# Patient Record
Sex: FEMALE | Race: WHITE | NOT HISPANIC OR LATINO | Employment: PART TIME | URBAN - METROPOLITAN AREA
[De-identification: names, ages, dates, MRNs, and addresses within clinical notes are randomized per-mention and may not be internally consistent; named-entity substitution may affect disease eponyms.]

---

## 2017-01-10 ENCOUNTER — TELEPHONE (OUTPATIENT)
Dept: PSYCHIATRY | Facility: CLINIC | Age: 22
End: 2017-01-10

## 2017-01-10 NOTE — TELEPHONE ENCOUNTER
----- Message from Elzbieta Reyes sent at 1/10/2017  8:47 AM CST -----  Regarding: Message/Anne  Contact: [phone]  Savanna (mom) is caller. She'd like to see about getting Genesite testing done for the pt at her next med check. She asked for a call back if you have any questions about this request.

## 2017-01-13 ENCOUNTER — OFFICE VISIT (OUTPATIENT)
Dept: PSYCHIATRY | Facility: CLINIC | Age: 22
End: 2017-01-13
Attending: PSYCHOLOGIST
Payer: COMMERCIAL

## 2017-01-13 DIAGNOSIS — F25.1 SCHIZOAFFECTIVE DISORDER, DEPRESSIVE TYPE (H): Primary | ICD-10-CM
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
## 2017-01-20 ENCOUNTER — OFFICE VISIT (OUTPATIENT)
Dept: PSYCHIATRY | Facility: CLINIC | Age: 22
End: 2017-01-20
Attending: PSYCHOLOGIST
Payer: COMMERCIAL

## 2017-01-20 DIAGNOSIS — F25.1 SCHIZOAFFECTIVE DISORDER, DEPRESSIVE TYPE (H): Primary | ICD-10-CM
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
## 2017-01-27 ENCOUNTER — OFFICE VISIT (OUTPATIENT)
Dept: PSYCHIATRY | Facility: CLINIC | Age: 22
End: 2017-01-27
Attending: PSYCHOLOGIST
Payer: COMMERCIAL

## 2017-01-27 DIAGNOSIS — F25.1 SCHIZOAFFECTIVE DISORDER, DEPRESSIVE TYPE (H): Primary | ICD-10-CM

## 2017-01-27 NOTE — PROGRESS NOTES
[facility]  Therapy Progress Note    Patient Name:  Cesia Nettles   :   1995    Date:   2016     Diagnostic Codes:  Schizoaffective Disorder, depressive type  Type of Session:  Individual psychotherapy  Length of Session:   57 minutes  Practitioner:   Esequiel Worthy, Ph.D.  Supervisor:   Chrissy Herrera Psy.D.    Narrative Description of Session:    The patient was seen by Esequiel Worthy, Ph.D., Postdoctoral Associate. The patient arrived twenty minutes early for her appointment, was casually dressed, and presented with appropriate grooming and hygiene.  Patient s mood was depressed and with congruent affect. No abnormalities in speech or thought content were noted, and thought processes were goal-directed. Patient denied the presence of SI/HI, paranoia, AH, or VH.     Patient opened session by stating that she was feeling  a little down today,  and elaborated by further describing recalling various arguments she has been having with her father.  The clinician encouraged the patient to mentalize while also reassuring herself, which she seemed able to do with encouragement.     Patient also said she is unsure whether she is  clicking  with her new DBT program.  An ensuing discussion revealed that the patient does not feel attended to throughout the groups, and would like more individual attention. Clinician reminded the patient about the importance of using effective assertiveness skills, and helped the patient rehearse effective ways of sharing her feelings with the .     Assessment:    Appearance:  Appropriately groomed, casually dressed  Behavior/relationship to examiner/demeanor:  Cooperative, pleasant  Motor activity/EPS:  Normal  Gait:  Normal  Speech rate:  Normal   Speech volume:  Normal  Speech articulation:  Normal  Speech coherence:  Normal  Speech spontaneity:  Normal  Mood (subjective report):  Depressed  Affect (objective appearance):  Depressed   Thought Process (Associations):   Goal-directed  Thought process (Rate): Normal   Thought content:  Normal  Abnormal Perception:  Denies current AH/VH/ paranoia  Insight:  Good insight  Judgment:  Fair    Plan: Continue discussing stressful interpersonal events, encouraging perspective-taking and use of DBT skills, and increasing self-awareness should continue to be helpful.   I did not see this pt directly. This pt is discussed with me in individual psychotherapy supervision, and I agree with the plan as documented. Chrissy Herrera Psy.D. , L.P.

## 2017-02-02 ENCOUNTER — OFFICE VISIT (OUTPATIENT)
Dept: PSYCHIATRY | Facility: CLINIC | Age: 22
End: 2017-02-02
Attending: PSYCHIATRY & NEUROLOGY
Payer: COMMERCIAL

## 2017-02-02 ENCOUNTER — TRANSFERRED RECORDS (OUTPATIENT)
Dept: HEALTH INFORMATION MANAGEMENT | Facility: CLINIC | Age: 22
End: 2017-02-02

## 2017-02-02 VITALS
DIASTOLIC BLOOD PRESSURE: 76 MMHG | BODY MASS INDEX: 33.88 KG/M2 | SYSTOLIC BLOOD PRESSURE: 115 MMHG | WEIGHT: 216.4 LBS | HEART RATE: 105 BPM

## 2017-02-02 DIAGNOSIS — R45.851 SUICIDAL IDEATION: ICD-10-CM

## 2017-02-02 DIAGNOSIS — F25.1 SCHIZOAFFECTIVE DISORDER, DEPRESSIVE TYPE (H): ICD-10-CM

## 2017-02-02 DIAGNOSIS — F20.3 SCHIZOPHRENIA, UNDIFFERENTIATED (H): Primary | ICD-10-CM

## 2017-02-02 LAB
ALBUMIN SERPL-MCNC: 3.9 G/DL (ref 3.4–5)
ALP SERPL-CCNC: 102 U/L (ref 40–150)
ALT SERPL W P-5'-P-CCNC: 23 U/L (ref 0–50)
ANION GAP SERPL CALCULATED.3IONS-SCNC: 6 MMOL/L (ref 3–14)
AST SERPL W P-5'-P-CCNC: 24 U/L (ref 0–45)
BILIRUB SERPL-MCNC: 0.3 MG/DL (ref 0.2–1.3)
BUN SERPL-MCNC: 9 MG/DL (ref 7–30)
CALCIUM SERPL-MCNC: 9.1 MG/DL (ref 8.5–10.1)
CHLORIDE SERPL-SCNC: 106 MMOL/L (ref 94–109)
CHOLEST SERPL-MCNC: 186 MG/DL
CO2 SERPL-SCNC: 27 MMOL/L (ref 20–32)
CREAT SERPL-MCNC: 0.78 MG/DL (ref 0.52–1.04)
ERYTHROCYTE [DISTWIDTH] IN BLOOD BY AUTOMATED COUNT: 13.9 % (ref 10–15)
GFR SERPL CREATININE-BSD FRML MDRD: NORMAL ML/MIN/1.7M2
GLUCOSE SERPL-MCNC: 87 MG/DL (ref 70–99)
HBA1C MFR BLD: 4.9 % (ref 4.3–6)
HCT VFR BLD AUTO: 38.2 % (ref 35–47)
HDLC SERPL-MCNC: 38 MG/DL
HGB BLD-MCNC: 12.3 G/DL (ref 11.7–15.7)
LDLC SERPL CALC-MCNC: 109 MG/DL
MCH RBC QN AUTO: 27.3 PG (ref 26.5–33)
MCHC RBC AUTO-ENTMCNC: 32.2 G/DL (ref 31.5–36.5)
MCV RBC AUTO: 85 FL (ref 78–100)
NONHDLC SERPL-MCNC: 148 MG/DL
PLATELET # BLD AUTO: 274 10E9/L (ref 150–450)
POTASSIUM SERPL-SCNC: 4.3 MMOL/L (ref 3.4–5.3)
PROT SERPL-MCNC: 7.7 G/DL (ref 6.8–8.8)
RBC # BLD AUTO: 4.51 10E12/L (ref 3.8–5.2)
SODIUM SERPL-SCNC: 139 MMOL/L (ref 133–144)
TRIGL SERPL-MCNC: 195 MG/DL
WBC # BLD AUTO: 6.8 10E9/L (ref 4–11)

## 2017-02-02 PROCEDURE — 99212 OFFICE O/P EST SF 10 MIN: CPT | Mod: ZF

## 2017-02-02 PROCEDURE — 85027 COMPLETE CBC AUTOMATED: CPT | Performed by: PSYCHIATRY & NEUROLOGY

## 2017-02-02 PROCEDURE — 83036 HEMOGLOBIN GLYCOSYLATED A1C: CPT | Performed by: PSYCHIATRY & NEUROLOGY

## 2017-02-02 PROCEDURE — 36415 COLL VENOUS BLD VENIPUNCTURE: CPT | Performed by: PSYCHIATRY & NEUROLOGY

## 2017-02-02 PROCEDURE — 80061 LIPID PANEL: CPT | Performed by: PSYCHIATRY & NEUROLOGY

## 2017-02-02 PROCEDURE — 80053 COMPREHEN METABOLIC PANEL: CPT | Performed by: PSYCHIATRY & NEUROLOGY

## 2017-02-02 RX ORDER — LAMOTRIGINE 100 MG/1
100 TABLET ORAL DAILY
Qty: 30 TABLET | Refills: 3 | Status: SHIPPED | OUTPATIENT
Start: 2017-02-02 | End: 2017-07-07

## 2017-02-02 RX ORDER — BENZTROPINE MESYLATE 1 MG/1
1 TABLET ORAL AT BEDTIME
Qty: 30 TABLET | Refills: 6 | Status: SHIPPED | OUTPATIENT
Start: 2017-02-02 | End: 2017-09-26

## 2017-02-02 RX ORDER — FLUOXETINE 40 MG/1
80 CAPSULE ORAL AT BEDTIME
Qty: 60 CAPSULE | Refills: 5 | Status: SHIPPED | OUTPATIENT
Start: 2017-02-02 | End: 2017-09-07

## 2017-02-02 RX ORDER — PERPHENAZINE 4 MG/1
TABLET ORAL
Qty: 210 TABLET | Refills: 5 | Status: SHIPPED | OUTPATIENT
Start: 2017-02-02 | End: 2017-04-14

## 2017-02-02 NOTE — MR AVS SNAPSHOT
After Visit Summary   2/2/2017    Cesia Nettles    MRN: [id number]           Patient Information     Date Of Birth          1995        Visit Information        Provider Department      2/2/2017 8:00 AM Mary Rodríguez MD [facility]        Today's Diagnoses     Schizophrenia, undifferentiated (H)    -  1     Schizoaffective disorder, depressive type         Suicidal ideation           Care Instructions    Please have mom call this number to check if genesight testing is covered by insurance - [phone]. If so, we will get this done at you next appointment.     Thank you for coming to [facility].    Lab Testing:  **If you had lab testing today and your results are reassuring or normal they will be mailed to you or sent through [employer] within 7 days.   **If the lab tests need quick action we will call you with the results.  The phone number we will call with results is # [phone] (home) . If this is not the best number please call our clinic and change the number.    Medication Refills:  If you need any refills please call your pharmacy and they will contact us. Please allow three business for refill processing.   If you need to  your refill at a new pharmacy, please contact the new pharmacy directly. The new pharmacy will help you get your medications transferred.     Scheduling:  If you have any concerns about today's visit or wish to schedule another appointment please call our office during normal business hours [phone] (8-5:00 M-F)    Contact Us:  Please call [phone] during business hours (8-5:00 M-F).  If after clinic hours, or on the weekend, please call  [phone].      MENTAL HEALTH CRISIS NUMBERS:  [facility]:   [facility] - [phone]   Crisis Residence [facility] [place] - [facility] - [phone]   Walk-In Counseling [facility] [place] - [phone]   COPE 24/7 [employer] Team for Adults - [[phone]];  Child - [[phone]]     Crisis Connection - [phone]     [facility]:   [facility] - [phone]   Walk-in counseling [facility] - [phone]   Walk-in counseling [facility] - [phone]   Crisis Residence [facility] - [phone]   Urgent Care Adult Mental Health:   --Drop-in, 24/7 crisis line, and [place] Mobile Team [[phone]]    CRISIS TEXT LINE: Text 150-030 from anywhere, anytime, any crisis 24/7;    OR SEE www.crisistextline.org     Poison Control Center - [phone]    CHILD: [facility] needs assessment team - [phone]     [facility] Lif[facility] - [phone]; or [employer] Lifeline - [phone]    If you have a medical emergency please call 911or go to the nearest ER.                    _____________________________________________    Again thank you for choosing [facility] and please let us know how we can best partner with you to improve you and your family's health.  You may be receiving a survey in the mail regarding this appointment. We would love to have your feedback, both positive and negative, so please fill out the survey and return it using the provided envolpe. The survey is done by an external company, so your answers are anonymous.           Follow-ups after your visit        Your next 10 appointments already scheduled     Apr 14, 2017  8:00 AM   Schizophrenia Follow Up with Mary Rodríguez MD   Psychiatry Clinic ([facility])    [address] [facility] [address] [phone]              Who to contact     Please call your clinic at [phone] to:    Ask questions about your health    Make or cancel appointments    Discuss your medicines    Learn about your test results    Speak to your doctor   If you have compliments or concerns about an experience at your clinic, or if you wish to file a complaint, please  contact [facility] Patient Relations at [phone] or email us at [email or web address]         Additional Information About Your Visit        [employer] Information     [employer] is an electronic gateway that provides easy, online access to your medical records. With [employer], you can request a clinic appointment, read your test results, renew a prescription or communicate with your care team.     To sign up for [employer] visit the website at [email or web address]   You will be asked to enter the access code listed below, as well as some personal information. Please follow the directions to create your username and password.     Your access code is: [email or web address]  Expires: 5/3/2017  8:44 AM     Your access code will  in 90 days. If you need help or a new code, please contact your [facility] Clinic or call [phone] for assistance.        Care EveryWhere ID     This is your Care EveryWhere ID. This could be used by other organizations to access your [place] medical records  [id number]        Your Vitals Were     Pulse                   105            Blood Pressure from Last 3 Encounters:   17 115/76   10/05/16 129/79   16 111/73    Weight from Last 3 Encounters:   17 98.158 kg (216 lb 6.4 oz)   10/05/16 94.62 kg (208 lb 9.6 oz)   16 95.981 kg (211 lb 9.6 oz)              Today, you had the following     No orders found for display         Where to get your medicines      These medications were sent to [facility] [place] - [address], [place] [address]     Phone:  [phone]    - benztropine 1 MG tablet  - FLUoxetine 40 MG capsule  - lamoTRIgine 100 MG tablet  - metFORMIN 500 MG tablet  - perphenazine 4 MG tablet       Primary Care Provider Office Phone # Fax #    Becki YAHIR Valencia [phone]       [facility] [address] [place] [address]         Thank you!     Thank you for choosing [facility]  for your care. Our goal is always to provide you with excellent care. Hearing back from our patients is one way we can continue to improve our services. Please take a few minutes to complete the written survey that you may receive in the mail after your visit with us. Thank you!             Your Updated Medication List - Protect others around you: Learn how to safely use, store and throw away your medicines at www.disposemymeds.org.          This list is accurate as of: 2/2/17  8:44 AM.  Always use your most recent med list.                   Brand Name Dispense Instructions for use    benztropine 1 MG tablet    COGENTIN    30 tablet    Take 1 tablet (1 mg) by mouth At Bedtime       FLUoxetine 40 MG capsule    PROzac    60 capsule    Take 2 capsules (80 mg) by mouth At Bedtime       lamoTRIgine 100 MG tablet    LaMICtal    30 tablet    Take 1 tablet (100 mg) by mouth daily       metFORMIN 500 MG tablet    GLUCOPHAGE    60 tablet    Take 1 tablet (500 mg) by mouth 2 times daily (with meals)       perphenazine 4 MG tablet     210 tablet    Take 12 mg (3 tabs) PO every morning and 16 mg (4 tabs) PO every night.

## 2017-02-02 NOTE — PATIENT INSTRUCTIONS
Please have mom call this number to check if genesight testing is covered by insurance - [phone]. If so, we will get this done at you next appointment.     Thank you for coming to [facility].    Lab Testing:  **If you had lab testing today and your results are reassuring or normal they will be mailed to you or sent through [employer] within 7 days.   **If the lab tests need quick action we will call you with the results.  The phone number we will call with results is # [phone] (home) . If this is not the best number please call our clinic and change the number.    Medication Refills:  If you need any refills please call your pharmacy and they will contact us. Please allow three business for refill processing.   If you need to  your refill at a new pharmacy, please contact the new pharmacy directly. The new pharmacy will help you get your medications transferred.     Scheduling:  If you have any concerns about today's visit or wish to schedule another appointment please call our office during normal business hours [phone] (8-5:00 M-F)    Contact Us:  Please call [phone] during business hours (8-5:00 M-F).  If after clinic hours, or on the weekend, please call  [phone].      MENTAL HEALTH CRISIS NUMBERS:  [facility]:   [facility] - [phone]   Crisis Residence [facility] Residence - [phone]   Walk-In Counseling [facility] - [phone]   COPE 24/7 [facility] Mobile Team for Adults - [[phone]]; Child - [[phone]]     Crisis Connection - [phone]     [facility]:   [facility] - [phone]   Walk-in counseling [facility] - [phone]   Walk-in counseling [facility] - [phone]   Crisis Residence [facility] - [phone]   Urgent Care Adult Mental Health:   --Drop-in, 24/7 crisis line, and [facility] Mobile Team [[phone]]    CRISIS TEXT LINE: Text  307-399 from anywhere, anytime, any crisis 24/7;    OR SEE www.crisistextline.org     Poison Control Center - [phone]    CHILD: [facility] needs assessment team - [phone]     [facility] - [phone]; or [employer] Lifeline - [phone]    If you have a medical emergency please call 911or go to the nearest ER.                    _____________________________________________    Again thank you for choosing [facility] and please let us know how we can best partner with you to improve you and your family's health.  You may be receiving a survey in the mail regarding this appointment. We would love to have your feedback, both positive and negative, so please fill out the survey and return it using the provided envolpe. The survey is done by an external company, so your answers are anonymous.

## 2017-02-02 NOTE — NURSING NOTE
Chief Complaint   Patient presents with     Recheck Medication   Schizoaffective disorder, depressive type     Reviewed allergies, smoking status, and pharmacy preference  Administered abuse screening questions   Obtained weight, blood pressure and heart rate

## 2017-02-02 NOTE — PROGRESS NOTES
"PSYCHIATRY CLINIC PROGRESS NOTE   30 minute medication management   The initial DIAG EVAL was 8/18/14.  Date of most recent Transfer Evaluation is 07/08/2016.    INTERIM HISTORY                                                 PSYCH CRITICAL ITEM HISTORY:  suicide attempts x2, suicidal ideation, psychosis [sxs include VAH, paranoia, ideas of refernce], mutiple psychotropic trials and psych hosp (3-5).   Cesia Nettles is a 21 year old female who was last seen in clinic on 12/1/2016 at which time no changes were made.  The patient reports good treatment adherence.  History was provided by pt who was a good historian.  Since the last visit: She notes that she started DBT 2 weeks ago, she goes there 3 x/week. Has more structure to her day and not feeling as bored. Continues to see her therapist once a week. Had some \"what if\" suicidal thoughts yesterday when she was about to sleep; passive, intermittent, no intent or plan. Since going up on bedtime Perphenazine; has had less voices; no side effects either. Mood has been stable. Relationships with  residents are ok; apart from \"one girl that gets really scary when she is angry.\" Working on talking to staff more; not isolating so much.   Excited to be going to [place] at the end of March with her mom's side of the family to visit terence's brother. Boyfriend is doing well; coming to see her on Sunday. They have been together 8 months. Excited for some news from her cousin Anel - she is pregnant! Siblings are doing well. Shares an episode recently where she got dysregulated and over-reacted at home - working on utilizing coping skills more.  Labs are due today; she is fasting and will get them. Mom wanted her to discuss Genesight testing today as well; not sure if it is covered.  PSYCH ROS:   ANXIETY:  social anxiety and overwhelmed  DEPRESSION:  hypersomnia, low energy, poor concentration /memory, feeling worthless, suicidal ideation  and overwhelmed;  DENIES- " depressed mood, anhedonia, feeling worthless and feeling hopeless  DYSREGULATION:  mood dysregulation and over reactive;  DENIES- impulsive , physically agitated, aggression and violent behavior  PSYCHOSIS:  none;  DENIES- hallucinations, delusions, disorganized speech, disorganized behavior and negative symptoms including avolition, affective flattening, anhedonia, alogia   Denies-suicidal ideation, SIB urges, malia and aggressive behavior    SUBSTANCE USE:     ALCOHOL-  rare       TOBACCO- none        CAFFEINE- no caffeine                      CANNABIS- none  OTHER ILLICIT DRUGS- none    MEDICAL ROS:  Reports  none  Denies  muscle twitches, excessive diaphoresis, restlessness, tremor and shiver, rash, hair loss , menstrual abnormality, skin/eye discoloration and bleeding or freq bruising and akathisia, dystonia, apparent TD, muscle stiffness and tremor [action or resting].       Financial Support- social security disability     Living Situation-  [employer] since 9/4/2014. Pt is youngest in . She is a HS graduate, mom, Savanna is legal guardian  Children- none    PSYCH and CD Critical Summary Points since July 2016 12/16 - Increased Perphenazine to 16 mg at bedtime due to ongoing psychotic symptoms    PAST PSYCH MED TRIALS   see EMR Problem List: Hx of psychiatric care    MEDICAL / SURGICAL HISTORY                                   MEDICAL TEAM:          PCP- Becki Valencia                   [facility]ist- [facility]  Legal guardian: Savanna Hastings (mother)    Pregnant or breastfeeding:  NO      Contraception- yes, Nexplanon 68 mg    Patient Active Problem List   Diagnosis     Schizoaffective disorder, depressive type (H)     Hx of psychiatric care       ALLERGY                                Review of patient's allergies indicates no known allergies.    MEDICATIONS                               Current Outpatient Prescriptions   Medication Sig Dispense  "Refill     perphenazine 4 MG tablet Take 12 mg (3 tabs) PO every morning and 16 mg (4 tabs) PO every night. 210 tablet 3     benztropine (COGENTIN) 1 MG tablet Take 1 tablet (1 mg) by mouth At Bedtime 30 tablet 6     FLUoxetine (PROZAC) 40 MG capsule Take 2 capsules (80 mg) by mouth At Bedtime 60 capsule 5     lamoTRIgine (LAMICTAL) 100 MG tablet Take 1 tablet (100 mg) by mouth daily 30 tablet 3     metFORMIN (GLUCOPHAGE) 500 MG tablet Take 1 tablet (500 mg) by mouth 2 times daily (with meals) 60 tablet 6       VITALS   /76 mmHg  Pulse 105  Wt 98.158 kg (216 lb 6.4 oz)   MENTAL STATUS EXAM                                                             Alertness: alert  and oriented  Appearance: casually groomed and appearance was notable for stains on sweatshirt and pants, erythematous scab on chin  Behavior/Demeanor: cooperative and pleasant, with good  eye contact  Speech: normal and regular rate and rhythm  Language: intact and no problems  Psychomotor: normal or unremarkable  Mood: \"good\"  Affect: full range; was congruent to mood; was congruent to content  Thought Process/Associations: over-inclusive, mildly tangential but re-directable, but can answer questions directly  Thought Content:  Denies violent ideation and psychotic thought, endorses fleeting passive SI with no intent or plan.  Perception:  Reports auditory hallucinations (command-NO) and intermittent paranoia intermittently  Insight: fair  Judgment: fair  Cognition:  does appear grossly intact; formal cognitive testing was not done    LABS and DATA     PHQ9 TODAY = 16  PHQ-9 SCORE 2/25/2016 4/28/2016 7/8/2016   Total Score - - -   Total Score - - -   Total Score 14 13 12       PSYCHIATRIC DIAGNOSES                                                                                                 Schizoaffective disorder, depressed type  R/o cluster B traits    ASSESSMENT                                     Pertinent background: See Transfer " evaluation note dated above.  Additionally, her schizoaffective disorder is impacted by likely cluster B traits, and her symptoms can take a variable course depending on stressors, often interpersonal. She has been doing remarkably better since moving to a group home and especially with supported employment. (Before this transition, had several hospitalizations with frequent SI.) In general, she is doing much better in recent years than she has in the past.    Discussions critical to overall treatment are documented in notes from 7/7/2015    TODAY: Sudha is doing better today since dose adjustment in bedtime dose of Perphenazine last time. Psychotic symptoms are minimal and less distressing. She has started DBT which is crucial for her in dealing with stressors related to interpersonal difficulties, emotional dysregulation and intermittent passive SI. She is also seeing her psychologist Raudel Worthy weekly, here at the [facility] clinic and has good support from her family and boyfriend.  We will confirm that mom has ensured genesight testing is covered by insurance (number will be in AVS) and plan to get this done at next appointment. She has labs due today and will get them after her appointment.                       Psychotropic drug interactions:   Use of perphenazine and fluoxetine administration could increase risk of QT prolongation with concurrent. QTc (5/2014) 423 ms, and perphenazine dose has since been decreased.  Fluoxetine may raise serum levels of perphenazine as the latter is a 2D6 substrate, but benztropine, per Micromedex, may lower blood levels of perphenazine.  Use of perphenazine and benztropine may raise risk of anticholinergic SE. Risk mitigated by therapeutic response to current medication regimen, including good management of psychotic symptoms, as well as tolerance of medication regimen despite elevated pulse (no other features of anticholinergic toxidrome noted).    Management:  Monitoring for  adverse effects, routine vitals, routine labs, periodic EKGs, using lowest therapeutic dose of [perphenazine] and patient is aware of risks     PLAN                                                                                                       1) PSYCHOTROPIC MEDICATIONS:      - Continue Perphenazine 12 mg am and 16 mg at bedtime      - Continue Lamotrigine 100 mg daily      - Continue Fluoxetine 80 mg at bedtime      - Continue Cogentin 1 mg at bedtime      - Continue Metformin 500 mg BID    2) THERAPY: continue with Raudel    3) LABS NEXT DUE:  Neuroleptic labs due today, pt will get after appt.        RATING SCALES:    none    4) REFERRALS [CD, medical, other]: none    5) :  Continue standard services at [facility], [facility] worker and [facility] worker    6) RTC: 8 weeks.    7) CRISIS NUMBERS: Provided in AVS today  ONLY if a DARRIN PT: [facility] [phone] (clinic), [phone] (after hours)     TREATMENT RISK STATEMENT:  The risks, benefits, alternatives and potential adverse effects have been discussed and are understood by the patient/ patient's guardian. The pt understands the risks of using street drugs or alcohol.  There are no medical contraindications, the pt agrees to treatment with the ability to do so.  The patient understands to call 911 or come to the nearest ED if life threatening or urgent symptoms present.     RESIDENT:   Mary Rodríguez MD    Patient was seen and staffed by Dr. Lanier. Supervisor is Dr. Long.  I saw the patient with the resident, and participated in key portions of the service, including the mental status examination and developing the plan of care. I reviewed key portions of the history with the resident. I agree with the findings and plan as documented in this note.    Angie Lanier

## 2017-02-03 ASSESSMENT — PATIENT HEALTH QUESTIONNAIRE - PHQ9: SUM OF ALL RESPONSES TO PHQ QUESTIONS 1-9: 16

## 2017-02-15 ENCOUNTER — TELEPHONE (OUTPATIENT)
Dept: PSYCHIATRY | Facility: CLINIC | Age: 22
End: 2017-02-15

## 2017-02-15 NOTE — TELEPHONE ENCOUNTER
----- Message from Elzbieta Reyes sent at 2/15/2017 10:50 AM CST -----  Regarding: Message/Anne  Contact: [phone]  Brenda  Practitioner at foster care site, from [facility] is calling. She'll be faxing over an eligibility verification form for a reduced state ID fee. Dr. Rodríguez needs to fill out the form.

## 2017-02-17 NOTE — TELEPHONE ENCOUNTER
Signed & dated 2/16/17.  Faxed to Brenda at [employer] ([phone]).    Copy:  -sent to scanning  -retained in clinic until scanning is confirmed

## 2017-02-20 ENCOUNTER — TELEPHONE (OUTPATIENT)
Dept: PSYCHIATRY | Facility: CLINIC | Age: 22
End: 2017-02-20

## 2017-02-20 ENCOUNTER — OFFICE VISIT (OUTPATIENT)
Dept: PSYCHIATRY | Facility: CLINIC | Age: 22
End: 2017-02-20
Attending: PSYCHIATRY & NEUROLOGY
Payer: COMMERCIAL

## 2017-02-20 VITALS
DIASTOLIC BLOOD PRESSURE: 83 MMHG | SYSTOLIC BLOOD PRESSURE: 134 MMHG | BODY MASS INDEX: 34.27 KG/M2 | WEIGHT: 218.8 LBS | HEART RATE: 114 BPM

## 2017-02-20 DIAGNOSIS — F20.3 SCHIZOPHRENIA, UNDIFFERENTIATED (H): ICD-10-CM

## 2017-02-20 PROCEDURE — 99212 OFFICE O/P EST SF 10 MIN: CPT | Mod: ZF

## 2017-02-20 RX ORDER — HYDROXYZINE HYDROCHLORIDE 25 MG/1
TABLET, FILM COATED ORAL
Qty: 60 TABLET | Refills: 3 | Status: SHIPPED
Start: 2017-02-20 | End: 2017-12-11

## 2017-02-20 NOTE — NURSING NOTE
Chief Complaint   Patient presents with     Recheck Medication       Schizophrenia, undifferentiated     Reviewed allergies, smoking status, and pharmacy preference  Administered abuse screening questions   Obtained weight, blood pressure and heart rate

## 2017-02-20 NOTE — PROGRESS NOTES
PSYCHIATRY CLINIC PROGRESS NOTE   30 minute medication management   The initial DIAG EVAL was 14.  Date of most recent Transfer Evaluation is 2016.    INTERIM HISTORY                                                 PSYCH CRITICAL ITEM HISTORY:  suicide attempts x2, suicidal ideation, psychosis [sxs include VAH, paranoia, ideas of refernce], mutiple psychotropic trials and psych hosp (3-5).   Cesia Nettles is a 21 year old female who was last seen in clinic on 2016 at which time no changes were made. The patient reports good treatment adherence.  History was provided by pt who was a good historian.  Since the last visit: She notes that she has been having increased anxiety, mostly everyday usually at night. Feels like these started after she started DBT 2 weeks ago. Happening more these days, quite disabling. Practicing TIPP skills but useful to an extent.   She would like to get genesight testing today, states that mom will pay for these. Continues to see her therapist once a week. Saw Pranav yesterday, she went to [place] yesterday. Excited to be going to [place] at the end of March with her mom's side of the family to visit terence's brother.     Called mom on the phone to ensure she has explored insurance coverage for genesight testing; mom reports that her intention is to see if there are other anti-psychotics that can be used in lieu of perphenazine as she is worried that her dose may need to be increased - reports that Sudha had shared that she had believed that she had a conversation with her  grand-ma. Per mom, Sudha takes a while to get used to dose adjustments.    PSYCH ROS:   ANXIETY:  social anxiety, nervous/tense and fidgety/restless  DEPRESSION:  hypersomnia, low energy, poor concentration /memory, feeling worthless and overwhelmed;  DENIES- depressed mood, anhedonia, feeling worthless, suicidal ideation  and feeling hopeless  DYSREGULATION:  mood dysregulation and over reactive;   DENIES- impulsive , physically agitated, aggression and violent behavior  PSYCHOSIS:  pt's mom reports that pt had thought she had a conversation with her grand-ma, who is ;  DENIES- hallucinations, delusions, disorganized speech, disorganized behavior and negative symptoms including avolition, affective flattening, anhedonia, alogia   Denies-suicidal ideation, SIB urges, malia and aggressive behavior    SUBSTANCE USE:     ALCOHOL-  rare       TOBACCO- none        CAFFEINE- no caffeine                      CANNABIS- none  OTHER ILLICIT DRUGS- none    MEDICAL ROS:  Reports  none  Denies  muscle twitches, excessive diaphoresis, restlessness, tremor and shiver, rash, hair loss , menstrual abnormality, skin/eye discoloration and bleeding or freq bruising and akathisia, dystonia, apparent TD, muscle stiffness and tremor [action or resting].       Financial Support- social security disability     Living Situation-  [employer] since 2014. Pt is youngest in . She is a HS graduate, mom, Savanna is legal guardian  Children- none    PSYCH and CD Critical Summary Points since  - Increased Perphenazine to 16 mg at bedtime due to ongoing psychotic symptoms   - started hydroxyzine for anxiety    PAST PSYCH MED TRIALS   see EMR Problem List: Hx of psychiatric care    MEDICAL / SURGICAL HISTORY                                   MEDICAL TEAM:          PCP- Becki Valencia                   [facility]ist- [facility]  Legal guardian: Savanna Hastings (mother)    Pregnant or breastfeeding:  NO      Contraception- yes, Nexplanon 68 mg    Patient Active Problem List   Diagnosis     Schizoaffective disorder, depressive type (H)     Hx of psychiatric care       ALLERGY                                Review of patient's allergies indicates no known allergies.    MEDICATIONS                               Current Outpatient Prescriptions   Medication Sig  "Dispense Refill     perphenazine 4 MG tablet Take 12 mg (3 tabs) PO every morning and 16 mg (4 tabs) PO every night. 210 tablet 5     benztropine (COGENTIN) 1 MG tablet Take 1 tablet (1 mg) by mouth At Bedtime 30 tablet 6     FLUoxetine (PROZAC) 40 MG capsule Take 2 capsules (80 mg) by mouth At Bedtime 60 capsule 5     lamoTRIgine (LAMICTAL) 100 MG tablet Take 1 tablet (100 mg) by mouth daily 30 tablet 3     metFORMIN (GLUCOPHAGE) 500 MG tablet Take 1 tablet (500 mg) by mouth 2 times daily (with meals) 60 tablet 6       VITALS   /83  Pulse 114  Wt 99.2 kg (218 lb 12.8 oz)  BMI 34.27 kg/m2   MENTAL STATUS EXAM                                                             Alertness: alert  and oriented  Appearance: adequately groomed and casually groomed, erythematous scab on chin  Behavior/Demeanor: cooperative and pleasant, with good  eye contact  Speech: normal and regular rate and rhythm  Language: intact and no problems  Psychomotor: normal or unremarkable  Mood: \"good\"  Affect: full range; was congruent to mood; was congruent to content  Thought Process/Associations: over-inclusive, mildly tangential but re-directable, but can answer questions directly  Thought Content:  Denies violent ideation and psychotic thought, endorses a hx of chronic fleeting passive SI with no intent or plan.  Perception:  Reports auditory hallucinations (command-NO) and intermittent paranoia intermittently  Insight: fair  Judgment: fair  Cognition:  does appear grossly intact; formal cognitive testing was not done    LABS and DATA     PHQ9 TODAY = 12  PHQ-9 SCORE 4/28/2016 7/8/2016 2/2/2017   Total Score - - -   Total Score 13 12 16       PSYCHIATRIC DIAGNOSES                                                                                                 Schizoaffective disorder, depressed type  R/o cluster B traits    ASSESSMENT                                     Pertinent background: See Transfer evaluation note dated " above.  Additionally, her schizoaffective disorder is impacted by likely cluster B traits, and her symptoms can take a variable course depending on stressors, often interpersonal. She has been doing remarkably better since moving to a group home and especially with supported employment. (Before this transition, had several hospitalizations with frequent SI.) In general, she is doing much better in recent years than she has in the past.    Discussions critical to overall treatment are documented in notes from 7/7/2015    TODAY: Sudha is here to get a gensight testing. Confirmed with mom that insurance will cover and MA will  the outstanding cost. Mom would like psychotropics to be tested, to have more pharmacological options for management of residual psychosis. Per mom, Risperdal worked in the past, but with intolerable side effects. Will follow clinic procedure and get sample after visit.  With regards to anxiety, this could likely be exacerbated by ongoing work on DBT kills, which she is utilizing as much as she can. We will start PRN Hydroxyzine today; for this purpose, in the event that TIPP skills are insufficient. No other concerns.                    Psychotropic drug interactions:   Use of perphenazine and fluoxetine administration could increase risk of QT prolongation with concurrent. QTc (5/2014) 423 ms, and perphenazine dose has since been decreased.  Fluoxetine may raise serum levels of perphenazine as the latter is a 2D6 substrate, but benztropine, per Micromedex, may lower blood levels of perphenazine.  Use of perphenazine and benztropine may raise risk of anticholinergic SE. Risk mitigated by therapeutic response to current medication regimen, including good management of psychotic symptoms, as well as tolerance of medication regimen despite elevated pulse (no other features of anticholinergic toxidrome noted).    Management:  Monitoring for adverse effects, routine vitals, routine labs,  periodic EKGs, using lowest therapeutic dose of [perphenazine] and patient is aware of risks     PLAN                                                                                                       1) PSYCHOTROPIC MEDICATIONS:      - Continue Perphenazine 12 mg am and 16 mg at bedtime      - Continue Lamotrigine 100 mg daily      - Continue Fluoxetine 80 mg at bedtime      - Continue Cogentin 1 mg at bedtime      - Continue Metformin 500 mg BID      - Start Hydroxyzine 25 mg   tab BID PRN    2) THERAPY: continue with Raudel    3) LABS NEXT DUE:  Neuroleptic labs due 2/18       RATING SCALES:    none    4) REFERRALS [CD, medical, other]: none    5) :  Continue standard services at [facility], [facility] worker and [facility] worker    6) RTC: 8 weeks.    7) CRISIS NUMBERS: Provided in AVS today  ONLY if a EDUAR PT: [facility] [phone] (clinic), [phone] (after hours)     TREATMENT RISK STATEMENT:  The risks, benefits, alternatives and potential adverse effects have been discussed and are understood by the patient/ patient's guardian. The pt understands the risks of using street drugs or alcohol.  There are no medical contraindications, the pt agrees to treatment with the ability to do so.  The patient understands to call 911 or come to the nearest ED if life threatening or urgent symptoms present.     RESIDENT:   Mary Rodríguez MD    Patient was seen and staffed by Dr. Long. Supervisor is Dr. Long.    I saw the patient with the resident, and participated in key portions of the service, including the mental status examination and developing the plan of care. I reviewed key portions of the history with the resident. I agree with the findings and plan as documented in this note.    Angelic Long MD

## 2017-02-20 NOTE — TELEPHONE ENCOUNTER
-Pt arrived to clinic accompanied by herself (had appt with Dr. Rodríguez immediately prior to sample collection).   -Genetic testing ordered by Dr. Rodríguez for psychotropic medications.  -Buccal swab obtained from pt.  -[employer] consent form signed by pt.  -Copy of insurance card obtained ([place] printed from media tab, HP printed from insurance tab found in chart header).  -All information and samples sealed in envelope provided by [employer].  -Envelope given to clinic intake dept for mailing.  -Will contact pt once results have been rec'd.    Per Dr. Lazar RT in 6-8 weeks.

## 2017-02-20 NOTE — MR AVS SNAPSHOT
After Visit Summary   2/20/2017    Cesia Nettles    MRN: [id number]           Patient Information     Date Of Birth          1995        Visit Information        Provider Department      2/20/2017 2:30 PM Mary Rodríguez MD [facility]ATRY      Today's Diagnoses     Schizophrenia, undifferentiated (H)           Follow-ups after your visit        Follow-up notes from your care team     Return in about 2 months (around 4/20/2017).      Your next 10 appointments already scheduled     Mar 03, 2017  2:00 PM CST   Adult Psychotherapy with Esequiel Worthy, PhD   Psychiatry Clinic ([facility])    [address]  [address] [place] [address]   [phone]            Mar 24, 2017  2:00 PM CDT   Adult Psychotherapy with Esequiel Worthy, PhD   Psychiatry Clinic ([facility])    [address]  [address] [place] [address]   [phone]            Mar 31, 2017  2:00 PM CDT   Adult Psychotherapy with Esequiel Worthy, PhD   Psychiatry Clinic ([facility])    [address]  [address] [place] [address]   [phone]            Apr 07, 2017  2:00 PM CDT   Adult Psychotherapy with Esequiel Worthy, PhD   Psychiatry Clinic ([facility])    [address]  [address] [place] [address]   [phone]            Apr 10, 2017  2:30 PM CDT   Schizophrenia Follow Up with Mary Rodríguez MD   Psychiatry Clinic ([facility])    [address]  [address] [place] [address]   [phone]            Apr 14, 2017  8:00 AM CDT   Schizophrenia Follow Up with Mary Rodríguez MD   Psychiatry Clinic ([facility])    [address]  [address] [place] [address]   [phone]            Apr 14, 2017   2:00 PM CDT   Adult Psychotherapy with Esequiel Worthy PhD   Psychiatry Clinic ([facility])    [address]  [address] [facility] [address]   [phone]            2017  2:00 PM CDT   Adult Psychotherapy with Esequiel Worthy PhD   Psychiatry Clinic ([facility])    [facility]  [address] [facility] [address]  [address] [facility] [address] [phone]            2017  2:00 PM CDT   Adult Psychotherapy with Esequiel Worthy PhD   Psychiatry Clinic ([facility])    [address]  [address] [facility] [id number] [phone]              Who to contact     Please call your clinic at [phone] to:    Ask questions about your health    Make or cancel appointments    Discuss your medicines    Learn about your test results    Speak to your doctor   If you have compliments or concerns about an experience at your clinic, or if you wish to file a complaint, please contact [facility] Patient Relations at [phone] or email us at [email or web address]         Additional Information About Your Visit        [employer] Information     freshbagt is an electronic gateway that provides easy, online access to your medical records. With [employer], you can request a clinic appointment, read your test results, renew a prescription or communicate with your care team.     To sign up for freshbagt visit the website at [email or web address]   You will be asked to enter the access code listed below, as well as some personal information. Please follow the directions to create your username and password.     Your access code is: [email or web address]  Expires: 5/3/2017  8:44 AM     Your access code will  in 90 days. If you need help or a new code, please contact your [facility] or call [phone] for assistance.        Care EveryWhere ID     This  is your Care EveryWhere ID. This could be used by other organizations to access your [facility] medical records  [id number]        Your Vitals Were     Pulse BMI (Body Mass Index)                114 34.27 kg/m2           Blood Pressure from Last 3 Encounters:   02/20/17 134/83   02/02/17 115/76   10/05/16 129/79    Weight from Last 3 Encounters:   02/20/17 99.2 kg (218 lb 12.8 oz)   02/02/17 98.2 kg (216 lb 6.4 oz)   10/05/16 94.6 kg (208 lb 9.6 oz)              Today, you had the following     No orders found for display         Today's Medication Changes          These changes are accurate as of: 2/20/17 11:59 PM.  If you have any questions, ask your nurse or doctor.               Start taking these medicines.        Dose/Directions    hydrOXYzine 25 MG tablet   Commonly known as:  ATARAX   Used for:  Schizophrenia, undifferentiated (H)   Started by:  Mary Rodríguez MD        start Hydroxyzine 25 mg ? tab BID PRN   Quantity:  60 tablet   Refills:  3            Where to get your medicines      These medications were [place] [facility] [place] [address], [place] [address]     Phone:  [phone]     hydrOXYzine 25 MG tablet                Primary Care Provider Office Phone # Fax #    Becki Valencia [phone]       [facility] [address] [place] [address]        Thank you!     Thank you for choosing PSYCHIATRY CLINIC  for your care. Our goal is always to provide you with excellent care. Hearing back from our patients is one way we can continue to improve our services. Please take a few minutes to complete the written survey that you may receive in the mail after your visit with us. Thank you!             Your Updated Medication List - Protect others around you: Learn how to safely use, store and throw away your medicines at www.disposemymeds.org.          This list is accurate as of: 2/20/17 11:59 PM.  Always use your  most recent med list.                   Brand Name Dispense Instructions for use    benztropine 1 MG tablet    COGENTIN    30 tablet    Take 1 tablet (1 mg) by mouth At Bedtime       FLUoxetine 40 MG capsule    PROzac    60 capsule    Take 2 capsules (80 mg) by mouth At Bedtime       hydrOXYzine 25 MG tablet    ATARAX    60 tablet    start Hydroxyzine 25 mg ? tab BID PRN       lamoTRIgine 100 MG tablet    LaMICtal    30 tablet    Take 1 tablet (100 mg) by mouth daily       metFORMIN 500 MG tablet    GLUCOPHAGE    60 tablet    Take 1 tablet (500 mg) by mouth 2 times daily (with meals)       perphenazine 4 MG tablet     210 tablet    Take 12 mg (3 tabs) PO every morning and 16 mg (4 tabs) PO every night.

## 2017-02-21 ASSESSMENT — PATIENT HEALTH QUESTIONNAIRE - PHQ9: SUM OF ALL RESPONSES TO PHQ QUESTIONS 1-9: 12

## 2017-02-24 ENCOUNTER — OFFICE VISIT (OUTPATIENT)
Dept: PSYCHIATRY | Facility: CLINIC | Age: 22
End: 2017-02-24
Attending: PSYCHOLOGIST
Payer: COMMERCIAL

## 2017-02-24 DIAGNOSIS — F25.1 SCHIZOAFFECTIVE DISORDER, DEPRESSIVE TYPE (H): Primary | ICD-10-CM

## 2017-02-24 NOTE — MR AVS SNAPSHOT
After Visit Summary   2/24/2017    Cesia Nettles    MRN: [id number]           Patient Information     Date Of Birth          1995        Visit Information        Provider Department      2/24/2017 2:00 PM Esequiel Worthy, PhD Psychiatry Clinic        Today's Diagnoses     Schizoaffective disorder, depressive type    -  1       Follow-ups after your visit        Your next 10 appointments already scheduled     Mar 03, 2017  2:00 PM CST   Adult Psychotherapy with Esequiel Worthy PhD   Psychiatry Clinic ([facility])    [address] [place] [address]  [address] [place] [address]   [phone]            Mar 24, 2017  2:00 PM CDT   Adult Psychotherapy with Esequiel Worthy, PhD   Psychiatry Clinic ([facility])    [address] [place] [address]  [address] [place] [address]   [phone]            Mar 31, 2017  2:00 PM CDT   Adult Psychotherapy with Esequiel Worthy, PhD   Psychiatry Clinic ([facility])    [address] [address]  [address] [place] [address]   [phone]            Apr 07, 2017  2:00 PM CDT   Adult Psychotherapy with Esequiel Worthy PhD   Psychiatry Clinic ([facility])    [address] [place] [address]  [address] [place] [address]   [phone]            Apr 10, 2017  2:30 PM CDT   Schizophrenia Follow Up with Mary Rodríguez MD   Psychiatry Clinic ([facility])    [address] [address]  [address] [place] [address]   [phone]            Apr 14, 2017  8:00 AM CDT   Schizophrenia Follow Up with Mary Rodríguez MD   Psychiatry Clinic ([facility])    [address] [address]  [address] [place] [address]   [phone]            Apr 14, 2017  2:00 PM CDT   Adult Psychotherapy with Esequiel Worthy PhD   Psychiatry Clinic ([facility])     [address]  [address] [facility] [address]   [phone]            2017  2:00 PM CDT   Adult Psychotherapy with Esequiel Worthy, PhD   Psychiatry Clinic ([facility])    [address]  [address] [facility] [address]   [phone]            2017  2:00 PM CDT   Adult Psychotherapy with Esequiel Worthy PhD   Psychiatry Clinic ([facility])    [address]  [address] [facility] [address]   [phone]              Who to contact     Please call your clinic at [phone] to:    Ask questions about your health    Make or cancel appointments    Discuss your medicines    Learn about your test results    Speak to your doctor   If you have compliments or concerns about an experience at your clinic, or if you wish to file a complaint, please contact [facility] Patient Relations at [phone] or email us at [email or web address]         Additional Information About Your Visit        [employer] Information     [employer] is an electronic gateway that provides easy, online access to your medical records. With [employer], you can request a clinic appointment, read your test results, renew a prescription or communicate with your care team.     To sign up for [employer] visit the website at [email or web address]   You will be asked to enter the access code listed below, as well as some personal information. Please follow the directions to create your username and password.     Your access code is: [email or web address]  Expires: 5/3/2017  8:44 AM     Your access code will  in 90 days. If you need help or a new code, please contact your [facility] or call [phone] for assistance.        Care EveryWhere ID     This is your Care EveryWhere ID. This could be used by other organizations to access your [place]  medical records  [id number]         Blood Pressure from Last 3 Encounters:   02/20/17 134/83   02/02/17 115/76   10/05/16 129/79    Weight from Last 3 Encounters:   02/20/17 99.2 kg (218 lb 12.8 oz)   02/02/17 98.2 kg (216 lb 6.4 oz)   10/05/16 94.6 kg (208 lb 9.6 oz)              Today, you had the following     No orders found for display       Primary Care Provider Office Phone # Fax #    Becki Valencia [phone]       [facility] [address] [address]        Thank you!     Thank you for choosing PSYCHIATRY CLINIC  for your care. Our goal is always to provide you with excellent care. Hearing back from our patients is one way we can continue to improve our services. Please take a few minutes to complete the written survey that you may receive in the mail after your visit with us. Thank you!             Your Updated Medication List - Protect others around you: Learn how to safely use, store and throw away your medicines at www.disposemymeds.org.          This list is accurate as of: 2/24/17 11:59 PM.  Always use your most recent med list.                   Brand Name Dispense Instructions for use    benztropine 1 MG tablet    COGENTIN    30 tablet    Take 1 tablet (1 mg) by mouth At Bedtime       FLUoxetine 40 MG capsule    PROzac    60 capsule    Take 2 capsules (80 mg) by mouth At Bedtime       hydrOXYzine 25 MG tablet    ATARAX    60 tablet    start Hydroxyzine 25 mg ? tab BID PRN       lamoTRIgine 100 MG tablet    LaMICtal    30 tablet    Take 1 tablet (100 mg) by mouth daily       metFORMIN 500 MG tablet    GLUCOPHAGE    60 tablet    Take 1 tablet (500 mg) by mouth 2 times daily (with meals)       perphenazine 4 MG tablet     210 tablet    Take 12 mg (3 tabs) PO every morning and 16 mg (4 tabs) PO every night.

## 2017-02-24 NOTE — PROGRESS NOTES
[facility]  Therapy Progress Note    Patient Name:  Cesia Nettles   :   1995    Date:   2016     Diagnostic Codes:  Schizoaffective Disorder, depressive type  Type of Session:  Individual psychotherapy  Length of Session:   57 minutes  Practitioner:   Esequiel Worthy, Ph.D.  Supervisor:   Chrissy Herrera Psy.D.    Narrative Description of Session:    The patient was seen by Esequiel Worthy, Ph.D., Postdoctoral Associate. The patient arrived thirty minutes early for her appointment and was dressed casually with appropriate grooming/hygiene. Her mood was joyful with anxious affect.  Her thought pattern was at times tangential as she struggled to identify therapeutic topics to discuss. Patient denied any significant paranoia.      Patient shared that things have been going  really well  for her at home after one of her roommate s recently moved out (for the time being). That is, the patient shared that she feels as though she can be  calmer  and less threatened when this roommate is not home. The patient demonstrated an increased ability to mentalize as means to understand the roommate s internal struggles and conflicts.     Time was also spent helping the patient understand actions she has taken to improve her experience at her current DBT group. Initially, the patient said things have gotten better  on their own,  but she was able to take ownership of things she has done to maximize the utility of her group and have a more enjoyable experience.     Assessment:    Appearance:  Appropriately groomed, casually dressed  Behavior/relationship to examiner/demeanor:  Cooperative, pleasant  Motor activity/EPS:  Normal  Gait:  Normal  Speech rate:  Normal   Speech volume:  Normal  Speech articulation:  Normal  Speech coherence:  Normal  Speech spontaneity:  Normal  Mood (subjective report):  Joyful  Affect (objective appearance):  Anxious   Thought Process (Associations):  Goal-directed  Thought process (Rate):  Normal   Thought content:  Normal  Abnormal Perception:  Denies current AH/VH/paranoia  Insight:  Fair   Judgment:  Fair    Plan: Continue working with the patient on improving mentalization, flexible thinking, emotion regulation skills, and effective interpersonal skills.    I did not see this pt directly. This pt is discussed with me in individual psychotherapy supervision, and I agree with the plan as documented. Chrissy Herrera Psy.D. , L.P.

## 2017-03-01 NOTE — TELEPHONE ENCOUNTER
The following specific information is now requested from [employer] online ordering process:    Please list which of the following tests you would like ordered:   Psychotropic    The diagnoses in the patient's problem list correct?   Schizophrenia, undifferentiated (H)  F20.3       Third, Please answer the following questions:  1. Has the patient failed or are they currently failing at least one neuropsychiatric medication?  Yes  2. List the medication(s) that the patient has failed or is currently failing:   Hydroxyzine  Perphenazine  Fluoxetine  lamotrgine  3. Is the patient on multiple medications for his/her condition which increases the risk for adverse drug events?  Yes  4. Is the patient suspected of abuse and/or diversion with their current medication regimen?  No    Routed to Intake to enter in [employer].

## 2017-03-02 DIAGNOSIS — F25.1 SCHIZOAFFECTIVE DISORDER, DEPRESSIVE TYPE (H): Primary | ICD-10-CM

## 2017-03-03 ENCOUNTER — TELEPHONE (OUTPATIENT)
Dept: PSYCHIATRY | Facility: CLINIC | Age: 22
End: 2017-03-03

## 2017-03-03 ENCOUNTER — TRANSFERRED RECORDS (OUTPATIENT)
Dept: HEALTH INFORMATION MANAGEMENT | Facility: CLINIC | Age: 22
End: 2017-03-03

## 2017-03-03 NOTE — TELEPHONE ENCOUNTER
Writer rec'd photocopied results of [employer] testing.      Routed to Dr. Rodríguez to:  1.  Verify if she has rec'd color copy of results  2.  Ask if she would like pt to review with her or have pt meet for [facility] visit to review results.     Copy:  -sent to scanning  -retained in clinic until scanning is confirmed

## 2017-03-03 NOTE — PROGRESS NOTES
Patient Name:  Cesia Nettles   :   1995    Date:   2017      Diagnostic Codes:  Schizoaffective Disorder, depressive type  Type of Session:  Individual psychotherapy  Length of Session:   57 minutes  Practitioner:   Esequiel Worthy, Ph.D.  Supervisor:   Chrissy Herrera Psy.D.     Narrative Description of Session:    The patient was seen by Esequiel Worthy, Ph.D., Postdoctoral Associate. The patient arrived thirty minutes early for her appointment and was dressed casually with appropriate grooming/hygiene. Her mood was joyful with anxious affect. Her thought pattern was at times tangential as she struggled to identify therapeutic topics to discuss. Patient denied any significant paranoia.      Patient shared that things have been going  really well  for her at home after one of her roommate s recently moved out (for the time being). That is, the patient shared that she feels as though she can be  calmer  and less threatened when this roommate is not home. The patient demonstrated an increased ability to mentalize as means to understand the roommate s internal struggles and conflicts.      Time was also spent helping the patient understand actions she has taken to improve her experience at her current DBT group. Initially, the patient said things have gotten better  on their own,  but she was able to take ownership of things she has done to maximize the utility of her group and have a more enjoyable experience.      Assessment:    Appearance:  Appropriately groomed, casually dressed  Behavior/relationship to examiner/demeanor:  Cooperative, pleasant  Motor activity/EPS:  Normal  Gait:  Normal  Speech rate:  Normal   Speech volume:  Normal  Speech articulation:  Normal  Speech coherence:  Normal  Speech spontaneity:  Normal  Mood (subjective report):  Joyful  Affect (objective appearance):  Anxious   Thought Process (Associations):  Goal-directed  Thought process (Rate): Normal   Thought content:   Normal  Abnormal Perception:  Denies current AH/VH/paranoia  Insight:  Fair   Judgment:  Fair     Plan: Continue working with the patient on improving mentalization, flexible thinking, emotion regulation skills, and effective interpersonal skills.     I did not see this pt directly. This pt is discussed with me in individual psychotherapy supervision, and I agree with the plan as documented. Chrissy Herrera Psy.D. , L.P.

## 2017-03-06 NOTE — TELEPHONE ENCOUNTER
Dr. Rodríguez has already placed MTM referral to:  Discuss results of [employer] testing for psychotropics

## 2017-03-06 NOTE — TELEPHONE ENCOUNTER
LVM for pt:  Informed her results are in and the plan is for her and her mom/guardian to meet with a pharmacist to discuss.  Informed her writer will be calling here mom as well.     Called pt's mom/guardiant Savanna:  Informed her of the plan and she is looking forward to discussing the results.        -Routed to Pharmacists Gabbi and Delicia to verify they've rec'd the referral.   -Routed to DONOVAN Enciso.

## 2017-03-06 NOTE — TELEPHONE ENCOUNTER
Mary Rodríguez MD Flaa, Sandrita STUART RN        Caller: Unspecified (3 days ago,  4:20 PM)                     -I have not received a copy yet.  -I put in a MTM consult yesterday to have pt review with the pharm D's. Mom ideally should be present.

## 2017-03-06 NOTE — TELEPHONE ENCOUNTER
Copy of results placed in Dr. Rodríguez's folder.  Writer will notify pt's mom and follow-up on MTM referral.

## 2017-03-09 ENCOUNTER — OFFICE VISIT (OUTPATIENT)
Dept: PHARMACY | Facility: CLINIC | Age: 22
End: 2017-03-09
Payer: COMMERCIAL

## 2017-03-09 VITALS — DIASTOLIC BLOOD PRESSURE: 81 MMHG | SYSTOLIC BLOOD PRESSURE: 124 MMHG | HEART RATE: 109 BPM

## 2017-03-09 DIAGNOSIS — L08.9 LOCAL INFECTION OF SKIN AND SUBCUTANEOUS TISSUE: ICD-10-CM

## 2017-03-09 DIAGNOSIS — K02.9 DENTAL CARIES: ICD-10-CM

## 2017-03-09 DIAGNOSIS — F41.9 ANXIETY: ICD-10-CM

## 2017-03-09 DIAGNOSIS — F25.1 SCHIZOAFFECTIVE DISORDER, DEPRESSIVE TYPE (H): Primary | ICD-10-CM

## 2017-03-09 DIAGNOSIS — F32.89 OTHER DEPRESSION: ICD-10-CM

## 2017-03-09 DIAGNOSIS — G44.219 EPISODIC TENSION-TYPE HEADACHE, NOT INTRACTABLE: ICD-10-CM

## 2017-03-09 DIAGNOSIS — G25.81 RESTLESS LEG SYNDROME: ICD-10-CM

## 2017-03-09 PROCEDURE — 99607 MTMS BY PHARM ADDL 15 MIN: CPT | Performed by: PHARMACIST

## 2017-03-09 PROCEDURE — 99605 MTMS BY PHARM NP 15 MIN: CPT | Performed by: PHARMACIST

## 2017-03-09 RX ORDER — KETOCONAZOLE 20 MG/G
1 CREAM TOPICAL DAILY PRN
COMMUNITY
End: 2019-11-24

## 2017-03-09 RX ORDER — BACITRACIN ZINC 500 [USP'U]/G
1 OINTMENT TOPICAL DAILY PRN
COMMUNITY
End: 2019-11-24

## 2017-03-09 NOTE — Clinical Note
Dr. Rodríguez,  Thank you for referring this patient for MTM!  We reviewed her medications and Genesight results today- please see my note for details.  Pt and her mother seem interested in a discussion about switching medications, but I was clear that they needed to discuss any changes with you.    She does have some gene-drug interactions, but it mostly seems that Cesia has not seen much benefit from increasing doses of perphenazine over the last few months- we discussed options that did not have gene-drug interactions, one of which was paliperidone.  Additionally, she may benefit from increasing metformin to help with appetite suppression, as it does look like her weight has been steadily increasing.  Her next appointment with you is scheduled for 4/10/17, but they thought they would like to get a sooner appointment if possible to discuss any changes.  I let them know that you would call with next steps.  Please see my note for details and let me know how I can help.  Thanks! Delicia

## 2017-03-09 NOTE — PATIENT INSTRUCTIONS
Recommendations from today's MTM visit:                                                    Today we reviewed what your medicines are for, how to know if they are working, that your medicines are safe and how to make your medicine regimen as easy as possible.     1. I will talk to Dr. Rodríguez about your Genesight results and possibly increasing metformin    Please call with any questions!    Next MTM visit: as needed    To schedule another MTM appointment, please call the clinic directly or you may call the MTM scheduling line at [phone] or toll-free at [phone].     My Clinical Pharmacist's contact information:                                                      It was a pleasure seeing you today!  Please feel free to contact me with any questions or concerns you have.     Delicia Hubbard, Elena  [facility]  Phone: [phone]    You may receive a survey about the MTM services you received.  I would appreciate your feedback to help me serve you better in the future. Please fill it out and return it when you can. Your comments will be anonymous.

## 2017-03-09 NOTE — PROGRESS NOTES
"SUBJECTIVE/OBJECTIVE:                                                    Cesia Nettles is a 21 year old female coming in for an initial visit for Medication Therapy Management.  She was referred to me from Dr. Rodríguez. She is seen today with her mother.    Chief Complaint: Genesight review.    Allergies/ADRs: Reviewed in Epic  Tobacco: No tobacco use   Alcohol: not currently using  PMH: Reviewed in Epic    Medication Adherence: no issues reported    Schizoaffective/Depression/Anxiety:  Pt takes lamotrigine 100mg daily, fluoxetine 80mg QHS (varying doses since age 13), perphenazine 12mg QAM and 16mg QPM (been on x 4 years), which was increased a couple months ago from 12mg BID.  Pt feels as though there have been more breakthrough auditory hallucinations over the past year, mostly over the last few months, and does not feel as though symptoms have improved since the perphenazine increase. Pt does experience drowsiness throughout the day, but sleeps well through the night.  Pt was recently started on hydroxyzine 12.5mg BID prn anxiety, which she has only needed a couple times since being started on 2/20/17, but reported that it has been helpful.  Both patient and her mother reported that depression symptoms were not controlled with fluoxetine 80mg until lamotrigine was added.  They are interested in switching medications, as they do not feel that continued increases of perphenazine have been very helpful.    Pt has taken metformin 500mg BID x a \"couple years\" to help prevent increases in blood glucose.  Pt and mother do not believe it has prevented any weight gain.  Mom reported that she did have some diarrhea upon initiation, but it resolved.  No current side effects.    Past meds: Abilify 30mg- no benefit, risperidone 3mg- very effective, but dulling, not effective at 1mg or 2mg; Seroquel- delusions returned quickly  Pt reported no other past antidepressants    Restless legs: Pt has been taking benztropine 1mg QHS " "for \"many years.\"  She reported that she currently has no issues with restless legs or other EPS-type symptoms.  No side effects.    Dental Caries Prevention: Patient uses Prevident 5000 1.1% for brushing teeth BID.  No side effects.  Skin Infection: Pt reported having small infections on skin every once in awhile that turn into scabs.  She uses ketoconazole 2% cream and bacitracin ointment prn, which she reportedly finds helpful.  No side effects.    Headache: Pt has acetaminophen 325mg available prn (325-650mg K1fvddp), which she reported works well with one dose.  No side effects.    Current labs include:  BP Readings from Last 3 Encounters:   17 124/81   17 134/83   17 115/76     Lab Results   Component Value Date    A1C 4.9 2017   .  Lab Results   Component Value Date    CHOL 186 2017     Lab Results   Component Value Date    TRIG 195 2017     Lab Results   Component Value Date    HDL 38 2017     Lab Results   Component Value Date     2017     TSH   Date Value Ref Range Status   12/15/2015 0.98 0.20 - 4.50 mcU/mL Final     ASSESSMENT:                                                       Current medications were reviewed today.     Medication Adherence: no issues identified  Schizoaffective/Depression/Anxiety:    Reviewed Genesight results with patient.  Discussed that results will not tell us which drugs will work but can give us some insight into dosing and side effects based on individual metabolism of each medication. Given current therapy, notable Genesight results include:  1. CYP2D6 poor metabolizer- no activity  2. CYP2C9 poor metabolizer- reduced activity  3. CY ultrarapid metabolizer    Genesight results indicate that patient may have higher than anticipated levels of perphenazine and fluoxetine, though clinically, patient has needed increasing doses for symptom control.  She does, however, have side effects from perphenazine, including " sedation and metabolic effects and may benefit from switching medications if she continues to not have improvement with perphenazine dose increases.  If a medication change is warranted, she may benefit from paliperidone, an active metabolite of risperidone that does not have any gene-drug interactions.  (Risperidone had been very helpful in the past but caused mental dulling.)  Additionally, pt may benefit from reducing fluoxetine and maximizing lamotrigine, as lamotrigine seems to have been more beneficial.  Lastly, unrelated to Genesight results, pt may benefit from increasing metformin to 1000mg BID for appetite suppression- last A1c WNL (4.9).  Metabolic monitoring labs (lipids, weight, A1c, BP) are up to date.    Restless legs: Stable.  Dental Caries Prevention: Stable.  Skin Infection: Continue current medications as needed.  Headache: Stable.    PLAN:                                                      1. Consider increasing metformin to 1000mg BID for appetite suppression.  2. Consider medication changes based on Genesight results (see assessment- perphenazine and fluoxetine).    I spent 60 minutes with this patient today.  A copy of the visit note was provided to the patient's primary care psychiatrist.    Will follow up as needed.    The patient was given a summary of these recommendations as an after visit summary.     Delicia Hubbard, PharmD  [facility]  Phone: [phone]

## 2017-03-09 NOTE — MR AVS SNAPSHOT
After Visit Summary   3/9/2017    Cesia Nettles    MRN: [id number]           Patient Information     Date Of Birth          1995        Visit Information        Provider Department      3/9/2017 1:00 PM PSYCH [facility]        Care Instructions    Recommendations from today's MTM visit:                                                    Today we reviewed what your medicines are for, how to know if they are working, that your medicines are safe and how to make your medicine regimen as easy as possible.     1. I will talk to Dr. Rodríguez about your Genesight results and possibly increasing metformin    Please call with any questions!    Next MTM visit: as needed    To schedule another MTM appointment, please call the clinic directly or you may call the MTM scheduling line at [phone] or toll-free at [phone].     My Clinical Pharmacist's contact information:                                                      It was a pleasure seeing you today!  Please feel free to contact me with any questions or concerns you have.     Delicia Hubbard, Elena  [facility]  Phone: [phone]    You may receive a survey about the MTM services you received.  I would appreciate your feedback to help me serve you better in the future. Please fill it out and return it when you can. Your comments will be anonymous.            Follow-ups after your visit        Your next 10 appointments already scheduled     Mar 24, 2017  2:00 PM CDT   Adult Psychotherapy with Esequiel Worthy, PhD   Psychiatry Clinic ([place] [id number] [facility] [id number] [phone]            Mar 31, 2017  2:00 PM CDT   Adult Psychotherapy with Esequiel Worthy, PhD   Psychiatry Clinic ([address]  [address] [facility] [id number] [phone]             Apr 07, 2017  2:00 PM CDT   Adult Psychotherapy with Esequiel Worthy, PhD   Psychiatry Clinic ([facility])    [address]  [facility] [address]   [phone]            Apr 10, 2017  2:30 PM CDT   Schizophrenia Follow Up with Mary Rodríguez MD   Psychiatry Clinic ([facility])    [address]  [address] [place] [address]   [phone]            Apr 14, 2017  8:00 AM CDT   Schizophrenia Follow Up with Mary Rodríguez MD   Psychiatry Clinic ([facility])    [address]  [address] [place] [address]   [phone]            Apr 14, 2017  2:00 PM CDT   Adult Psychotherapy with Esequiel Worthy PhD   Psychiatry Clinic ([facility])    [address]  [address] [place] [address] [phone]            Apr 21, 2017  2:00 PM CDT   Adult Psychotherapy with Esequiel Worthy, PhD   Psychiatry Clinic ([facility])    [address]  [address] [place] [address] [phone]            Apr 28, 2017  2:00 PM CDT   Adult Psychotherapy with Esequiel Worthy, PhD   Psychiatry Clinic ([facility])    [address]  [address] [place] [address] [phone]              Who to contact     If you have questions or need follow up information about today's clinic visit or your schedule please contact [facility] directly at [phone].  Normal or non-critical lab and imaging results will be communicated to you by MyChart, letter or phone within 4 business days after the clinic has received the results. If you do not hear from us within 7 days, please contact the clinic through MyChart or phone. If you have a critical or abnormal lab result, we will notify you by phone as soon  "as possible.  Submit refill requests through [employer] or call your pharmacy and they will forward the refill request to us. Please allow 3 business days for your refill to be completed.          Additional Information About Your Visit        [employer] Makeup Information     [employer] lets you send messages to your doctor, view your test results, renew your prescriptions, schedule appointments and more. To sign up, go to [email or web address] . Click on \"Log in\" on the left side of the screen, which will take you to the Welcome page. Then click on \"Sign up Now\" on the right side of the page.     You will be asked to enter the access code listed below, as well as some personal information. Please follow the directions to create your username and password.     Your access code is: [email or web address]  Expires: 5/3/2017  8:44 AM     Your access code will  in 90 days. If you need help or a new code, please call your [facility] clinic or [phone].        Care EveryWhere ID     This is your Care EveryWhere ID. This could be used by other organizations to access your [facility] medical records  [id number]         Blood Pressure from Last 3 Encounters:   17 134/83   17 115/76   10/05/16 129/79    Weight from Last 3 Encounters:   17 218 lb 12.8 oz (99.2 kg)   17 216 lb 6.4 oz (98.2 kg)   10/05/16 208 lb 9.6 oz (94.6 kg)              Today, you had the following     No orders found for display       Primary Care Provider Office Phone # Fax #    Becki YAHIR [facility] [phone]       [facility] [address] [place] [address]        Thank you!     Thank you for choosing [facility]  for your care. Our goal is always to provide you with excellent care. Hearing back from our patients is one way we can continue to improve our services. Please take a few minutes to complete the written survey that you may receive in the mail after your visit with us. Thank you!      "        Your Updated Medication List - Protect others around you: Learn how to safely use, store and throw away your medicines at www.disposemymeds.org.          This list is accurate as of: 3/9/17  1:58 PM.  Always use your most recent med list.                   Brand Name Dispense Instructions for use    Acetaminophen 325 MG Caps      Take 325-650 mg by mouth every 4 hours as needed       bacitracin ointment      Apply 1 g topically daily as needed for wound care       benztropine 1 MG tablet    COGENTIN    30 tablet    Take 1 tablet (1 mg) by mouth At Bedtime       FLUoxetine 40 MG capsule    PROzac    60 capsule    Take 2 capsules (80 mg) by mouth At Bedtime       hydrOXYzine 25 MG tablet    ATARAX    60 tablet    start Hydroxyzine 25 mg ? tab BID PRN       ketoconazole 2 % cream    NIZORAL     Apply 1 applicator topically daily as needed for itching or irritation       lamoTRIgine 100 MG tablet    LaMICtal    30 tablet    Take 1 tablet (100 mg) by mouth daily       metFORMIN 500 MG tablet    GLUCOPHAGE    60 tablet    Take 1 tablet (500 mg) by mouth 2 times daily (with meals)       perphenazine 4 MG tablet     210 tablet    Take 12 mg (3 tabs) PO every morning and 16 mg (4 tabs) PO every night.       PREVIDENT 5000 BOOSTER 1.1 % Pste   Generic drug:  Sodium Fluoride      Apply 1 Dose to affected area 2 times daily

## 2017-03-15 ENCOUNTER — TELEPHONE (OUTPATIENT)
Dept: PSYCHIATRY | Facility: CLINIC | Age: 22
End: 2017-03-15

## 2017-03-15 NOTE — TELEPHONE ENCOUNTER
Returned call to pt's mom.  She and pt met with pharmacist to discuss genesight testing (see 3/9/17 encounter).  Pt is eager to move forward with changes.  Mom is wondering if pt needs an appointment to discuss these changes or if they can be discussed over the phone.  PEN 4/10/17.      Routed to Dr. Olea to advise.       Will follow-up with pt's mom.

## 2017-03-15 NOTE — TELEPHONE ENCOUNTER
Mary Rodríguez MD Flaa, Sandrita STUART RN        Caller: Unspecified (Today,  1:39 PM)                       -I can either call mom to discuss potential changes on the phone on Monday   -or have them come in Friday 3/24 - I have an opening at 3 pm - they can make an appt now if they would like that.   -I would prefer the 3/24 face to face - and that is also 2 weeks before their next scheduled appt.

## 2017-03-15 NOTE — TELEPHONE ENCOUNTER
Called mom.  Pt's starts a new job next week so her availability is unknown.  Pt's mom works Friday afternoons so she can't attend.  A phone call on Monday is preferred.  Best times to call mom ar before 10AM or after 3PM but Dr. Rodríguez can call at any time and she may be available.

## 2017-03-15 NOTE — TELEPHONE ENCOUNTER
----- Message from Elzbieta Reyes sent at 3/14/2017  2:31 PM CDT -----  Regarding: Meds/Nwroselyniti  Contact: [phone]  Savanna (mom) is calling. She'd like to f/u after meeting with the pharmacist last week on the med change.

## 2017-03-22 NOTE — TELEPHONE ENCOUNTER
Message  Received: Today       Shahida Cleveland Jodi L, RN       Phone Number: [phone]                       Mom, Savanna, is caller. Would like to talk to someone about the med change that is going to be happening. Says that they will be out of town next week, so would like to talk to someone THIS week.

## 2017-03-23 ENCOUNTER — TELEPHONE (OUTPATIENT)
Dept: PSYCHIATRY | Facility: CLINIC | Age: 22
End: 2017-03-23

## 2017-03-23 NOTE — TELEPHONE ENCOUNTER
Called momSavanna (Sudha's legal guardian)  to discuss genesight results. Sudha started a new job this week and they have been working on reality testing for her recent paranoia.    We discussed optimizing Lamictal and decreasing Fluoxetine and cross-tapering Perphenazine with Paliperidone. Sudha and mom would love to start the anti-psychotic cross-taper first, preferably when they are back from their trip to [place]. I will touch base with mom prior to the appt to ensure the plan still stands. No other concerns.    Mary Rodríguez M.D. M.P.H   PGY-3 Psychiatry Resident

## 2017-04-06 ENCOUNTER — TELEPHONE (OUTPATIENT)
Dept: PSYCHIATRY | Facility: CLINIC | Age: 22
End: 2017-04-06

## 2017-04-14 ENCOUNTER — OFFICE VISIT (OUTPATIENT)
Dept: PSYCHIATRY | Facility: CLINIC | Age: 22
End: 2017-04-14
Attending: PSYCHIATRY & NEUROLOGY
Payer: COMMERCIAL

## 2017-04-14 VITALS
WEIGHT: 214.2 LBS | SYSTOLIC BLOOD PRESSURE: 136 MMHG | DIASTOLIC BLOOD PRESSURE: 76 MMHG | BODY MASS INDEX: 33.55 KG/M2 | HEART RATE: 75 BPM

## 2017-04-14 DIAGNOSIS — F20.3 SCHIZOPHRENIA, UNDIFFERENTIATED (H): ICD-10-CM

## 2017-04-14 PROCEDURE — 99212 OFFICE O/P EST SF 10 MIN: CPT | Mod: ZF

## 2017-04-14 RX ORDER — PALIPERIDONE 3 MG/1
TABLET, EXTENDED RELEASE ORAL
Qty: 53 TABLET | Refills: 3 | Status: SHIPPED | OUTPATIENT
Start: 2017-04-14 | End: 2017-06-15

## 2017-04-14 RX ORDER — PERPHENAZINE 4 MG/1
TABLET ORAL
Qty: 210 TABLET | Refills: 5 | Status: SHIPPED | OUTPATIENT
Start: 2017-04-14 | End: 2017-04-17

## 2017-04-14 NOTE — PROGRESS NOTES
"PSYCHIATRY CLINIC PROGRESS NOTE   30 minute medication management   The initial DIAG EVAL was 8/18/14.  Date of most recent Transfer Evaluation is 07/08/2016.    INTERIM HISTORY                                                 PSYCH CRITICAL ITEM HISTORY:  suicide attempts x2, suicidal ideation, psychosis [sxs include VAH, paranoia, ideas of refernce], mutiple psychotropic trials and psych hosp (3-5).   Cesia Nettles is a 22 year old female who was last seen in clinic on 2/2/2016 at which time no changes were made. The patient reports good treatment adherence.  History was provided by pt who was a good historian.  Since the last visit: She notes that she has been more stressed out due to a new job which has been overwhelming - she will be quitting this week as it is nit a good  fit. Continues to hear voices which are derogatary; yesterday was feeling bad about herself, was feeling like nobody cared about her. Went to the concert with Pranav, birthday went well. Continues to see Alexys her therapist for support.  Called mom to discuss planned taper and ensure that was still the goal of today's visit.    PSYCH ROS:   ANXIETY:  social anxiety, nervous/tense, overwhelmed and with new job  DEPRESSION:  depressed mood, hypersomnia, low energy, poor concentration /memory, feeling worthless, suicidal ideation [intermittent, passive, \"what if's\"] and overwhelmed;  DENIES- anhedonia, feeling worthless and feeling hopeless  DYSREGULATION:  mood dysregulation and over reactive;  DENIES- impulsive , physically agitated, aggression and violent behavior  PSYCHOSIS:  percpetual disturbance [auditory hallucinations (yes;  command-NO;  following command-NO];  DENIES- delusions, paranoia and ideas of reference     SUBSTANCE USE:     ALCOHOL-  rare       TOBACCO- none        CAFFEINE- no caffeine                      CANNABIS- none  OTHER ILLICIT DRUGS- none    MEDICAL ROS:  Reports  none  Denies  muscle twitches, excessive diaphoresis, " restlessness, tremor and shiver, rash, hair loss , menstrual abnormality, skin/eye discoloration and bleeding or freq bruising and akathisia, dystonia, apparent TD, muscle stiffness and tremor [action or resting].       Financial Support- social security disability     Living Situation-  - [employer] [facility] since 9/4/2014. Pt is youngest in . She is a HS graduate, mom, Savanna is legal guardian  Children- none    PSYCH and CD Critical Summary Points since July 2016 12/16 - Increased Perphenazine to 16 mg at bedtime due to ongoing psychotic symptoms  2/17 - started hydroxyzine for anxiety    PAST PSYCH MED TRIALS   see EMR Problem List: Hx of psychiatric care    MEDICAL / SURGICAL HISTORY                                   MEDICAL TEAM:          PCP- Becki Valencia                   Therapist- Cheikh Sierra [facility] [place] [facility]  Legal guardian: Savanna Hastings (mother)    Pregnant or breastfeeding:  NO      Contraception- yes, Nexplanon 68 mg    Patient Active Problem List   Diagnosis     Schizoaffective disorder, depressive type (H)     Hx of psychiatric care       ALLERGY                                Review of patient's allergies indicates no known allergies.    MEDICATIONS                               Current Outpatient Prescriptions   Medication Sig Dispense Refill     Sodium Fluoride (PREVIDENT 5000 BOOSTER) 1.1 % PSTE Apply 1 Dose to affected area 2 times daily       ketoconazole (NIZORAL) 2 % cream Apply 1 applicator topically daily as needed for itching or irritation       bacitracin ointment Apply 1 g topically daily as needed for wound care       Acetaminophen 325 MG CAPS Take 325-650 mg by mouth every 4 hours as needed       hydrOXYzine (ATARAX) 25 MG tablet start Hydroxyzine 25 mg   tab BID PRN 60 tablet 3     perphenazine 4 MG tablet Take 12 mg (3 tabs) PO every morning and 16 mg (4 tabs) PO every night. 210 tablet 5     benztropine (COGENTIN) 1 MG tablet Take 1 tablet (1 mg)  "by mouth At Bedtime 30 tablet 6     FLUoxetine (PROZAC) 40 MG capsule Take 2 capsules (80 mg) by mouth At Bedtime 60 capsule 5     lamoTRIgine (LAMICTAL) 100 MG tablet Take 1 tablet (100 mg) by mouth daily 30 tablet 3     metFORMIN (GLUCOPHAGE) 500 MG tablet Take 1 tablet (500 mg) by mouth 2 times daily (with meals) 60 tablet 6       VITALS   /76  Pulse 75  Wt 97.2 kg (214 lb 3.2 oz)  BMI 33.55 kg/m2   MENTAL STATUS EXAM                                                             Alertness: alert  and oriented  Appearance: adequately groomed and casually groomed, erythematous scab on chin  Behavior/Demeanor: cooperative and pleasant, with good  eye contact  Speech: normal and regular rate and rhythm  Language: intact and no problems  Psychomotor: normal or unremarkable  Mood: \"ok\"  Affect: full range; was congruent to mood; was congruent to content  Thought Process/Associations: over-inclusive, mildly tangential but re-directable, can answer questions directly  Thought Content:  Denies violent ideation and psychotic thought, endorses a hx of chronic fleeting passive SI with no intent or plan.  Perception:  Reports auditory hallucinations (yes;  command-NO;  following command-NO  Insight: fair  Judgment: fair  Cognition:  does appear grossly intact; formal cognitive testing was not done    LABS and DATA     PHQ9 TODAY = 13  PHQ-9 SCORE 7/8/2016 2/2/2017 2/20/2017   Total Score - - -   Total Score 12 16 12       PSYCHIATRIC DIAGNOSES                                                                                                 Schizoaffective disorder, depressed type  R/o cluster B traits    ASSESSMENT                                     Pertinent background: See Transfer evaluation note dated above.  Additionally, her schizoaffective disorder is impacted by likely cluster B traits, and her symptoms can take a variable course depending on stressors, often interpersonal. She has been doing remarkably better " since moving to a group home and especially with supported employment. (Before this transition, had several hospitalizations with frequent SI.) In general, she is doing much better in recent years than she has in the past.    Discussions critical to overall treatment are documented in notes from 7/7/2015    TODAY: Sudha is here to start cross-taper of anti-psychotics due to limited benefits with Perphenazine and side efffects. Per gensight testing, Paliperidone would likely provide benefits similar to Risperidone which she had responded to in the past without the risk of gene-drug interaction. Confirmed with mom via phone that she is agreeable to this plan. We will decrease Perphenazine by 4 mg /week and start Paliperidone at 3 mg and titrate to 6 mg in 1 week. Perphenazine taper should take about 7 weeks (total dose of 28 mg). She is aware to monitor clinical response and call with any concerns during this cross taper. Future medication considerations would include optimizing lamotrigine and reducing Fluoxetine; due to more benefits with the former. No other concerns.                    Psychotropic drug interactions:   Use of perphenazine and fluoxetine administration could increase risk of QT prolongation with concurrent. QTc (5/2014) 423 ms, and perphenazine dose has since been decreased.  Fluoxetine may raise serum levels of perphenazine as the latter is a 2D6 substrate, but benztropine, per Micromedex, may lower blood levels of perphenazine.  Use of perphenazine and benztropine may raise risk of anticholinergic SE. Risk mitigated by therapeutic response to current medication regimen, including good management of psychotic symptoms, as well as tolerance of medication regimen despite elevated pulse (no other features of anticholinergic toxidrome noted).    Management:  Monitoring for adverse effects, routine vitals, routine labs, periodic EKGs, tapering off of [perphenazine] and patient is aware of risks     PLAN                                                                                                        1) PSYCHOTROPIC MEDICATIONS:      - We started Paliperidone 3 mg today, please go up to 6 mg (2 tabs) in a week and hold it till our next appt.      - We will decrease bedtime dose of Perphenazine by 4 mg/week starting today; this taper should take 7 weeks to complete.         - Continue Lamotrigine 100 mg daily      - Continue Fluoxetine 80 mg at bedtime      - Continue Cogentin 1 mg at bedtime      - Continue Metformin 500 mg BID      - Continue Hydroxyzine 25 mg   tab BID PRN    2) THERAPY: continue with Raudel    3) LABS NEXT DUE:  Neuroleptic labs due 2/18       RATING SCALES:    none    4) REFERRALS [CD, medical, other]: none    5) :  Continue standard services at [facility], [facility] worker and [facility] worker    6) RTC: 8 weeks.    7) CRISIS NUMBERS: Provided in AVS today  ONLY if a [facility] PT: [facility] [place] [facility] [phone] (clinic), [phone] (after hours)     TREATMENT RISK STATEMENT:  The risks, benefits, alternatives and potential adverse effects have been discussed and are understood by the patient/ patient's guardian. The pt understands the risks of using street drugs or alcohol.  There are no medical contraindications, the pt agrees to treatment with the ability to do so.  The patient understands to call 911 or come to the nearest ED if life threatening or urgent symptoms present.     RESIDENT:   Mary Rodríguez MD    Patient was seen and staffed by Dr. Long. Supervisor is Dr. Long.    I saw the patient with the resident, and participated in key portions of the service, including the mental status examination and developing the plan of care. I reviewed key portions of the history with the resident. I agree with the findings and plan as documented in this note.    Angelic Long MD

## 2017-04-14 NOTE — PATIENT INSTRUCTIONS
We started Paliperidone 3 mg today, please go up to 6 mg (2 tabs) in a week and hold it till our next appt. This medication has been sent to [facility]    We will decrease bedtime dose of Perphenazine by 4 mg/week starting today; this taper should take 7 weeks to complete. It doesn't matter which dose you decide to go down by.     We will re-assess in 8 weeks to decide if we need to go up on Paliperidone further.     Call with any questions    Thank you for coming to the PSYCHIATRY CLINIC.    Lab Testing:  **If you had lab testing today and your results are reassuring or normal they will be mailed to you or sent through [employer] within 7 days.   **If the lab tests need quick action we will call you with the results.  The phone number we will call with results is # [phone] (home) . If this is not the best number please call our clinic and change the number.    Medication Refills:  If you need any refills please call your pharmacy and they will contact us. Please allow three business for refill processing.   If you need to  your refill at a new pharmacy, please contact the new pharmacy directly. The new pharmacy will help you get your medications transferred.     Scheduling:  If you have any concerns about today's visit or wish to schedule another appointment please call our office during normal business hours [phone] (8-5:00 M-F)    Contact Us:  Please call [phone] during business hours (8-5:00 M-F).  If after clinic hours, or on the weekend, please call  [phone].      MENTAL HEALTH CRISIS NUMBERS:  [facility]:   [facility] - [phone]   Crisis Residence [facility] - [phone]   Walk-In Counseling [facility] - [phone]   COPE [facility] [place] Mobile Team for Adults - [[phone]]; Child - [[phone]]     Crisis Connection - [phone]     [facility]:   [facility] - [phone]   Walk-in counseling [facility]  [place] - [phone]   Walk-in counseling [facility] - [phone]   Crisis Residence [facility] - [phone]   Urgent Care Adult Mental Health:   --Drop-in, 24/7 crisis line, and [facility] Mobile Team [[phone]]    CRISIS TEXT LINE: Text [phone] from anywhere, anytime, any crisis 24/7;    OR SEE www.crisistextline.org     Poison Control Center - [phone]    CHILD: [facility] needs assessment team - [phone]     [facility] Lif[facility] - 1-434.550.6726; or [facility] - 1-568.122.4989    If you have a medical emergency please call 911or go to the nearest ER.                    _____________________________________________    Again thank you for choosing PSYCHIATRY CLINIC and please let us know how we can best partner with you to improve you and your family's health.  You may be receiving a survey in the mail regarding this appointment. We would love to have your feedback, both positive and negative, so please fill out the survey and return it using the provided envolpe. The survey is done by an external company, so your answers are anonymous.

## 2017-04-14 NOTE — MR AVS SNAPSHOT
After Visit Summary   4/14/2017    Cseia Nettles    MRN: [id number]           Patient Information     Date Of Birth          1995        Visit Information        Provider Department      4/14/2017 8:00 AM Mary Rodríguez MD Psychiatry Clinic        Today's Diagnoses     Schizophrenia, undifferentiated (H)          Care Instructions    We started Paliperidone 3 mg today, please go up to 6 mg (2 tabs) in a week and hold it till our next appt. This medication has been sent to [facility]    We will decrease bedtime dose of Perphenazine by 4 mg/week starting today; this taper should take 7 weeks to complete. It doesn't matter which dose you decide to go down by.     We will re-assess in 8 weeks to decide if we need to go up on Paliperidone further.     Call with any questions    Thank you for coming to the PSYCHIATRY CLINIC.    Lab Testing:  **If you had lab testing today and your results are reassuring or normal they will be mailed to you or sent through [employer] within 7 days.   **If the lab tests need quick action we will call you with the results.  The phone number we will call with results is # [phone] (home) . If this is not the best number please call our clinic and change the number.    Medication Refills:  If you need any refills please call your pharmacy and they will contact us. Please allow three business for refill processing.   If you need to  your refill at a new pharmacy, please contact the new pharmacy directly. The new pharmacy will help you get your medications transferred.     Scheduling:  If you have any concerns about today's visit or wish to schedule another appointment please call our office during normal business hours [phone] (8-5:00 M-F)    Contact Us:  Please call [phone] during business hours (8-5:00 M-F).  If after clinic hours, or on the weekend, please call  [phone].      MENTAL HEALTH CRISIS NUMBERS:  [facility]:   [facility]  [facility] - [phone]   Crisis Residence [facility] - [phone]   Walk-In Counseling Center [facility] - [phone]   COPE 24/7 [place] Mobile Team for Adults - [[phone]]; Child - [[phone]]     Crisis Connection - [phone]     [facility] - [phone]   Walk-in counseling [facility] - [phone]   Walk-in counseling [facility] - [phone]   Crisis Residence [facility] - [phone]   Urgent Care Adult Mental Health:   --Drop-in, 24/7 crisis line, and [facility] Mobile Team [[phone]]    CRISIS TEXT LINE: Text [phone] from anywhere, anytime, any crisis 24/7;    OR SEE www.crisistextline.org     Poison Control Center - [phone]    CHILD: [facility] needs assessment team - [phone]     [facility] - [phone]; or [facility] Lifeline - 1-159.855.5046    If you have a medical emergency please call 911or go to the nearest ER.                    _____________________________________________    Again thank you for choosing PSYCHIATRY CLINIC and please let us know how we can best partner with you to improve you and your family's health.  You may be receiving a survey in the mail regarding this appointment. We would love to have your feedback, both positive and negative, so please fill out the survey and return it using the provided envolpe. The survey is done by an external company, so your answers are anonymous.           Follow-ups after your visit        Your next 10 appointments already scheduled     Apr 20, 2017  2:00 PM CDT   Adult Psychotherapy with Esequiel Worthy, PhD   Psychiatry Clinic ([facility])    [address]  [address] [facility] [id number] [phone]            Apr 27, 2017  2:00 PM CDT   Adult Psychotherapy with Esequiel Worthy, PhD   Psychiatry Clinic ([facility])    [address]  [address] [place]  [address] [facility] [id number] [phone]            Jun 01, 2017  8:15 AM CDT   Schizophrenia Follow Up with Mary Rodríguez MD   Psychiatry Clinic ([facility])    [facility]  [address] [facility] [address]  [address] [facility] [id number] [phone]              Who to contact     Please call your clinic at [phone] to:    Ask questions about your health    Make or cancel appointments    Discuss your medicines    Learn about your test results    Speak to your doctor   If you have compliments or concerns about an experience at your clinic, or if you wish to file a complaint, please contact [facility] Patient Relations at [phone] or email us at [email or web address]         Additional Information About Your Visit        Care EveryWhere ID     This is your Care EveryWhere ID. This could be used by other organizations to access your [facility] medical records  [id number]        Your Vitals Were     Pulse BMI (Body Mass Index)                75 33.55 kg/m2           Blood Pressure from Last 3 Encounters:   04/14/17 136/76   03/09/17 124/81   02/20/17 134/83    Weight from Last 3 Encounters:   04/14/17 97.2 kg (214 lb 3.2 oz)   02/20/17 99.2 kg (218 lb 12.8 oz)   02/02/17 98.2 kg (216 lb 6.4 oz)              Today, you had the following     No orders found for display       Primary Care Provider Office Phone # Fax #    Becki YAHIR Valencia [phone]       [facility] [address] [place] [address]        Thank you!     Thank you for choosing PSYCHIATRY CLINIC  for your care. Our goal is always to provide you with excellent care. Hearing back from our patients is one way we can continue to improve our services. Please take a few minutes to complete the written survey that you may receive in the mail after your visit with us. Thank you!             Your Updated Medication List - Protect others  around you: Learn how to safely use, store and throw away your medicines at www.disposemymeds.org.          This list is accurate as of: 4/14/17  8:47 AM.  Always use your most recent med list.                   Brand Name Dispense Instructions for use    Acetaminophen 325 MG Caps      Take 325-650 mg by mouth every 4 hours as needed       bacitracin ointment      Apply 1 g topically daily as needed for wound care       benztropine 1 MG tablet    COGENTIN    30 tablet    Take 1 tablet (1 mg) by mouth At Bedtime       FLUoxetine 40 MG capsule    PROzac    60 capsule    Take 2 capsules (80 mg) by mouth At Bedtime       hydrOXYzine 25 MG tablet    ATARAX    60 tablet    start Hydroxyzine 25 mg ? tab BID PRN       ketoconazole 2 % cream    NIZORAL     Apply 1 applicator topically daily as needed for itching or irritation       lamoTRIgine 100 MG tablet    LaMICtal    30 tablet    Take 1 tablet (100 mg) by mouth daily       metFORMIN 500 MG tablet    GLUCOPHAGE    60 tablet    Take 1 tablet (500 mg) by mouth 2 times daily (with meals)       perphenazine 4 MG tablet     210 tablet    Take 12 mg (3 tabs) PO every morning and 16 mg (4 tabs) PO every night.       PREVIDENT 5000 BOOSTER 1.1 % Pste   Generic drug:  Sodium Fluoride      Apply 1 Dose to affected area 2 times daily

## 2017-04-17 ENCOUNTER — TELEPHONE (OUTPATIENT)
Dept: PSYCHIATRY | Facility: CLINIC | Age: 22
End: 2017-04-17

## 2017-04-17 DIAGNOSIS — F20.3 SCHIZOPHRENIA, UNDIFFERENTIATED (H): ICD-10-CM

## 2017-04-17 RX ORDER — PERPHENAZINE 4 MG/1
TABLET ORAL
Qty: 180 TABLET | Refills: 5 | Status: SHIPPED | OUTPATIENT
Start: 2017-04-17 | End: 2017-06-01

## 2017-04-17 NOTE — TELEPHONE ENCOUNTER
Per 4/14/17 Rx:  perphenazine 4 MG tablet 210 tablet 5 4/14/2017  No   Sig: Start taking 12 mg (3 tabs) PO every morning and 12mg (4 tabs) PO every night. We are decreasing by 4 mg every week starting today.              In review of notes it appears that writing 4 tabs was an error.  However, since the 4/14/17 note isn't yet signed writer will verify this with Dr. Rodríguez and follow-up with pharmacy.

## 2017-04-17 NOTE — TELEPHONE ENCOUNTER
Mary Rodríguez MD Flaa, Sandrita STUART RN        Caller: Unspecified (Today, 12:23 PM)                     It is 12 mg BID, so 3 tabs.      Re-sent Rx accordingly.

## 2017-04-17 NOTE — TELEPHONE ENCOUNTER
----- Message from Elzbieta Reyes sent at 4/17/2017  8:23 AM CDT -----  Regarding: Meds/Anne Bucio from [place]  Ph: [phone]    The Perfenadine 4mg directions say to take 12mg/4 tabs at night, but 4 tabs would be 16 mg. She'd like to clarify.

## 2017-04-18 ENCOUNTER — TELEPHONE (OUTPATIENT)
Dept: PSYCHIATRY | Facility: CLINIC | Age: 22
End: 2017-04-18

## 2017-04-18 ASSESSMENT — PATIENT HEALTH QUESTIONNAIRE - PHQ9: SUM OF ALL RESPONSES TO PHQ QUESTIONS 1-9: 17

## 2017-04-18 NOTE — TELEPHONE ENCOUNTER
Prior Authorization Retail Medication Request  Medication/Dose: Prior Authorization for Paliperidone ER (Invega 3 MG)  Diagnosis and ICD code:   New/Renewal/Insurance Change PA:   Previously Tried and Failed Therapies:     Insurance ID (if provided):  [employer] ID#  [id number]  Insurance Phone (if provided): [phone]/Fax: [phone]    Any additional info from fax request:     If you received a fax notification from an outside Pharmacy:  Pharmacy Name: [facility]  Pharmacy #: [phone]  Pharmacy Fax: [phone]

## 2017-04-19 ENCOUNTER — TELEPHONE (OUTPATIENT)
Dept: PSYCHIATRY | Facility: CLINIC | Age: 22
End: 2017-04-19

## 2017-04-19 NOTE — TELEPHONE ENCOUNTER
Medication Question  Received: Today       Gabbi Marie Jodi L RN       Phone Number: [phone]                     Brenda, mental health practitioner at [facility], is calling with questions regarding the patient's medications.  The patient was prescribed Invega and was supposed to be titrating off of perphenazine, but the Invega requires a prior authorization.  Brenda is wondering if they should wait to start the titration until the PA is approved?  She would like a call back at [phone].

## 2017-04-19 NOTE — LETTER
2017      Name:  Cesia Nettles  :  1995      To Whom It May Concern:    Cesia was recently prescribed Invega.  We have received notice that Invega needs a Prior Authorization.  We will complete this PA and submit it to insurance in hopes it will be approved for coverage.      In the meantime, please continue current dose of perphanzine.  The perphanize taper will start at the same time she starts Invega.     We will keep you updated on the status of the outcome of the Invega Prior Authorization.     Thank You,       Mary Rodríguez MD

## 2017-04-19 NOTE — TELEPHONE ENCOUNTER
Mary Rodríguez MD Flaa, Sandrita STUART RN        Caller: Unspecified (Today, 10:44 AM)                     Yes, lets wait till it gets approved. I am hoping she will still be able to start the taper this week.

## 2017-04-19 NOTE — TELEPHONE ENCOUNTER
I spoke with Brenda and let her know that Dr. Rodríguez would like to wait to start the taper until the Invega is approved. I let her know I would give her a call when we receive the decision letter from insurance. She stated understanding and then asked if there was any way to get a new script for perphenazine starting the same day as the Invega. I let her know I would forward on to see if that is possible. Message forwarded to Sandrita Moise RN. Alejandra Lowery LPN

## 2017-04-21 ENCOUNTER — TELEPHONE (OUTPATIENT)
Dept: PSYCHIATRY | Facility: CLINIC | Age: 22
End: 2017-04-21

## 2017-04-25 NOTE — TELEPHONE ENCOUNTER
Writer called patient and informed her of PA Approval for Paliperidone ER (Invega) 3 MG, called [facility] and left a message regarding PA Approval Rupinder Samayoa LPN

## 2017-04-25 NOTE — TELEPHONE ENCOUNTER
APPROVED  Effective dates 4/14/17 - 4/19/18    Routed to LINDA Bucio to notify pt & pharmacy.           Copy of completed PA and approval letter:  -sent to scanning  -retained in clinic until scanning is confirmed

## 2017-04-26 ENCOUNTER — TELEPHONE (OUTPATIENT)
Dept: PSYCHIATRY | Facility: CLINIC | Age: 22
End: 2017-04-26

## 2017-04-26 NOTE — TELEPHONE ENCOUNTER
Diagnosis for this med is: Schizophrenia, undifferentiated       Routed to Rupinder MCKEON to return call with this information.

## 2017-04-26 NOTE — TELEPHONE ENCOUNTER
----- Message from Shahida Cleveland sent at 4/26/2017  1:32 PM CDT -----  Anne/Sandrita Ahn from [place], is caller. She is wondering what the pt's diagnosis is for the invega    [phone]

## 2017-04-26 NOTE — TELEPHONE ENCOUNTER
Writer called [facility] and spoke with Anabelle and relayed the message. No further questions, she will update the patient's file. Rupinder Samayoa LPN

## 2017-04-27 ENCOUNTER — TELEPHONE (OUTPATIENT)
Dept: PSYCHIATRY | Facility: CLINIC | Age: 22
End: 2017-04-27

## 2017-04-27 NOTE — TELEPHONE ENCOUNTER
----- Message from Shahida Cleveland sent at 4/27/2017  9:47 AM CDT -----  Anne/Sandrita Sims from [employer], is caller. She is calling about the perphenazine. She says that the med is being titrated and they need more detailed directions.     P: [phone]   F: [phone]

## 2017-04-27 NOTE — TELEPHONE ENCOUNTER
"Patient called writer and informed writer that she \"needs a break from therapy.\" Clinician validated patient's concern that work and therapy feel too \"overwhelming,\" and reassured patient that she could seek therapy services from this clinic in the future.   "

## 2017-04-27 NOTE — Clinical Note
Hi Dr. Rodríguez,     Please edit letter to provide specific week-by-week tapering instructions for perphenazine.  [facility] requires specific instructions.     Thank You,   Sandrita

## 2017-04-27 NOTE — LETTER
2017          Name:  Cesia Nettles  :  1995      To Group Home Staff:    Perphanzine tapering instructions:  Week 1:  12 mg PO every morning and 12 mg PO every night  Week 2:   8 mg PO every morning and 12 mg PO every night  Week 3:   8 mg PO every morning and 8 mg PO every night  Week 4:   4 mg PO every morning and 8 mg PO every night  Week 5:   4 mg PO every morning and 4 mg PO every night  Week 6:   None in the morning, 4 mg PO only every night  Then stop.      Sincerely,      Mary Rodríguez MD, MPH

## 2017-05-18 ENCOUNTER — TELEPHONE (OUTPATIENT)
Dept: PSYCHIATRY | Facility: CLINIC | Age: 22
End: 2017-05-18

## 2017-05-18 DIAGNOSIS — F20.3 SCHIZOPHRENIA, UNDIFFERENTIATED (H): ICD-10-CM

## 2017-05-18 NOTE — TELEPHONE ENCOUNTER
Rec'd fax from  stating:  -Pharmacogenetics policy specifically states that multi-gene sequencing panels for all pharmacogenetic indications are considered experimental/investigational.  Experimental/investigational services are generally not covered.    -Services that do not meet coverage criteria are considered not medically necessary.      Options to dispute this:  -Pt/mom can appeal it by calling Medical Appeal line at [phone].  -Health Care Provider can talk with [employer] medical reviewer at [phone].          Copy of this fax:  -sent to scanning  -retained in clinic until scanning is confirmed

## 2017-06-01 ENCOUNTER — OFFICE VISIT (OUTPATIENT)
Dept: PSYCHIATRY | Facility: CLINIC | Age: 22
End: 2017-06-01
Attending: PSYCHIATRY & NEUROLOGY
Payer: COMMERCIAL

## 2017-06-01 VITALS
SYSTOLIC BLOOD PRESSURE: 132 MMHG | WEIGHT: 214.6 LBS | HEART RATE: 112 BPM | DIASTOLIC BLOOD PRESSURE: 85 MMHG | BODY MASS INDEX: 33.61 KG/M2

## 2017-06-01 DIAGNOSIS — F20.3 SCHIZOPHRENIA, UNDIFFERENTIATED (H): ICD-10-CM

## 2017-06-01 PROCEDURE — 99212 OFFICE O/P EST SF 10 MIN: CPT | Mod: ZF

## 2017-06-01 NOTE — PROGRESS NOTES
PSYCHIATRY CLINIC PROGRESS NOTE   30 minute medication management   The initial DIAG EVAL was 8/18/14.  Date of most recent Transfer Evaluation is 07/08/2016.    INTERIM HISTORY                                                 PSYCH CRITICAL ITEM HISTORY:  suicide attempts x2, suicidal ideation, psychosis [sxs include VAH, paranoia, ideas of refernce], mutiple psychotropic trials and psych hosp (3-5).   Cesia Nettles is a 22 year old female who was last seen in clinic on 4/14/2016 at which time Paliperidone- Perphenazine cross taper was started. The patient reports good treatment adherence.  History was provided by pt who was a good historian.  Since the last visit: She notes that she has been doing well with the taper - now on 4 mg Perphenazine at bedtime; no adverse effects, actually feels better in terms of her mood. Notes voices are less as well. Stopped going to therapy sessions, wants to take a break for a while and find a better fit. Living at  is fine, they have a new roommate which is cool because there is less conflict in the house.  Has some family related plans for the summer, excited that sonali is in town. Shares concern of weight gain and requests titration of Metformin.  PSYCH ROS:   ANXIETY:  social anxiety  DEPRESSION:  hypersomnia, weight gain /loss, low energy, poor concentration /memory and overwhelmed;  DENIES- depressed mood, anhedonia, feeling worthless, suicidal ideation  and feeling hopeless  DYSREGULATION:  mood dysregulation;  DENIES- irritable, over reactive, impulsive , physically agitated, aggression and violent behavior  PSYCHOSIS:  percpetual disturbance [hallucinations    and these are much less AH, non-command type, more derogatory];  DENIES- delusions, paranoia and ideas of reference     SUBSTANCE USE:     ALCOHOL-  rare       TOBACCO- none        CAFFEINE- no caffeine                      CANNABIS- none  OTHER ILLICIT DRUGS- none    MEDICAL ROS:  Reports  none  Denies  muscle  twitches, excessive diaphoresis, restlessness, tremor and shiver, rash, hair loss , menstrual abnormality, skin/eye discoloration and bleeding or freq bruising and akathisia, dystonia, apparent TD, muscle stiffness and tremor [action or resting].       Financial Support- social security disability     Living Situation-  - [facility] since 9/4/2014. Pt is youngest in . She is a HS graduate, mom, Savanna is legal guardian  Children- none    PSYCH and CD Critical Summary Points since July 2016 12/16 - Increased Perphenazine to 16 mg at bedtime due to ongoing psychotic symptoms  2/17 - started hydroxyzine for anxiety  4/17 - started Perphenazine and Paliperidone cross taper    PAST PSYCH MED TRIALS   see EMR Problem List: Hx of psychiatric care    MEDICAL / SURGICAL HISTORY                                   MEDICAL TEAM:          PCP- Becki Valencia                   Therapist- Cheikh [facility] [place] [facility]  Legal guardian: Savanna Hastings (mother)    Pregnant or breastfeeding:  NO      Contraception- yes, Nexplanon 68 mg    Patient Active Problem List   Diagnosis     Schizoaffective disorder, depressive type (H)     Hx of psychiatric care       ALLERGY                                Review of patient's allergies indicates no known allergies.    MEDICATIONS                               Current Outpatient Prescriptions   Medication Sig Dispense Refill     perphenazine 4 MG tablet Start taking 12 mg (3 tabs) PO every morning and 12mg (3 tabs) PO every night. We are decreasing by 4 mg every week starting today 180 tablet 5     paliperidone (INVEGA) 3 MG 24 hr tablet Start taking 3 mg ( 1 tab) today and increase to 6 mg ( 2 tabs) after a week 53 tablet 3     Sodium Fluoride (PREVIDENT 5000 BOOSTER) 1.1 % PSTE Apply 1 Dose to affected area 2 times daily       ketoconazole (NIZORAL) 2 % cream Apply 1 applicator topically daily as needed for itching or irritation       bacitracin  "ointment Apply 1 g topically daily as needed for wound care       Acetaminophen 325 MG CAPS Take 325-650 mg by mouth every 4 hours as needed       hydrOXYzine (ATARAX) 25 MG tablet start Hydroxyzine 25 mg   tab BID PRN 60 tablet 3     benztropine (COGENTIN) 1 MG tablet Take 1 tablet (1 mg) by mouth At Bedtime 30 tablet 6     FLUoxetine (PROZAC) 40 MG capsule Take 2 capsules (80 mg) by mouth At Bedtime 60 capsule 5     lamoTRIgine (LAMICTAL) 100 MG tablet Take 1 tablet (100 mg) by mouth daily 30 tablet 3     metFORMIN (GLUCOPHAGE) 500 MG tablet Take 1 tablet (500 mg) by mouth 2 times daily (with meals) 60 tablet 6       VITALS   /85  Pulse 112  Wt 97.3 kg (214 lb 9.6 oz)  BMI 33.61 kg/m2   MENTAL STATUS EXAM                                                             Alertness: alert  and oriented  Appearance: adequately groomed and casually groomed, erythematous scab on chin  Behavior/Demeanor: cooperative and pleasant, with good  eye contact  Speech: normal and regular rate and rhythm  Language: intact and no problems  Psychomotor: normal or unremarkable  Mood: \"ok, much better, really\"  Affect: full range; was congruent to mood; was congruent to content  Thought Process/Associations: over-inclusive, mildly tangential but re-directable, can answer questions directly  Thought Content:  Denies suicidal ideation  and violent ideation,   Perception:  Reports auditory hallucinations (yes;  command-NO;  following command-NO  Insight: fair  Judgment: fair  Cognition:  does appear grossly intact; formal cognitive testing was not done    LABS and DATA     PHQ9 TODAY = 14  PHQ-9 SCORE 2/2/2017 2/20/2017 4/14/2017   Total Score - - -   Total Score 16 12 17       PSYCHIATRIC DIAGNOSES                                                                                                 Schizoaffective disorder, depressed type  R/o cluster B traits    ASSESSMENT                                     Pertinent background: See " Transfer evaluation note dated above.  Additionally, her schizoaffective disorder is impacted by likely cluster B traits, and her symptoms can take a variable course depending on stressors, often interpersonal. She has been doing remarkably better since moving to a group home and especially with supported employment. (Before this transition, had several hospitalizations with frequent SI.) In general, she is doing much better in recent years than she has in the past.    Discussions critical to overall treatment are documented in notes from 7/7/2015    TODAY: Sudha did well with cross taper of Paliperidone and Perphenazine, now on last week of Perphanzine. She tolerated well with improvement in MH symptoms - mood, psychosis, dysregulation. We will leave Paliperidone at 6 mg for now and re-assess need for further titration in the future. Also, per [employer] testing, future medication considerations would include optimizing lamotrigine and reducing Fluoxetine; due to more benefits with the former.  Due to endorsed fatigue 2/2 weight gain, discussed the use of behavioral interventions such as physical activities and healthy nutrition. Per her request, will increase Metformin today to aid in this goal; she has been on higher doses in the past and an increase was recommended by MTM consult a few months ago. She is aware to call for any concerns/ side effects.  Of note, patient is taking a break from therapy, but is open to re-starting at a later date. Will coordinate care with her therapist especially as patient would like to transition to another provider. No other concerns.                Psychotropic drug interactions:   Use of perphenazine and fluoxetine administration could increase risk of QT prolongation with concurrent. QTc (5/2014) 423 ms, and perphenazine dose is being tapered.  Fluoxetine may raise serum levels of perphenazine as the latter is a 2D6 substrate, but benztropine, per Micromedex, may lower blood  levels of perphenazine.  Use of perphenazine and benztropine may raise risk of anticholinergic SE. Risk mitigated by therapeutic response to current medication regimen, including good management of psychotic symptoms, as well as tolerance of medication regimen despite elevated pulse (no other features of anticholinergic toxidrome noted).    Management:  Monitoring for adverse effects, routine vitals, routine labs, periodic EKGs, tapering off of [perphenazine] and patient is aware of risks     PLAN                                                                                                       1) PSYCHOTROPIC MEDICATIONS:      - Continue Paliperidone 6 mg       - Continue Perphenazine at 4 mg; taper ends this week      - Continue Lamotrigine 100 mg daily      - Continue Fluoxetine 80 mg at bedtime      - Continue Cogentin 1 mg at bedtime      - Increase Metformin to 1000 mg BID      - Continue Hydroxyzine 25 mg   tab BID PRN    2) THERAPY: encourage    3) LABS NEXT DUE:  Neuroleptic labs due 2/18       RATING SCALES:    none    4) REFERRALS [CD, medical, other]: none    5) :  Continue standard services at [facility], [facility] worker and [facility] worker    6) RTC: 8 weeks with new resident, pt is aware to call with concerns in the meantime.    7) CRISIS NUMBERS: Provided in AVS today  ONLY if a [facility] PT: [facility] [phone] (clinic), [phone] (after hours)     TREATMENT RISK STATEMENT:  The risks, benefits, alternatives and potential adverse effects have been discussed and are understood by the patient/ patient's guardian. The pt understands the risks of using street drugs or alcohol.  There are no medical contraindications, the pt agrees to treatment with the ability to do so.  The patient understands to call 911 or come to the nearest ED if life threatening or urgent symptoms present.     RESIDENT:   Mary Rodríguez MD    Patient was not staffed due to logistical issues.  Supervisor is Dr. Rosales who will review and sign the note.    I did not see this patient directly. I have reviewed the documentation and I agree with the resident's plan of care.     Oraila Rosales MD

## 2017-06-01 NOTE — PATIENT INSTRUCTIONS
We increased the Metformin to 100 mg BID    Complete the ongoing taper and further adjustments to your medications will be done next time.    Will talk to Alexys about finding you a new therapist.     Great job!    Thank you for coming to the PSYCHIATRY CLINIC.    Lab Testing:  **If you had lab testing today and your results are reassuring or normal they will be mailed to you or sent through [employer] within 7 days.   **If the lab tests need quick action we will call you with the results.  The phone number we will call with results is # [phone] (home) . If this is not the best number please call our clinic and change the number.    Medication Refills:  If you need any refills please call your pharmacy and they will contact us. Please allow three business for refill processing.   If you need to  your refill at a new pharmacy, please contact the new pharmacy directly. The new pharmacy will help you get your medications transferred.     Scheduling:  If you have any concerns about today's visit or wish to schedule another appointment please call our office during normal business hours [phone] (8-5:00 M-F)    Contact Us:  Please call [phone] during business hours (8-5:00 M-F).  If after clinic hours, or on the weekend, please call  [phone].      MENTAL HEALTH CRISIS NUMBERS:  [facility]:   [facility] - [phone]   Crisis Residence [facility]idence - [phone]   Walk-In Counseling [facility] - [phone]   COPE 24/7 [place] Mobile Team for Adults - [[phone]]; Child - [[phone]]     Crisis Connection - [phone]     [facility]:   [facility] - [phone]   Walk-in counseling [facility] - [phone]   Walk-in counseling [facility] - [phone]   Crisis Residence [facility] Residence - [phone]   Urgent Care Adult Mental Health:   --Drop-in, 24/7 crisis line, and [place]  Mobile Team [[phone]]    CRISIS TEXT LINE: Text [phone] from anywhere, anytime, any crisis 24/7;    OR SEE www.crisistextline.org     Poison Control Center - [phone]    CHILD: [facility] needs assessment team - [phone]     [facility] Lifeline - 1-656.665.1940; or [facility] Lifeline - 1-851.386.2785    If you have a medical emergency please call 911or go to the nearest ER.                    _____________________________________________    Again thank you for choosing PSYCHIATRY CLINIC and please let us know how we can best partner with you to improve you and your family's health.  You may be receiving a survey in the mail regarding this appointment. We would love to have your feedback, both positive and negative, so please fill out the survey and return it using the provided envolpe. The survey is done by an external company, so your answers are anonymous.

## 2017-06-01 NOTE — MR AVS SNAPSHOT
After Visit Summary   6/1/2017    Cesia Nettles    MRN: [id number]           Patient Information     Date Of Birth          1995        Visit Information        Provider Department      6/1/2017 8:15 AM Mary Rodríguez MD Psychiatry Clinic        Today's Diagnoses     Schizophrenia, undifferentiated (H)          Care Instructions    We increased the Metformin to 100 mg BID    Complete the ongoing taper and further adjustments to your medications will be done next time.    Will talk to Alexys about finding you a new therapist.     Great job!    Thank you for coming to the PSYCHIATRY CLINIC.    Lab Testing:  **If you had lab testing today and your results are reassuring or normal they will be mailed to you or sent through [employer] within 7 days.   **If the lab tests need quick action we will call you with the results.  The phone number we will call with results is # [phone] (home) . If this is not the best number please call our clinic and change the number.    Medication Refills:  If you need any refills please call your pharmacy and they will contact us. Please allow three business for refill processing.   If you need to  your refill at a new pharmacy, please contact the new pharmacy directly. The new pharmacy will help you get your medications transferred.     Scheduling:  If you have any concerns about today's visit or wish to schedule another appointment please call our office during normal business hours [phone] (8-5:00 M-F)    Contact Us:  Please call [phone] during business hours (8-5:00 M-F).  If after clinic hours, or on the weekend, please call  [phone].      MENTAL HEALTH CRISIS NUMBERS:  [facility]:   [facility] - [phone]   Crisis Residence [facility] - [phone]   Walk-In Counseling Center [facility] - [phone]   COPE 24/7 [place] Mobile Team for Adults - [[phone]]; Child - [[phone]]      Crisis Connection - [phone]     [facility] - [phone]   Walk-in counseling [facility] - [facility] - [phone]   Walk-in counseling [facility] - [phone]   Crisis Residence [facility] - [phone]   Urgent Care Adult Mental Health:   --Drop-in, 24/7 crisis line, and [place] Mobile Team [[phone]]    CRISIS TEXT LINE: Text [phone] from anywhere, anytime, any crisis 24/7;    OR SEE www.crisistextline.org     Poison Control Center - [phone]    CHILD: [facility] needs assessment team - [phone]     [facility] Lifeline - [phone]; or [employer] Lifeline - 1-529.610.4137    If you have a medical emergency please call 911or go to the nearest ER.                    _____________________________________________    Again thank you for choosing PSYCHIATRY CLINIC and please let us know how we can best partner with you to improve you and your family's health.  You may be receiving a survey in the mail regarding this appointment. We would love to have your feedback, both positive and negative, so please fill out the survey and return it using the provided envolpe. The survey is done by an external company, so your answers are anonymous.             Follow-ups after your visit        Your next 10 appointments already scheduled     Jul 26, 2017  8:45 AM CDT   Schizophrenia Follow Up with Twyla Petit MD   Psychiatry Clinic ([facility])    [address] [facility] [address] [phone]              Who to contact     Please call your clinic at [phone] to:    Ask questions about your health    Make or cancel appointments    Discuss your medicines    Learn about your test results    Speak to your doctor   If you have compliments or concerns about an experience at your clinic, or if you wish to file a complaint, please contact [facility]  Physicians Patient Relations at [phone] or email us at [email or web address]         Additional Information About Your Visit        Care EveryWhere ID     This is your Care EveryWhere ID. This could be used by other organizations to access your [place] medical records  [id number]        Your Vitals Were     Pulse BMI (Body Mass Index)                112 33.61 kg/m2           Blood Pressure from Last 3 Encounters:   06/01/17 132/85   04/14/17 136/76   03/09/17 124/81    Weight from Last 3 Encounters:   06/01/17 97.3 kg (214 lb 9.6 oz)   04/14/17 97.2 kg (214 lb 3.2 oz)   02/20/17 99.2 kg (218 lb 12.8 oz)              Today, you had the following     No orders found for display       Primary Care Provider Office Phone # Fax #    Becki Valencia [phone]       [facility] [address] [place] [address]        Thank you!     Thank you for choosing PSYCHIATRY CLINIC  for your care. Our goal is always to provide you with excellent care. Hearing back from our patients is one way we can continue to improve our services. Please take a few minutes to complete the written survey that you may receive in the mail after your visit with us. Thank you!             Your Updated Medication List - Protect others around you: Learn how to safely use, store and throw away your medicines at www.disposemymeds.org.          This list is accurate as of: 6/1/17  9:08 AM.  Always use your most recent med list.                   Brand Name Dispense Instructions for use    Acetaminophen 325 MG Caps      Take 325-650 mg by mouth every 4 hours as needed       bacitracin ointment      Apply 1 g topically daily as needed for wound care       benztropine 1 MG tablet    COGENTIN    30 tablet    Take 1 tablet (1 mg) by mouth At Bedtime       FLUoxetine 40 MG capsule    PROzac    60 capsule    Take 2 capsules (80 mg) by mouth At Bedtime       hydrOXYzine 25 MG tablet    ATARAX    60 tablet    start  Hydroxyzine 25 mg ? tab BID PRN       ketoconazole 2 % cream    NIZORAL     Apply 1 applicator topically daily as needed for itching or irritation       lamoTRIgine 100 MG tablet    LaMICtal    30 tablet    Take 1 tablet (100 mg) by mouth daily       metFORMIN 500 MG tablet    GLUCOPHAGE    60 tablet    Take 1 tablet (500 mg) by mouth 2 times daily (with meals)       paliperidone 3 MG 24 hr tablet    INVEGA    53 tablet    Start taking 3 mg ( 1 tab) today and increase to 6 mg ( 2 tabs) after a week       PREVIDENT 5000 BOOSTER 1.1 % Pste   Generic drug:  Sodium Fluoride      Apply 1 Dose to affected area 2 times daily

## 2017-06-02 ASSESSMENT — PATIENT HEALTH QUESTIONNAIRE - PHQ9: SUM OF ALL RESPONSES TO PHQ QUESTIONS 1-9: 14

## 2017-06-10 ENCOUNTER — TELEPHONE (OUTPATIENT)
Dept: PSYCHIATRY | Facility: CLINIC | Age: 22
End: 2017-06-10

## 2017-06-10 NOTE — TELEPHONE ENCOUNTER
"Pt contacted on call psychiatry resident as she reports that she has been having some increase in mental health symptoms, specifically paranoia over the last few days. Yesterday she believed her step mother was calling her names and that people do not like her or care about her. She reports she is able to maintain safety, had some suicidal thoughts Thursday with no plan but \"worked through them\" and has not had any since. She is hoping to have an adjustment to her medication as she just completed a cross taper of perphenazine to paliperidone and believes she may benefit from more paliperidone. Of note, she also had not yet started the increased metformin dose. She reported good adherence with all of her other prescribed medications. She is concerned with waiting until July for a medication adjustment and is agreeable to plan of writer sending message to Dr. Rodríguez with her request of an increase in paliperidone to be addressed early next week. She again denied having any current safety concerns and agreed to come to the ED or call emergency services should they reoccur and she is unable to keep self safe.       Samantha Payan, DO  PGY-2 Psychiatry Resident  "

## 2017-06-15 ENCOUNTER — TELEPHONE (OUTPATIENT)
Dept: PSYCHIATRY | Facility: CLINIC | Age: 22
End: 2017-06-15

## 2017-06-15 RX ORDER — PALIPERIDONE 3 MG/1
9 TABLET, EXTENDED RELEASE ORAL AT BEDTIME
Qty: 21 TABLET | Refills: 0 | Status: SHIPPED | OUTPATIENT
Start: 2017-06-15 | End: 2017-06-16

## 2017-06-16 ENCOUNTER — TELEPHONE (OUTPATIENT)
Dept: PSYCHIATRY | Facility: CLINIC | Age: 22
End: 2017-06-16

## 2017-06-16 DIAGNOSIS — F20.3 SCHIZOPHRENIA, UNDIFFERENTIATED (H): ICD-10-CM

## 2017-06-16 RX ORDER — PALIPERIDONE 3 MG/1
9 TABLET, EXTENDED RELEASE ORAL AT BEDTIME
Qty: 21 TABLET | Refills: 0 | Status: SHIPPED | OUTPATIENT
Start: 2017-06-16 | End: 2017-06-19

## 2017-06-16 NOTE — TELEPHONE ENCOUNTER
"This writer is covering for Dr. Rodríguez. Please see flow of communication below.     Per message sent to Dr. Rodríguez by on-call resident, Dr. Stewart on 6/15/17    \"We (the on-call residents) have received two phone calls about this patient getting worse over the last several days. More hallucinations, paranoia, delusions. So, I decided to bump her Invega to 9mg from 6mg. I wrote a 7day script for them. Mom and Sudha would like to get an earlier/urgent appt if available (currently scheduled to see Twyla in July). I wasn't sure if I should Epic message scheduling myself, or allow you guys to handle it.     Anyway, that's the situation. I'm sure the mother would appreciate a call back if not tomorrow then for sure on Monday.\"    Per telephone note by Dr. Petit later in the evening of 6/15/17:     \"Received a request for call back to pt's mom Denise [phone].  (per intake, there is a BRIONNA for mom.)  Mom spoke earlier in the evening with a different resident on call who increased pt's neuroleptic dose and gave a 7 day supply.  However, per mom, pt's group home is requesting an entirely new prescription to be faxed with instructions, so that pt's psychosis can be better managed.  Per mom, th pt has been having increasing hallucinations since Sunday (when pt called in) and mom is concerned pt will need to be hospitalized soon if she does not getter a higher dose neuroleptic.  Mom is aware that Dr. Rodríguez is away until Monday and is transitioning out of clinic, so is requesting prescription refill from covering doctor or supervisor.       I will route this communication to Dr. Rodríguez and to Max Schofield and  Yannick.     The group home phone number is [phone] and the fax number is [phone]. Group Home name is [email or web address].\"      Per message from clinic attending, DR. Schofield, on 6/16/17:     \"As you are providing cross-coverage for Dr. Rodríguez, Madina has been experiencing increased psychotic " "symptoms (including AH, paranoia) in the context of cross-taper off of Perphenazine (28mg/day as of 4/14/17 when started cross-taper, tapered to discontinuation by around 6/4/17) and on to Paliperidone (3mg/day started 4/14/17, and increased to 6mg/day one week later).  The cross-taper was evidently made, due to lack of efficacy and side effects.  She met with Dr. Rodríguez 6/1/17, and was noted to be doing well with the medication change.  Madina called 6/10/17 and spoke with on-call resident - BOB Payan MD - see that note re: increased paranoia, along with, \"She reports she is able to maintain safety, had some suicidal thoughts Thursday with no plan but \"worked through them\" and has not had any since. She is hoping to have an adjustment to her medication ...\"  Paliperidone increased to 9mg/day 6/15/17 pm by on-call resident ZACH Stewart MD and 7 day rx sent to pharmacy.  I would recommend contacting Madina, and checking on symptoms and safety.  Could offer to add Perphenazine 4mg po qhs PRN (psychosis) as an additional message.  Would be ideal for her to follow-up with Dr. Rodríguez next wee.  I would also recommend that she contact on-call provider or seek ER evaluation if she has worsened symptoms or safety concerns over the weekend.\"      Call placed to pt's mother, Denise, at 9am on 6/16/17.     Discussed concerns about worsening symptoms. Mother requests that new prescription be faxed to  so that they can make the medication change today.   - Discussed possibility of adding additional 4mg PRN dose of perphenazine, but mother declines at this time. Would like to see how increase in Invega goes.   - Reviewed Dr. Rodríguez's schedule for next week - has urgent slot on Monday AM 6/19at 7:45 which will work for pt.   - No additional concerns at this time. Appreciated call.     Call placed to pt at 9:25am.    Pt reports continued paranoia and AH. Agreeable to increase in medication. Reports chronic, intermittent " passive suicidal thoughts, but denies plan or intent. Feels safe. Agreeable to appt on Monday at 7:45am. No additional concerns at this time.       Order for increase in Invega to 9mg daily faxed to  at fax number noted above.   - Will route conversation to Chandu Eli, Marisabel and Pat, as well as Sandrita Moise RN      Ines Servin MD   Psychiatry Resident, PGY-3

## 2017-06-16 NOTE — TELEPHONE ENCOUNTER
Dr. Servin faxed it earlier today.  Writer faxed again.  Called  staff who confirm they did receive it.  Also faxed the order to [employer].

## 2017-06-16 NOTE — TELEPHONE ENCOUNTER
Received a request for call back to pt's mom Denise [phone].  (per intake, there is a BRIONNA for mom.)  Mom spoke earlier in the evening with a different resident on call who increased pt's neuroleptic dose and gave a 7 day supply.  However, per mom, pt's group home is requesting an entirely new prescription to be faxed with instructions, so that pt's psychosis can be better managed.  Per mom, th pt has been having increasing hallucinations since Sunday (when pt called in) and mom is concerned pt will need to be hospitalized soon if she does not getter a higher dose neuroleptic.  Mom is aware that Dr. Rodríguez is away until Monday and is transitioning out of clinic, so is requesting prescription refill from covering doctor or supervisor.      I will route this communication to Dr. Rodríguez and to Max Schofeild and  Yannick.    The group home phone number is [phone] and the fax number is [phone]. Group Home name is [email or web address]     Twyla Petit MD   Psychiatry Resident PGY2

## 2017-06-16 NOTE — TELEPHONE ENCOUNTER
"vd phone call from patient's mother this evening. Patient has cross-titrated from perphenazine to paliperidone. Mother says she \"hasn't seen Dianne like this is a long, long time.\" Reports increased paranoia, delusions, and hallucinations. Mother and patient alike are very concerned and are asking if they can increased the paliperidone. No SI or HI.    Given level of concern, we agreed to increase dose to 9mg paliperidone at bed time. Sent script for 7 day supply to their pharmacy. Savanna will plan to call back on Monday to figure out further dosing. She hopes that Sudha can be seen in clinic on an urgent basis (current appt scheduled for July).      "

## 2017-06-16 NOTE — TELEPHONE ENCOUNTER
Printed the 6/16/17 order.  Placed in Dr. Servin's folder to sign.  Will then fax to group home.

## 2017-06-16 NOTE — TELEPHONE ENCOUNTER
Message  Received: Today       Shahida Cleveland Jodi L, RN                     Brii from [employer] is caller. She is calling with a question about the paliperidone. She thinks that there was suppose to be a dose change and she is checking on this. She had thought that it should be increased for 7 days.     [phone]

## 2017-06-19 ENCOUNTER — TELEPHONE (OUTPATIENT)
Dept: PSYCHIATRY | Facility: CLINIC | Age: 22
End: 2017-06-19

## 2017-06-19 DIAGNOSIS — F20.3 SCHIZOPHRENIA, UNDIFFERENTIATED (H): ICD-10-CM

## 2017-06-19 RX ORDER — PALIPERIDONE 9 MG/1
9 TABLET, EXTENDED RELEASE ORAL AT BEDTIME
Qty: 30 TABLET | Refills: 2 | Status: SHIPPED | OUTPATIENT
Start: 2017-06-19 | End: 2017-08-17 | Stop reason: DRUGHIGH

## 2017-06-19 NOTE — TELEPHONE ENCOUNTER
"Called momDenise to inquire about pt who was a no-show to her appt this morning. She did not answer and generic VM so did not leave a VM.     Called her [facility] to find out what happened and staff (Brenda) mentioned that she had cancelled as she had stayed up late and was \"not feeling up to it\".    Talked to Sudha who states that she wasn't feeling too good this morning- \" had a stomach ache and the appt was too early\". States that since starting the 9 mg Invega, she does feel somewhat better, but it is a bit too early to tell. She notes that she has made another appt in a month ( only slots left now are urgent morning slots which don't work for her) and will f/ up then.     I inform her that I will be providing a refill of the 9 mg of Invega - covering resident had provided only a week's worth and that we will be calling later this week to check on her progress. Inform staff of the above and request that we would appreciate close monitoring and to call with any questions/ concerns.    Update - mom called and I informed her of above conversation. She appreciated this and will keep us informed on Sudha's progress.    Plan - Continue Invega 9 mg tabs at bedtime, will provide 30 day supply with 2 refills and e-fax to Daphne Rodríguez M.D. M.P.H   PGY-3 Psychiatry Resident  "

## 2017-06-28 ENCOUNTER — TELEPHONE (OUTPATIENT)
Dept: PSYCHIATRY | Facility: CLINIC | Age: 22
End: 2017-06-28

## 2017-06-28 NOTE — TELEPHONE ENCOUNTER
Per clinical note dated 6/01/17, patient requested a titration of metformin due to concerns regarding weight gain.  Metformin was increased to 1000 mg BID.  Called Brenda to inform her, confirmed that the dose filled by the pharmacy is correct.  Discussed the most common side effects that they might see, such as diarrhea, flatulence, nausea (GI distress in general).  Faxed a copy of the order for their records.    Will route to Dr. Rodríguez and Dr. Petit for FYI.

## 2017-06-28 NOTE — TELEPHONE ENCOUNTER
----- Message from Rupinder Cohn RN sent at 6/28/2017 12:04 PM CDT -----  Regarding: FW: Medication Question  Contact: [phone]      ----- Message -----     From: Gabbi Marie     Sent: 6/28/2017  11:53 AM       To: Sandrita Moise RN  Subject: Medication Question                              Hi Brenda Remy from [employer] Is calling with a medication question.  The patient's prescription for metformin was refilled on 6/10, but the dose had doubled.  Previously she had been taking 1-500 mg tablet twice daily, and the new orders say to take 2 tablets twice daily.  Brenda says they never received an order to increase the medication.  She would appreciate a call back to clarify what dose the patient should be taking.  She can be reached at [phone].    Thanks,  Gabbi

## 2017-07-07 DIAGNOSIS — R45.851 SUICIDAL IDEATION: ICD-10-CM

## 2017-07-07 RX ORDER — LAMOTRIGINE 100 MG/1
100 TABLET ORAL DAILY
Qty: 30 TABLET | Refills: 0 | Status: SHIPPED | OUTPATIENT
Start: 2017-07-07 | End: 2017-08-08

## 2017-07-07 NOTE — TELEPHONE ENCOUNTER
Medication requested: Lamictal 100 mg tabs  Last refilled: 6-14-17  Qty: 30      Last seen: 6-1-17  RTC: 8 wks  Cancel: 0  No-show: 0  Next appt: 7-26-17    Refill decision: Refilled for 30 days per protocol.    Kathleen M Doege RN

## 2017-07-20 ENCOUNTER — TELEPHONE (OUTPATIENT)
Dept: PSYCHIATRY | Facility: CLINIC | Age: 22
End: 2017-07-20

## 2017-07-20 NOTE — TELEPHONE ENCOUNTER
----- Message from Gabbi Marie sent at 7/20/2017 12:23 PM CDT -----  Regarding: Patient Call - Medication Request  Contact: [phone]  Cesia Ruelas is calling to discuss the possibility of increasing her dose of paliperidone before her appointment with Dr. Petit on 7/26.  She said that Dr. Rodríguez had told her to call the clinic and discuss this with the nurse if she wanted to increase the med before she was seen.  Cesia can be reached at [phone].  It is ok to leave a detailed message.    Patient uses [facility].    Thanks,  Gabbi

## 2017-07-20 NOTE — TELEPHONE ENCOUNTER
"Appt history:  LS  6/1/17  RTC  8 weeks  CAN  6/19/17 per pt   NSH  none  PEN  7/26/17     At 6/1/17 appt:  No med changes    Returned call to pt:  Denies hearing voices.  No crisis.  Denies thoughts of self harm, suicide, harming others.  Has been feeling paranoid this week.  Denies triggers or conflicts in the house and with life in general.  As we further discuss how she's feeling she shares she's paranoid and nervous about her boyfriend stating, \"I'm nervous that he likes another girl because he's the best boyfriend I've ever had.\"  The trust issue began a couple of months ago when pt saw a flirtatious message in her boyfriend's phone from his ex.      Pt would like to try an increase in Invega to see if that helps.  Per 6/1/17 note, she has no therapist.  Pt states she's meeting her new therapist tomorrow and is nervous.  The reason she switched therapy clinics is due to the frequent changing of therapy interns at this clinic.  As we continue to talk, pt states she can wait until her appt next week for a med change citing that she can talk to her  staff and her mom.  She was just hoping to get started on the increase prior to her 7/26/17 appt because \"I know I need my meds increased.\"     Routed to Dr. Petit as OLIVAI.        "

## 2017-07-21 NOTE — TELEPHONE ENCOUNTER
Twyla Petit MD   7/20/17 (9:57 PM)                   Thank you for the message.  I would prefer to wait until the appointment unless pt feels like she is decompensating too much.  It's so hard to do this without having met the patient, that I really do prefer not to adjust meds without meeting the person, especially if she has an appointment coming up in a few days.  Please let me know if she reports new/worsening symptoms and I should reconsider.

## 2017-07-26 ENCOUNTER — OFFICE VISIT (OUTPATIENT)
Dept: PSYCHIATRY | Facility: CLINIC | Age: 22
End: 2017-07-26
Attending: PSYCHIATRY & NEUROLOGY
Payer: COMMERCIAL

## 2017-07-26 DIAGNOSIS — Z92.89 HX OF PSYCHIATRIC CARE: ICD-10-CM

## 2017-07-26 NOTE — PATIENT INSTRUCTIONS
SELF CARE and SAFETY PLAN    This care plan was completed on 07/26/174 at which time the following was reported: suicidal ideation without plan; without intent [details in Interim History] and paranoid ideation.  It was determined that ongoing outpatient care was appropriate in addition to establishing the following care plan:     1. Triggers which can worsen symptoms (possibly cause thoughts of self-harm):     -Thoughts: regarding Pranav reconnecting with Ex-GF/fear of rejection    -Images    -Mood    -Situation    -Behavior       2. Coping strategies:    -ice pack on face    -paced breathing (4 in & 6 out) 10 X    -progressive muscle relaxation    -intense exercise    -other -Journaling      3. People I can call for help: (friends, family and/or professionals)    Name: Twyla Petit MD              Phone: [facility] [phone] (clinic)                                                                                            [phone] (after hours)    Name:  Staff at    Phone: They're there 24/7 [phone]    Name:  Therapist Rylee Samuels   Phone: [phone]      4. Making the environment safe:  # no access to guns  # no stockpiling of meds        CRISIS NUMBERS:  CRISIS TEXT LINE: Text 084-047 from anywhere, anytime, any crisis 24/7;  OR SEE www.crisistextline.org

## 2017-07-26 NOTE — MR AVS SNAPSHOT
After Visit Summary   7/26/2017    Cesia Nettles    MRN: [id number]           Patient Information     Date Of Birth          1995        Visit Information        Provider Department      7/26/2017 8:45 AM Twyla Petit MD Psychiatry Clinic        Care Instructions    SELF CARE and SAFETY PLAN    This care plan was completed on 07/26/174 at which time the following was reported: suicidal ideation without plan; without intent [details in Interim History] and paranoid ideation.  It was determined that ongoing outpatient care was appropriate in addition to establishing the following care plan:     1. Triggers which can worsen symptoms (possibly cause thoughts of self-harm):     -Thoughts: regarding Pranav reconnecting with Ex-GF/fear of rejection    -Images    -Mood    -Situation    -Behavior       2. Coping strategies:    -ice pack on face    -paced breathing (4 in & 6 out) 10 X    -progressive muscle relaxation    -intense exercise    -other -Journaling      3. People I can call for help: (friends, family and/or professionals)    Name: Twyla Petit MD              Phone: [facility] [phone] (clinic)                                                                                            [phone] (after hours)    Name:  Staff at    Phone: they're there 24/7 [phone]    Name:  Therapist Rylee Samuels   Phone: [phone]      4. Making the environment safe:  # no access to guns  # no stockpiling of meds        CRISIS NUMBERS:  CRISIS TEXT LINE: Text 227-226 from anywhere, anytime, any crisis 24/7;  OR SEE www.crisistextline.org                        Follow-ups after your visit        Follow-up notes from your care team     Return in about 4 weeks (around 8/23/2017).      Your next 10 appointments already scheduled     Sep 06, 2017  8:15 AM CDT   Schizophrenia Follow Up with Twyla Petit MD   Psychiatry Clinic ([facility])    [address]  [facility] [id number] [phone]              Who to contact     Please call your clinic at [phone] to:    Ask questions about your health    Make or cancel appointments    Discuss your medicines    Learn about your test results    Speak to your doctor   If you have compliments or concerns about an experience at your clinic, or if you wish to file a complaint, please contact [facility] Patient Relations at [phone] or email us at [email or web address]         Additional Information About Your Visit        Lecorpio Information     Lecorpio is an electronic gateway that provides easy, online access to your medical records. With Lecorpio, you can request a clinic appointment, read your test results, renew a prescription or communicate with your care team.     To sign up for Lecorpio visit the website at [email or web address]   You will be asked to enter the access code listed below, as well as some personal information. Please follow the directions to create your username and password.     Your access code is: [email or web address]  Expires: 10/24/2017 10:02 AM     Your access code will  in 90 days. If you need help or a new code, please contact your [facility] or call [phone] for assistance.        Care EveryWhere ID     This is your Care EveryWhere ID. This could be used by other organizations to access your [facility] medical records  [id number]         Blood Pressure from Last 3 Encounters:   17 132/85   17 136/76   17 124/81    Weight from Last 3 Encounters:   17 97.3 kg (214 lb 9.6 oz)   17 97.2 kg (214 lb 3.2 oz)   17 99.2 kg (218 lb 12.8 oz)              Today, you had the following     No orders found for display       Primary Care Provider Office Phone # Fax #    Becki Valencia [phone]       [facility] [address] [place] [address]         Equal Access to Services     [facility] : Hadii laura butler hussaingarrett Kennedy, wadollyda luqadaha, qaybta kapriscillasubha jean baptiste. So [facility] [phone].    ATENCIÓN: Si habla elvie, tiene a christensen disposición servicios gratuitos de asistencia lingüística. Rukhsana al [phone].    We comply with applicable federal civil rights laws and [place] laws. We do not discriminate on the basis of race, color, national origin, age, disability sex, sexual orientation or gender identity.            Thank you!     Thank you for choosing PSYCHIATRY CLINIC  for your care. Our goal is always to provide you with excellent care. Hearing back from our patients is one way we can continue to improve our services. Please take a few minutes to complete the written survey that you may receive in the mail after your visit with us. Thank you!             Your Updated Medication List - Protect others around you: Learn how to safely use, store and throw away your medicines at www.disposemymeds.org.          This list is accurate as of: 7/26/17 10:02 AM.  Always use your most recent med list.                   Brand Name Dispense Instructions for use Diagnosis    Acetaminophen 325 MG Caps      Take 325-650 mg by mouth every 4 hours as needed        bacitracin ointment      Apply 1 g topically daily as needed for wound care        benztropine 1 MG tablet    COGENTIN    30 tablet    Take 1 tablet (1 mg) by mouth At Bedtime    Schizoaffective disorder, depressive type (H)       FLUoxetine 40 MG capsule    PROzac    60 capsule    Take 2 capsules (80 mg) by mouth At Bedtime    Schizophrenia, undifferentiated (H)       hydrOXYzine 25 MG tablet    ATARAX    60 tablet    start Hydroxyzine 25 mg ? tab BID PRN    Schizophrenia, undifferentiated (H)       ketoconazole 2 % cream    NIZORAL     Apply 1 applicator topically daily as needed for itching or irritation        lamoTRIgine 100 MG tablet    LaMICtal    30 tablet     Take 1 tablet (100 mg) by mouth daily    Suicidal ideation       metFORMIN 500 MG tablet    GLUCOPHAGE    60 tablet    Take 2 tablets (1,000 mg) by mouth 2 times daily (with meals)    Schizophrenia, undifferentiated (H)       paliperidone 9 MG 24 hr tablet    INVEGA    30 tablet    Take 1 tablet (9 mg) by mouth At Bedtime    Schizophrenia, undifferentiated (H)       PREVIDENT 5000 BOOSTER 1.1 % Pste   Generic drug:  Sodium Fluoride      Apply 1 Dose to affected area 2 times daily

## 2017-07-26 NOTE — PROGRESS NOTES
PSYCHIATRY CLINIC TRANSFER NOTE   The initial diagnostic evaluation was on 07/08/2016.  Date of the most recent transfer of care liliane is 07/26/2017.    Pertinent Background:  This patient first experienced mental health issues in childhood and has received treatment for psychosis and suicidality.  See transfer evaluation for detailed history.  Notably, Rylee is a 21 yo female with history of AH dating back to age 7 yo.  Psychosis has also included command AH in the past.  Additionally she has had 2 SI attempts (2012 Zyrtec OD and 2014 hanging with scarf).  Previous prompts were fear of rejection.  Therese is very close to both parents and her half siblings and she is in a romantic relationship with BF Pranav for a little over a year. She has good insight about her paranoia and some realistic concerns about BF reconnecting with his ex. Per chart, some Cluster B traits and frequent hospitalizations prior to starting  residence and supportive employment, and doing well the past severeal years.       Psych critical item history includes suicide attempt [multiple], suicidal ideation, psychosis [sxs include paranoia, AH, ideas of reference], mutiple psychotropic trials and psych hosp (>5).    INTERIM HISTORY                                                 Cesia Nettles is a 22 year old female who was last seen for medication management on 06/01/2017 at which time Metformin was increased to 1,000 mg BID and cross taper of Perphenazine to Paliperidone was continued. Per telephone encounter Paliperidone was increased after Perphenazine completely tapered.  The patient reports good treatment adherence.  History was provided by the patient who was a good historian.  Since the last visit:  - Therese reports that she has ongoing paranoia about Pranav (romantic BF) reconnecting with an ex-GF on social media.  There has been a history of this in the past during their 1 year relationship and almost lead to pt breaking up with BF.  Therese  will be working on this concern with her therapist.  - Other than the paranoia, which may be reality based concern, Therese denies any ideas of reference, delusions, VH.  She does occasionally have AH of thinking that someone has called her name.  This is not bothersome for the patient and is much less frequent or intense than before.  - In terms of mood, Therese feels that she is doing very well and has no specific concerns.  - She shares with me many pictures of her half siblings and young children in her extended family. She is looking forward to a trip that is coming up to visit with family in [place] for a grandfather's birthday.    RECENT SYMPTOMS:   DEPRESSION:  reports-suicidal ideation without plan; without intent [details in Interim History] and low energy;  DENIES- feeling worthless, poor concentration /memory, feeling hopeless and excessive crying  YUMIKO/HYPOMANIA:  reports-none;  DENIES- increased energy and decreased sleep need  PSYCHOSIS:  reports-auditory hallucinations without commands [details in Interim History] and paranoia about relationship;  DENIES- delusions and visual hallucinations [details in Interim History]  DYSREGULATION:  reports-suicidal ideation without plan; without intent [details in Interim History];  DENIES- violent ideation, SIB, impulsive and aggressive  ANXIETY:  excessive worry, feeling fearful and concerns regarding romantic relationship  EATING DISORDER: none    RECENT SUBSTANCE USE:     ALCOHOL- none          TOBACCO- none               CAFFEINE- no caffeine  OPIOIDS- none       NARCAN KIT- N/A       CANNABIS- none          OTHER ILLICIT DRUGS- none     CURRENT SOCIAL HISTORY:  FINANCIAL SUPPORT- social security disability       CHILDREN- none       LIVING SITUATION- lives in       SOCIAL/ SPIRITUAL SUPPORT- family, BF       FEELS SAFE AT HOME- Yes     MEDICAL ROS:  Reports wt gain and polydipsia      Denies short term memory and/or word finding difficulty and unusual  movements    SUBSTANCE USE HISTORY                                                                             Past Use- none  Treatment [#, most recent] - none  Medical Consequences [withdrawal, sz etc] - none  HIV/Hepatitis- none  Legal Consequences- none    PSYCHIATRIC HISTORY     SIB [method, most recent]- none  Suicidal Ideation Hx [passive, active]- h/o active SI w plan and intent  Suicide Attempt [#, recent, method]:   #- intentional excessive ingestion of Zyrtec 2012 (prompt was feeling rejected by parents), and impulsive attempt to hang self with scarf 2014(unable to identify prompt)   Most Recent- 2014    Violence/Aggression Hx- has in the past engaged in hair pulling with siblings  Psychosis Hx- yes. Since age 7 yo,  usu negative slef talk, some command Ah for SI, VH, paranoia and ideas of reference  Psych Hosp [ #, most recent, committed]- ~ 7 X, last one in 2014  ECT [#, most recent]- none    Eating Disorder: none    Outpatient Programs [ DBT, Day Treatment, Eating Disorder Tx etc] : NA     SOCIAL and FAMILY HISTORY                                          patient reported   Financial Support- social security disability  Living Situation/Family/Relationships- lives in ;  Children- None  Trauma History (self-report)- maybe some bullying  Legal- none  Cultural/ Social/ Spiritual Support- parents,  staff,   Early History/Education-  Pt was born in [place] and parents  when pt was about 3 mo old.  Parents later  when pt was 6 yo.  Then both parents moved to [place] when pt was 5 yo, and Therese spent more time with mom, but continued to be very close with dad as well.  Both parents remarried and Therese now has 2 half sibs on each side of the family.  Notably, both mom and dad each have a 8 yo and a 5 yo, so Therese has 4 half sibs (2 are 8 yo and 2 are 5 yo).  Pt has a HS degree.    Family Mental Health History-  Mat great GF - Schizophrenia, Mat- GM - MDD, Mom - BPD and MDD.  Pat GF - MDD, Dad  MDD.    PAST PSYCH MED TRIALS   see EMR Problem List: Hx of psychiatric care    MEDICAL / SURGICAL HISTORY                                   CARE TEAM:   PCP- Becki Valencia          Therapist- Rylee Samuels [facility] -  [phone] (Verbal BRIONNA on file)    Pregnant or breastfeeding:  No      Contraception- Implenon    Neurologic Hx [head injury etc]:  H/O childhood Szs (Petit mal) from age [age] yo.    Patient Active Problem List   Diagnosis     Schizoaffective disorder, depressive type (H)     Hx of psychiatric care       ALLERGY                                Review of patient's allergies indicates no known allergies.  MEDICATIONS                               Current Outpatient Prescriptions   Medication Sig Dispense Refill     lamoTRIgine (LAMICTAL) 100 MG tablet Take 1 tablet (100 mg) by mouth daily 30 tablet 0     paliperidone (INVEGA) 9 MG 24 hr tablet Take 1 tablet (9 mg) by mouth At Bedtime 30 tablet 2     metFORMIN (GLUCOPHAGE) 500 MG tablet Take 2 tablets (1,000 mg) by mouth 2 times daily (with meals) 60 tablet 6     Sodium Fluoride (PREVIDENT 5000 BOOSTER) 1.1 % PSTE Apply 1 Dose to affected area 2 times daily       ketoconazole (NIZORAL) 2 % cream Apply 1 applicator topically daily as needed for itching or irritation       bacitracin ointment Apply 1 g topically daily as needed for wound care       Acetaminophen 325 MG CAPS Take 325-650 mg by mouth every 4 hours as needed       hydrOXYzine (ATARAX) 25 MG tablet start Hydroxyzine 25 mg   tab BID PRN 60 tablet 3     benztropine (COGENTIN) 1 MG tablet Take 1 tablet (1 mg) by mouth At Bedtime 30 tablet 6     FLUoxetine (PROZAC) 40 MG capsule Take 2 capsules (80 mg) by mouth At Bedtime 60 capsule 5     VITALS   There were no vitals taken for this visit.   MENTAL STATUS EXAM                                                           Alertness: alert  and oriented  Appearance: casually groomed  Behavior/Demeanor: cooperative, pleasant and interruptive, with  good  eye contact   Speech: regular rate and rhythm  Language: no problems  Psychomotor: normal or unremarkable  Mood: description consistent with euthymia, pt reports paranoia about romantic relationship  Affect: full range and appropriate; was congruent to mood; was congruent to content  Thought Process/Associations: unremarkable  Thought Content:  Reports suicidal ideation without plan; without intent [details in Interim History] and paranoid ideation;  Denies violent ideation and delusions  Perception:  Reports auditory hallucinations without commands [details in Interim History];  Denies visual hallucinations  Insight: good  Judgment: good  Cognition: does  appear grossly intact; formal cognitive testing was not done    LABS and DATA   RATING SCALES:  AIMS: determine next due    PHQ9 TODAY = 13  PHQ-9 SCORE 2/20/2017 4/14/2017 6/1/2017   Total Score - - -   Total Score - - -   Total Score 12 17 14     ANTIPSYCHOTIC LABS   [glu, A1C, lipids (focus LDL), liver enzymes, WBC, ANEU, Hgb, plts]    q12 mo  Recent Labs   Lab Test  02/02/17   0916  09/16/14   0947   GLC  87  69*   A1C  4.9  5.2     Recent Labs   Lab Test  02/02/17   0916 12/15/15   CHOL  186  188   TRIG  195*  210*   LDL  109*  109   HDL  38*  37*     Recent Labs   Lab Test  02/02/17   0916  12/21/13   1016   AST  24  33   ALT  23  18   ALKPHOS  102  107  107     Recent Labs   Lab Test  02/02/17   0916  09/16/14   0947  12/21/13   1016   WBC  6.8  6.9  6.1  6.1   ANEU   --   3.9  3.9  3.9   HGB  12.3  12.8  12.5  12.5   PLT  274  317  310  310     Last EKG 05/2014 with QTc 423 ms    DIAGNOSIS     Schizoaffective Disorder, Depressed type    ASSESSMENT                                     TODAY Therese reports feeling overall well in terms of mood. She likewise denies signs and symptoms of psychosis other than mild-moderate paranoia about her romantic relationship.  She will be working through this with her therapist.  Given previous h/o SI and SA in  context of feeling rejected, we discuss need for a safety plan with potential theoretical stressor of the romantic relationship ending or even going thorough rough patch.  Pt is agreeable to contacting us, her therapist as well as  staff in the event that there would be a prompt for decompensation.      SUICIDE RISK ASSESSMENT [details described above]:  Today Cesia Nettles reports presvious SA X2, last one in 2014 and sporadic passive SI without plan or intent, last one a few months ago.  In addition, she has notable risk factors for self-harm including similar prompt as seen w/previous self-harm, relationship conflict and previous suicide attempt.  However, risk is mitigated by no plan or intent, no access to lethal means, describes a safety plan, h/o seeking help when needed, symptom improvement, future oriented, feeling hopeful, none to minimal alcohol use , commitment to family, good social support   and stable housing.  Based on all available evidence she does not appear to be at imminent risk for self-harm therefore does not meet criteria for a 72-hr hold/  involuntary hospitalization.  However, based on degree of symptoms close psych FU was recommended which the pt did agree to.  Additional steps to minimize risk include: SAFETY PLAN completed 07/26/2017 and called Therapist Rylee Samuels during visit    [place] PRESCRIPTION MONITORING PROGRAM [] was not checked today:  not using controlled substances.    PSYCHOTROPIC DRUG INTERACTIONS:   Fluoxetine and Hydrozyzine  concurrent use may increase risk of QTc prolongation.   Fluoxetine and Paliperidone concurrent use may increase QTc prolongation  Paliperidone and Hydroxyzine concurrent use may increase Qt prolongation  MANAGEMENT:  Monitoring for adverse effects, routine vitals, routine labs and periodic EKGs     PLAN                                                                                                       1) PSYCHOTROPIC MEDICATIONS:  - Continue  Paliperidone 9 mg   - Continue Lamotrigine 100 mg Daily  - Continue Fluoxetine 80 mg QHS  - Continue Cogentin 1 mg QHS  - Continue Metformin 1000 mg BID  - Continue Hydroxyzine 25 mg 1/2 tab BID PRN (hasn't use in several months)    2) THERAPY:  Continue - I called today to open lines of communication. (Verbal BRIONNA on file.)  TREATMENT PLAN:   Date revised  -  NA   Date next due- next apt    3) NEXT DUE:    Labs- Neuroleptic labs due 02/18  EKG- will consider at next apt  Rating Scales- AIMS at next apt    4) REFERRALS:    - Continue : GH, CM, CADI, ARHMS     5) RTC: 4-6 wks     SAFETY PLAN reviewed: Yes    6) CRISIS NUMBERS:   Provided routinely in AVS.  Especially emphasized:  [facility] [phone] (clinic)    [phone] (after hours)  ONLY if a [facility] PT: [facility] [phone] (clinic), [phone] (after hours)     TREATMENT RISK STATEMENT:  The risks, benefits, alternatives and potential adverse effects have been discussed and are understood by the pt. The pt understands the risks of using street drugs or alcohol. There are no medical contraindications, the pt agrees to treatment with the ability to do so. The pt knows to call the clinic for any problems or to access emergency care if needed.  Medical and substance use concerns are documented above.  Psychotropic drug interaction check was done, including changes made today.    WHODAS 2.0  07/26/17  total score = see scanned image tab; [a 12-item WHODAS 2.0 assessment has been completed by the pt and/or recorded in EPIC].    RESIDENT:   Twyla Petit MD    Patient staffed in clinic with Dr. Lanier who will sign the note.  Supervisor is Dr. Rosales.  I saw the patient with the resident, and participated in key portions of the service, including the mental status examination and developing the plan of care. I reviewed key portions of the history with the resident. I agree with the findings and plan as documented in  this note.    Angie Lanier

## 2017-07-27 ENCOUNTER — TELEPHONE (OUTPATIENT)
Dept: PSYCHIATRY | Facility: CLINIC | Age: 22
End: 2017-07-27

## 2017-07-27 NOTE — TELEPHONE ENCOUNTER
On 7/26/2017 the patient signed three (3)  PHI's authorizing phone messages to be left for her regarding scheduling, medical information.  The forms also authorize Person to Person communication with   1. Rylee Samuels/therapist  2. Savanna Sinon/mother  And  3. Tino Nettles/father   for scheduling, medical and billing information.  I sent these document to scanning on 7/27/2017 and kept a copy in Psychiatry until scanning is complete/confirmed. Elizabeth Velasquez/CMA

## 2017-07-27 NOTE — TELEPHONE ENCOUNTER
On 7/26/2017 the patient signed three (3)  PHI's authorizing phone messages to be left for her regarding scheduling, medical and billing information.  The forms also authorize Person to Person communication with   1. Rylee Samuels/therapist  2. Savanna Venkata/mother  And  3. Tino Nettles/father   for scheduling, medical and billing information.  I sent these document to scanning on 7/27/2017 and kept a copy in Psychiatry until scanning is complete/confirmed. Elizabeth Velasquez/CMA

## 2017-08-08 DIAGNOSIS — R45.851 SUICIDAL IDEATION: ICD-10-CM

## 2017-08-08 RX ORDER — LAMOTRIGINE 100 MG/1
100 TABLET ORAL DAILY
Qty: 30 TABLET | Refills: 0 | Status: SHIPPED | OUTPATIENT
Start: 2017-08-08 | End: 2017-09-07

## 2017-08-08 NOTE — TELEPHONE ENCOUNTER
Medication requested: Lamictal 100 mg tabs  Last refilled: 7-11-17  Qty: 30      Last seen: 7-26-17  RTC: 4-6 wks  Cancel: 0  No-show: 0  Next appt: 9-6-17    Refill decision: Refilled for 30 days per protocol.      Kathleen M Doege RN

## 2017-08-16 ENCOUNTER — TELEPHONE (OUTPATIENT)
Dept: PSYCHIATRY | Facility: CLINIC | Age: 22
End: 2017-08-16

## 2017-08-16 DIAGNOSIS — F20.3 SCHIZOPHRENIA, UNDIFFERENTIATED (H): ICD-10-CM

## 2017-08-16 NOTE — TELEPHONE ENCOUNTER
----- Message from Shahida Cleveland sent at 8/16/2017 10:53 AM CDT -----  Contact: [phone]  Marisabel/Sandrita    Patient is caller. She is calling to request a med increase for haldol. She says that she uses the [facility] pharmacy.

## 2017-08-16 NOTE — TELEPHONE ENCOUNTER
"Appt history:  LS  7/26/17  RTC  4-6 weeks  CAN  none  NSH  none  PEN  9/6/17    At 7/26/17 appt:  No med changes were made.    Returned call to pt:  Returned call to pt.  We clarify the med she wants to increase is Invega.  Reason is \"paranoia and hallucinations\" about her dad and her boyfriend.  Increased since last appt (specifically a couple of weeks ago).  Comes and goes rather than being constant through the day.  Doesn't occur every day but has been more frequent lately.  Voices telling her that her boyfriend doesn't want to spend time with her and is cheating on her. Paranoid that her boyfriend doesn't love her.  Also, worries that her dad only wants to spend time with her stepmom.  Pt shared her feelings with her dad last week so they decided to have breakfast together.  However, during this recent breakfast pt feels like her dad only told her all of the things she does wrong.  Writer asked if she feels safe at and she states she does and adds talks with group home staff about her feelings.  Mostly talks to her mom about her feelings and reports her mom gets stressed about it.  Sees a therapist q Monday.  Didn't have appt this Monday because therapist was not available.  Does have therapy appt this coming Monday.      Pt is aware writer will share this update with Dr. Petit. There are no sooner appts available to offer pt than her already scheduled appt.      Routed to Dr. Petit.      "

## 2017-08-17 ENCOUNTER — DOCUMENTATION ONLY (OUTPATIENT)
Dept: PSYCHIATRY | Facility: CLINIC | Age: 22
End: 2017-08-17

## 2017-08-17 RX ORDER — PALIPERIDONE 3 MG/1
TABLET, EXTENDED RELEASE ORAL
Qty: 30 TABLET | Refills: 0 | Status: SHIPPED | OUTPATIENT
Start: 2017-08-17 | End: 2017-09-26

## 2017-08-17 RX ORDER — PALIPERIDONE 9 MG/1
TABLET, EXTENDED RELEASE ORAL
Qty: 30 TABLET | Refills: 0 | Status: SHIPPED | OUTPATIENT
Start: 2017-08-17 | End: 2017-09-26

## 2017-08-17 NOTE — TELEPHONE ENCOUNTER
Twyla Petit MD   You; Oralia Rosales MD 8/17/17 (6:28 AM)                   Let's go up to 12 mg Qday. 12 mg daily would be highest dose.  We can go up to that until her apt and if she continues to have paranoia we may have to switch neuroleptics. Please ask her  to alert  staff if she gets any EPS symptoms.

## 2017-08-17 NOTE — PROGRESS NOTES
Received message that pt is having increased paranoia about her BF (possibly cheating on her) and about her father.  Pt requested to increase neuroleptic medication until next visit. Reviewed chart, and ok'd increase of Invega to 12 mg QDay (this is the max dose).  GH will be instructed to monitor for EPSE.  Next apt 09/06/2017.    Twyla Petit MD   Psychiatry Resident PGY3

## 2017-08-17 NOTE — TELEPHONE ENCOUNTER
Notified pt via phone call.  She is appreciative.  New Rx sent to [facility].  Order printed for Dr. Petit to sign

## 2017-09-07 DIAGNOSIS — R45.851 SUICIDAL IDEATION: ICD-10-CM

## 2017-09-07 DIAGNOSIS — F20.3 SCHIZOPHRENIA, UNDIFFERENTIATED (H): ICD-10-CM

## 2017-09-07 RX ORDER — FLUOXETINE 40 MG/1
80 CAPSULE ORAL AT BEDTIME
Qty: 60 CAPSULE | Refills: 0 | Status: SHIPPED | OUTPATIENT
Start: 2017-09-07 | End: 2017-10-27

## 2017-09-07 RX ORDER — LAMOTRIGINE 100 MG/1
100 TABLET ORAL DAILY
Qty: 30 TABLET | Refills: 0 | Status: SHIPPED | OUTPATIENT
Start: 2017-09-07 | End: 2017-10-27

## 2017-09-07 NOTE — TELEPHONE ENCOUNTER
Medication requested: Fluoxetine 40  Mg caps  Last refilled: 8-9-17  Qty: 60      Kathleen M Doege RN

## 2017-09-07 NOTE — TELEPHONE ENCOUNTER
Medication requested: Lamictal 100 mg tabs  Last refilled: 8-8-17  Qty: 30      Last seen: 7-26-17  RTC: 4-6- wks  Cancel: 2  No-show: 0  Next appt: 10-2-17    Refill decision: Refill pended and routed to the provider for review/determination due to cancellation x2.      Kathleen M Doege RN

## 2017-09-26 DIAGNOSIS — F25.1 SCHIZOAFFECTIVE DISORDER, DEPRESSIVE TYPE (H): ICD-10-CM

## 2017-09-26 DIAGNOSIS — F20.3 SCHIZOPHRENIA, UNDIFFERENTIATED (H): ICD-10-CM

## 2017-09-26 NOTE — TELEPHONE ENCOUNTER
Medication requested: benztropine 1 MG   Last refilled: 2/2/17  Qty: 30 : 6    Medication requested:  paliperidone 3 MG & 9 MG  Last refilled: 8/17/17  Qty: 30:0     Last seen:  7/26/17  RTC:  4-6 WKS  Cancel: 1  No-show: 0  Next appt: 10/2/17    **3 MEDS Refill decision: Refill pended and routed to the provider for review/determination due to :1 CANCEL / DELAYED APPT.

## 2017-09-27 RX ORDER — PALIPERIDONE 9 MG/1
9 TABLET, EXTENDED RELEASE ORAL AT BEDTIME
Qty: 30 TABLET | Refills: 0 | Status: SHIPPED | OUTPATIENT
Start: 2017-09-27 | End: 2017-11-03

## 2017-09-27 RX ORDER — PALIPERIDONE 3 MG/1
3 TABLET, EXTENDED RELEASE ORAL AT BEDTIME
Qty: 30 TABLET | Refills: 0 | Status: SHIPPED | OUTPATIENT
Start: 2017-09-27 | End: 2017-11-03

## 2017-09-27 RX ORDER — BENZTROPINE MESYLATE 1 MG/1
1 TABLET ORAL AT BEDTIME
Qty: 30 TABLET | Refills: 0 | Status: SHIPPED | OUTPATIENT
Start: 2017-09-27 | End: 2017-11-03

## 2017-10-10 ENCOUNTER — TELEPHONE (OUTPATIENT)
Dept: PSYCHIATRY | Facility: CLINIC | Age: 22
End: 2017-10-10

## 2017-10-11 NOTE — TELEPHONE ENCOUNTER
"Patient called Behavioral Health Intake stating she wished to speak with a provider.  She was noted to be tearful.  When this writer called the patient she stated that she had been \"broken up with\" by her boyfriend 2-3 weeks before and that she had been having increasing thoughts of self-harm since that time.  Stated that tonight those thoughts had become severe/upsetting and that while she did not have a plan for self-injury or suicide, she was not feeling safe.  This writer asked the patient if she thought that she needed to be assessed in the ED in order ensure her safety tonight and she stated that she felt this to be the case.    Patient was at home, at the group home she lives in, which is staffed at all times.  She passed the phone to a staff person, named Flaca, who stated that she would ensure coverage to their  and would bring the patient for assessment later in the evening, ensuring this writer that the patient would be monitored at all times.  Agreed that this writer would call back later in the night to assess status of patient.    In approximately 2 hrs this writer called the patient back - she was noted to no longer be sounding tearful, in fact her voice sounded brightened.  She volunteered that she was feeling much better after she had had a long conversation with her staff about her feelings.  Volunteered the following statement: \"I'm not feeling unsafe anymore.\"  We agreed that the patient would call tomorrow/in the near term to schedule follow-up assessment/check up with [facility], and that this note would be routed to her OPP Dr. Petit.  Finally, patient was strongly encouraged to call back, and to reach out to her staff again, if her thoughts of self-harm returned/intensified and if she once again began to feel unsafe.    Iván Torres, DO  Psychiatry, PGY2  "

## 2017-10-16 ENCOUNTER — HOSPITAL ENCOUNTER (EMERGENCY)
Facility: CLINIC | Age: 22
Discharge: HOME | End: 2017-10-16
Attending: PSYCHIATRY & NEUROLOGY | Admitting: PSYCHIATRY & NEUROLOGY
Payer: COMMERCIAL

## 2017-10-16 VITALS
BODY MASS INDEX: 30.7 KG/M2 | TEMPERATURE: 95.6 F | SYSTOLIC BLOOD PRESSURE: 117 MMHG | RESPIRATION RATE: 16 BRPM | HEART RATE: 105 BPM | WEIGHT: 196 LBS | OXYGEN SATURATION: 98 % | DIASTOLIC BLOOD PRESSURE: 71 MMHG

## 2017-10-16 DIAGNOSIS — F25.1 SCHIZOAFFECTIVE DISORDER, DEPRESSIVE TYPE (H): ICD-10-CM

## 2017-10-16 DIAGNOSIS — F70 MILD INTELLECTUAL DISABILITY: ICD-10-CM

## 2017-10-16 LAB
AMPHETAMINES UR QL SCN: NEGATIVE
BARBITURATES UR QL: NEGATIVE
BENZODIAZ UR QL: NEGATIVE
CANNABINOIDS UR QL SCN: NEGATIVE
COCAINE UR QL: NEGATIVE
ETHANOL UR QL SCN: NEGATIVE
HCG UR QL: NEGATIVE
OPIATES UR QL SCN: NEGATIVE

## 2017-10-16 PROCEDURE — 90791 PSYCH DIAGNOSTIC EVALUATION: CPT

## 2017-10-16 PROCEDURE — 81025 URINE PREGNANCY TEST: CPT | Performed by: FAMILY MEDICINE

## 2017-10-16 PROCEDURE — 99285 EMERGENCY DEPT VISIT HI MDM: CPT | Mod: 25 | Performed by: PSYCHIATRY & NEUROLOGY

## 2017-10-16 PROCEDURE — 99284 EMERGENCY DEPT VISIT MOD MDM: CPT | Mod: Z6 | Performed by: PSYCHIATRY & NEUROLOGY

## 2017-10-16 PROCEDURE — 80307 DRUG TEST PRSMV CHEM ANLYZR: CPT | Performed by: FAMILY MEDICINE

## 2017-10-16 PROCEDURE — 80320 DRUG SCREEN QUANTALCOHOLS: CPT | Performed by: FAMILY MEDICINE

## 2017-10-16 ASSESSMENT — ENCOUNTER SYMPTOMS
NERVOUS/ANXIOUS: 0
SHORTNESS OF BREATH: 0
DIZZINESS: 0
DYSPHORIC MOOD: 1
CHEST TIGHTNESS: 0
FEVER: 0
ABDOMINAL PAIN: 0
HALLUCINATIONS: 0
BACK PAIN: 0

## 2017-10-16 NOTE — ED AVS SNAPSHOT
[facility], Emergency Department    [address]    [place] [address]    Phone:  [phone]    Fax:  [phone]                                       Cesia Nettles   MRN: [id number]    Department:  [facility], Emergency Department   Date of Visit:  10/16/2017           Patient Information     Date Of Birth          1995        Your diagnoses for this visit were:     Schizoaffective disorder, depressive type (H)     Mild intellectual disability        You were seen by Kojo Culp MD.        Discharge Instructions       Follow up with your therapist, see if she can give you a make up appointment this week for missing today.  You may want to see if you can do an extra session or two in the next few weeks as well.    Don't forget to use staff to help through some of these rougher times        24 Hour Appointment Hotline       To make an appointment at any [facility], call [phone] ([phone]). If you don't have a family doctor or clinic, we will help you find one. [facility] clinics are conveniently located to serve the needs of you and your family.             Review of your medicines      Our records show that you are taking the medicines listed below. If these are incorrect, please call your family doctor or clinic.        Dose / Directions Last dose taken    Acetaminophen 325 MG Caps   Dose:  325-650 mg   Indication:  Pain        Take 325-650 mg by mouth every 4 hours as needed   Refills:  0        bacitracin ointment   Dose:  1 g        Apply 1 g topically daily as needed for wound care   Refills:  0        benztropine 1 MG tablet   Commonly known as:  COGENTIN   Dose:  1 mg   Quantity:  30 tablet        Take 1 tablet (1 mg) by mouth At Bedtime   Refills:  0        FLUoxetine 40 MG capsule   Commonly known as:  PROzac   Dose:  80 mg   Quantity:  60 capsule        Take 2 capsules (80 mg) by mouth At Bedtime   Refills:  0        hydrOXYzine 25 MG tablet   Commonly known  as:  ATARAX   Quantity:  60 tablet        start Hydroxyzine 25 mg ? tab BID PRN   Refills:  3        ketoconazole 2 % cream   Commonly known as:  NIZORAL   Dose:  1 applicator        Apply 1 applicator topically daily as needed for itching or irritation   Refills:  0        lamoTRIgine 100 MG tablet   Commonly known as:  LaMICtal   Dose:  100 mg   Quantity:  30 tablet        Take 1 tablet (100 mg) by mouth daily   Refills:  0        metFORMIN 500 MG tablet   Commonly known as:  GLUCOPHAGE   Dose:  1000 mg   Quantity:  60 tablet        Take 2 tablets (1,000 mg) by mouth 2 times daily (with meals)   Refills:  6        * paliperidone 9 MG 24 hr tablet   Commonly known as:  INVEGA   Dose:  9 mg   Quantity:  30 tablet        Take 1 tablet (9 mg) by mouth At Bedtime TAKE WITH 3MG TAB  FOR 12MG DAILY DOSE   Refills:  0        * paliperidone 3 MG 24 hr tablet   Commonly known as:  INVEGA   Dose:  3 mg   Quantity:  30 tablet        Take 1 tablet (3 mg) by mouth At Bedtime TAKE WITH 9MG TAB  FOR 12MG DAILY DOSE   Refills:  0        PREVIDENT 5000 BOOSTER 1.1 % Pste   Dose:  1 Dose   Generic drug:  Sodium Fluoride        Apply 1 Dose to affected area 2 times daily   Refills:  0        * Notice:  This list has 2 medication(s) that are the same as other medications prescribed for you. Read the directions carefully, and ask your doctor or other care provider to review them with you.            Procedures and tests performed during your visit     Drug abuse screen 6 urine (tox)    HCG qualitative urine      Orders Needing Specimen Collection     None      Pending Results     No orders found from 10/14/2017 to 10/17/2017.            Pending Culture Results     No orders found from 10/14/2017 to 10/17/2017.            Pending Results Instructions     If you had any lab results that were not finalized at the time of your Discharge, you can call the ED Lab Result RN at [phone]. You will be contacted by this team for any positive  "Lab results or changes in treatment. The nurses are available 7 days a week from 10A to 6:30P.  You can leave a message 24 hours per day and they will return your call.        Thank you for choosing [facility]       Thank you for choosing [facility] for your care. Our goal is always to provide you with excellent care. Hearing back from our patients is one way we can continue to improve our services. Please take a few minutes to complete the written survey that you may receive in the mail after you visit with us. Thank you!        [address] Information     [employer] lets you send messages to your doctor, view your test results, renew your prescriptions, schedule appointments and more. To sign up, go to [email or web address] . Click on \"Log in\" on the left side of the screen, which will take you to the Welcome page. Then click on \"Sign up Now\" on the right side of the page.     You will be asked to enter the access code listed below, as well as some personal information. Please follow the directions to create your username and password.     Your access code is: [email or web address]  Expires: 10/24/2017 10:02 AM     Your access code will  in 90 days. If you need help or a new code, please call your [facility] clinic or [phone].        Care EveryWhere ID     This is your Care EveryWhere ID. This could be used by other organizations to access your [facility] medical records  [id number]        Equal Access to Services     TAVIA JOE : Hadii laura Benavides, waaxda alphonse, qaybta kaalmasubha jean baptiste . So [facility] [phone].    ATENCIÓN: Si habla español, tiene a christensen disposición servicios gratuitos de asistencia lingüística. Carmename al [phone].    We comply with applicable federal civil rights laws and [place] laws. We do not discriminate on the basis of race, color, national origin, age, disability, sex, sexual orientation, or gender identity.            After Visit " Summary       This is your record. Keep this with you and show to your community pharmacist(s) and doctor(s) at your next visit.

## 2017-10-16 NOTE — ED NOTES
Bed: HW01  Expected date: 10/16/17  Expected time: 10:50 AM  Means of arrival:   Comments:  EM 23yo F si on hold

## 2017-10-16 NOTE — DISCHARGE INSTRUCTIONS
Follow up with your therapist, see if she can give you a make up appointment this week for missing today.  You may want to see if you can do an extra session or two in the next few weeks as well.    Don't forget to use staff to help through some of these rougher times

## 2017-10-16 NOTE — ED PROVIDER NOTES
History     Chief Complaint   Patient presents with     Suicidal     found ace wrap around at neck; after breakup with boyfriend     The history is provided by the patient, medical records and a parent.     Cesia Nettles is a 22 year old female who comes in due to her having suicidal thoughts.  She had grabbed a scarf and states she tied it around her neck for a few minutes and then took it off. It was not witnessed.  She has no marks on her neck.  She denies being suicidal now.  She states she is stressed secondary to her bf breaking up with her 3 weeks ago.  She has been having off and on spells of feeling down and having unsafe thoughts since the break up.  She has used staff and called the resident on call last week to good success.  Today she did not get to go to her regular therapy appointment due to the therapist cancelling due to being sick.  The staff today at her group home she does not know as well so was unsure about going to talk to them.  She has a history of schizoaffective disorder and mild intellectual disability.  She states she can talk to staff or call the crisis line if she feels unsafe again.    Please see the 's assessment in [employer] from today for further details.    I have reviewed the Medications, Allergies, Past Medical and Surgical History, and Social History in the Epic system.    Review of Systems   Constitutional: Negative for fever.   Eyes: Negative for visual disturbance.   Respiratory: Negative for chest tightness and shortness of breath.    Cardiovascular: Negative for chest pain.   Gastrointestinal: Negative for abdominal pain.   Musculoskeletal: Negative for back pain.   Neurological: Negative for dizziness.   Psychiatric/Behavioral: Positive for dysphoric mood and suicidal ideas (had thoughts earlier today, none now). Negative for hallucinations and self-injury. The patient is not nervous/anxious.    All other systems reviewed and are negative.      Physical Exam   BP:  117/71  Pulse: 105  Temp: 95.6  F (35.3  C)  Resp: 16  Weight: 88.9 kg (196 lb)  SpO2: 98 %       Physical Exam   Constitutional: She is oriented to person, place, and time. She appears well-developed and well-nourished.   HENT:   Head: Normocephalic and atraumatic.   Mouth/Throat: Oropharynx is clear and moist.   Eyes: Pupils are equal, round, and reactive to light.   Neck: Normal range of motion. Neck supple.   Cardiovascular: Normal rate, regular rhythm and normal heart sounds.    Pulmonary/Chest: Effort normal and breath sounds normal.   Abdominal: Soft. Bowel sounds are normal.   Musculoskeletal: Normal range of motion.   Neurological: She is alert and oriented to person, place, and time.   Skin: Skin is warm and dry.   Psychiatric: She has a normal mood and affect. Her speech is normal and behavior is normal. Judgment and thought content normal. She is not actively hallucinating. Thought content is not paranoid and not delusional. Cognition and memory are impaired. She expresses no homicidal and no suicidal ideation. She expresses no suicidal plans and no homicidal plans.   Cesia is a 21 y/o female who looks her age.  She is well groomed with good eye contact.   Nursing note and vitals reviewed.      ED Course     ED Course     Procedures               Labs Ordered and Resulted from Time of ED Arrival Up to the Time of Departure from the ED   DRUG ABUSE SCREEN 6 CHEM DEP URINE ([facility])   HCG QUALITATIVE URINE            Assessments & Plan (with Medical Decision Making)   Cesia will be discharged home.  She is not an imminent risk to herself or others.  She is struggling with the break up of a bf and has periods where she feels unsafe and times she feels okay.  She has shown that she can call for help or talk with staff.  Also, her therapy appointment was cancelled today.  She will follow up with her therapist and established providers.    I have reviewed the nursing notes.    I have reviewed the findings,  diagnosis, plan and need for follow up with the patient.    New Prescriptions    No medications on file       Final diagnoses:   Schizoaffective disorder, depressive type (H)   Mild intellectual disability       10/16/2017   [facility], [place], EMERGENCY DEPARTMENT     Kojo Culp MD  10/16/17 [id number]

## 2017-10-16 NOTE — ED AVS SNAPSHOT
[facility], [place], Emergency Department    [address]    [facility] [address]    Phone:  [phone]    Fax:  [phone]                                       Cesia Nettles   MRN: [id number]    Department:  [facility], Emergency Department   Date of Visit:  10/16/2017           After Visit Summary Signature Page     I have received my discharge instructions, and my questions have been answered. I have discussed any challenges I see with this plan with the nurse or doctor.    ..........................................................................................................................................  Patient/Patient Representative Signature      ..........................................................................................................................................  Patient Representative Print Name and Relationship to Patient    ..................................................               ................................................  Date                                            Time    ..........................................................................................................................................  Reviewed by Signature/Title    ...................................................              ..............................................  Date                                                            Time

## 2017-10-18 ENCOUNTER — TELEPHONE (OUTPATIENT)
Dept: PSYCHIATRY | Facility: CLINIC | Age: 22
End: 2017-10-18

## 2017-10-18 NOTE — TELEPHONE ENCOUNTER
----- Message from Elzbieta Reyes sent at 10/18/2017  1:56 PM CDT -----  Regarding: Message/Megits  Contact: [phone]  Brii,  staff, is caller. States she wanted to let us know that the pt has been decompensating. They brought the pt to the hospital on Monday because she said she wanted to hang herself- pt was not admitted.  She's been having SI for several days now; this comes after a break-up with her SO about two weeks ago.

## 2017-10-18 NOTE — TELEPHONE ENCOUNTER
"Returned call to Brii at the .  Since the ED visit pt is \"doing fine, she goes to work\" but at home she isolates more.  Had individual therapy this AM.  Usually has weekly therapy but this week she's going twice.  Next therapy session is on Friday.  Most of her SI is situational (recent break up with boyfriend).  Informed Brii that increased therapy sessions are beneficial with the recent break-up.  Brii states that all  staff are aware of pt's current increased SI.  They will bring pt to ED if they are not able to ensure her safety.  We scheduled an appt on Tuesday 10/24/17 with Dr. Petit and cancelled the appt on 11/7/17.      Routed to fran Rosales.    "

## 2017-10-27 DIAGNOSIS — F20.3 SCHIZOPHRENIA, UNDIFFERENTIATED (H): ICD-10-CM

## 2017-10-27 DIAGNOSIS — R45.851 SUICIDAL IDEATION: ICD-10-CM

## 2017-10-27 NOTE — TELEPHONE ENCOUNTER
Medication requested: Lamictal 100 mg tabs and Fluoxetine 40 mg caps  Last refilled: 9-8-17  Qty: 3  0      Last seen: 7-26-17  RTC: 4-6 wks  Cancel: 2  No-show: 1  Next appt: none    Refill decision: Refill pended and routed to the provider for review/determination due to cancellation 2, NOS x1 and no scheduled appointment.  Monday 10-24-17 appointment had been an early appointment specifically made for pt- but it appears the patient did not come to appointment.    Kathleen M Doege RN

## 2017-10-30 RX ORDER — LAMOTRIGINE 100 MG/1
100 TABLET ORAL DAILY
Qty: 30 TABLET | Refills: 0 | Status: SHIPPED | OUTPATIENT
Start: 2017-10-30 | End: 2017-12-05

## 2017-10-30 RX ORDER — FLUOXETINE 40 MG/1
80 CAPSULE ORAL AT BEDTIME
Qty: 60 CAPSULE | Refills: 0 | Status: SHIPPED | OUTPATIENT
Start: 2017-10-30 | End: 2017-12-05

## 2017-11-03 DIAGNOSIS — F25.1 SCHIZOAFFECTIVE DISORDER, DEPRESSIVE TYPE (H): ICD-10-CM

## 2017-11-03 DIAGNOSIS — F20.3 SCHIZOPHRENIA, UNDIFFERENTIATED (H): ICD-10-CM

## 2017-11-03 RX ORDER — PALIPERIDONE 3 MG/1
3 TABLET, EXTENDED RELEASE ORAL AT BEDTIME
Qty: 14 TABLET | Refills: 0 | Status: SHIPPED | OUTPATIENT
Start: 2017-11-03 | End: 2017-12-11

## 2017-11-03 RX ORDER — BENZTROPINE MESYLATE 1 MG/1
1 TABLET ORAL AT BEDTIME
Qty: 14 TABLET | Refills: 0 | Status: SHIPPED | OUTPATIENT
Start: 2017-11-03 | End: 2017-12-11

## 2017-11-03 RX ORDER — PALIPERIDONE 9 MG/1
9 TABLET, EXTENDED RELEASE ORAL AT BEDTIME
Qty: 14 TABLET | Refills: 0 | Status: SHIPPED | OUTPATIENT
Start: 2017-11-03 | End: 2017-12-04

## 2017-11-03 NOTE — TELEPHONE ENCOUNTER
Writer covering for Dr. Petit who is out of the office today. 2 week supply of the medications requested were provided as pt has not been seen in clinic since July and has had medication changes since that time without follow up as instructed. Prescriptions sent with instructions for patient to follow up in the next 2 weeks for further refills and next scheduled appointment is over a month away.

## 2017-11-03 NOTE — TELEPHONE ENCOUNTER
Medication requested: Cogentin and Invega  Last refilled: 10-3-17  Qty: 30 days      Last seen: 7-26-17  RTC: 4-6 wks  Cancel: 2  No-show: 1  Next appt: 12-11-17    Refill decision: Refill pended and routed to the provider for review/determination due to cancellation x2 and NOS x1.      Kathleen M Doege RN

## 2017-12-04 DIAGNOSIS — F20.3 SCHIZOPHRENIA, UNDIFFERENTIATED (H): ICD-10-CM

## 2017-12-04 NOTE — TELEPHONE ENCOUNTER
Medication requested: Paliperidone ER 9 mg tabs  Last refilled: 11-14-17  Qty: 14      Last seen: 7-26-17  RTC: 4-6 wks  Cancel: 2  No-show: 1  Next appt: 12-11-17    Refill decision: Refill pended and routed to the provider for review/determination due to the fact that on 11-3-17 a 14 day supply order was sent to the pharmacy (filled on 11-14-17) with the understanding that she be seen with in 2 weeks. She did not make an appointment. She should now be out of medication as of 11-28-17 and next appointment is not until 12-11-17. She has had 2 cancellations and 1 NOS since she was last seen.    Kathleen M Doege RN

## 2017-12-05 DIAGNOSIS — R45.851 SUICIDAL IDEATION: ICD-10-CM

## 2017-12-05 DIAGNOSIS — F20.3 SCHIZOPHRENIA, UNDIFFERENTIATED (H): ICD-10-CM

## 2017-12-05 RX ORDER — PALIPERIDONE 9 MG/1
9 TABLET, EXTENDED RELEASE ORAL AT BEDTIME
Qty: 7 TABLET | Refills: 0 | Status: SHIPPED | OUTPATIENT
Start: 2017-12-05 | End: 2017-12-11

## 2017-12-05 RX ORDER — LAMOTRIGINE 100 MG/1
100 TABLET ORAL DAILY
Qty: 30 TABLET | Refills: 0 | Status: SHIPPED | OUTPATIENT
Start: 2017-12-05 | End: 2017-12-11

## 2017-12-05 NOTE — TELEPHONE ENCOUNTER
Medication requested: FLUoxetine (PROZAC) 40 MG   Last refilled: 10/30/17  Qty: 60 0      Last seen: 7/26/17  RTC: 4-6 WKS  Cancel: 2  No-show: 1  Next appt: 12/11/17    Refill decision: Refill pended and routed to the provider for review/determination due to  DELAY IN F/U APPT.   2 CX  1NOSHOW    PENDING APPT. 12/11/17

## 2017-12-05 NOTE — TELEPHONE ENCOUNTER
Medication requested: lamoTRIgine (LAMICTAL) 100 MG   Last refilled: 10/30/17  Qty: 30:0      Last seen: :  RTC: 7/26/17  Cancel: 2  No-show: 1  Next appt: 12/11/17    Refill decision:Refill pended and routed to the provider for review/determination due to  DELAY IN F/U APPT.  2 CX  1 NOSHOW

## 2017-12-06 RX ORDER — FLUOXETINE 40 MG/1
80 CAPSULE ORAL AT BEDTIME
Qty: 60 CAPSULE | Refills: 0 | Status: SHIPPED | OUTPATIENT
Start: 2017-12-06 | End: 2017-12-11

## 2017-12-11 ENCOUNTER — OFFICE VISIT (OUTPATIENT)
Dept: PSYCHIATRY | Facility: CLINIC | Age: 22
End: 2017-12-11
Attending: PSYCHIATRY & NEUROLOGY
Payer: COMMERCIAL

## 2017-12-11 VITALS
BODY MASS INDEX: 29.7 KG/M2 | WEIGHT: 189.6 LBS | HEART RATE: 116 BPM | SYSTOLIC BLOOD PRESSURE: 114 MMHG | DIASTOLIC BLOOD PRESSURE: 77 MMHG

## 2017-12-11 DIAGNOSIS — F25.1 SCHIZOAFFECTIVE DISORDER, DEPRESSIVE TYPE (H): ICD-10-CM

## 2017-12-11 DIAGNOSIS — R45.851 SUICIDAL IDEATION: ICD-10-CM

## 2017-12-11 DIAGNOSIS — F20.3 SCHIZOPHRENIA, UNDIFFERENTIATED (H): ICD-10-CM

## 2017-12-11 PROCEDURE — 99212 OFFICE O/P EST SF 10 MIN: CPT | Mod: ZF

## 2017-12-11 RX ORDER — PALIPERIDONE 6 MG/1
12 TABLET, EXTENDED RELEASE ORAL AT BEDTIME
Qty: 60 TABLET | Refills: 1 | Status: SHIPPED | OUTPATIENT
Start: 2017-12-11 | End: 2018-01-23

## 2017-12-11 RX ORDER — BENZTROPINE MESYLATE 1 MG/1
1 TABLET ORAL AT BEDTIME
Qty: 30 TABLET | Refills: 1 | Status: SHIPPED | OUTPATIENT
Start: 2017-12-11 | End: 2018-01-23

## 2017-12-11 RX ORDER — FLUOXETINE 40 MG/1
80 CAPSULE ORAL AT BEDTIME
Qty: 60 CAPSULE | Refills: 1 | Status: SHIPPED | OUTPATIENT
Start: 2017-12-11 | End: 2018-01-23

## 2017-12-11 RX ORDER — HYDROXYZINE HYDROCHLORIDE 25 MG/1
TABLET, FILM COATED ORAL
Qty: 60 TABLET | Refills: 3 | Status: SHIPPED | OUTPATIENT
Start: 2017-12-11 | End: 2017-12-27

## 2017-12-11 RX ORDER — LAMOTRIGINE 100 MG/1
100 TABLET ORAL DAILY
Qty: 30 TABLET | Refills: 1 | Status: SHIPPED | OUTPATIENT
Start: 2017-12-11 | End: 2018-01-23

## 2017-12-11 ASSESSMENT — PATIENT HEALTH QUESTIONNAIRE - PHQ9: SUM OF ALL RESPONSES TO PHQ QUESTIONS 1-9: 16

## 2017-12-11 NOTE — NURSING NOTE
Chief Complaint   Patient presents with     Recheck Medication     Schizophrenia,     Reviewed allergies, smoking status, and pharmacy preference  Administered abuse screening questions   Obtained weight, blood pressure and heart rate

## 2017-12-11 NOTE — PROGRESS NOTES
PSYCHIATRY CLINIC PROGRESS NOTE   The initial diagnostic evaluation was on 07/08/2016.  Date of the most recent transfer of care liliane is 07/26/2017.    Pertinent Background:  This patient first experienced mental health issues in childhood and has received treatment for psychosis and suicidality.  See transfer evaluation for detailed history.  Notably, Rylee is a 23 yo female with history of AH dating back to age 9 yo.  Psychosis has also included command AH in the past.  Additionally she has had 2 SI attempts (2012 Zyrtec OD and 2014 hanging with scarf).  Previous prompts were fear of rejection.  Therese is very close to both parents and her half siblings and she is in a romantic relationship with BF Pranav for a little over a year. She has good insight about her paranoia and some realistic concerns about BF reconnecting with his ex. Per chart, some Cluster B traits and frequent hospitalizations prior to starting  residence and supportive employment, and doing well the past severeal years.       Psych critical item history includes suicide attempt [multiple], suicidal ideation, psychosis [sxs include paranoia, AH, ideas of reference], mutiple psychotropic trials and psych hosp (>5).    INTERIM HISTORY                                                 Cesia Nettles is a 22 year old female who was last seen for medication management on 07/26/2017 at which time no medication changes were made. Per telephone encounter Paliperidone was increased after an acute stressor worsened symptoms.  The patient reports good treatment adherence.  History was provided by the patient who was a good historian.  Since the last visit:  - Sudha's BF broke up with her at the end of October.  This lead to decompensation in mood and an increase in suicidal thinking, which led to pt tying a scarf around her neck in context of suicidal ideation.  Pt was evaluated in the ED and was discharged to home ().   - Since that time, Sudha has continued  to work with her therapist on processing the break up as well as her emotional stability.  She reports this is going well.  - Sudha has also signed up for a DBT group. Per pt, she has done this several times before and found very helpful, so  Will be starting at [facility] next week.  - Pt continues to have some interaction with her ex-boyfriend, who recently started dating a new woman.  Pt reports that she felt very upset when he sent her a photo of the new GF with him and had a long crying spell. Sudha has told Pranav (ex-BF) that she does not want to see pictures of him with the new GF and will take a break from texting with him.  We talked about setting boundaries and pt is looking forward to employing more of her DBT skills in the near future.  - Despite this stressor, Sudha reports that after the acute break up and suicidality in October, her mood has been very stable with baseline passive suicidal thinking.  She does not have any suicidal intent and no plan.  She does not spend time thinking about possible means of ending her life.  She is hopeful and future oriented.  She has juana strong commitment to her immediate and extended family.   - Sudha continues to enjoy living in the , although she does wish that the house had Internet, so that her housemates would stop asking her to use her individual wifi (installed by mom).  Encouraged pt to bring up issue with her co-residents to the  staff.    RECENT SYMPTOMS:   DEPRESSION:  reports-suicidal ideation without plan; without intent [details in Interim History], low energy and poor concentration /memory;  DENIES- feeling worthless, indecisiveness, feeling hopeless, feeling trapped and excessive crying  PSYCHOSIS:  reports-auditory hallucinations without commands [details in Interim History] and visual distortions/shadows;  DENIES- delusions  DYSREGULATION:  reports-suicidal ideation without plan; without intent [details in Interim History] and  mood dysregulation;  DENIES- violent ideation, SIB, impulsive and aggressive  EATING DISORDER: none    RECENT SUBSTANCE USE:     ALCOHOL- none          TOBACCO- none               CAFFEINE- no caffeine  OPIOIDS- none       NARCAN KIT- N/A       CANNABIS- none          OTHER ILLICIT DRUGS- none     CURRENT SOCIAL HISTORY:  FINANCIAL SUPPORT- social security disability       CHILDREN- none       LIVING SITUATION- lives in       SOCIAL/ SPIRITUAL SUPPORT- family, BF       FEELS SAFE AT HOME- Yes     MEDICAL ROS:  Reports wt gain and polydipsia      Denies short term memory and/or word finding difficulty and unusual movements        PSYCH and CD Critical Summary Points since July 2017           10/2017 - Increased Invega from 9 mg to 12 mg      PAST PSYCH MED TRIALS   see EMR Problem List: Hx of psychiatric care    MEDICAL / SURGICAL HISTORY                                   CARE TEAM:   PCP- Becki Valencia          Therapist- Rylee Samuels - [facility] -  [phone] (Verbal BRIONNA on file); Will also start DBT    Pregnant or breastfeeding:  No      Contraception- Implenon    Neurologic Hx [head injury etc]:  H/O childhood Szs (Petit mal) from age [age] yo.    Patient Active Problem List   Diagnosis     Schizoaffective disorder, depressive type (H)     Hx of psychiatric care       ALLERGY                                Review of patient's allergies indicates no known allergies.  MEDICATIONS                               Current Outpatient Prescriptions   Medication Sig Dispense Refill     FLUoxetine (PROZAC) 40 MG capsule Take 2 capsules (80 mg) by mouth At Bedtime 60 capsule 0     paliperidone (INVEGA) 9 MG 24 hr tablet Take 1 tablet (9 mg) by mouth At Bedtime TAKE WITH 3MG TAB  FOR 12MG DAILY DOSE 7 tablet 0     lamoTRIgine (LAMICTAL) 100 MG tablet Take 1 tablet (100 mg) by mouth daily 30 tablet 0     benztropine (COGENTIN) 1 MG tablet Take 1 tablet (1 mg) by mouth At Bedtime 14 tablet 0     paliperidone (INVEGA) 3 MG  24 hr tablet Take 1 tablet (3 mg) by mouth At Bedtime TAKE WITH 9MG TAB  FOR 12MG DAILY DOSE 14 tablet 0     metFORMIN (GLUCOPHAGE) 500 MG tablet Take 2 tablets (1,000 mg) by mouth 2 times daily (with meals) 60 tablet 6     Sodium Fluoride (PREVIDENT 5000 BOOSTER) 1.1 % PSTE Apply 1 Dose to affected area 2 times daily       ketoconazole (NIZORAL) 2 % cream Apply 1 applicator topically daily as needed for itching or irritation       bacitracin ointment Apply 1 g topically daily as needed for wound care       Acetaminophen 325 MG CAPS Take 325-650 mg by mouth every 4 hours as needed       hydrOXYzine (ATARAX) 25 MG tablet start Hydroxyzine 25 mg   tab BID PRN 60 tablet 3     VITALS   /77  Pulse 116  Wt 86 kg (189 lb 9.6 oz)  BMI 29.7 kg/m2   MENTAL STATUS EXAM                                                           Alertness: alert  and oriented  Appearance: casually groomed  Behavior/Demeanor: cooperative, pleasant and interruptive, with good  eye contact   Speech: regular rate and rhythm  Language: no problems  Psychomotor: normal or unremarkable  Mood: description consistent with euthymia  Affect: full range and appropriate; was congruent to mood; was congruent to content  Thought Process/Associations: unremarkable  Thought Content:  Reports suicidal ideation without plan; without intent [details in Interim History];  Denies violent ideation and delusions  Perception:  Reports auditory hallucinations without commands [details in Interim History];  Denies visual hallucinations  Insight: good  Judgment: good  Cognition: does  appear grossly intact; formal cognitive testing was not done    LABS and DATA   RATING SCALES:  AIMS: determine next due    PHQ9 TODAY = 16  PHQ-9 SCORE 2/20/2017 4/14/2017 6/1/2017   Total Score - - -   Total Score - - -   Total Score 12 17 14     ANTIPSYCHOTIC LABS   [glu, A1C, lipids (focus LDL), liver enzymes, WBC, ANEU, Hgb, plts]    q12 mo  Recent Labs   Lab Test  02/02/17    0916  09/16/14   0947   GLC  87  69*   A1C  4.9  5.2     Recent Labs   Lab Test  02/02/17   0916 12/15/15   CHOL  186  188   TRIG  195*  210*   LDL  109*  109   HDL  38*  37*     Recent Labs   Lab Test  02/02/17   0916  12/21/13   1016   AST  24  33   ALT  23  18   ALKPHOS  102  107  107     Recent Labs   Lab Test  02/02/17   0916  09/16/14   0947  12/21/13   1016   WBC  6.8  6.9  6.1  6.1   ANEU   --   3.9  3.9  3.9   HGB  12.3  12.8  12.5  12.5   PLT  274  317  310  310     Last EKG 05/2014 with QTc 423 ms    DIAGNOSIS     Schizoaffective Disorder, Depressed type, in partial remission re: mood, active (though baseline) psychotic features.    ASSESSMENT                                     TODAY Therese reports feeling back to baseline after the break up with her BF in October.  Mood is stable and depression likely entering remission. Fleeting thoughts about being better off dead are without plan or intent and pt  Psychosis is also stable with some baseline occasional AH/VH that are not bothersome to the pt.       SUICIDE RISK ASSESSMENT [details described above]: Cesia Nettles reported previous SA X3, most recently 10/2017 tying a scarf around her neck while having SI (prior to that was in 2014). Current passive SI without plan or intent.  In addition, she has notable risk factors for self-harm including similar prompt as seen w/previous self-harm, relationship conflict and previous suicide attempts.  However, risk is mitigated by no suicidal plan or intent, describes a safety plan, h/o seeking help when needed, symptom improvement, future oriented, feeling hopeful, none to minimal alcohol use, commitment to family, good social support and stable housing.  Based on all available evidence she does not appear to be at imminent risk for self-harm therefore does not meet criteria for a 72-hr hold/involuntary hospitalization.  However, based on degree of symptoms DBT and close psych FU was recommended which the pt  did agree to.  Additional steps to minimize risk include: SAFETY PLAN completed 07/26/2017 and ID previous SI triggers    [place] PRESCRIPTION MONITORING PROGRAM [] was not checked today:  not using controlled substances.    PSYCHOTROPIC DRUG INTERACTIONS:   Fluoxetine and Hydrozyzine  concurrent use may increase risk of QTc prolongation.   Fluoxetine and Paliperidone concurrent use may increase QTc prolongation  Paliperidone and Hydroxyzine concurrent use may increase Qt prolongation  MANAGEMENT:  Monitoring for adverse effects, routine vitals, routine labs and periodic EKGs     PLAN                                                                                                       1) PSYCHOTROPIC MEDICATIONS:  - Continue Paliperidone 12 mg   - Continue Lamotrigine 100 mg Daily  - Continue Fluoxetine 80 mg QHS  - Continue Cogentin 1 mg QHS  - Continue Metformin 1000 mg BID  - Continue Hydroxyzine 25 mg 1/2 tab BID PRN (hasn't use in several months)    2) THERAPY:  Continue Individual and will start DBT next week.  TREATMENT PLAN:   Date revised  -  NA   Date next due- next apt    3) NEXT DUE:    Labs- Neuroleptic labs due 02/18  EKG- will consider at next apt  Rating Scales- AIMS at next apt    4) REFERRALS:    - Continue : GH, CM, CADI, ARHMS     5) RTC: 4     SAFETY PLAN reviewed: Yes    6) CRISIS NUMBERS:   Provided routinely in AVS.  Especially emphasized:  [facility] [phone] (clinic)    [phone] (after hours)  ONLY if a [facility] PT: [facility] [phone] (clinic), [phone] (after hours)     TREATMENT RISK STATEMENT:  The risks, benefits, alternatives and potential adverse effects have been discussed and are understood by the pt. The pt understands the risks of using street drugs or alcohol. There are no medical contraindications, the pt agrees to treatment with the ability to do so. The pt knows to call the clinic for any problems or to access emergency  care if needed.  Medical and substance use concerns are documented above.  Psychotropic drug interaction check was done, including changes made today.    WHODAS 2.0 12/11/17  total score = 32; [a 12-item WHODAS 2.0 assessment has been completed by the pt and/or recorded in EPIC].      RESIDENT:   Twyla Petit MD    Patient staffed in clinic with Dr. Schofield who will sign the note.  Supervisor is Dr. Rosales.    Supervisor Attestation:  I saw the patient with the resident, and participated in key portions of the service, including the mental status examination and developing the plan of care. I reviewed key portions of the history with the resident. I agree with the findings and plan as documented in this note.  Elvis Schofield MD

## 2017-12-11 NOTE — MR AVS SNAPSHOT
After Visit Summary   12/11/2017    Cesia Nettles    MRN: [id number]           Patient Information     Date Of Birth          1995        Visit Information        Provider Department      12/11/2017 9:45 AM Twyla Petit MD Psychiatry Clinic        Today's Diagnoses     Schizoaffective disorder, depressive type        Schizophrenia, undifferentiated (H)        Suicidal ideation          Care Instructions    No Medication Changes.          Follow-ups after your visit        Follow-up notes from your care team     Return in about 4 weeks (around 1/8/2018).      Your next 10 appointments already scheduled     Jan 17, 2018  9:45 AM CST   Adult Med Follow UP with Twyla Petit MD   Psychiatry Clinic ([facility])    [address] [facility] [address] [phone]              Who to contact     Please call your clinic at [phone] to:    Ask questions about your health    Make or cancel appointments    Discuss your medicines    Learn about your test results    Speak to your doctor   If you have compliments or concerns about an experience at your clinic, or if you wish to file a complaint, please contact [facility] Patient Relations at [phone] or email us at [email or web address]         Additional Information About Your Visit        MyChart Information     Mediaoceant is an electronic gateway that provides easy, online access to your medical records. With Adim8, you can request a clinic appointment, read your test results, renew a prescription or communicate with your care team.     To sign up for Mediaoceant visit the website at [email or web address]   You will be asked to enter the access code listed below, as well as some personal information. Please follow the directions to create your username and password.     Your access code is: [email or web address]  Expires: 3/18/2018  9:42 AM     Your access code  will  in 90 days. If you need help or a new code, please contact your [facility] or call [phone] for assistance.        Care EveryWhere ID     This is your Care EveryWhere ID. This could be used by other organizations to access your [facility] medical records  [id number]        Your Vitals Were     Pulse BMI (Body Mass Index)                116 29.7 kg/m2           Blood Pressure from Last 3 Encounters:   17 114/77   10/16/17 117/71   17 132/85    Weight from Last 3 Encounters:   17 86 kg (189 lb 9.6 oz)   10/16/17 88.9 kg (196 lb)   17 97.3 kg (214 lb 9.6 oz)              Today, you had the following     No orders found for display         Today's Medication Changes          These changes are accurate as of: 17 11:59 PM.  If you have any questions, ask your nurse or doctor.               These medicines have changed or have updated prescriptions.        Dose/Directions    paliperidone 6 MG 24 hr tablet   Commonly known as:  INVEGA   This may have changed:    - medication strength  - how much to take  - Another medication with the same name was removed. Continue taking this medication, and follow the directions you see here.   Used for:  Schizophrenia, undifferentiated (H)   Changed by:  Twyla Petit MD        Dose:  12 mg   Take 2 tablets (12 mg) by mouth At Bedtime TAKE WITH 3MG TAB  FOR 12MG DAILY DOSE   Quantity:  60 tablet   Refills:  1            Where to get your medicines      These medications [place] [facility] [place] [address], [place] [address]     Phone:  [phone]     benztropine 1 MG tablet    FLUoxetine 40 MG capsule    lamoTRIgine 100 MG tablet    metFORMIN 500 MG tablet    paliperidone 6 MG 24 hr tablet         Some of these will need a paper prescription and others can be bought over the counter.  Ask your nurse if you have questions.     Bring a paper  prescription for each of these medications     hydrOXYzine 25 MG tablet                Primary Care Provider Office Phone # Fax #    Becki Valencia [phone]       [facility] [address] [address]        Equal Access to Services     HARRY HEATH : Hadii aad ku hadmiano Soomaali, waaxda luqadaha, qaybta kaalmada adeegyada, waxay idiin hayaan adeeg khyamila lagabelamont . So [facility] [phone].    ATENCIÓN: Si habla español, tiene a christensen disposición servicios gratuitos de asistencia lingüística. Llame al [phone].    We comply with applicable federal civil rights laws and [place] laws. We do not discriminate on the basis of race, color, national origin, age, disability, sex, sexual orientation, or gender identity.            Thank you!     Thank you for choosing PSYCHIATRY CLINIC  for your care. Our goal is always to provide you with excellent care. Hearing back from our patients is one way we can continue to improve our services. Please take a few minutes to complete the written survey that you may receive in the mail after your visit with us. Thank you!             Your Updated Medication List - Protect others around you: Learn how to safely use, store and throw away your medicines at www.disposemymeds.org.          This list is accurate as of: 12/11/17 11:59 PM.  Always use your most recent med list.                   Brand Name Dispense Instructions for use Diagnosis    Acetaminophen 325 MG Caps      Take 325-650 mg by mouth every 4 hours as needed        bacitracin ointment      Apply 1 g topically daily as needed for wound care        benztropine 1 MG tablet    COGENTIN    30 tablet    Take 1 tablet (1 mg) by mouth At Bedtime    Schizoaffective disorder, depressive type (H)       FLUoxetine 40 MG capsule    PROzac    60 capsule    Take 2 capsules (80 mg) by mouth At Bedtime    Schizophrenia, undifferentiated (H)       hydrOXYzine 25 MG tablet    ATARAX    60 tablet    start  Hydroxyzine 25 mg ? tab BID PRN    Schizophrenia, undifferentiated (H)       ketoconazole 2 % cream    NIZORAL     Apply 1 applicator topically daily as needed for itching or irritation        lamoTRIgine 100 MG tablet    LaMICtal    30 tablet    Take 1 tablet (100 mg) by mouth daily    Suicidal ideation       metFORMIN 500 MG tablet    GLUCOPHAGE    60 tablet    Take 2 tablets (1,000 mg) by mouth 2 times daily (with meals)    Schizophrenia, undifferentiated (H)       paliperidone 6 MG 24 hr tablet    INVEGA    60 tablet    Take 2 tablets (12 mg) by mouth At Bedtime TAKE WITH 3MG TAB  FOR 12MG DAILY DOSE    Schizophrenia, undifferentiated (H)       PREVIDENT 5000 BOOSTER 1.1 % Pste   Generic drug:  Sodium Fluoride      Apply 1 Dose to affected area 2 times daily

## 2017-12-27 DIAGNOSIS — F20.3 SCHIZOPHRENIA, UNDIFFERENTIATED (H): ICD-10-CM

## 2017-12-27 RX ORDER — HYDROXYZINE HYDROCHLORIDE 25 MG/1
TABLET, FILM COATED ORAL
Qty: 60 TABLET | Refills: 0 | Status: SHIPPED | OUTPATIENT
Start: 2017-12-27 | End: 2018-01-23

## 2017-12-27 NOTE — TELEPHONE ENCOUNTER
Rec'd RF request from [facility] for hydroxyzine.  Per med the following RF was completed with local print rather than e-prescribe:   Disp Refills Start End FRACISCO   hydrOXYzine (ATARAX) 25 MG tablet 60 tablet 3 12/11/2017  No   Sig: start Hydroxyzine 25 mg   tab BID PRN   Class: Local Print   Order: [id number]         Writer re-sent as stated above under e-prescribe (30 d/s, 0 RFs).

## 2018-01-23 ENCOUNTER — OFFICE VISIT (OUTPATIENT)
Dept: PSYCHIATRY | Facility: CLINIC | Age: 23
End: 2018-01-23
Attending: PSYCHIATRY & NEUROLOGY
Payer: COMMERCIAL

## 2018-01-23 VITALS
BODY MASS INDEX: 29.13 KG/M2 | WEIGHT: 186 LBS | HEART RATE: 106 BPM | DIASTOLIC BLOOD PRESSURE: 78 MMHG | SYSTOLIC BLOOD PRESSURE: 118 MMHG

## 2018-01-23 DIAGNOSIS — F25.1 SCHIZOAFFECTIVE DISORDER, DEPRESSIVE TYPE (H): Primary | ICD-10-CM

## 2018-01-23 DIAGNOSIS — R45.851 SUICIDAL IDEATION: ICD-10-CM

## 2018-01-23 DIAGNOSIS — F20.3 SCHIZOPHRENIA, UNDIFFERENTIATED (H): ICD-10-CM

## 2018-01-23 DIAGNOSIS — F25.1 SCHIZOAFFECTIVE DISORDER, DEPRESSIVE TYPE (H): ICD-10-CM

## 2018-01-23 PROCEDURE — G0463 HOSPITAL OUTPT CLINIC VISIT: HCPCS | Mod: ZF

## 2018-01-23 RX ORDER — PALIPERIDONE 6 MG/1
12 TABLET, EXTENDED RELEASE ORAL AT BEDTIME
Qty: 60 TABLET | Refills: 1 | Status: SHIPPED | OUTPATIENT
Start: 2018-01-23 | End: 2018-03-23

## 2018-01-23 RX ORDER — HYDROXYZINE HYDROCHLORIDE 25 MG/1
TABLET, FILM COATED ORAL
Qty: 60 TABLET | Refills: 0 | Status: SHIPPED | OUTPATIENT
Start: 2018-01-23 | End: 2018-03-23

## 2018-01-23 RX ORDER — BENZTROPINE MESYLATE 1 MG/1
1 TABLET ORAL AT BEDTIME
Qty: 30 TABLET | Refills: 1 | Status: SHIPPED | OUTPATIENT
Start: 2018-01-23 | End: 2018-03-23

## 2018-01-23 RX ORDER — LAMOTRIGINE 100 MG/1
100 TABLET ORAL DAILY
Qty: 30 TABLET | Refills: 1 | Status: SHIPPED | OUTPATIENT
Start: 2018-01-23 | End: 2018-04-20

## 2018-01-23 RX ORDER — FLUOXETINE 40 MG/1
80 CAPSULE ORAL AT BEDTIME
Qty: 60 CAPSULE | Refills: 1 | Status: SHIPPED | OUTPATIENT
Start: 2018-01-23 | End: 2018-03-23

## 2018-01-23 ASSESSMENT — ABNORMAL INVOLUNTARY MOVEMENT SCALE (AIMS)
TONGUE: 0
AIMS_DISAPPEAR_SLEEPING: YES
AIMS_PATIENT_AWARENESS: NO AWARENESS

## 2018-01-23 ASSESSMENT — PAIN SCALES - GENERAL: PAINLEVEL: NO PAIN (0)

## 2018-01-23 NOTE — MR AVS SNAPSHOT
After Visit Summary   1/23/2018    Cesia Nettles    MRN: [id number]           Patient Information     Date Of Birth          1995        Visit Information        Provider Department      1/23/2018 8:15 AM Twyla Petit MD Psychiatry Clinic        Today's Diagnoses     Schizoaffective disorder, depressive type (H)    -  1    Schizoaffective disorder, depressive type        Schizophrenia, undifferentiated (H)        Suicidal ideation          Care Instructions      TREATMENT  PLAN          SYMPTOMS; PROBLEMS   MEASURABLE GOALS;    FUNCTIONAL IMPROVEMENT INTERVENTIONS;   GAINS MADE DISCHARGE CRITERIA   Dysregulation: mood dysregulation   reduce feeling overwhelmed/ improve decision making skills psychotherapy  social skills training  strength focus  stress management   - Continue DBT skills group marked symptom improvement   Depression: depressed mood   find enjoyment at least once a day stress management   - get out of the house 4-5 times/week for social interaction marked symptom improvement     Thank you for coming to the PSYCHIATRY CLINIC.    Lab Testing:  If you had lab testing today and your results are reassuring or normal they will be mailed to you or sent through [employer] within 7 days.   If the lab tests need quick action we will call you with the results.  The phone number we will call with results is # [phone] (home) . If this is not the best number please call our clinic and change the number.    Medication Refills:  If you need any refills please call your pharmacy and they will contact us. Our fax number for refills is [phone]. Please allow three business for refill processing.   If you need to  your refill at a new pharmacy, please contact the new pharmacy directly. The new pharmacy will help you get your medications transferred.     Scheduling:  If you have any concerns about today's visit or wish to schedule another appointment please call our office during normal  business hours [phone] (8-5:00 M-F)    Contact Us:  Please call [phone] during business hours (8-5:00 M-F).  If after clinic hours, or on the weekend, please call  [phone].    Financial Assistance [phone]  [facility] Billing [phone]  [place] Billing [phone]  Medical Records [phone]      MENTAL HEALTH CRISIS NUMBERS:  [facility]:   [facility] - [phone]   Crisis Residence [facility] - [phone]   Walk-In Counseling Center [facility] - [phone]   COPE 24/7 [employer] Mobile Team for Adults - [[phone]]; Child - [[phone]]     Crisis Connection - [phone]     [facility]:   [facility] - [phone]   Walk-in counseling [facility] - [phone]   Walk-in counseling [facility] - [phone]   Crisis Residence [facility] Residence - [phone]   Urgent Care Adult Mental Health:   --Drop-in, 24/7 crisis line, and [facility] Mobile Team [[phone]]    CRISIS TEXT LINE: Text [phone] from anywhere, anytime, any crisis 24/7;    OR SEE www.crisistextline.org     Poison Control Center - [phone]    CHILD: [facility] needs assessment team - [phone]     [facility] - [phone]; or [facility] - [phone]    If you have a medical emergency please call 911or go to the nearest ER.                    _____________________________________________    Again thank you for choosing PSYCHIATRY CLINIC and please let us know how we can best partner with you to improve you and your family's health.  You may be receiving a survey in the mail regarding this appointment. We would love to have your feedback, both positive and negative, so please fill out the survey and return it using the provided envolpe. The survey is done by an external company, so your answers are anonymous.             Follow-ups after your visit        Your next 10 appointments  already scheduled     2018  8:45 AM CST   Adult Med Follow UP with Twyla Petit MD   Psychiatry Clinic ([facility])    [place] [id number]  [address] [id number] [phone]              Who to contact     Please call your clinic at [phone] to:    Ask questions about your health    Make or cancel appointments    Discuss your medicines    Learn about your test results    Speak to your doctor   If you have compliments or concerns about an experience at your clinic, or if you wish to file a complaint, please contact [facility] Patient Relations at [phone] or email us at [email or web address]         Additional Information About Your Visit        [email or web address] Information     [employer] is an electronic gateway that provides easy, online access to your medical records. With [employer], you can request a clinic appointment, read your test results, renew a prescription or communicate with your care team.     To sign up for [employer] visit the website at [email or web address]   You will be asked to enter the access code listed below, as well as some personal information. Please follow the directions to create your username and password.     Your access code is: [email or web address]  Expires: 3/18/2018  9:42 AM     Your access code will  in 90 days. If you need help or a new code, please contact your [facility] or call [phone] for assistance.        Care EveryWhere ID     This is your Care EveryWhere ID. This could be used by other organizations to access your [place] medical records  [id number]        Your Vitals Were     Pulse BMI (Body Mass Index)                106 29.13 kg/m2           Blood Pressure from Last 3 Encounters:   18 118/78   17 114/77   10/16/17 117/71    Weight from Last 3 Encounters:   18 84.4 kg (186 lb)   17 86 kg (189 lb 9.6 oz)   10/16/17  88.9 kg (196 lb)              Today, you had the following     No orders found for display         Where to get your medicines      These medications were [place] to [facility] [place] [address], [place] [address]     Phone:  [phone]     benztropine 1 MG tablet    FLUoxetine 40 MG capsule    hydrOXYzine 25 MG tablet    lamoTRIgine 100 MG tablet    paliperidone 6 MG 24 hr tablet          Primary Care Provider Office Phone # Fax #    Becki Valencia [phone]       [facility] [address] [place] [address]        Equal Access to Services     [facility]: Hadii aad luke hadasho Sonidia, waaxda luqadaha, qaybta kaalmada adeegyada, subha mccormack . [facility] [phone].    ATENCIÓN: Si habla español, tiene a christensen disposición servicios gratuitos de asistencia lingüística. [facility] [phone].    We comply with applicable federal civil rights laws and [place] laws. We do not discriminate on the basis of race, color, national origin, age, disability, sex, sexual orientation, or gender identity.            Thank you!     Thank you for choosing PSYCHIATRY CLINIC  for your care. Our goal is always to provide you with excellent care. Hearing back from our patients is one way we can continue to improve our services. Please take a few minutes to complete the written survey that you may receive in the mail after your visit with us. Thank you!             Your Updated Medication List - Protect others around you: Learn how to safely use, store and throw away your medicines at www.disposemymeds.org.          This list is accurate as of: 1/23/18  9:07 AM.  Always use your most recent med list.                   Brand Name Dispense Instructions for use Diagnosis    Acetaminophen 325 MG Caps      Take 325-650 mg by mouth every 4 hours as needed        bacitracin ointment      Apply 1 g topically daily as needed for wound  care        benztropine 1 MG tablet    COGENTIN    30 tablet    Take 1 tablet (1 mg) by mouth At Bedtime    Schizoaffective disorder, depressive type (H)       FLUoxetine 40 MG capsule    PROzac    60 capsule    Take 2 capsules (80 mg) by mouth At Bedtime    Schizophrenia, undifferentiated (H)       hydrOXYzine 25 MG tablet    ATARAX    60 tablet    Take 1/2 tab po  BID PRN.    Schizophrenia, undifferentiated (H)       ketoconazole 2 % cream    NIZORAL     Apply 1 applicator topically daily as needed for itching or irritation        lamoTRIgine 100 MG tablet    LaMICtal    30 tablet    Take 1 tablet (100 mg) by mouth daily    Suicidal ideation       metFORMIN 500 MG tablet    GLUCOPHAGE    60 tablet    Take 2 tablets (1,000 mg) by mouth 2 times daily (with meals)    Schizophrenia, undifferentiated (H)       paliperidone 6 MG 24 hr tablet    INVEGA    60 tablet    Take 2 tablets (12 mg) by mouth At Bedtime TAKE WITH 3MG TAB  FOR 12MG DAILY DOSE    Schizophrenia, undifferentiated (H)       PREVIDENT 5000 BOOSTER 1.1 % Pste   Generic drug:  Sodium Fluoride      Apply 1 Dose to affected area 2 times daily

## 2018-01-23 NOTE — Clinical Note
Dr. Lanier, I put your name in for the treatment plan, because I thought you would staff pt, but had to let her go due to time constraints.  Sorry. L

## 2018-01-23 NOTE — PROGRESS NOTES
"  PSYCHIATRY CLINIC PROGRESS NOTE   The initial diagnostic evaluation was on 07/08/2016.  Date of the most recent transfer of care liliane is 07/26/2017.    Pertinent Background:  This patient first experienced mental health issues in childhood and has received treatment for psychosis and suicidality.  See transfer evaluation for detailed history.  Notably, Rylee is a 21 yo female with history of AH dating back to age 7 yo.  Psychosis has also included command AH in the past.  Additionally she has had 2 SI attempts (2012 Zyrtec OD and 2014 hanging with scarf).  Previous prompts were fear of rejection.  Therese is very close to both parents and her half siblings and she is in a romantic relationship with BF Pranav for a little over a year. She has good insight about her paranoia and some realistic concerns about BF reconnecting with his ex. Per chart, some Cluster B traits and frequent hospitalizations prior to starting  residence and supportive employment, and doing well the past severeal years.       Psych critical item history includes suicide attempt [multiple], suicidal ideation, psychosis [sxs include paranoia, AH, ideas of reference], mutiple psychotropic trials and psych hosp (>5).    INTERIM HISTORY                                                 Cesia Nettles is a 22 year old female who was last seen for medication management on 12/11/2017 at which time no medication changes were made.   The patient reports good treatment adherence.  History was provided by the patient who was a good historian.  Since the last visit:  - Sudha reports that her mood has improved and that overall she is doing \"alot better.\"  She attributes this improvement largely to starting DBT skills group and continuing with her individual therapist.   - She does think about her ex-BF occasionally and they do have minimal interaction on social media; however, she no longer gets dysregulated by their interactions and she has been free from any SI " since our previous visit.    - She advocates for continuing medications as they are.    RECENT SYMPTOMS:   DEPRESSION:  reports-depressed mood;  DENIES- suicidal ideation, feeling hopeless, excessive crying and overwhelmed  PSYCHOSIS:  reports-none;  DENIES- delusions, auditory hallucinations, visual hallucinations and disorganized behavior  DYSREGULATION:  reports-none;  DENIES- suicidal ideation, violent ideation, SIB, mood dysregulation, impulsive, aggressive and irritable  EATING DISORDER: none    RECENT SUBSTANCE USE:     ALCOHOL- none          TOBACCO- none               CAFFEINE- no caffeine  OPIOIDS- none       NARCAN KIT- N/A       CANNABIS- none          OTHER ILLICIT DRUGS- none     CURRENT SOCIAL HISTORY:  FINANCIAL SUPPORT- social security disability       CHILDREN- none       LIVING SITUATION- lives in       SOCIAL/ SPIRITUAL SUPPORT- family, BF       FEELS SAFE AT HOME- Yes     MEDICAL ROS:  Reports wt gain and polydipsia      Denies short term memory and/or word finding difficulty and unusual movements        PSYCH and CD Critical Summary Points since July 2017           10/2017 - Increased Invega from 9 mg to 12 mg      PAST PSYCH MED TRIALS   see EMR Problem List: Hx of psychiatric care    MEDICAL / SURGICAL HISTORY                                   CARE TEAM:   PCP- Becki Valencia          Therapist- Rylee Samuels [facility] -  [phone] (Verbal BRIONNA on file); Will also start DBT    Pregnant or breastfeeding:  No      Contraception- Implenon    Neurologic Hx [head injury etc]:  H/O childhood Szs (Petit mal) from age [age] yo.    Patient Active Problem List   Diagnosis     Schizoaffective disorder, depressive type (H)     Hx of psychiatric care       ALLERGY                                Review of patient's allergies indicates no known allergies.  MEDICATIONS                               Current Outpatient Prescriptions   Medication Sig Dispense Refill     hydrOXYzine (ATARAX) 25 MG tablet  Take 1/2 tab po  BID PRN. 60 tablet 0     FLUoxetine (PROZAC) 40 MG capsule Take 2 capsules (80 mg) by mouth At Bedtime 60 capsule 1     lamoTRIgine (LAMICTAL) 100 MG tablet Take 1 tablet (100 mg) by mouth daily 30 tablet 1     metFORMIN (GLUCOPHAGE) 500 MG tablet Take 2 tablets (1,000 mg) by mouth 2 times daily (with meals) 60 tablet 6     Sodium Fluoride (PREVIDENT 5000 BOOSTER) 1.1 % PSTE Apply 1 Dose to affected area 2 times daily       ketoconazole (NIZORAL) 2 % cream Apply 1 applicator topically daily as needed for itching or irritation       bacitracin ointment Apply 1 g topically daily as needed for wound care       Acetaminophen 325 MG CAPS Take 325-650 mg by mouth every 4 hours as needed       benztropine (COGENTIN) 1 MG tablet Take 1 tablet (1 mg) by mouth At Bedtime 30 tablet 1     paliperidone (INVEGA) 6 MG 24 hr tablet Take 2 tablets (12 mg) by mouth At Bedtime TAKE WITH 3MG TAB  FOR 12MG DAILY DOSE 60 tablet 1     VITALS   /78  Pulse 106  Wt 84.4 kg (186 lb)  BMI 29.13 kg/m2   MENTAL STATUS EXAM                                                           Alertness: alert  and oriented  Appearance: casually groomed  Behavior/Demeanor: cooperative, pleasant and interruptive, with good  eye contact   Speech: regular rate and rhythm  Language: no problems  Psychomotor: normal or unremarkable  Mood: description consistent with euthymia  Affect: full range and appropriate; was congruent to mood; was congruent to content  Thought Process/Associations: unremarkable  Thought Content:  Reports none;  Denies suicidal and violent ideation and delusions  Perception:  Reports none;  Denies auditory hallucinations and visual hallucinations  Insight: good  Judgment: good  Cognition: does  appear grossly intact; formal cognitive testing was not done    LABS and DATA   RATING SCALES:  AIMS: done 01/23/2018 with total score of 0    PHQ9 TODAY = 13  PHQ-9 SCORE 4/14/2017 6/1/2017 12/11/2017   Total Score - - -    Total Score - - -   Total Score 17 14 16     ANTIPSYCHOTIC LABS   [glu, A1C, lipids (focus LDL), liver enzymes, WBC, ANEU, Hgb, plts]    q12 mo  Recent Labs   Lab Test  02/02/17   0916  09/16/14   0947   GLC  87  69*   A1C  4.9  5.2     Recent Labs   Lab Test  02/02/17   0916 12/15/15   CHOL  186  188   TRIG  195*  210*   LDL  109*  109   HDL  38*  37*     Recent Labs   Lab Test  02/02/17   0916  12/21/13   1016   AST  24  33   ALT  23  18   ALKPHOS  102  107  107     Recent Labs   Lab Test  02/02/17   0916  09/16/14   0947  12/21/13   1016   WBC  6.8  6.9  6.1  6.1   ANEU   --   3.9  3.9  3.9   HGB  12.3  12.8  12.5  12.5   PLT  274  317  310  310     Last EKG 05/2014 with QTc 423 ms    DIAGNOSIS     Schizoaffective Disorder, Depressed type, in partial remission re: mood, active (though baseline) psychotic features.    ASSESSMENT                                     TODAY Therese  Continues to experience partial resolution of her depression and complete resolution of her SI, since starting DBT skills group.  Today, she denies any signs and symptoms of psychosis.  She continues to enjoy her [facility] living environment and spending time with her family.  No medication changes made today.        SUICIDE RISK ASSESSMENT [details described above]: Cesia Nettles reported previous SA X3, most recently 10/2017 tying a scarf around her neck while having SI (prior to that was in 2014). No current SI.  In addition, she has notable risk factors for self-harm including similar prompt as seen w/previous self-harm, relationship conflict and previous suicide attempts.  However, risk is mitigated by no suicidal plan or intent, describes a safety plan, h/o seeking help when needed, symptom improvement, future oriented, feeling hopeful, none to minimal alcohol use, commitment to family, good social support and stable housing.  Based on all available evidence she does not appear to be at imminent risk for self-harm therefore does not  meet criteria for a 72-hr hold/involuntary hospitalization.  However, based on degree of symptoms DBT and close psych FU was recommended which the pt did agree to.  Additional steps to minimize risk include: SAFETY PLAN completed 07/26/2017 and ID previous SI [facility] PRESCRIPTION MONITORING PROGRAM [] was not checked today:  not using controlled substances.    PSYCHOTROPIC DRUG INTERACTIONS:   Fluoxetine and Hydrozyzine  concurrent use may increase risk of QTc prolongation.   Fluoxetine and Paliperidone concurrent use may increase QTc prolongation  Paliperidone and Hydroxyzine concurrent use may increase Qt prolongation  MANAGEMENT:  Monitoring for adverse effects, routine vitals, routine labs and periodic EKGs     PLAN                                                                                                       1) PSYCHOTROPIC MEDICATIONS:  - Continue Paliperidone 12 mg   - Continue Lamotrigine 100 mg Daily  - Continue Fluoxetine 80 mg QHS  - Continue Cogentin 1 mg QHS  - Continue Metformin 1000 mg BID  - Continue Hydroxyzine 25 mg 1/2 tab BID PRN (hasn't use in several months)    2) THERAPY:  Continue Individual and will start DBT next week.    TREATMENT PLAN:   Date revised  -  01/23/2018  Date next due- 04/23/2018    3) NEXT DUE:    Labs- Neuroleptic labs due 02/18  EKG- will consider at next apt  Rating Scales- AIMS: done 01/23/2018 with total score of 0    4) REFERRALS:    - Continue : GH, CM, CADI, ARHMS     5) RTC: 4 weeks     SAFETY PLAN reviewed: Yes    6) CRISIS NUMBERS:   Provided routinely in AVS.  Especially emphasized:  [facility] [phone] (clinic)    [phone] (after hours)  ONLY if a [facility] PT: [facility] [phone] (clinic), [phone] (after hours)     TREATMENT RISK STATEMENT:  The risks, benefits, alternatives and potential adverse effects have been discussed and are understood by the pt. The pt understands the risks of  using street drugs or alcohol. There are no medical contraindications, the pt agrees to treatment with the ability to do so. The pt knows to call the clinic for any problems or to access emergency care if needed.  Medical and substance use concerns are documented above.  Psychotropic drug interaction check was done, including changes made today.    PSYCHIATRY CLINIC INDIVIDUAL PSYCHOTHERAPY NOTE                                     [16]   Start time - 08:50        End time - 09:06  Date reviewed - 01/23/18       Date next due - 04/18/2018    Subjective: This supportive psychotherapy session addressed issues related to current stressors consisting of relationship conflicts/interactions.  Patient's reaction: Action in the context of mental status appropriate for ambulatory setting.  Progress: good  Plan: RTC 8 weeks  Psychotherapy services during this visit included  myself and the patient.   TREATMENT  PLAN          SYMPTOMS; PROBLEMS   MEASURABLE GOALS;    FUNCTIONAL IMPROVEMENT INTERVENTIONS;   GAINS MADE DISCHARGE CRITERIA   Dysregulation: mood dysregulation   reduce feeling overwhelmed/ improve decision making skills psychotherapy  social skills training  strength focus  stress management   - Continue DBT skills group marked symptom improvement   Depression: depressed mood   find enjoyment at least once a day stress management   - get out of the house 4-5 times/week for social interaction marked symptom improvement   RESIDENT:   Twyla Petit MD    Patient not staffed in clinic.  Supervisor is Dr. Rosales.    I did not see this patient directly. I have reviewed the documentation and I agree with the resident's plan of care.     Oralia Rosales MD

## 2018-01-23 NOTE — PATIENT INSTRUCTIONS
TREATMENT  PLAN          SYMPTOMS; PROBLEMS   MEASURABLE GOALS;    FUNCTIONAL IMPROVEMENT INTERVENTIONS;   GAINS MADE DISCHARGE CRITERIA   Dysregulation: mood dysregulation   reduce feeling overwhelmed/ improve decision making skills psychotherapy  social skills training  strength focus  stress management   - Continue DBT skills group marked symptom improvement   Depression: depressed mood   find enjoyment at least once a day stress management   - get out of the house 4-5 times/week for social interaction marked symptom improvement     Thank you for coming to the PSYCHIATRY CLINIC.    Lab Testing:  If you had lab testing today and your results are reassuring or normal they will be mailed to you or sent through [employer] within 7 days.   If the lab tests need quick action we will call you with the results.  The phone number we will call with results is # [phone] (home) . If this is not the best number please call our clinic and change the number.    Medication Refills:  If you need any refills please call your pharmacy and they will contact us. Our fax number for refills is [phone]. Please allow three business for refill processing.   If you need to  your refill at a new pharmacy, please contact the new pharmacy directly. The new pharmacy will help you get your medications transferred.     Scheduling:  If you have any concerns about today's visit or wish to schedule another appointment please call our office during normal business hours [phone] (8-5:00 M-F)    Contact Us:  Please call [phone] during business hours (8-5:00 M-F).  If after clinic hours, or on the weekend, please call  [phone].    Financial Assistance [phone]  [employer] Billing [phone]  [place] Billing [phone]  Medical Records [phone]      MENTAL HEALTH CRISIS NUMBERS:  [facility]:   [facility] - [phone]   Crisis Residence [facility] - [phone]    Walk-In Counseling Center [facility] - [phone]   COPE 24/7 [employer] Mobile Team for Adults - [[phone]]; Child - [[phone]]     Crisis Connection - [phone]     [facility]:   [facility] - [phone]   Walk-in counseling [facility] - [phone]   Walk-in counseling [facility] - [phone]   Crisis Residence [facility] - [phone]   Urgent Care Adult Mental Health:   --Drop-in, 24/7 crisis line, and [place] Mobile Team [[phone]]    CRISIS TEXT LINE: Text [phone] from anywhere, anytime, any crisis 24/7;    OR SEE www.[email or web address]     Poison Control Center - [phone]    CHILD: [facility] needs assessment team - [phone]     [facility] Lifeline - [phone]; or [facility] Lifeline - [phone]    If you have a medical emergency please call 911or go to the nearest ER.                    _____________________________________________    Again thank you for choosing PSYCHIATRY CLINIC and please let us know how we can best partner with you to improve you and your family's health.  You may be receiving a survey in the mail regarding this appointment. We would love to have your feedback, both positive and negative, so please fill out the survey and return it using the provided envolpe. The survey is done by an external company, so your answers are anonymous.

## 2018-01-23 NOTE — NURSING NOTE
Chief Complaint   Patient presents with     Recheck Medication     Schizoaffective disorder, depressive      Reviewed Allergies, Medications, Pharmacy, Smoking Status, and Pain Level  Administered Abuse Screening Questions   Obtained Weight, Blood Pressure, Heart Rate

## 2018-02-22 ENCOUNTER — TELEPHONE (OUTPATIENT)
Dept: PSYCHIATRY | Facility: CLINIC | Age: 23
End: 2018-02-22

## 2018-02-22 ENCOUNTER — OFFICE VISIT (OUTPATIENT)
Dept: PSYCHIATRY | Facility: CLINIC | Age: 23
End: 2018-02-22
Attending: PSYCHIATRY & NEUROLOGY
Payer: COMMERCIAL

## 2018-02-22 VITALS
HEART RATE: 114 BPM | WEIGHT: 187.2 LBS | SYSTOLIC BLOOD PRESSURE: 110 MMHG | BODY MASS INDEX: 29.32 KG/M2 | DIASTOLIC BLOOD PRESSURE: 66 MMHG

## 2018-02-22 DIAGNOSIS — F25.0 SCHIZOAFFECTIVE DISORDER, BIPOLAR TYPE (H): Primary | ICD-10-CM

## 2018-02-22 PROCEDURE — 40000809 ZZH STATISTIC NO DOCUMENTATION TO SUPPORT CHARGE

## 2018-02-22 PROCEDURE — G0463 HOSPITAL OUTPT CLINIC VISIT: HCPCS | Mod: ZF

## 2018-02-22 ASSESSMENT — PAIN SCALES - GENERAL: PAINLEVEL: NO PAIN (0)

## 2018-02-22 NOTE — NURSING NOTE
Chief Complaint   Patient presents with     Recheck Medication     Schizoaffective disorder, depressive type (H)      Reviewed Allergies, Medications, Pharmacy, Smoking Status, and Pain Level   Obtained Weight, Blood Pressure, Heart Rate

## 2018-02-22 NOTE — MR AVS SNAPSHOT
After Visit Summary   2018    Cesia Nettles    MRN: [id number]           Patient Information     Date Of Birth          1995        Visit Information        Provider Department      2018 9:15 AM Twyla Petit MD Psychiatry Clinic        Today's Diagnoses     Schizoaffective disorder, bipolar type (H)    -  1       Follow-ups after your visit        Your next 10 appointments already scheduled     Mar 27, 2018  7:45 AM CDT   Adult Med Follow UP with Twyla Petit MD   Psychiatry Clinic ([facility])    [place] [id number]  [address] [id number] [phone]              Who to contact     Please call your clinic at [phone] to:    Ask questions about your health    Make or cancel appointments    Discuss your medicines    Learn about your test results    Speak to your doctor            Additional Information About Your Visit        MyChart Information     [employer] is an electronic gateway that provides easy, online access to your medical records. With [employer], you can request a clinic appointment, read your test results, renew a prescription or communicate with your care team.     To sign up for [employer] visit the website at [email or web address]   You will be asked to enter the access code listed below, as well as some personal information. Please follow the directions to create your username and password.     Your access code is: [email or web address]  Expires: 2018  5:36 PM     Your access code will  in 90 days. If you need help or a new code, please contact your [facility] or call [phone] for assistance.        Care EveryWhere ID     This is your Care EveryWhere ID. This could be used by other organizations to access your [place] medical records  [id number]        Your Vitals Were     Pulse BMI (Body Mass Index)                114 29.32 kg/m2           Blood Pressure from Last 3  Encounters:   02/22/18 110/66   01/23/18 118/78   12/11/17 114/77    Weight from Last 3 Encounters:   02/22/18 84.9 kg (187 lb 3.2 oz)   01/23/18 84.4 kg (186 lb)   12/11/17 86 kg (189 lb 9.6 oz)              Today, you had the following     No orders found for display       Primary Care Provider Office Phone # Fax #    Becki Valencia [phone]       [facility] [address] [place] [address]        Equal Access to Services     [facility]: Hadii aad ku hadasho Soomaali, waaxda luqadaha, qaybta kaalmada adeegyada, subha mccormack . So [facility] [phone].    ATENCIÓN: Si habla español, tiene a christensen disposición servicios gratuitos de asistencia lingüística. [facility] [phone].    We comply with applicable federal civil rights laws and [place] laws. We do not discriminate on the basis of race, color, national origin, age, disability, sex, sexual orientation, or gender identity.            Thank you!     Thank you for choosing PSYCHIATRY CLINIC  for your care. Our goal is always to provide you with excellent care. Hearing back from our patients is one way we can continue to improve our services. Please take a few minutes to complete the written survey that you may receive in the mail after your visit with us. Thank you!             Your Updated Medication List - Protect others around you: Learn how to safely use, store and throw away your medicines at www.disposemymeds.org.          This list is accurate as of 2/22/18 11:59 PM.  Always use your most recent med list.                   Brand Name Dispense Instructions for use Diagnosis    Acetaminophen 325 MG Caps      Take 325-650 mg by mouth every 4 hours as needed        bacitracin ointment      Apply 1 g topically daily as needed for wound care        ketoconazole 2 % cream    NIZORAL     Apply 1 applicator topically daily as needed for itching or irritation        lamoTRIgine 100 MG tablet    LaMICtal    30 tablet     Take 1 tablet (100 mg) by mouth daily    Suicidal ideation       metFORMIN 500 MG tablet    GLUCOPHAGE    60 tablet    Take 2 tablets (1,000 mg) by mouth 2 times daily (with meals)    Schizophrenia, undifferentiated (H)       PREVIDENT 5000 BOOSTER 1.1 % Pste   Generic drug:  Sodium Fluoride      Apply 1 Dose to affected area 2 times daily

## 2018-02-22 NOTE — PROGRESS NOTES
Pt left the lobby before she was seen due to time constraints and pt issues that came up in provider's previous appt.  Pt called and said she would reschedule.  I called pt and left a voicemail with my apology for the delay and offered appointment times early next week. Twyla Petit MD   Psychiatry Resident PGY3    I did not see this patient directly. I have reviewed the documentation and I agree with the resident's plan of care.     Oralia Rosales MD

## 2018-02-22 NOTE — TELEPHONE ENCOUNTER
Called Sudha and left voce mail to apologize for running behind this morning and not being able to see her before her ride came to pick her up.    Twyla Petit MD   Psychiatry Resident PGY3

## 2018-02-22 NOTE — Clinical Note
"Hi Dr. Rosales, Could you please enter the \"no fee\" code for level of service?  I got prompted from billing to forward this encounter to you.  Pt was not seen, because she left waiting area d/t long wait time. Thank you, Twyla"

## 2018-03-23 ENCOUNTER — TELEPHONE (OUTPATIENT)
Dept: PSYCHIATRY | Facility: CLINIC | Age: 23
End: 2018-03-23

## 2018-03-23 DIAGNOSIS — F20.3 SCHIZOPHRENIA, UNDIFFERENTIATED (H): ICD-10-CM

## 2018-03-23 DIAGNOSIS — F25.1 SCHIZOAFFECTIVE DISORDER, DEPRESSIVE TYPE (H): ICD-10-CM

## 2018-03-23 RX ORDER — PALIPERIDONE 6 MG/1
12 TABLET, EXTENDED RELEASE ORAL AT BEDTIME
Qty: 60 TABLET | Refills: 0 | Status: SHIPPED | OUTPATIENT
Start: 2018-03-23 | End: 2018-03-23

## 2018-03-23 RX ORDER — PALIPERIDONE 6 MG/1
12 TABLET, EXTENDED RELEASE ORAL AT BEDTIME
Qty: 60 TABLET | Refills: 0
Start: 2018-03-23 | End: 2018-04-20

## 2018-03-23 RX ORDER — HYDROXYZINE HYDROCHLORIDE 25 MG/1
TABLET, FILM COATED ORAL
Qty: 30 TABLET | Refills: 0 | Status: SHIPPED | OUTPATIENT
Start: 2018-03-23 | End: 2018-05-09

## 2018-03-23 RX ORDER — BENZTROPINE MESYLATE 1 MG/1
1 TABLET ORAL AT BEDTIME
Qty: 30 TABLET | Refills: 0 | Status: SHIPPED | OUTPATIENT
Start: 2018-03-23 | End: 2018-04-20

## 2018-03-23 RX ORDER — FLUOXETINE 40 MG/1
80 CAPSULE ORAL AT BEDTIME
Qty: 60 CAPSULE | Refills: 0 | Status: SHIPPED | OUTPATIENT
Start: 2018-03-23 | End: 2018-04-20

## 2018-03-23 NOTE — TELEPHONE ENCOUNTER
Medication requested: benztropine  Last refilled: 2/26/18  Qty: 30  Medication requested: fluoxetine  Last refilled: 2/26/18  Qty: 60  Medication requested: hydroxyzine  Last refilled: 2/26/18  Qty: 30  Medication requested: paliperidone 9 mg  Last refilled: 2/26/18  Qty: 30  Medication requested: paliperidone 3 mg  Last refilled: 2/26/18  Qty: 30      Last seen: 1/23/18  RTC: 4 weeks  Cancel: 2  No-show: 0  Next appt: 3/27/18    Refill decision:   Refill pended and routed to the provider for review/determination due to appt cancels  Please see order for Paliperidone. Total of 12 mg ordered but EMR has 6 mg take take 2 tabs with Paliperone 3 mg (15 mg?)

## 2018-04-20 DIAGNOSIS — F20.3 SCHIZOPHRENIA, UNDIFFERENTIATED (H): ICD-10-CM

## 2018-04-20 DIAGNOSIS — R45.851 SUICIDAL IDEATION: ICD-10-CM

## 2018-04-20 DIAGNOSIS — F25.1 SCHIZOAFFECTIVE DISORDER, DEPRESSIVE TYPE (H): ICD-10-CM

## 2018-04-23 RX ORDER — PALIPERIDONE 6 MG/1
12 TABLET, EXTENDED RELEASE ORAL AT BEDTIME
Qty: 28 TABLET | Refills: 0 | Status: SHIPPED | OUTPATIENT
Start: 2018-04-23 | End: 2018-05-09

## 2018-04-23 RX ORDER — FLUOXETINE 40 MG/1
80 CAPSULE ORAL AT BEDTIME
Qty: 28 CAPSULE | Refills: 0 | Status: SHIPPED | OUTPATIENT
Start: 2018-04-23 | End: 2018-05-09

## 2018-04-23 RX ORDER — LAMOTRIGINE 100 MG/1
100 TABLET ORAL DAILY
Qty: 14 TABLET | Refills: 0 | Status: SHIPPED | OUTPATIENT
Start: 2018-04-23 | End: 2018-05-09

## 2018-04-23 RX ORDER — BENZTROPINE MESYLATE 1 MG/1
1 TABLET ORAL AT BEDTIME
Qty: 14 TABLET | Refills: 0 | Status: SHIPPED | OUTPATIENT
Start: 2018-04-23 | End: 2018-05-09

## 2018-04-24 ENCOUNTER — TELEPHONE (OUTPATIENT)
Dept: PSYCHIATRY | Facility: CLINIC | Age: 23
End: 2018-04-24

## 2018-04-24 NOTE — TELEPHONE ENCOUNTER
Prior Authorization Retail Medication Request    Medication/Dose: paliperidone ER 6 mg - take 2 tablets at bedtime    ICD code (if different than what is on RX):  F25.0  Schizoaffective disorder, bipolar type    Previously Tried and Failed:  perphenazine  Rationale:  Patient has been on this medication since 2017, and has been stable on it, together with her other medications.  She has experienced lessening of auditory and visual hallucinations, to a level that is manageable and does not interfere with her functioning.  Her mood has also been relatively stable, and she has been doing well.  She feels stable enough emotionally and can cope with stressors that previously would have led to suicidal ideation and gestures.  On her current medications, she is  Not experiencing dysregulation, allowing her to apply the skills that she has learned in therapy.  Please consider continuing to cover this medication for her to prevent decompensation and possible need for hospitalization.    Insurance Name:  [employer]  Insurance ID:  [id number]      Pharmacy Information (if different than what is on RX)  Name:  [facility] on [facility] in [place]  Phone:  [phone]  Fax:  [phone]      [employer]  Key:  [id number]  Patient last name:  Tho  : 1995    Will route to PA team for assistance.

## 2018-04-26 NOTE — TELEPHONE ENCOUNTER
APPROVAL  Rec'd approval of paliperidone ER.  Effective 3/26/18 - 4/26/2023.  PA # [id number].  Faxed approval letter to pharmacy.      Notified [place] pharmacy via fax  that it's been approved.    Called patient and left VM.     Copies sent to scanning:  - appeal approval letter

## 2018-05-09 ENCOUNTER — OFFICE VISIT (OUTPATIENT)
Dept: PSYCHIATRY | Facility: CLINIC | Age: 23
End: 2018-05-09
Attending: PSYCHIATRY & NEUROLOGY
Payer: COMMERCIAL

## 2018-05-09 VITALS
SYSTOLIC BLOOD PRESSURE: 102 MMHG | BODY MASS INDEX: 27.97 KG/M2 | WEIGHT: 178.6 LBS | DIASTOLIC BLOOD PRESSURE: 72 MMHG | HEART RATE: 90 BPM

## 2018-05-09 DIAGNOSIS — F25.1 SCHIZOAFFECTIVE DISORDER, DEPRESSIVE TYPE (H): ICD-10-CM

## 2018-05-09 DIAGNOSIS — F20.3 SCHIZOPHRENIA, UNDIFFERENTIATED (H): ICD-10-CM

## 2018-05-09 DIAGNOSIS — F25.0 SCHIZOAFFECTIVE DISORDER, BIPOLAR TYPE (H): Primary | ICD-10-CM

## 2018-05-09 DIAGNOSIS — R45.851 SUICIDAL IDEATION: ICD-10-CM

## 2018-05-09 PROCEDURE — G0463 HOSPITAL OUTPT CLINIC VISIT: HCPCS | Mod: ZF

## 2018-05-09 RX ORDER — HYDROXYZINE HYDROCHLORIDE 25 MG/1
TABLET, FILM COATED ORAL
Qty: 30 TABLET | Refills: 0 | Status: SHIPPED | OUTPATIENT
Start: 2018-05-09 | End: 2018-06-25

## 2018-05-09 RX ORDER — FLUOXETINE 40 MG/1
80 CAPSULE ORAL AT BEDTIME
Qty: 28 CAPSULE | Refills: 3 | Status: SHIPPED | OUTPATIENT
Start: 2018-05-09 | End: 2018-06-25

## 2018-05-09 RX ORDER — BENZTROPINE MESYLATE 1 MG/1
1 TABLET ORAL AT BEDTIME
Qty: 30 TABLET | Refills: 3 | Status: SHIPPED | OUTPATIENT
Start: 2018-05-09 | End: 2018-06-25

## 2018-05-09 RX ORDER — PALIPERIDONE 6 MG/1
12 TABLET, EXTENDED RELEASE ORAL AT BEDTIME
Qty: 28 TABLET | Refills: 3 | Status: SHIPPED | OUTPATIENT
Start: 2018-05-09 | End: 2018-06-25

## 2018-05-09 RX ORDER — LAMOTRIGINE 100 MG/1
100 TABLET ORAL DAILY
Qty: 30 TABLET | Refills: 3 | Status: SHIPPED | OUTPATIENT
Start: 2018-05-09 | End: 2018-06-25

## 2018-05-09 ASSESSMENT — PAIN SCALES - GENERAL: PAINLEVEL: NO PAIN (0)

## 2018-05-09 NOTE — MR AVS SNAPSHOT
After Visit Summary   5/9/2018    Cesia Nettles    MRN: [id number]           Patient Information     Date Of Birth          1995        Visit Information        Provider Department      5/9/2018 9:15 AM Twyla Petit MD Psychiatry Clinic        Today's Diagnoses     Schizoaffective disorder, bipolar type (H)    -  1    Schizophrenia, undifferentiated (H)        Suicidal ideation        Schizoaffective disorder, depressive type          Care Instructions      TREATMENT  PLAN          SYMPTOMS; PROBLEMS   MEASURABLE GOALS;    FUNCTIONAL IMPROVEMENT INTERVENTIONS;   GAINS MADE DISCHARGE CRITERIA   Dysregulation: mood dysregulation and relationship conflict with dad   reduce feeling overwhelmed/ improve decision making skills psychotherapy  social skills training  strength focus  stress management   - Will consider Fam Therapy marked symptom improvement   Depression: depressed mood   find enjoyment at least once a day  Learn new coping skills to  Deal with stressors stress management   - get out of the house 4-5 times/week for social interaction marked symptom improvement             Follow-ups after your visit        Follow-up notes from your care team     Return in about 4 weeks (around 6/6/2018).      Your next 10 appointments already scheduled     Jun 25, 2018 10:15 AM CDT   Adult Med Follow UP with Twyla Petit MD   Psychiatry Clinic ([facility])    [place] [id number] [address] [address] [phone]              Who to contact     Please call your clinic at [phone] to:    Ask questions about your health    Make or cancel appointments    Discuss your medicines    Learn about your test results    Speak to your doctor            Additional Information About Your Visit        MyChart Information     [employer] is an electronic gateway that provides easy, online access to your medical records. With [employer], you can request a clinic  appointment, read your test results, renew a prescription or communicate with your care team.     To sign up for [employer] visit the website at [email or web address]   You will be asked to enter the access code listed below, as well as some personal information. Please follow the directions to create your username and password.     Your access code is: [email or web address]  Expires: 2018  5:36 PM     Your access code will  in 90 days. If you need help or a new code, please contact your [facility] or call [phone] for assistance.        Care EveryWhere ID     This is your Care EveryWhere ID. This could be used by other organizations to access your [place] medical records  [id number]        Your Vitals Were     Pulse BMI (Body Mass Index)                90 27.97 kg/m2           Blood Pressure from Last 3 Encounters:   18 102/72   18 110/66   18 118/78    Weight from Last 3 Encounters:   18 81 kg (178 lb 9.6 oz)   18 84.9 kg (187 lb 3.2 oz)   18 84.4 kg (186 lb)                 Where to get your medicines      These medications were [place] to [facility] [place] - [address], [place] [address]     Phone:  [phone]     benztropine 1 MG tablet    FLUoxetine 40 MG capsule    hydrOXYzine 25 MG tablet    lamoTRIgine 100 MG tablet    metFORMIN 500 MG tablet    paliperidone 6 MG 24 hr tablet          Primary Care Provider Office Phone # Fax #    Becki YAHIR Valencia [phone]       [facility] [address] [place] [address]        Equal Access to Services     [facility] HEATH AH: Hadkaren Benavides, wacayetano cunha, qamatt kaalmapadmini rios, subha cervantes. So [facility] [phone].    ATENCIÓN: Si habla español, tiene a christensen disposición servicios gratuitos de asistencia lingüística. Llame al [phone].    We comply with applicable federal  civil rights laws and [place] laws. We do not discriminate on the basis of race, color, national origin, age, disability, sex, sexual orientation, or gender identity.            Thank you!     Thank you for choosing PSYCHIATRY CLINIC  for your care. Our goal is always to provide you with excellent care. Hearing back from our patients is one way we can continue to improve our services. Please take a few minutes to complete the written survey that you may receive in the mail after your visit with us. Thank you!             Your Updated Medication List - Protect others around you: Learn how to safely use, store and throw away your medicines at www.disposemymeds.org.          This list is accurate as of 5/9/18 11:59 PM.  Always use your most recent med list.                   Brand Name Dispense Instructions for use Diagnosis    Acetaminophen 325 MG Caps      Take 325-650 mg by mouth every 4 hours as needed        bacitracin ointment      Apply 1 g topically daily as needed for wound care        benztropine 1 MG tablet    COGENTIN    30 tablet    Take 1 tablet (1 mg) by mouth At Bedtime *Call clinic to schedule MD APPT.    Schizoaffective disorder, depressive type (H)       FLUoxetine 40 MG capsule    PROzac    28 capsule    Take 2 capsules (80 mg) by mouth At Bedtime *Call clinic to schedule MD APPT.    Schizophrenia, undifferentiated (H)       hydrOXYzine 25 MG tablet    ATARAX    30 tablet    Take 1/2 tab po  BID PRN.    Schizophrenia, undifferentiated (H)       ketoconazole 2 % cream    NIZORAL     Apply 1 applicator topically daily as needed for itching or irritation        lamoTRIgine 100 MG tablet    LaMICtal    30 tablet    Take 1 tablet (100 mg) by mouth daily *Call clinic to schedule MD APPT.    Suicidal ideation       metFORMIN 500 MG tablet    GLUCOPHAGE    60 tablet    Take 2 tablets (1,000 mg) by mouth 2 times daily (with meals)    Schizophrenia, undifferentiated (H)       paliperidone 6 MG 24 hr tablet     INVEGA    28 tablet    Take 2 tablets (12 mg) by mouth At Bedtime *Call clinic to schedule MD APPT.    Schizophrenia, undifferentiated (H)       PREVIDENT 5000 BOOSTER 1.1 % Pste   Generic drug:  Sodium Fluoride      Apply 1 Dose to affected area 2 times daily

## 2018-05-09 NOTE — PATIENT INSTRUCTIONS
TREATMENT  PLAN          SYMPTOMS; PROBLEMS   MEASURABLE GOALS;    FUNCTIONAL IMPROVEMENT INTERVENTIONS;   GAINS MADE DISCHARGE CRITERIA   Dysregulation: mood dysregulation and relationship conflict with dad   reduce feeling overwhelmed/ improve decision making skills psychotherapy  social skills training  strength focus  stress management   - Will consider Fam Therapy marked symptom improvement   Depression: depressed mood   find enjoyment at least once a day  Learn new coping skills to  Deal with stressors stress management   - get out of the house 4-5 times/week for social interaction marked symptom improvement

## 2018-05-09 NOTE — PROGRESS NOTES
PSYCHIATRY CLINIC PROGRESS NOTE    The initial diagnostic evaluation was on 07/08/2016.  Date of the most recent transfer of care liliane is 07/26/2017.    Pertinent Background:  This patient first experienced mental health issues in childhood and has received treatment for psychosis and suicidality.  See transfer evaluation for detailed history.  Notably, Rylee is a 23 yo female with history of AH dating back to age 7 yo.  Psychosis has also included command AH in the past.  Additionally she has had 2 SI attempts (2012 Zyrtec OD and 2014 hanging with scarf).  Previous prompts were fear of rejection.  Therese is very close to both parents and her half siblings and she is in a romantic relationship with BF Pranav for a little over a year. She has good insight about her paranoia and some realistic concerns about BF reconnecting with his ex. Per chart, some Cluster B traits and frequent hospitalizations prior to starting  residence and supportive employment, and doing well the past severeal years.       Psych critical item history includes suicide attempt [multiple], suicidal ideation, psychosis [sxs include paranoia, AH, ideas of reference], mutiple psychotropic trials and psych hosp (>5).    INTERIM HISTORY                                                 Cesia Nettles is a 23 year old female who was last seen for medication management on 01/23/20178 at which time no medication changes were made.   The patient reports good treatment adherence.  History was provided by the patient who was a good historian.  Since the last visit:  - Sudha presented for a follow up appt on 02/22/2018; however, due to provider running behind schedule, pt left the lobby before she was seen.  - Today Sudha reports hat she has been doing very well.  She is excited about an upcoming visit to see her grandfather in early June.  She tells me a lot about her extended family and about a new baby that one of her cousins is having.  - Since our last  "visit Sudha has completed a brief DBT program and finds that the skills she learned there are very useful for working though anxiety and stressors.  She has used her Hydroxyzine medication only once (12.5 mg) in the past several months.  - Sudha does have some relationship conflict with her father and pt reports that dad \"focuses only on the negative\" and gives the example of him telling her that she needs to clean up her room.  - Pt lives in a  and likes her environment there.  - She is active in a [employer] and recently developed some romantic feelings for a peer.  We discuss w pt need to be cautious about proceeding with the romantic relationship, as in the past she had significant emotional dysregulation surrounding a break up.  Sudha is in agreement about taking things slowly and will discuss with her individual therapist whether and how she should tell her peer about her feelings toward him.  - She advocates for continuing medications as they are.    RECENT SYMPTOMS:   DEPRESSION:  reports-depressed mood;  DENIES- suicidal ideation, feeling hopeless, excessive crying and overwhelmed  PSYCHOSIS:  reports-none;  DENIES- delusions, auditory hallucinations, visual hallucinations and disorganized behavior  DYSREGULATION:  reports-none;  DENIES- suicidal ideation, violent ideation, SIB, mood dysregulation, impulsive, aggressive and irritable  EATING DISORDER: none    RECENT SUBSTANCE USE:     ALCOHOL- none          TOBACCO- none               CAFFEINE- no caffeine  OPIOIDS- none       NARCAN KIT- N/A       CANNABIS- none          OTHER ILLICIT DRUGS- none     CURRENT SOCIAL HISTORY:  FINANCIAL SUPPORT- social security disability       CHILDREN- none       LIVING SITUATION- lives in       SOCIAL/ SPIRITUAL SUPPORT- family, BF       FEELS SAFE AT HOME- Yes     MEDICAL ROS:  Reports wt gain and polydipsia      Denies short term memory and/or word finding difficulty and unusual movements        PSYCH and CD Critical " Summary Points since July 2017           10/2017 - Increased Invega from 9 mg to 12 mg      PAST PSYCH MED TRIALS   see EMR Problem List: Hx of psychiatric care    MEDICAL / SURGICAL HISTORY                                   CARE TEAM:   PCP- Becki Valencia          Therapist- Rylee Samuels [facility] -  [phone] (Verbal BRIONNA on file); Will also start DBT    Pregnant or breastfeeding:  No      Contraception- Implenon    Neurologic Hx [head injury etc]:  H/O childhood Szs (Petit mal) from age [age] yo.    Patient Active Problem List   Diagnosis     Schizoaffective disorder, depressive type (H)     Hx of psychiatric care       ALLERGY                                Review of patient's allergies indicates no known allergies.  MEDICATIONS                               Current Outpatient Prescriptions   Medication Sig Dispense Refill     Acetaminophen 325 MG CAPS Take 325-650 mg by mouth every 4 hours as needed       bacitracin ointment Apply 1 g topically daily as needed for wound care       benztropine (COGENTIN) 1 MG tablet Take 1 tablet (1 mg) by mouth At Bedtime *Call clinic to schedule MD APPT. 14 tablet 0     FLUoxetine (PROZAC) 40 MG capsule Take 2 capsules (80 mg) by mouth At Bedtime *Call clinic to schedule MD APPT. 28 capsule 0     hydrOXYzine (ATARAX) 25 MG tablet Take 1/2 tab po  BID PRN. 30 tablet 0     ketoconazole (NIZORAL) 2 % cream Apply 1 applicator topically daily as needed for itching or irritation       lamoTRIgine (LAMICTAL) 100 MG tablet Take 1 tablet (100 mg) by mouth daily *Call clinic to schedule MD APPT. 14 tablet 0     metFORMIN (GLUCOPHAGE) 500 MG tablet Take 2 tablets (1,000 mg) by mouth 2 times daily (with meals) 60 tablet 6     paliperidone (INVEGA) 6 MG 24 hr tablet Take 2 tablets (12 mg) by mouth At Bedtime *Call clinic to schedule MD APPT. 28 tablet 0     Sodium Fluoride (PREVIDENT 5000 BOOSTER) 1.1 % PSTE Apply 1 Dose to affected area 2 times daily       VITALS   /72  Pulse  90  Wt 81 kg (178 lb 9.6 oz)  BMI 27.97 kg/m2   MENTAL STATUS EXAM                                                           Alertness: alert  and oriented  Appearance: casually groomed  Behavior/Demeanor: cooperative, pleasant and interruptive, with good  eye contact   Speech: regular rate and rhythm  Language: no problems  Psychomotor: normal or unremarkable  Mood: description consistent with euthymia  Affect: full range and appropriate; was congruent to mood; was congruent to content  Thought Process/Associations: unremarkable  Thought Content:  Reports none;  Denies suicidal and violent ideation and delusions  Perception:  Reports none;  Denies auditory hallucinations and visual hallucinations  Insight: good  Judgment: good  Cognition: does  appear grossly intact; formal cognitive testing was not done    LABS and DATA   RATING SCALES:  AIMS: done 01/23/2018 with total score of 0    PHQ9 TODAY = 10  PHQ-9 SCORE 4/14/2017 6/1/2017 12/11/2017   Total Score - - -   Total Score - - -   Total Score 17 14 16     ANTIPSYCHOTIC LABS   [glu, A1C, lipids (focus LDL), liver enzymes, WBC, ANEU, Hgb, plts]    q12 mo  Recent Labs   Lab Test  02/02/17   0916  09/16/14   0947   GLC  87  69*   A1C  4.9  5.2     Recent Labs   Lab Test  02/02/17   0916 12/15/15   CHOL  186  188   TRIG  195*  210*   LDL  109*  109   HDL  38*  37*     Recent Labs   Lab Test  02/02/17   0916  12/21/13   1016   AST  24  33   ALT  23  18   ALKPHOS  102  107  107     Recent Labs   Lab Test  02/02/17   0916  09/16/14   0947  12/21/13   1016   WBC  6.8  6.9  6.1  6.1   ANEU   --   3.9  3.9  3.9   HGB  12.3  12.8  12.5  12.5   PLT  274  317  310  310     Last EKG 05/2014 with QTc 423 ms    DIAGNOSIS     Schizoaffective Disorder, Depressed type, in partial remission re: mood, active (though baseline) psychotic features.    ASSESSMENT                                     TODAY Therese continues to experience partial resolution of her depression and  complete resolution of her SI, since using DBT skills.  Today, she denies any signs and symptoms of psychosis.  She continues to enjoy her [facility] living environment and spending time with her family and is looking forward to a visit with her grandfather in early June.  No medication changes made today.        SUICIDE RISK ASSESSMENT [details described above]: Cesia Nettles reported previous SA X3, most recently 10/2017 tying a scarf around her neck while having SI (prior to that was in 2014). No current SI.  In addition, she has notable risk factors for self-harm including similar prompt as seen w/previous self-harm, relationship conflict and previous suicide attempts.  However, risk is mitigated by no suicidal plan or intent, describes a safety plan, h/o seeking help when needed, symptom improvement, future oriented, feeling hopeful, none to minimal alcohol use, commitment to family, good social support and stable housing.  Based on all available evidence she does not appear to be at imminent risk for self-harm therefore does not meet criteria for a 72-hr hold/involuntary hospitalization.  However, based on degree of symptoms DBT and close psych FU was recommended which the pt did agree to.  Additional steps to minimize risk include: SAFETY PLAN completed 07/26/2017 and ID previous SI triggers    [place] PRESCRIPTION MONITORING PROGRAM [] was not checked today:  not using controlled substances.    PSYCHOTROPIC DRUG INTERACTIONS:   Fluoxetine and Hydrozyzine  concurrent use may increase risk of QTc prolongation.   Fluoxetine and Paliperidone concurrent use may increase QTc prolongation  Paliperidone and Hydroxyzine concurrent use may increase Qt prolongation  MANAGEMENT:  Monitoring for adverse effects, routine vitals, routine labs and periodic EKGs     PLAN                                                                                                       1) PSYCHOTROPIC MEDICATIONS:  - Continue Paliperidone 12 mg   -  Continue Lamotrigine 100 mg Daily  - Continue Fluoxetine 80 mg QHS  - Continue Cogentin 1 mg QHS  - Continue Metformin 1000 mg BID  - Continue Hydroxyzine 25 mg 1/2 tab BID PRN (hasn't use in several months)    2) THERAPY:  Continue Individual and consider Family Therapy. Has completed DBT between Feb and May 2018.    TREATMENT PLAN:   Date revised  -  05/09/2018  Date next due- 08/09/2018    3) NEXT DUE:    Labs- Neuroleptic labs due 02/18 - ordered Fasting labs prior to next appt  EKG- will consider at next apt  Rating Scales- AIMS: done 01/23/2018 with total score of 0    4) REFERRALS:    - Continue : GH, CM, CADI, ARHMS     5) RTC: 4 weeks     SAFETY PLAN reviewed: No: no safety concerns today    6) CRISIS NUMBERS:   Provided routinely in AVS.  Especially emphasized:  [facility] [phone] (clinic)    [phone] (after hours)  ONLY if a [facility] PT: [facility] [phone] (clinic), [phone] (after hours)     TREATMENT RISK STATEMENT:  The risks, benefits, alternatives and potential adverse effects have been discussed and are understood by the pt. The pt understands the risks of using street drugs or alcohol. There are no medical contraindications, the pt agrees to treatment with the ability to do so. The pt knows to call the clinic for any problems or to access emergency care if needed.  Medical and substance use concerns are documented above.  Psychotropic drug interaction check was done, including changes made today.    PSYCHIATRY CLINIC INDIVIDUAL PSYCHOTHERAPY NOTE                                     [16]   Start time - 09:30       End time -09:46  Date reviewed - 05/09/2018      Date next due - 08/09/2018    Subjective: This supportive psychotherapy session addressed issues related to current stressors consisting of relationship conflicts/interactions.  Patient's reaction: Action in the context of mental status appropriate for ambulatory setting.  Progress:  good  Plan: RTC 4 weeks  Psychotherapy services during this visit included  myself and the patient.       TREATMENT  PLAN          SYMPTOMS; PROBLEMS   MEASURABLE GOALS;    FUNCTIONAL IMPROVEMENT INTERVENTIONS;   GAINS MADE DISCHARGE CRITERIA   Dysregulation: mood dysregulation and relationship conflict with dad   reduce feeling overwhelmed/ improve decision making skills psychotherapy  social skills training  strength focus  stress management   - Will consider Fam Therapy marked symptom improvement   Depression: depressed mood   find enjoyment at least once a day  Learn new coping skills to  Deal with stressors stress management   - get out of the house 4-5 times/week for social interaction marked symptom improvement     RESIDENT:   Twyla Petit MD    Patient not staffed in clinic.  Supervisor is Dr. Rosales.    I did not see this patient directly. I have reviewed the documentation and I agree with the resident's plan of care.     Oralia Rosales MD

## 2018-05-10 ASSESSMENT — PATIENT HEALTH QUESTIONNAIRE - PHQ9: SUM OF ALL RESPONSES TO PHQ QUESTIONS 1-9: 10

## 2018-06-25 ENCOUNTER — OFFICE VISIT (OUTPATIENT)
Dept: PSYCHIATRY | Facility: CLINIC | Age: 23
End: 2018-06-25
Attending: PSYCHIATRY & NEUROLOGY
Payer: COMMERCIAL

## 2018-06-25 VITALS
HEART RATE: 81 BPM | DIASTOLIC BLOOD PRESSURE: 65 MMHG | WEIGHT: 173.8 LBS | BODY MASS INDEX: 27.22 KG/M2 | SYSTOLIC BLOOD PRESSURE: 94 MMHG

## 2018-06-25 DIAGNOSIS — F25.1 SCHIZOAFFECTIVE DISORDER, DEPRESSIVE TYPE (H): Primary | ICD-10-CM

## 2018-06-25 DIAGNOSIS — R45.851 SUICIDAL IDEATION: ICD-10-CM

## 2018-06-25 DIAGNOSIS — F20.3 SCHIZOPHRENIA, UNDIFFERENTIATED (H): ICD-10-CM

## 2018-06-25 DIAGNOSIS — F25.1 SCHIZOAFFECTIVE DISORDER, DEPRESSIVE TYPE (H): ICD-10-CM

## 2018-06-25 PROCEDURE — 80061 LIPID PANEL: CPT | Performed by: PSYCHIATRY & NEUROLOGY

## 2018-06-25 PROCEDURE — G0463 HOSPITAL OUTPT CLINIC VISIT: HCPCS | Mod: ZF

## 2018-06-25 RX ORDER — FLUOXETINE 40 MG/1
80 CAPSULE ORAL AT BEDTIME
Qty: 28 CAPSULE | Refills: 3 | Status: SHIPPED | OUTPATIENT
Start: 2018-06-25 | End: 2018-09-25

## 2018-06-25 RX ORDER — BENZTROPINE MESYLATE 1 MG/1
1 TABLET ORAL AT BEDTIME
Qty: 30 TABLET | Refills: 3 | Status: SHIPPED | OUTPATIENT
Start: 2018-06-25 | End: 2018-09-25

## 2018-06-25 RX ORDER — LAMOTRIGINE 100 MG/1
100 TABLET ORAL DAILY
Qty: 30 TABLET | Refills: 3 | Status: SHIPPED | OUTPATIENT
Start: 2018-06-25 | End: 2018-09-25

## 2018-06-25 RX ORDER — PALIPERIDONE 6 MG/1
12 TABLET, EXTENDED RELEASE ORAL AT BEDTIME
Qty: 28 TABLET | Refills: 3 | Status: SHIPPED | OUTPATIENT
Start: 2018-06-25 | End: 2018-09-25

## 2018-06-25 RX ORDER — HYDROXYZINE HYDROCHLORIDE 25 MG/1
TABLET, FILM COATED ORAL
Qty: 30 TABLET | Refills: 0 | Status: SHIPPED | OUTPATIENT
Start: 2018-06-25 | End: 2018-12-10

## 2018-06-25 ASSESSMENT — PAIN SCALES - GENERAL: PAINLEVEL: NO PAIN (0)

## 2018-06-25 NOTE — NURSING NOTE
Chief Complaint   Patient presents with     RECHECK     Schizoaffective disorder, bipolar type

## 2018-06-25 NOTE — MR AVS SNAPSHOT
After Visit Summary   2018    Cesia Nettles    MRN: [id number]           Patient Information     Date Of Birth          1995        Visit Information        Provider Department      2018 10:15 AM Twyla Petit MD Psychiatry Clinic        Today's Diagnoses     Schizoaffective disorder, depressive type (H)    -  1    Schizophrenia, undifferentiated (H)        Suicidal ideation        Schizoaffective disorder, depressive type           Follow-ups after your visit        Follow-up notes from your care team     Return in about 8 weeks (around 2018).      Who to contact     Please call your clinic at [phone] to:    Ask questions about your health    Make or cancel appointments    Discuss your medicines    Learn about your test results    Speak to your doctor            Additional Information About Your Visit        MyChart Information     [employer] is an electronic gateway that provides easy, online access to your medical records. With [employer], you can request a clinic appointment, read your test results, renew a prescription or communicate with your care team.     To sign up for [employer] visit the website at [email or web address]   You will be asked to enter the access code listed below, as well as some personal information. Please follow the directions to create your username and password.     Your access code is: [email or web address]  Expires: 2018 11:40 AM     Your access code will  in 90 days. If you need help or a new code, please contact your [facility] or call [phone] for assistance.        Care EveryWhere ID     This is your Care EveryWhere ID. This could be used by other organizations to access your [facility] medical records  [id number]        Your Vitals Were     Pulse BMI (Body Mass Index)                81 27.22 kg/m2           Blood Pressure from Last 3 Encounters:   18 94/65   18 102/72   18 110/66     Weight from Last 3 Encounters:   06/25/18 78.8 kg (173 lb 12.8 oz)   05/09/18 81 kg (178 lb 9.6 oz)   02/22/18 84.9 kg (187 lb 3.2 oz)              We Performed the Following     CBC with Platelets Differential ([facility])     Glucose     Hemoglobin A1c     Lipid panel reflex to direct LDL Fasting          Where to get your medicines      These medications were [place] to [facility] [place] [address], [place] [address]     Phone:  [phone]     benztropine 1 MG tablet    FLUoxetine 40 MG capsule    hydrOXYzine 25 MG tablet    lamoTRIgine 100 MG tablet    metFORMIN 500 MG tablet    paliperidone 6 MG 24 hr tablet          Primary Care Provider Office Phone # Fax #    Becki Valencia [phone]       [facility] [address] [place] [address]        Equal Access to Services     [facility] Center[facility]IS : Hadii aad ku hadasho Soomaali, waaxda luqadaha, qaybta kaalmada adeegyada, waxay alexandreain haykarln sacha mccormack . So [facility] [phone].    ATENCIÓN: Si habla español, tiene a christensen disposición servicios gratuitos de asistencia lingüística. Rukhsana al [phone].    We comply with applicable federal civil rights laws and [place] laws. We do not discriminate on the basis of race, color, national origin, age, disability, sex, sexual orientation, or gender identity.            Thank you!     Thank you for choosing PSYCHIATRY CLINIC  for your care. Our goal is always to provide you with excellent care. Hearing back from our patients is one way we can continue to improve our services. Please take a few minutes to complete the written survey that you may receive in the mail after your visit with us. Thank you!             Your Updated Medication List - Protect others around you: Learn how to safely use, store and throw away your medicines at www.disposemymeds.org.          This list is accurate as of 6/25/18 11:40 AM.  Always use your most recent med list.                    Brand Name Dispense Instructions for use Diagnosis    Acetaminophen 325 MG Caps      Take 325-650 mg by mouth every 4 hours as needed        bacitracin ointment      Apply 1 g topically daily as needed for wound care        benztropine 1 MG tablet    COGENTIN    30 tablet    Take 1 tablet (1 mg) by mouth At Bedtime *Call clinic to schedule MD APPT.    Schizoaffective disorder, depressive type (H)       FLUoxetine 40 MG capsule    PROzac    28 capsule    Take 2 capsules (80 mg) by mouth At Bedtime *Call clinic to schedule MD APPT.    Schizophrenia, undifferentiated (H)       hydrOXYzine 25 MG tablet    ATARAX    30 tablet    Take 1/2 tab po  BID PRN.    Schizophrenia, undifferentiated (H)       ketoconazole 2 % cream    NIZORAL     Apply 1 applicator topically daily as needed for itching or irritation        lamoTRIgine 100 MG tablet    LaMICtal    30 tablet    Take 1 tablet (100 mg) by mouth daily *Call clinic to schedule MD APPT.    Suicidal ideation       metFORMIN 500 MG tablet    GLUCOPHAGE    60 tablet    Take 2 tablets (1,000 mg) by mouth 2 times daily (with meals)    Schizophrenia, undifferentiated (H)       paliperidone 6 MG 24 hr tablet    INVEGA    28 tablet    Take 2 tablets (12 mg) by mouth At Bedtime *Call clinic to schedule MD APPT.    Schizophrenia, undifferentiated (H)       PREVIDENT 5000 BOOSTER 1.1 % Pste   Generic drug:  Sodium Fluoride      Apply 1 Dose to affected area 2 times daily

## 2018-06-25 NOTE — PROGRESS NOTES
PSYCHIATRY CLINIC PROGRESS NOTE    The initial diagnostic evaluation was on 2016.  Date of the most recent transfer of care liliane is 2017.    Pertinent Background:  This patient first experienced mental health issues in childhood and has received treatment for psychosis and suicidality.  See transfer evaluation for detailed history.  Notably, Rylee is a 21 yo female with history of AH dating back to age 7 yo.  Psychosis has also included command AH in the past.  Additionally she has had 2 SI attempts ( Zyrtec OD and  hanging with scarf).  Previous prompts were fear of rejection.  Therese is very close to both parents and her half siblings and she is in a romantic relationship with BF Pranav for a little over a year. She has good insight about her paranoia and some realistic concerns about BF reconnecting with his ex. Per chart, some Cluster B traits and frequent hospitalizations prior to starting  residence and supportive employment, and doing well the past severeal years.       Psych critical item history includes suicide attempt [multiple], suicidal ideation, psychosis [sxs include paranoia, AH, ideas of reference], mutiple psychotropic trials and psych hosp (>5).    INTERIM HISTORY                                                 Cesia Nettles is a 23 year old female who was last seen for medication management on 2018 at which time no medication changes were made.   The patient reports good treatment adherence.  History was provided by the patient who was a good historian.  Since the last visit:  - Today Sudha reports hat she has been doing very well.    - She had a nice visit with mat MERISSA in [place].  There is family reunion coming up later on this summer in [place] again and pt is looking ofrward to it.  - She reports mood is good and stable. No SI. No SIB.  Using DBT skills to help regulate emotions.  - Enjoys her  environment.  - Shows me many pictures of her extended family  niece,  younger sibs, etc.  - Sudha also is very excited about the season finale of [place] show and tells me about the kirk .    - She advocates for continuing medications as they are.    RECENT SYMPTOMS:   DEPRESSION:  reports-none;  DENIES- suicidal ideation, feeling hopeless, excessive crying and overwhelmed  PSYCHOSIS:  reports-none;  DENIES- delusions, auditory hallucinations, visual hallucinations and disorganized behavior  DYSREGULATION:  reports-none;  DENIES- suicidal ideation, violent ideation, SIB, mood dysregulation, impulsive, aggressive and irritable  EATING DISORDER: none    RECENT SUBSTANCE USE:     ALCOHOL- none          TOBACCO- none               CAFFEINE- no caffeine  OPIOIDS- none       NARCAN KIT- N/A       CANNABIS- none          OTHER ILLICIT DRUGS- none     CURRENT SOCIAL HISTORY:  FINANCIAL SUPPORT- social security disability       CHILDREN- none       LIVING SITUATION- lives in [facility]     SOCIAL/ SPIRITUAL SUPPORT- family, BF       FEELS SAFE AT HOME- Yes     MEDICAL ROS:  Reports wt gain and polydipsia      Denies short term memory and/or word finding difficulty and unusual movements        PSYCH and CD Critical Summary Points since July 2017           10/2017 - Increased Invega from 9 mg to 12 mg      PAST PSYCH MED TRIALS   see EMR Problem List: Hx of psychiatric care    MEDICAL / SURGICAL HISTORY                                   CARE TEAM:   PCP- Becki Valencia          Therapist- Rylee Samuels - [facility] -  [phone] (Verbal BRIONNA on file); Will also start DBT    Pregnant or breastfeeding:  No      Contraception- Implenon    Neurologic Hx [head injury etc]:  H/O childhood Szs (Petit mal) from age [age] yo.    Patient Active Problem List   Diagnosis     Schizoaffective disorder, depressive type (H)     Hx of psychiatric care       ALLERGY                                Review of patient's allergies indicates no known  allergies.  MEDICATIONS                               Current Outpatient Prescriptions   Medication Sig Dispense Refill     Acetaminophen 325 MG CAPS Take 325-650 mg by mouth every 4 hours as needed       bacitracin ointment Apply 1 g topically daily as needed for wound care       benztropine (COGENTIN) 1 MG tablet Take 1 tablet (1 mg) by mouth At Bedtime *Call clinic to schedule MD APPT. 30 tablet 3     FLUoxetine (PROZAC) 40 MG capsule Take 2 capsules (80 mg) by mouth At Bedtime *Call clinic to schedule MD APPT. 28 capsule 3     hydrOXYzine (ATARAX) 25 MG tablet Take 1/2 tab po  BID PRN. 30 tablet 0     ketoconazole (NIZORAL) 2 % cream Apply 1 applicator topically daily as needed for itching or irritation       lamoTRIgine (LAMICTAL) 100 MG tablet Take 1 tablet (100 mg) by mouth daily *Call clinic to schedule MD APPT. 30 tablet 3     metFORMIN (GLUCOPHAGE) 500 MG tablet Take 2 tablets (1,000 mg) by mouth 2 times daily (with meals) 60 tablet 6     paliperidone (INVEGA) 6 MG 24 hr tablet Take 2 tablets (12 mg) by mouth At Bedtime *Call clinic to schedule MD APPT. 28 tablet 3     Sodium Fluoride (PREVIDENT 5000 BOOSTER) 1.1 % PSTE Apply 1 Dose to affected area 2 times daily       VITALS   BP 94/65  Pulse 81  Wt 78.8 kg (173 lb 12.8 oz)  BMI 27.22 kg/m2   MENTAL STATUS EXAM                                                           Alertness: alert  and oriented  Appearance: casually groomed  Behavior/Demeanor: cooperative, pleasant and interruptive, with good  eye contact   Speech: regular rate and rhythm  Language: no problems  Psychomotor: normal or unremarkable  Mood: description consistent with euthymia  Affect: full range and appropriate; was congruent to mood; was congruent to content  Thought Process/Associations: unremarkable  Thought Content:  Reports none;  Denies suicidal and violent ideation and delusions  Perception:  Reports none;  Denies auditory hallucinations and visual hallucinations  Insight:  good  Judgment: good  Cognition: does  appear grossly intact; formal cognitive testing was not done    LABS and DATA   RATING SCALES:  AIMS: done 01/23/2018 with total score of 0    PHQ9 TODAY = pt reports she did not receive PHQ9 to complete  PHQ-9 SCORE 6/1/2017 12/11/2017 5/9/2018   Total Score - - -   Total Score - - -   Total Score 14 16 10     ANTIPSYCHOTIC LABS   [glu, A1C, lipids (focus LDL), liver enzymes, WBC, ANEU, Hgb, plts]    q12 mo  Recent Labs   Lab Test  02/02/17   0916  09/16/14   0947   GLC  87  69*   A1C  4.9  5.2     Recent Labs   Lab Test  02/02/17   0916 12/15/15   CHOL  186  188   TRIG  195*  210*   LDL  109*  109   HDL  38*  37*     Recent Labs   Lab Test  02/02/17   0916  12/21/13   1016   AST  24  33   ALT  23  18   ALKPHOS  102  107  107     Recent Labs   Lab Test  02/02/17   0916  09/16/14   0947  12/21/13   1016   WBC  6.8  6.9  6.1  6.1   ANEU   --   3.9  3.9  3.9   HGB  12.3  12.8  12.5  12.5   PLT  274  317  310  310     Last EKG 05/2014 with QTc 423 ms    DIAGNOSIS     Schizoaffective Disorder, Depressed type, in partial remission re: mood, active (though baseline) psychotic features.    ASSESSMENT                                     TODAY Therese continues to experience resolution of her depression and complete resolution of her SI, since using DBT skills.  Today, she denies any signs and symptoms of psychosis.  She continues to enjoy her [facility] living environment and spending time with her family. Had a pleasant visit with GP in [place] and is planning another visit to [place] for a family reunion.  No medication changes made today. Pt overdue for yearly neuroleptic labs and needs Fasting labs.     SUICIDE RISK ASSESSMENT [details described above]: Cesia Nettles reported previous SA X3, most recently 10/2017 tying a scarf around her neck while having SI (prior to that was in 2014). No current SI.  In addition, she has notable risk factors for self-harm including similar prompt as seen  w/previous self-harm, relationship conflict and previous suicide attempts.  However, risk is mitigated by no suicidal plan or intent, describes a safety plan, h/o seeking help when needed, symptom improvement, future oriented, feeling hopeful, none to minimal alcohol use, commitment to family, good social support and stable housing.  Based on all available evidence she does not appear to be at imminent risk for self-harm therefore does not meet criteria for a 72-hr hold/involuntary hospitalization.  However, based on degree of symptoms DBT and close psych FU was recommended which the pt did agree to.  Additional steps to minimize risk include: SAFETY PLAN completed 07/26/2017 and ID previous SI triggers    [place] PRESCRIPTION MONITORING PROGRAM [] was not checked today:  not using controlled substances.    PSYCHOTROPIC DRUG INTERACTIONS:   Fluoxetine and Hydrozyzine  concurrent use may increase risk of QTc prolongation.   Fluoxetine and Paliperidone concurrent use may increase QTc prolongation  Paliperidone and Hydroxyzine concurrent use may increase Qt prolongation  MANAGEMENT:  Monitoring for adverse effects, routine vitals, routine labs and periodic EKGs     PLAN                                                                                                       1) PSYCHOTROPIC MEDICATIONS:  - Continue Paliperidone 12 mg   - Continue Lamotrigine 100 mg Daily  - Continue Fluoxetine 80 mg QHS  - Continue Cogentin 1 mg QHS  - Continue Metformin 1000 mg BID  - Continue Hydroxyzine 25 mg 1/2 tab BID PRN (hasn't use in several months)    2) THERAPY:  Continue Individual and consider Family Therapy. Has completed DBT between Feb and May 2018.    TREATMENT PLAN:   Date revised  -  05/09/2018  Date next due- 08/09/2018    3) NEXT DUE:    Labs- Neuroleptic labs due 02/18 - ordered Fasting labs prior to next appt  EKG- will consider at next apt  Rating Scales- AIMS: done 01/23/2018 with total score of 0    4) REFERRALS:     - Continue : GH, CM, CADI, ARHMS     5) RTC: 8 weeks with Dr Rupal Maynard     SAFETY PLAN reviewed: No: no safety concerns today    6) CRISIS NUMBERS:   Provided routinely in AVS.  Especially emphasized:  [facility] [place] [phone] (clinic)    [phone] (after hours)  ONLY if a [facility] PT: [facility] [place] [phone] (clinic), [phone] (after hours)     TREATMENT RISK STATEMENT:  The risks, benefits, alternatives and potential adverse effects have been discussed and are understood by the pt. The pt understands the risks of using street drugs or alcohol. There are no medical contraindications, the pt agrees to treatment with the ability to do so. The pt knows to call the clinic for any problems or to access emergency care if needed.  Medical and substance use concerns are documented above.  Psychotropic drug interaction check was done, including changes made today.    PSYCHIATRY CLINIC INDIVIDUAL PSYCHOTHERAPY NOTE                                     [16]   Start time - 10:30       End time -10:46  Date reviewed - 05/09/2018      Date next due - 08/09/2018    Subjective: This supportive psychotherapy session addressed issues related to current stressors consisting of relationship conflicts/interactions.  Patient's reaction: Action in the context of mental status appropriate for ambulatory setting.  Progress: good  Plan: RTC 8 weeks with Dr Rupal Maynard  Psychotherapy services during this visit included  myself and the patient.       TREATMENT  PLAN          SYMPTOMS; PROBLEMS   MEASURABLE GOALS;    FUNCTIONAL IMPROVEMENT INTERVENTIONS;   GAINS MADE DISCHARGE CRITERIA   Dysregulation: mood dysregulation and relationship conflict with dad   reduce feeling overwhelmed/ improve decision making skills psychotherapy  social skills training  strength focus  stress management   - Will consider Fam Therapy marked symptom improvement   Depression: depressed mood   find  enjoyment at least once a day  Learn new coping skills to  Deal with stressors stress management   - get out of the house 4-5 times/week for social interaction marked symptom improvement     RESIDENT:   Twyla Petit MD    Patient staffed in clinic with Dr. Rosales.  Supervisor is Dr. Rosales.  I saw the patient with the resident, and participated in key portions of the service, including the mental status examination and developing the plan of care. I reviewed key portions of the history with the resident. I agree with the findings and plan as documented in this note.    Oralia Rosales MD

## 2018-09-06 ENCOUNTER — TELEPHONE (OUTPATIENT)
Dept: PSYCHIATRY | Facility: CLINIC | Age: 23
End: 2018-09-06

## 2018-09-06 NOTE — TELEPHONE ENCOUNTER
On 5/1/2017 the patient signed an BRIONNA authorizing [facility] to obtain/release medical records from [facility] for the purpose of all services per BRIONNA. This authorization will remain valid until patient revokes it in writing or upon termination of services per BRIONNA. On 9/6/2018 I sent this BRIONNA and A letter from [facility] to Medical Records via the tube system and  kept a copy until scanning is complete/confirmed. Tabitha Radford MA

## 2018-09-25 DIAGNOSIS — R45.851 SUICIDAL IDEATION: ICD-10-CM

## 2018-09-25 DIAGNOSIS — F25.1 SCHIZOAFFECTIVE DISORDER, DEPRESSIVE TYPE (H): ICD-10-CM

## 2018-09-25 DIAGNOSIS — F20.3 SCHIZOPHRENIA, UNDIFFERENTIATED (H): ICD-10-CM

## 2018-09-25 RX ORDER — BENZTROPINE MESYLATE 1 MG/1
1 TABLET ORAL AT BEDTIME
Qty: 30 TABLET | Refills: 0 | Status: SHIPPED | OUTPATIENT
Start: 2018-09-25 | End: 2018-10-25

## 2018-09-25 RX ORDER — PALIPERIDONE 6 MG/1
12 TABLET, EXTENDED RELEASE ORAL AT BEDTIME
Qty: 60 TABLET | Refills: 0 | Status: SHIPPED | OUTPATIENT
Start: 2018-09-25 | End: 2018-10-25

## 2018-09-25 RX ORDER — FLUOXETINE 40 MG/1
80 CAPSULE ORAL AT BEDTIME
Qty: 60 CAPSULE | Refills: 0 | Status: SHIPPED | OUTPATIENT
Start: 2018-09-25 | End: 2018-10-25

## 2018-09-25 RX ORDER — LAMOTRIGINE 100 MG/1
100 TABLET ORAL DAILY
Qty: 30 TABLET | Refills: 0 | Status: SHIPPED | OUTPATIENT
Start: 2018-09-25 | End: 2018-10-25

## 2018-09-25 NOTE — TELEPHONE ENCOUNTER
Medication requested: COGENTIN 1 MG tablet    Last refilled: 9/5/18  Qty: 30    Medication requested: lamoTRIgine (LAMICTAL) 100 MG tablet     Last refilled: 9/5/18  Qty: 30    Medication requested: FLUoxetine (PROZAC) 40 MG capsule      Last refilled: 9/5/18  Qty: 60    Medication requested: paliperidone (INVEGA) 9MG and 3 MG tab  Last refilled: 9/5/18  Qty: 30    Last seen: 6/25/18  RTC: 8 weeks  Cancel: 0  No-show: 0  Next appt: not scheduled    Refill decision:   30 day shabbir refill sent to the pharmacy - including instructions for patient to call the clinic and schedule an appointment.

## 2018-10-25 DIAGNOSIS — F25.1 SCHIZOAFFECTIVE DISORDER, DEPRESSIVE TYPE (H): ICD-10-CM

## 2018-10-25 DIAGNOSIS — R45.851 SUICIDAL IDEATION: ICD-10-CM

## 2018-10-25 DIAGNOSIS — F20.3 SCHIZOPHRENIA, UNDIFFERENTIATED (H): ICD-10-CM

## 2018-10-25 RX ORDER — PALIPERIDONE 6 MG/1
12 TABLET, EXTENDED RELEASE ORAL AT BEDTIME
Qty: 28 TABLET | Refills: 0 | Status: SHIPPED | OUTPATIENT
Start: 2018-10-25 | End: 2018-11-15

## 2018-10-25 RX ORDER — BENZTROPINE MESYLATE 1 MG/1
1 TABLET ORAL AT BEDTIME
Qty: 14 TABLET | Refills: 0 | Status: SHIPPED | OUTPATIENT
Start: 2018-10-25 | End: 2018-11-01

## 2018-10-25 RX ORDER — FLUOXETINE 40 MG/1
80 CAPSULE ORAL AT BEDTIME
Qty: 28 CAPSULE | Refills: 0 | Status: SHIPPED | OUTPATIENT
Start: 2018-10-25 | End: 2018-11-01

## 2018-10-25 RX ORDER — LAMOTRIGINE 100 MG/1
100 TABLET ORAL DAILY
Qty: 14 TABLET | Refills: 0 | Status: SHIPPED | OUTPATIENT
Start: 2018-10-25 | End: 2018-11-01

## 2018-10-25 NOTE — TELEPHONE ENCOUNTER
Medication requested: COGENTIN 1 MG tablet    Last refilled: 9/25/18  Qty: 30     Medication requested: lamoTRIgine (LAMICTAL) 100 MG tablet     Last refilled: 9/25/18  Qty: 30     Medication requested: FLUoxetine (PROZAC) 40 MG capsule      Last refilled: 9/25/18  Qty: 60     Medication requested: paliperidone (INVEGA) 9MG and 3 MG tab  Last refilled: 9/25/18  Qty: 30     Last seen: 6/25/18  RTC: 8 weeks  Cancel: 0  No-show: 0  Next appt: not scheduled     Refill decision:   30 day shabbir refill sent to the pharmacy - including instructions for patient to call the clinic and schedule an appointment.

## 2018-10-31 ENCOUNTER — TELEPHONE (OUTPATIENT)
Dept: PSYCHIATRY | Facility: CLINIC | Age: 23
End: 2018-10-31

## 2018-10-31 DIAGNOSIS — R45.851 SUICIDAL IDEATION: ICD-10-CM

## 2018-10-31 DIAGNOSIS — F20.3 SCHIZOPHRENIA, UNDIFFERENTIATED (H): ICD-10-CM

## 2018-10-31 DIAGNOSIS — F25.1 SCHIZOAFFECTIVE DISORDER, DEPRESSIVE TYPE (H): ICD-10-CM

## 2018-10-31 NOTE — TELEPHONE ENCOUNTER
Last seen: 6/25/18 (Marisabel)  RTC: 8 wks with Caesar  Cancel: none  No-show: none  Next appt: 12/10/18     Incoming refill from patient called to inform that they've scheduled a follow-up appt on 12/10/18 and would like a refill through that appt.      Medication requested: benztropine (COGENTIN) 1 MG tablet  Directions: Take 1 tablet (1 mg) by mouth At Bedtime *Call clinic to schedule MD APPT. - Oral  Qty: 14 tablet  Last refilled: 10/25/2018     Medication requested: FLUoxetine (PROZAC) 40 MG capsule  Directions: Take 2 capsules (80 mg) by mouth At Bedtime *Call clinic to schedule MD APPT. - Oral  Qty: 28 capsule  Last refilled: 10/25/2018    Medication requested: lamoTRIgine (LAMICTAL) 100 MG tablet  Directions: Take 1 tablet (100 mg) by mouth daily *Call clinic to schedule MD APPT. - Oral  Qty: 14 tablet  Last refilled: 10/25/2018    Refill request routed to provider for approval.

## 2018-11-01 RX ORDER — BENZTROPINE MESYLATE 1 MG/1
1 TABLET ORAL AT BEDTIME
Qty: 35 TABLET | Refills: 0 | Status: SHIPPED | OUTPATIENT
Start: 2018-11-01 | End: 2018-11-14

## 2018-11-01 RX ORDER — FLUOXETINE 40 MG/1
80 CAPSULE ORAL AT BEDTIME
Qty: 70 CAPSULE | Refills: 0 | Status: SHIPPED | OUTPATIENT
Start: 2018-11-01 | End: 2018-11-14

## 2018-11-01 RX ORDER — LAMOTRIGINE 100 MG/1
100 TABLET ORAL DAILY
Qty: 35 TABLET | Refills: 0 | Status: SHIPPED | OUTPATIENT
Start: 2018-11-01 | End: 2018-11-14

## 2018-11-01 NOTE — TELEPHONE ENCOUNTER
Rupal Maynard MD   You 4 hours ago (5:59 AM)                 Vicente Ahn,     Yes you may refill them. Thank you!     Rupal (Routing comment)                        You   Rupal Maynard MD 18 hours ago (4:03 PM)                   Hi Dr. Maynard-     Please see encounter notes, do you approve a refill of these meds to get pt to follow-up appt scheduled on 12/10/18?     Thanks,   Anabelle (Routing comment)         -Writer refilled 35 day supply of medications for pt to last until follow-up appt scheduled on 12/10/18. Writer sent refills of Lamictal, Prozac and Cogentin to pt's preferred pharmacy per provider approval.

## 2018-11-14 DIAGNOSIS — F20.3 SCHIZOPHRENIA, UNDIFFERENTIATED (H): ICD-10-CM

## 2018-11-14 DIAGNOSIS — R45.851 SUICIDAL IDEATION: ICD-10-CM

## 2018-11-14 DIAGNOSIS — F25.1 SCHIZOAFFECTIVE DISORDER, DEPRESSIVE TYPE (H): ICD-10-CM

## 2018-11-14 RX ORDER — PALIPERIDONE 6 MG/1
12 TABLET, EXTENDED RELEASE ORAL AT BEDTIME
Qty: 60 TABLET | Refills: 0 | Status: CANCELLED | OUTPATIENT
Start: 2018-11-14

## 2018-11-14 NOTE — TELEPHONE ENCOUNTER
Medication requested: paliperidone (INVEGA) 6 MG 24 hr   Last refilled: 10/26/18  Qty: 30    Medication requested: lamoTRIgine (LAMICTAL) 100 MG   Last refilled: 11/5/18  Qty: 30  Medication requested:  FLUoxetine (PROZAC) 40 MG   Last refilled: 11/5/18  Qty: 60  Medication requested:  benztropine (COGENTIN) 1 MG   Last refilled: 11/5/18  Qty: 30    Last seen: 6/5/18  RTC: 2 MOS  Cancel: 0  No-show: 0  Next appt: 12/10/18     Refilled for 30 days per protocol.  Will send FYI to provider as is outside RTC timeframe

## 2018-11-15 DIAGNOSIS — F20.3 SCHIZOPHRENIA, UNDIFFERENTIATED (H): ICD-10-CM

## 2018-11-15 RX ORDER — LAMOTRIGINE 100 MG/1
100 TABLET ORAL DAILY
Qty: 30 TABLET | Refills: 0 | Status: SHIPPED | OUTPATIENT
Start: 2018-11-15 | End: 2018-12-10

## 2018-11-15 RX ORDER — FLUOXETINE 40 MG/1
80 CAPSULE ORAL AT BEDTIME
Qty: 60 CAPSULE | Refills: 0 | Status: SHIPPED | OUTPATIENT
Start: 2018-11-15 | End: 2018-12-10

## 2018-11-15 RX ORDER — PALIPERIDONE 6 MG/1
12 TABLET, EXTENDED RELEASE ORAL AT BEDTIME
Qty: 60 TABLET | Refills: 0 | Status: SHIPPED | OUTPATIENT
Start: 2018-11-15 | End: 2018-12-10

## 2018-11-15 RX ORDER — BENZTROPINE MESYLATE 1 MG/1
1 TABLET ORAL AT BEDTIME
Qty: 30 TABLET | Refills: 0 | Status: SHIPPED | OUTPATIENT
Start: 2018-11-15 | End: 2018-12-10

## 2018-11-15 NOTE — TELEPHONE ENCOUNTER
Last seen: 6/25/18 (Marisabel)  RTC: 8 weeks with Dr. Maynard  Cancel: none  No-show: none  Next appt: 12/10/18 (Caesar)     Incoming refill from [facility] pharmacy via phone call     Medication requested: paliperidone (INVEGA) 6 MG 24 hr tablet  Directions: Take 2 tablets (12 mg) by mouth At Bedtime   Qty: 28  Last refilled: approximately 10/25/18 per med tab (14 d/s)     Medication refill approved per refill protocol  30 d/s sent to pt's preferred pharmacy  Pharmacist stated in the past, 6 mg tabs are not covered by insurance so the pharmacist would split it as 3 mg and 9 mg instead. Although, writer was reviewing the pt's chart and it appears the 6 mg tabs should be approved until 2023. Writer relayed this to the pharmacist. She was unaware of this as the pharmacy has continued splitting the tabs to avoid any issues. The pharmacist requested that writer try sending 6 mg tabs and she will run it through insurance again. If it is not covered, she will then split it to make sure the pt gets the appropriate dose.

## 2018-12-10 ENCOUNTER — OFFICE VISIT (OUTPATIENT)
Dept: PSYCHIATRY | Facility: CLINIC | Age: 23
End: 2018-12-10
Attending: PSYCHIATRY & NEUROLOGY
Payer: COMMERCIAL

## 2018-12-10 ENCOUNTER — TELEPHONE (OUTPATIENT)
Dept: PSYCHIATRY | Facility: CLINIC | Age: 23
End: 2018-12-10

## 2018-12-10 VITALS
WEIGHT: 196.8 LBS | SYSTOLIC BLOOD PRESSURE: 130 MMHG | HEART RATE: 121 BPM | DIASTOLIC BLOOD PRESSURE: 81 MMHG | BODY MASS INDEX: 30.82 KG/M2

## 2018-12-10 DIAGNOSIS — R45.851 SUICIDAL IDEATION: ICD-10-CM

## 2018-12-10 DIAGNOSIS — F25.1 SCHIZOAFFECTIVE DISORDER, DEPRESSIVE TYPE (H): ICD-10-CM

## 2018-12-10 DIAGNOSIS — F20.3 SCHIZOPHRENIA, UNDIFFERENTIATED (H): ICD-10-CM

## 2018-12-10 LAB
BASOPHILS # BLD AUTO: 0 10E9/L (ref 0–0.2)
BASOPHILS NFR BLD AUTO: 0.2 %
CHOLEST SERPL-MCNC: 205 MG/DL
DIFFERENTIAL METHOD BLD: NORMAL
EOSINOPHIL # BLD AUTO: 0.2 10E9/L (ref 0–0.7)
EOSINOPHIL NFR BLD AUTO: 3.3 %
ERYTHROCYTE [DISTWIDTH] IN BLOOD BY AUTOMATED COUNT: 13.8 % (ref 10–15)
HCT VFR BLD AUTO: 36.5 % (ref 35–47)
HDLC SERPL-MCNC: 45 MG/DL
HGB BLD-MCNC: 11.7 G/DL (ref 11.7–15.7)
IMM GRANULOCYTES # BLD: 0 10E9/L (ref 0–0.4)
IMM GRANULOCYTES NFR BLD: 0.3 %
LDLC SERPL CALC-MCNC: 120 MG/DL
LYMPHOCYTES # BLD AUTO: 2.3 10E9/L (ref 0.8–5.3)
LYMPHOCYTES NFR BLD AUTO: 36 %
MCH RBC QN AUTO: 27.7 PG (ref 26.5–33)
MCHC RBC AUTO-ENTMCNC: 32.1 G/DL (ref 31.5–36.5)
MCV RBC AUTO: 87 FL (ref 78–100)
MONOCYTES # BLD AUTO: 0.6 10E9/L (ref 0–1.3)
MONOCYTES NFR BLD AUTO: 9.4 %
NEUTROPHILS # BLD AUTO: 3.3 10E9/L (ref 1.6–8.3)
NEUTROPHILS NFR BLD AUTO: 50.8 %
NONHDLC SERPL-MCNC: 160 MG/DL
NRBC # BLD AUTO: 0 10*3/UL
NRBC BLD AUTO-RTO: 0 /100
PLATELET # BLD AUTO: 292 10E9/L (ref 150–450)
RBC # BLD AUTO: 4.22 10E12/L (ref 3.8–5.2)
TRIGL SERPL-MCNC: 201 MG/DL
WBC # BLD AUTO: 6.4 10E9/L (ref 4–11)

## 2018-12-10 PROCEDURE — G0463 HOSPITAL OUTPT CLINIC VISIT: HCPCS | Mod: ZF

## 2018-12-10 PROCEDURE — 80061 LIPID PANEL: CPT | Performed by: PSYCHIATRY & NEUROLOGY

## 2018-12-10 PROCEDURE — 85025 COMPLETE CBC W/AUTO DIFF WBC: CPT | Performed by: PSYCHIATRY & NEUROLOGY

## 2018-12-10 PROCEDURE — 36415 COLL VENOUS BLD VENIPUNCTURE: CPT | Performed by: PSYCHIATRY & NEUROLOGY

## 2018-12-10 RX ORDER — LAMOTRIGINE 100 MG/1
100 TABLET ORAL DAILY
Qty: 30 TABLET | Refills: 0 | Status: SHIPPED | OUTPATIENT
Start: 2018-12-10 | End: 2019-01-10

## 2018-12-10 RX ORDER — BENZTROPINE MESYLATE 1 MG/1
1 TABLET ORAL AT BEDTIME
Qty: 30 TABLET | Refills: 0 | Status: SHIPPED | OUTPATIENT
Start: 2018-12-10 | End: 2019-01-10

## 2018-12-10 RX ORDER — PALIPERIDONE 6 MG/1
12 TABLET, EXTENDED RELEASE ORAL AT BEDTIME
Qty: 60 TABLET | Refills: 0 | Status: SHIPPED | OUTPATIENT
Start: 2018-12-10 | End: 2019-01-10

## 2018-12-10 RX ORDER — FLUOXETINE 40 MG/1
80 CAPSULE ORAL AT BEDTIME
Qty: 60 CAPSULE | Refills: 0 | Status: SHIPPED | OUTPATIENT
Start: 2018-12-10 | End: 2019-01-10

## 2018-12-10 ASSESSMENT — PATIENT HEALTH QUESTIONNAIRE - PHQ9: SUM OF ALL RESPONSES TO PHQ QUESTIONS 1-9: 13

## 2018-12-10 ASSESSMENT — PAIN SCALES - GENERAL: PAINLEVEL: NO PAIN (0)

## 2018-12-10 NOTE — TELEPHONE ENCOUNTER
On 12/10/2018 the patient signed an BRIONNA authorizing medical records to be released from [facility] to [facility] for the purpose of legal per BRIONNA.  I sent the original  BRIONNA to Medical Records via the tube system and kept a copy in psychiatry until scanning is complete/confirmed. Tabitha Radford MA

## 2018-12-10 NOTE — PROGRESS NOTES
[facility] PSY[facility]IATRY CLINIC TRANSFER DIAGNOSTIC ASSESSMENT       CARE TEAM:  PCP- Becki Valencia    Specialty Providers- no    Therapist- Rylee Samuels 1x/week [facility] -  [phone] (Verbal BRIONNA on file)    [facility] Team- CATHY Salvador     Cesia Nettles is a 23 year old female who prefers the name Sudha & pronouns she, her, hers, herself. Date of initial diagnostic assessment is 07/08/2016.  Date of most recent transfer of care assessment is 12/10/18.     Pertinent Background:  This patient first experienced mental health issues in childhood and has received treatment for psychosis and suicidality.  See transfer evaluation for detailed history.  Notably, has history of AH dating back to age 7 yo.  Psychosis has also included command AH in the past.  Additionally she has had 2 SI attempts (2012 Zyrtec OD and 2014 hanging with scarf).  Previous prompts were fear of rejection.  Sudha is very close to both parents and her half siblings. She has good insight about her paranoia.. Per chart, some Cluster B traits and frequent hospitalizations prior to starting  residence and supportive employment, and doing well the past severeal years.       Psych critical item history includes suicide attempt [multiple], suicidal ideation, psychosis [sxs include paranoia, AH, ideas of reference], mutiple psychotropic trials and psych hosp (>5).    INTERIM HISTORY                                                                                              4, 4   The patient reports good treatment adherence.  History was provided by the patient who was a good historian.  The last visit ended with no change to the med regimen.     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 She enjoys her job.   - She is a new relationship, Hong, and feels that she wants to work on accepting complements from him. She has been discussing this  "with her therapist.   - Anxiety continues to be an ongoing trouble for her. When it is a stressul fistuation, she would get sweaty and feel a cold rush. She drinks cold water which calms her down.   - Denies AH/VH. Denies Paranoia. Denies SI/SIB. Denies substance use.     RECENT SYMPTOMS:   DEPRESSION:  reports-\"up and down\";  DENIES- suicidal ideation, self-destructive thoughts, depressed mood, anhedonia and low energy  PSYCHOSIS:  reports-none;  DENIES- delusions, auditory hallucinations and visual hallucinations  DYSREGULATION:  reports-none;  DENIES- suicidal ideation, violent ideation and SIB  SLEEP:  Has been sleeping more than her usual   EATING DISORDER: no     RECENT SUBSTANCE USE:     ALCOHOL- no , last drink was couple of weeks ago , she had one shot of tequila.    TOBACCO- no     CAFFEINE- no  OPIOIDS- no         NARCAN KIT- N/A        CANNABIS- no            OTHER ILLICIT DRUGS- none      CURRENT SOCIAL HISTORY:  FINANCIAL SUPPORT- working       CHILDREN- no      LIVING SITUATION- lives in a new group home.    SOCIAL/ SPIRITUAL SUPPORT- family      FEELS SAFE AT HOME- yes     MEDICAL ROS (2,10):  Reports A comprehensive review of systems was performed and is negative other than noted in the HPI.      Denies headaches, wt gain, tremor, akathisia, akathisia, wt gain and polyphagia  SUBSTANCE USE HISTORY                                                                              Past Use- none  Treatment [#, most recent] - none  Medical Consequences [withdrawal, sz etc] - none  HIV/Hepatitis- none  Legal Consequences- none     PSYCHIATRIC HISTORY      SIB [method, most recent]- none  Suicidal Ideation Hx [passive, active]- h/o active SI w plan and intent  Suicide Attempt [#, recent, method]:   #- intentional excessive ingestion of Zyrtec 2012 (prompt was feeling rejected by parents), and impulsive attempt to hang self with scarf 2014(unable to identify prompt)   Most Recent- 2014     Violence/Aggression " Hx- has in the past engaged in hair pulling with siblings  Psychosis Hx- yes. Since age 9 yo,  usu negative slef talk, some command  for SI, VH, paranoia and ideas of reference  Psych Hosp [ #, most recent, committed]- ~ 7 X, last one in 2014  ECT [#, most recent]- none     Eating Disorder: none     Outpatient Programs [ DBT, Day Treatment, Eating Disorder Tx etc] : NA     SOCIAL and FAMILY HISTORY                                          patient reported   Financial Support- social security disability  Living Situation/Family/Relationships- lives in ;  Children- None  Trauma History (self-report)- maybe some bullying  Legal- none  Cultural/ Social/ Spiritual Support- parents,  staff,   Early History/Education-  Pt was born in [place] and parents  when pt was about 3 mo old.  Parents later  when pt was 6 yo.  Then both parents moved to [place] when pt was 5 yo, and Therese spent more time with mom, but continued to be very close with dad as well.  Both parents remarried and Therese now has 2 half sibs on each side of the family.  Notably, both mom and dad each have a 10 yo and a 7 yo, so Therese has 4 half sibs (2 are 10 yo and 2 are 7 yo).  Pt has a HS degree.     Family Mental Health History-  Mat great GF - Schizophrenia, Mat- GM - MDD, Mom - BPD and MDD.  Pat GF - MDD, Dad MDD.    PAST PSYCH MED TRIALS   see EMR Problem List: Hx of psychiatric care    MEDICAL / SURGICAL HISTORY                                   Pregnant or breastfeeding- no      Contraception- implant    Neurologic Hx- no   Patient Active Problem List   Diagnosis     Schizoaffective disorder, depressive type (H)     Hx of psychiatric care       Major Surgery- no    ALLERGY                                Patient has no known allergies.  MEDICATIONS                               Current Outpatient Medications   Medication Sig Dispense Refill     Acetaminophen 325 MG CAPS Take 325-650 mg by mouth every 4 hours as needed       bacitracin  ointment Apply 1 g topically daily as needed for wound care       benztropine (COGENTIN) 1 MG tablet Take 1 tablet (1 mg) by mouth At Bedtime 30 tablet 0     FLUoxetine (PROZAC) 40 MG capsule Take 2 capsules (80 mg) by mouth At Bedtime 60 capsule 0     hydrOXYzine (ATARAX) 25 MG tablet Take 1/2 tab po  BID PRN. 30 tablet 0     ketoconazole (NIZORAL) 2 % cream Apply 1 applicator topically daily as needed for itching or irritation       lamoTRIgine (LAMICTAL) 100 MG tablet Take 1 tablet (100 mg) by mouth daily 30 tablet 0     metFORMIN (GLUCOPHAGE) 500 MG tablet Take 2 tablets (1,000 mg) by mouth 2 times daily (with meals) 60 tablet 6     paliperidone (INVEGA) 6 MG 24 hr tablet Take 2 tablets (12 mg) by mouth At Bedtime *Call clinic to schedule MD APPT. 60 tablet 0     Sodium Fluoride (PREVIDENT 5000 BOOSTER) 1.1 % PSTE Apply 1 Dose to affected area 2 times daily       VITALS                                                                                                                                  3, 3   There were no vitals taken for this visit.   MENTAL STATUS EXAM                                                                         9, 14 cog gs     Alertness: alert  and oriented  Appearance: casually groomed  Behavior/Demeanor: cooperative, pleasant and interruptive, with good  eye contact   Speech: regular rate and rhythm  Language: no problems  Psychomotor: normal or unremarkable  Mood: description consistent with euthymia, pt reports paranoia about romantic relationship  Affect: full range and appropriate; was congruent to mood; was congruent to content  Thought Process/Associations: unremarkable  Thought Content:  Reports history of suicidal ideation without plan; without intent [details in Interim History] and paranoid ideation;  Denies violent ideation and delusions  Perception:  Reports auditory hallucinations without commands [details in Interim History];  Denies visual hallucinations  Insight:  good  Judgment: good  Cognition: does  appear grossly intact; formal cognitive testing was not done    LABS and DATA     AIMS:  not needed , not needed    PHQ9 TODAY = 13  PHQ-9 SCORE 6/1/2017 12/11/2017 5/9/2018   PHQ-9 Total Score - - -   PHQ-9 Total Score - - -   PHQ-9 Total Score 14 16 10     ANTIPSYCHOTIC LABS  [glu, A1C, lipids (jenny LDL), liver enzymes, WBC, ANEU, Hgb, plts]  q12 mo  Recent Labs   Lab Test 02/02/17  0916 09/16/14  0947 12/21/13  1016   GLC 87 69* 70  70   A1C 4.9 5.2  --      Recent Labs   Lab Test 02/02/17  0916 12/15/15 09/16/14  0947 12/21/13  1016   CHOL 186 188 205* 208*  208*   TRIG 195* 210* 265* 282*  282*   * 109 115  --    HDL 38* 37* 37* 43  43     Recent Labs   Lab Test 02/02/17  0916 12/21/13  1016   AST 24 33   ALT 23 18   ALKPHOS 102 107  107     Recent Labs   Lab Test 02/02/17  0916 09/16/14  0947 12/21/13  1016   WBC 6.8 6.9 6.1  6.1   ANEU  --  3.9 3.9  3.9   HGB 12.3 12.8 12.5  12.5    317 310  310     DIAGNOSIS     Schizoaffective Disorder, Depressed type, in remission (mood and psychosis)  Unspecified Anxiety Disorder    ASSESSMENT                                                                                                   m2, h3     TODAY: Sudha reports overall stability on the current medication regimen.  She has been tolerating the medications with no reported side effects.  She denies symptoms concerning for worsening mood or psychosis.  Continues to endorse symptoms of anxiety.  Ongoing individual therapy was recommended which patient was agreeable to.  Denies symptoms concerning for extrapyramidal side effects.  AIMS rating scale as needed.  Neuroleptic labs are overdue, patient was advised to obtain them today.  There did not appear to be current/imminent safety concerns - see below.    Future consideration:  - simplify medication regimen. May discontinue cogentin if patient is tolerating medications with no reported side  effects    SUICIDE RISK ASSESSMENT [details described above]: Cesia Nettles reported previous SA X3, most recently 10/2017 tying a scarf around her neck while having SI (prior to that was in 2014). No current SI.  In addition, she has notable risk factors for self-harm including similar prompt as seen w/previous self-harm, relationship conflict and previous suicide attempts.  However, risk is mitigated by no suicidal plan or intent, describes a safety plan, h/o seeking help when needed, symptom improvement, future oriented, feeling hopeful, none to minimal alcohol use, commitment to family, good social support and stable housing.  Based on all available evidence she does not appear to be at imminent risk for self-harm therefore does not meet criteria for a 72-hr hold/involuntary hospitalization.  However, based on degree of symptoms DBT and close psych FU was recommended which the pt did agree to.  Additional steps to minimize risk include: SAFETY PLAN completed 07/26/2017 and ID previous SI triggers     [place] PRESCRIPTION MONITORING PROGRAM [] was not checked today:  not using controlled substances.     PSYCHOTROPIC DRUG INTERACTIONS:   Fluoxetine and Hydrozyzine  concurrent use may increase risk of QTc prolongation.   Fluoxetine and Paliperidone concurrent use may increase QTc prolongation  Paliperidone and Hydroxyzine concurrent use may increase Qt prolongation  MANAGEMENT:  Monitoring for adverse effects, routine vitals, routine labs and periodic EKGs     PLAN                                                                                                              m2, h3     1) PSYCHOTROPIC MEDICATIONS:  - Continue Paliperidone 12 mg at bedtime   - Continue Lamotrigine 100 mg Daily  - Continue Fluoxetine 80 mg QHS  - Continue Cogentin 1 mg QHS  - Continue Metformin 1000 mg BID  - Discontinue Hydroxyzine 25 mg 1/2 tab BID, patient hasnt been using it.      2) THERAPY:  Continue individual therapy      3) NEXT  DUE:    Labs- Neuroleptic labs overdue. To be obtained today.   EKG- Next visit   Rating Scales- AIMS as needed     4) REFERRALS:    - Continue : GH, CM, CADI, ARHMS      5) RTC: 8 weeks     SAFETY PLAN reviewed: Yes     6) CRISIS NUMBERS:   Provided routinely in AVS.  Especially emphasized:  [facility] [phone] (clinic)    [phone] (after hours)  ONLY if a [facility] PT: [facility] [phone] (clinic), [phone] (after hours)      TREATMENT RISK STATEMENT:  The risks, benefits, alternatives and potential adverse effects have been discussed and are understood by the pt. The pt understands the risks of using street drugs or alcohol. There are no medical contraindications, the pt agrees to treatment with the ability to do so. The pt knows to call the clinic for any problems or to access emergency care if needed.  Medical and substance use concerns are documented above.  Psychotropic drug interaction check was done, including changes made today.    PROVIDER: Rupal Maynard MD    Patient staffed in clinic with Dr. Schofield who will sign the note.  Supervisor is Dr. Rosales.    Supervisor Attestation:  I met with Cesia Nettles along with the resident, and participated in key portions of the service, including the mental status examination and developing the plan of care. I reviewed key portions of the history with the resident. I agree with the findings and plan as documented in this note.  Elvis Schofield MD

## 2018-12-10 NOTE — NURSING NOTE
Chief Complaint   Patient presents with     Recheck Medication     Schizoaffective disorder, depressive type (H

## 2018-12-10 NOTE — PATIENT INSTRUCTIONS
- continue current medication regimen, with no changes  - get blood work  - continue therapy to target anxiety   - follow up with me in 8 weeks

## 2019-01-10 DIAGNOSIS — F20.3 SCHIZOPHRENIA, UNDIFFERENTIATED (H): ICD-10-CM

## 2019-01-10 DIAGNOSIS — F25.1 SCHIZOAFFECTIVE DISORDER, DEPRESSIVE TYPE (H): ICD-10-CM

## 2019-01-10 DIAGNOSIS — R45.851 SUICIDAL IDEATION: ICD-10-CM

## 2019-01-10 RX ORDER — LAMOTRIGINE 100 MG/1
100 TABLET ORAL DAILY
Qty: 30 TABLET | Refills: 0 | Status: SHIPPED | OUTPATIENT
Start: 2019-01-10 | End: 2019-02-06

## 2019-01-10 RX ORDER — BENZTROPINE MESYLATE 1 MG/1
1 TABLET ORAL AT BEDTIME
Qty: 30 TABLET | Refills: 0 | Status: SHIPPED | OUTPATIENT
Start: 2019-01-10 | End: 2019-02-06

## 2019-01-10 RX ORDER — FLUOXETINE 40 MG/1
80 CAPSULE ORAL AT BEDTIME
Qty: 60 CAPSULE | Refills: 0 | Status: SHIPPED | OUTPATIENT
Start: 2019-01-10 | End: 2019-02-06

## 2019-01-10 RX ORDER — PALIPERIDONE 6 MG/1
12 TABLET, EXTENDED RELEASE ORAL AT BEDTIME
Qty: 60 TABLET | Refills: 0 | Status: SHIPPED | OUTPATIENT
Start: 2019-01-10 | End: 2019-02-07

## 2019-01-10 NOTE — TELEPHONE ENCOUNTER
Last Seen 12/10/18  RTC 8 weeks  Cancel None  No-Show None    Next Appt 1/28/19    Incoming Refill From [facility] via fax    Medication Requested paliperidone ER (INVEGA) 6 MG 24 hr tablet  Directions Take 2 tablets (12 mg) by mouth At Bedtime  Qty 60  Last Refill 12/17/18    Medication Requested benztropine (COGENTIN) 1 MG tablet  Directions Take 1 tablet (1 mg) by mouth At Bedtime  Qty 30  Last Refill 12/21/18    Medication Requested lamoTRIgine (LAMICTAL) 100 MG tablet  Directions Take 1 tablet (100 mg) by mouth daily  Qty 30  Last Refill 12/21/18    Medication Requested FLUoxetine (PROZAC) 40 MG capsule  Directions Take 2 capsules (80 mg) by mouth At Bedtime  Qty 60  Last Refill 12/21/18      30 d/s pended and routed to Dr. Maynard for approval.

## 2019-01-15 ENCOUNTER — DOCUMENTATION ONLY (OUTPATIENT)
Dept: OTHER | Facility: CLINIC | Age: 24
End: 2019-01-15

## 2019-01-24 ENCOUNTER — TELEPHONE (OUTPATIENT)
Dept: PSYCHIATRY | Facility: CLINIC | Age: 24
End: 2019-01-24

## 2019-01-24 NOTE — TELEPHONE ENCOUNTER
Kristal Mendoza Emily C RN             Savanna Hastings - pt's mom - [phone]     Mom needs some papers faxed in regards to daughter      Mom stated that parents were going to be switching guardianship for pt to outside member. Mom requested that blank BRIONNA and blank consent to communicate forms be emailed to her to be completed for new guardian. Mom requested that they be emailed to [email or web address].     Mom is also requesting to have NMDA receptor encephalitis testing competed to rule out additional causes of symptoms. Mom would like to discuss further with Dr. Maynard but does not want to worry the pt at follow-up appt on 1/28/19.    Routed to provider.

## 2019-01-25 ENCOUNTER — TELEPHONE (OUTPATIENT)
Dept: PSYCHIATRY | Facility: CLINIC | Age: 24
End: 2019-01-25

## 2019-01-25 NOTE — TELEPHONE ENCOUNTER
On 1/25/2018 the patient's guardian signed an BRIONNA authorizing medical records to be released from [facility] to Savanna Hastings for the purpose of continuing care. I sent the request to medical records via the tube system and kept a copy in psychiatry until scanning is complete.  Genet Ruggiero CMA    On 1/25/2019 the patient's guardian signed a PHI authorizing electronic communication with [facility].  I sent this document to scanning on 1/25/2019 and kept a copy in Psychiatry until scanning is confirmed. Genet Ruggiero CMA    On 1/25/2019 the patient's guardian signed a PHI authorizing person to person communication with Savanna Hastings/leelee & Tino Nettles/[place] for scheduling, medical, and  items.  I sent this document to scanning on 1/25/2019 and kept a copy in Psychiatry until scanning is confirmed. Genet Ruggiero CMA

## 2019-01-31 ENCOUNTER — MEDICAL CORRESPONDENCE (OUTPATIENT)
Dept: HEALTH INFORMATION MANAGEMENT | Facility: CLINIC | Age: 24
End: 2019-01-31

## 2019-02-06 DIAGNOSIS — F25.1 SCHIZOAFFECTIVE DISORDER, DEPRESSIVE TYPE (H): ICD-10-CM

## 2019-02-06 DIAGNOSIS — F20.3 SCHIZOPHRENIA, UNDIFFERENTIATED (H): ICD-10-CM

## 2019-02-06 DIAGNOSIS — R45.851 SUICIDAL IDEATION: ICD-10-CM

## 2019-02-07 DIAGNOSIS — F20.3 SCHIZOPHRENIA, UNDIFFERENTIATED (H): ICD-10-CM

## 2019-02-07 RX ORDER — LAMOTRIGINE 100 MG/1
100 TABLET ORAL DAILY
Qty: 30 TABLET | Refills: 0 | Status: SHIPPED | OUTPATIENT
Start: 2019-02-07 | End: 2019-03-04

## 2019-02-07 RX ORDER — BENZTROPINE MESYLATE 1 MG/1
1 TABLET ORAL AT BEDTIME
Qty: 30 TABLET | Refills: 0 | Status: SHIPPED | OUTPATIENT
Start: 2019-02-07 | End: 2019-03-04

## 2019-02-07 RX ORDER — FLUOXETINE 40 MG/1
80 CAPSULE ORAL AT BEDTIME
Qty: 60 CAPSULE | Refills: 0 | Status: SHIPPED | OUTPATIENT
Start: 2019-02-07 | End: 2019-03-04

## 2019-02-07 NOTE — TELEPHONE ENCOUNTER
Medication requested: paliperidone ER (INVEGA) 6 MG 24 hr tablet  Last refilled: 1/10/19   Qty: 60      Last seen: 12/10/18  RTC: 2 MONTHS  Cancel: 1  No-show: 0  Next appt: NOT SCHEDULED    Refill decision:   Refill pended and routed to the provider for review/determination due to cancel x 1  Scheduling has been notified to contact the pt for appointment.

## 2019-02-07 NOTE — TELEPHONE ENCOUNTER
Medication requested:  benztropine (COGENTIN) 1 MG   Last refilled: 1/14/19  Qty: 30  Medication requested:  FLUoxetine (PROZAC) 40 MG  Last refilled: 1/14/19  Qty: 60    Medication requested:  lamoTRIgine (LAMICTAL) 100 MG  Last refilled: 1/14/19  Qty: 30      Last seen: 12/10/18  RTC: 2 MOS  Cancel:  X 1  No-show: 0  Next appt: NONE  Pt outside of RTC timeframe  WITH CANCEL X  1     30 DAY  RF PENDING   Scheduling has been notified to contact the pt for appointment

## 2019-02-08 RX ORDER — PALIPERIDONE 6 MG/1
12 TABLET, EXTENDED RELEASE ORAL AT BEDTIME
Qty: 60 TABLET | Refills: 0 | Status: SHIPPED | OUTPATIENT
Start: 2019-02-08 | End: 2019-03-04

## 2019-03-04 DIAGNOSIS — R45.851 SUICIDAL IDEATION: ICD-10-CM

## 2019-03-04 DIAGNOSIS — F25.1 SCHIZOAFFECTIVE DISORDER, DEPRESSIVE TYPE (H): ICD-10-CM

## 2019-03-04 DIAGNOSIS — F20.3 SCHIZOPHRENIA, UNDIFFERENTIATED (H): ICD-10-CM

## 2019-03-06 RX ORDER — BENZTROPINE MESYLATE 1 MG/1
TABLET ORAL
Qty: 30 TABLET | Refills: 0 | Status: SHIPPED | OUTPATIENT
Start: 2019-03-06 | End: 2019-03-25

## 2019-03-06 RX ORDER — LAMOTRIGINE 100 MG/1
TABLET ORAL
Qty: 30 TABLET | Refills: 0 | Status: SHIPPED | OUTPATIENT
Start: 2019-03-06 | End: 2019-03-25

## 2019-03-06 RX ORDER — FLUOXETINE 40 MG/1
CAPSULE ORAL
Qty: 60 CAPSULE | Refills: 0 | Status: SHIPPED | OUTPATIENT
Start: 2019-03-06 | End: 2019-03-25

## 2019-03-06 RX ORDER — PALIPERIDONE 6 MG/1
TABLET, EXTENDED RELEASE ORAL
Qty: 60 TABLET | Refills: 0 | Status: SHIPPED | OUTPATIENT
Start: 2019-03-06 | End: 2019-03-25

## 2019-03-06 NOTE — TELEPHONE ENCOUNTER
Medication requested:  benztropine (COGENTIN) 1 MG   Last refilled: 2/8/19  Qty: 30  Medication requested:  FLUoxetine (PROZAC) 40 MG  Last refilled: 2/8/19  Qty: 60  Medication requested: paliperidone ER (INVEGA) 6 MG 24 hr tablet  Last refilled: 2/8/19  Qty: 60  Medication requested:  lamoTRIgine (LAMICTAL) 100 MG  Last refilled: 2/8/19  Qty: 30       Last seen: 12/10/18  RTC: 2 MOS  Cancel:  X 1  No-show: 0  Next appt: NONE  Pt outside of RTC timeframe  WITH CANCEL X  1     30 DAY  RF PENDING

## 2019-03-11 ENCOUNTER — TELEPHONE (OUTPATIENT)
Dept: PSYCHIATRY | Facility: CLINIC | Age: 24
End: 2019-03-11

## 2019-03-11 NOTE — TELEPHONE ENCOUNTER
On 11/16/2018 the patient's authorized adult signed an BRIONNA authorizing the release of records from [facility] to [employer] for [employer] forms.  The BRIONNA was sent to [place].Shonna Sevilla LPN

## 2019-03-11 NOTE — TELEPHONE ENCOUNTER
3/25/2019,  Completed forms were returned to this writer and faxed to [employer] at [phone].The original copies were sent to scanning and copies are held in Psych until scanning is complete.Shonna Sevilla LPN     8 faxed pages, including cover sheet and a 2 page BRIONNA, were received from [employer] - [facility] [place] [employer]. The original forms were put in Dr. Maynard's folder and copies are held in Psych until completed forms are returned to this writer. Forms are due on Friday 3/15/2019.Shonna Sevilla LPN

## 2019-03-15 ENCOUNTER — TELEPHONE (OUTPATIENT)
Dept: PSYCHIATRY | Facility: CLINIC | Age: 24
End: 2019-03-15

## 2019-03-15 NOTE — TELEPHONE ENCOUNTER
"On 3/15/2019, writer spoke to [email or web address] regarding [employer] forms. They were unable to locate an BRIONNA to speak to the Psych Clinic and the pt happened to be walking past so they put pt \"Sudha\" on the phone and this writer told pt that Dr. Maynard would not be able to complete forms until her next appointment on 3/25/2019 at 0830. Pt said that an extension was put in to [employer]. This writer asked pt to inform [email or web address] that forms will be completed on 3/25/2019. Pt acknowledged understanding.Shonna Sevilla LPN    "

## 2019-03-25 ENCOUNTER — TELEPHONE (OUTPATIENT)
Dept: PSYCHIATRY | Facility: CLINIC | Age: 24
End: 2019-03-25

## 2019-03-25 ENCOUNTER — OFFICE VISIT (OUTPATIENT)
Dept: PSYCHIATRY | Facility: CLINIC | Age: 24
End: 2019-03-25
Attending: PSYCHIATRY & NEUROLOGY
Payer: COMMERCIAL

## 2019-03-25 VITALS
BODY MASS INDEX: 31.64 KG/M2 | SYSTOLIC BLOOD PRESSURE: 112 MMHG | DIASTOLIC BLOOD PRESSURE: 73 MMHG | HEART RATE: 118 BPM | WEIGHT: 202 LBS

## 2019-03-25 DIAGNOSIS — F20.3 SCHIZOPHRENIA, UNDIFFERENTIATED (H): ICD-10-CM

## 2019-03-25 DIAGNOSIS — F25.1 SCHIZOAFFECTIVE DISORDER, DEPRESSIVE TYPE (H): Primary | ICD-10-CM

## 2019-03-25 DIAGNOSIS — R45.851 SUICIDAL IDEATION: ICD-10-CM

## 2019-03-25 PROCEDURE — G0463 HOSPITAL OUTPT CLINIC VISIT: HCPCS | Mod: ZF

## 2019-03-25 PROCEDURE — 80061 LIPID PANEL: CPT | Performed by: PSYCHIATRY & NEUROLOGY

## 2019-03-25 RX ORDER — FLUOXETINE 40 MG/1
40 CAPSULE ORAL DAILY
Qty: 60 CAPSULE | Refills: 0 | Status: SHIPPED | OUTPATIENT
Start: 2019-03-25 | End: 2019-03-25

## 2019-03-25 RX ORDER — LAMOTRIGINE 100 MG/1
100 TABLET ORAL DAILY
Qty: 30 TABLET | Refills: 0 | Status: SHIPPED | OUTPATIENT
Start: 2019-03-25 | End: 2019-05-01

## 2019-03-25 RX ORDER — BENZTROPINE MESYLATE 1 MG/1
1 TABLET ORAL AT BEDTIME
Qty: 30 TABLET | Refills: 0 | Status: SHIPPED | OUTPATIENT
Start: 2019-03-25 | End: 2019-05-01

## 2019-03-25 RX ORDER — PALIPERIDONE 6 MG/1
6 TABLET, EXTENDED RELEASE ORAL EVERY MORNING
Qty: 60 TABLET | Refills: 0 | Status: SHIPPED | OUTPATIENT
Start: 2019-03-25 | End: 2019-03-25

## 2019-03-25 RX ORDER — PALIPERIDONE 6 MG/1
12 TABLET, EXTENDED RELEASE ORAL EVERY MORNING
Qty: 60 TABLET | Refills: 0 | Status: SHIPPED | OUTPATIENT
Start: 2019-03-25 | End: 2019-05-01

## 2019-03-25 RX ORDER — FLUOXETINE 40 MG/1
80 CAPSULE ORAL DAILY
Qty: 60 CAPSULE | Refills: 1 | Status: SHIPPED | OUTPATIENT
Start: 2019-03-25 | End: 2019-06-10

## 2019-03-25 ASSESSMENT — PAIN SCALES - GENERAL: PAINLEVEL: NO PAIN (0)

## 2019-03-25 ASSESSMENT — PATIENT HEALTH QUESTIONNAIRE - PHQ9: SUM OF ALL RESPONSES TO PHQ QUESTIONS 1-9: 15

## 2019-03-25 NOTE — TELEPHONE ENCOUNTER
Writer received signed letter from provider. Letter faxed to [phone].     No further action needed by this writer

## 2019-03-25 NOTE — LETTER
March 25, 2019      TO: Cesia Nettles  [address] [facility] [address]         To Whom it may Concern,     Cesia should be taking the following medications:      - Continue Paliperidone 12 mg daily   - Continue Lamotrigine 100 mg Daily  - Continue Fluoxetine 80 mg QHS  - Continue Cogentin 1 mg QHS  - Continue Metformin 1000 mg BID  - Discontinue Hydroxyzine 25 mg 1/2 tab BID, patient hasnt been using it.       Sincerely,      Rupal Maynard MD

## 2019-03-25 NOTE — PROGRESS NOTES
[facility] PSYCHIATRY CLINIC PROGRESS NOTE     CARE TEAM:  PCP- Becki Valencia    Specialty Providers- no    Therapist- Rylee Samuels 1x/week [facility] -  [phone] (Verbal BRIONNA on file)    [facility] Team- CATHY Salvador  Guardian: Elma Flores through [employer]     Cesia Nettles is a 24 year old female who prefers the name Sudha & pronouns she, her, hers, herself. Date of initial diagnostic assessment is 07/08/2016.  Date of most recent transfer of care assessment is 12/10/18.     Pertinent Background:  This patient first experienced mental health issues in childhood and has received treatment for psychosis and suicidality.  See transfer evaluation for detailed history.  Notably, has history of AH dating back to age 9 yo.  Psychosis has also included command AH in the past.  Additionally she has had 2 SI attempts (2012 Zyrtec OD and 2014 hanging with scarf).  Previous prompts were fear of rejection.  Sudha is very close to both parents and her half siblings. She has good insight about her paranoia.. Per chart, some Cluster B traits and frequent hospitalizations prior to starting  residence and supportive employment, and doing well the past severeal years. Recently changed guardianship.      Psych critical item history includes suicide attempt [multiple], suicidal ideation, psychosis [sxs include paranoia, AH, ideas of reference], mutiple psychotropic trials and psych hosp (>5).    INTERIM HISTORY                                                                                              4, 4   The patient reports good treatment adherence.  History was provided by the patient who was a good historian.  The last visit ended with no change to the med regimen.     Since the last visit:   - reports overall stability on the current medication regimen with some days a bit depressed than others.  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 She  "enjoys her job a lot.   - Likes the  and is looking forward to a few family events coming up.   - Is wondering if she has ASD and is interested in getting testing for it.    - at times may get AH but are not bothersome to her and \"I just shove them away\"  - Denies AH/VH. Denies Paranoia. Denies SI/SIB. Denies substance use.     RECENT SYMPTOMS:   DEPRESSION:  reports-\"up and down\";  DENIES- suicidal ideation, self-destructive thoughts, depressed mood, anhedonia and low energy  PSYCHOSIS:  reports-none;  DENIES- delusions, auditory hallucinations and visual hallucinations  DYSREGULATION:  reports-none;  DENIES- suicidal ideation, violent ideation and SIB  SLEEP:  Has been sleeping more than her usual   EATING DISORDER: no     RECENT SUBSTANCE USE:     ALCOHOL- no ,   TOBACCO- no     CAFFEINE- no  OPIOIDS- no         NARCAN KIT- N/A        CANNABIS- no            OTHER ILLICIT DRUGS- none      CURRENT SOCIAL HISTORY:  FINANCIAL SUPPORT- working       CHILDREN- no      LIVING SITUATION- lives in a new group home.    SOCIAL/ SPIRITUAL SUPPORT- family      FEELS SAFE AT HOME- yes     MEDICAL ROS (2,10):  Reports A comprehensive review of systems was performed and is negative other than noted in the HPI.      Denies headaches, wt gain, tremor, akathisia, akathisia, wt gain and polyphagia     PAST PSYCH MED TRIALS   see EMR Problem List: Hx of psychiatric care    MEDICAL / SURGICAL HISTORY                                   Pregnant or breastfeeding- no      Contraception- implant    Neurologic Hx- no   Patient Active Problem List   Diagnosis     Schizoaffective disorder, depressive type (H)     Hx of psychiatric care       Major Surgery- no    ALLERGY                                Cats and Seasonal allergies  MEDICATIONS                               Current Outpatient Medications   Medication Sig Dispense Refill     Acetaminophen 325 MG CAPS Take 325-650 mg by mouth every 4 hours as needed       bacitracin ointment " Apply 1 g topically daily as needed for wound care       benztropine (COGENTIN) 1 MG tablet TAKE 1 TABLET BY MOUTH EVERY NIGHT AT BEDTIME 30 tablet 0     FLUoxetine (PROZAC) 40 MG capsule TAKE 2 CAPSULES BY MOUTH EVERY NIGHT AT BEDTIME 60 capsule 0     ketoconazole (NIZORAL) 2 % cream Apply 1 applicator topically daily as needed for itching or irritation       lamoTRIgine (LAMICTAL) 100 MG tablet TAKE 1 TABLET BY MOUTH DAILY 30 tablet 0     metFORMIN (GLUCOPHAGE) 500 MG tablet Take 2 tablets (1,000 mg) by mouth 2 times daily (with meals) 60 tablet 6     paliperidone ER (INVEGA) 6 MG 24 hr tablet TAKE 2 TABLETS BY MOUTH AT BEDTIME 60 tablet 0     Sodium Fluoride (PREVIDENT 5000 BOOSTER) 1.1 % PSTE Apply 1 Dose to affected area 2 times daily       VITALS                                                                                                                                  3, 3   /73   Pulse 118   Wt 91.6 kg (202 lb)   BMI 31.64 kg/m     MENTAL STATUS EXAM                                                                         9, 14 cog gs     Alertness: alert  and oriented  Appearance: casually groomed  Behavior/Demeanor: cooperative, pleasant and interruptive, with good  eye contact   Speech: regular rate and rhythm  Language: no problems  Psychomotor: normal or unremarkable  Mood: description consistent with euthymia, pt reports paranoia about romantic relationship  Affect: full range and appropriate; was congruent to mood; was congruent to content  Thought Process/Associations: unremarkable  Thought Content:  Reports history of suicidal ideation without plan; without intent [details in Interim History] and paranoid ideation;  Denies violent ideation and delusions  Perception:  Reports auditory hallucinations without commands [details in Interim History];  Denies visual hallucinations  Insight: good  Judgment: good  Cognition: does  appear grossly intact; formal cognitive testing was not  done    LABS and DATA     AIMS:  not needed     PHQ9 TODAY = 10  PHQ-9 SCORE 12/11/2017 5/9/2018 12/10/2018   PHQ-9 Total Score - - -   PHQ-9 Total Score - - -   PHQ-9 Total Score 16 10 13     ANTIPSYCHOTIC LABS  [glu, A1C, lipids (jenny LDL), liver enzymes, WBC, ANEU, Hgb, plts]  q12 mo  Recent Labs   Lab Test 02/02/17  0916 09/16/14  0947 12/21/13  1016   GLC 87 69* 70  70   A1C 4.9 5.2  --      Recent Labs   Lab Test 12/10/18  0912 02/02/17  0916 12/15/15 09/16/14  0947   CHOL 205* 186 188 205*   TRIG 201* 195* 210* 265*   * 109* 109 115   HDL 45* 38* 37* 37*     Recent Labs   Lab Test 02/02/17  0916 12/21/13  1016   AST 24 33   ALT 23 18   ALKPHOS 102 107  107     Recent Labs   Lab Test 12/10/18  0912 02/02/17  0916 09/16/14  0947 12/21/13  1016   WBC 6.4 6.8 6.9 6.1  6.1   ANEU 3.3  --  3.9 3.9  3.9   HGB 11.7 12.3 12.8 12.5  12.5    274 317 310  310     DIAGNOSIS     Schizoaffective Disorder, Depressed type, in remission (mood and psychosis)  Unspecified Anxiety Disorder    ASSESSMENT                                                                                                   m2, h3     TODAY: Sudha reports overall stability on the current medication regimen.  She has been tolerating the medications with no reported side effects.  She denies symptoms concerning for worsening mood or psychosis.  Continues to endorse symptoms of anxiety. Has recently changed guardianship. Her mother, Savanna, sent an e-mail requesting re-evaluation for current presentation. Patient is a good candidate for neuropsychological testing. She needs updated neuroleptic blood work. Discussed these recommendations with guardian who agreed to it.  Ongoing individual therapy was recommended which patient was agreeable to. No indication for medication changes today. There did not appear to be current/imminent safety concerns - see below.    Future consideration:  - simplify medication regimen. May discontinue cogentin if  patient is tolerating medications with no reported side effects    SUICIDE RISK ASSESSMENT [details described above]: Cesia Nettles reported previous SA X3, most recently 10/2017 tying a scarf around her neck while having SI (prior to that was in 2014). No current SI.  In addition, she has notable risk factors for self-harm including similar prompt as seen w/previous self-harm, relationship conflict and previous suicide attempts.  However, risk is mitigated by no suicidal plan or intent, describes a safety plan, h/o seeking help when needed, symptom improvement, future oriented, feeling hopeful, none to minimal alcohol use, commitment to family, good social support and stable housing.  Based on all available evidence she does not appear to be at imminent risk for self-harm therefore does not meet criteria for a 72-hr hold/involuntary hospitalization.  However, based on degree of symptoms DBT and close psych FU was recommended which the pt did agree to.  Additional steps to minimize risk include: SAFETY PLAN completed 07/26/2017 and ID previous SI triggers     [place] PRESCRIPTION MONITORING PROGRAM [] was not checked today:  not using controlled substances.     PSYCHOTROPIC DRUG INTERACTIONS:   Fluoxetine and Hydrozyzine  concurrent use may increase risk of QTc prolongation.   Fluoxetine and Paliperidone concurrent use may increase QTc prolongation  Paliperidone and Hydroxyzine concurrent use may increase Qt prolongation  MANAGEMENT:  Monitoring for adverse effects, routine vitals, routine labs and periodic EKGs     PLAN                                                                                                              m2, h3     1) PSYCHOTROPIC MEDICATIONS:  - Continue Paliperidone 12 mg at bedtime   - Continue Lamotrigine 100 mg Daily  - Continue Fluoxetine 80 mg QHS  - Continue Cogentin 1 mg QHS  - Continue Metformin 1000 mg BID  - Discontinue Hydroxyzine 25 mg 1/2 tab BID, patient hasnt been using it.       2) THERAPY:  Continue individual therapy      3) NEXT DUE:    Labs- Neuroleptic labs ordered   EKG- ordered   Rating Scales- AIMS as needed     4) REFERRALS:    none     5) RTC: 8 weeks     SAFETY PLAN reviewed: Yes     6) CRISIS NUMBERS:   Provided routinely in AVS.  Especially emphasized:  [facility] [phone] (clinic)    [phone] (after hours)  ONLY if a [facility]: [facility] [phone] (clinic), [phone] (after hours)      TREATMENT RISK STATEMENT:  The risks, benefits, alternatives and potential adverse effects have been discussed and are understood by the pt. The pt understands the risks of using street drugs or alcohol. There are no medical contraindications, the pt agrees to treatment with the ability to do so. The pt knows to call the clinic for any problems or to access emergency care if needed.  Medical and substance use concerns are documented above.  Psychotropic drug interaction check was done, including changes made today.    PROVIDER: Rupal Maynard MD    Patient staffed in clinic with Dr. Schofield who will sign the note.  Supervisor is Dr. Rosales.    Supervisor Attestation:  I met with Cesia Nettles along with the resident physician, Rupal Maynard MD. I participated in key portions of the service, including the mental status examination and developing the plan of care. I reviewed key portions of the history with the resident. I agree with the findings and plan as documented in this note.  Elvis Schofield MD

## 2019-03-25 NOTE — TELEPHONE ENCOUNTER
Patient Call      Rupal Maynard MD   You 12 minutes ago (1:39 PM)      Yes, OK to make that change. Thank you for attending to this!     Rupal    Routing Comment        You   Rupal Maynard MD 1 hour ago (12:06 PM)      Med Maynard,     Is it ok to change Metformin 500 mg tab - take 2 tabs BID to #120 ( 1 month supply)? Currently its ordered as #60.     Thanks,   Deepti HAHN     Routing Comment        Rupal Maynard MD   You 2 hours ago (11:43 AM)      Vicente Tatum,     Thank you for forwarding this. There was an error when I entered the medicines. I fixed it now and it should be in sync with what is in the note.     Thank you!   Rupal    Routing Comment      Writer placed a call to Yulisa at [facility]. Relayed the changes were completed by provider except for metformin. Writer asked Yulisa to change Metformin 500 mg tab- take 2 BID to #120 with 6 additional refills. Yulisa agreed with the plan.     No further action needed by this writer.

## 2019-03-25 NOTE — NURSING NOTE
Chief Complaint   Patient presents with     Recheck Medication     Schizoaffective disorder, depressive type (H)

## 2019-03-25 NOTE — TELEPHONE ENCOUNTER
Writer received incoming call from Yulisa from [facility]. Wojciech would like clarification on few medications prescribed by provider today.     1. Per Med tab:     paliperidone ER (INVEGA) 6 MG 24 hr tablet 60 tablet 0 3/25/2019  No   Sig - Route: Take 1 tablet (6 mg) by mouth every morning - Oral   Sent to pharmacy as: paliperidone ER (INVEGA) 6 MG 24 hr tablet   Class: E-Prescribe   Order: [id number]   E-Prescribing Status: Receipt confirmed by pharmacy (3/25/2019  9:15 AM CDT)     Per last office visit note: Continue Paliperidone 12 mg at bedtime (Need direction change)     2. Per Med tab:    metFORMIN (GLUCOPHAGE) 500 MG tablet 60 tablet 6 3/25/2019  No   Sig - Route: Take 2 tablets (1,000 mg) by mouth 2 times daily (with meals) - Oral   Sent to pharmacy as: metFORMIN (GLUCOPHAGE) 500 MG tablet   Class: E-Prescribe   Order: [id number]   E-Prescribing Status: Receipt confirmed by pharmacy (3/25/2019  9:15 AM CDT)     Per last office visit note: Continue Metformin 1000 mg BID (Need quantity change)      3. Per Med tab:     FLUoxetine (PROZAC) 40 MG capsule 60 capsule 0 3/25/2019  No   Sig - Route: Take 1 capsule (40 mg) by mouth daily - Oral   Sent to pharmacy as: FLUoxetine (PROZAC) 40 MG capsule   Class: E-Prescribe   Order: [id number]   E-Prescribing Status: Receipt confirmed by pharmacy (3/25/2019  9:15 AM CDT)     Per last office visit note: Continue Fluoxetine 80 mg at bedtime (Need direction change)     Will route to provider to review the current doses for the above medications due to office visit unsigned.

## 2019-03-25 NOTE — PATIENT INSTRUCTIONS
Thank you for coming to the PSYCHIATRY CLINIC.    Lab Testing:  If you had lab testing today and your results are reassuring or normal they will be mailed to you or sent through [employer] within 7 days.   If the lab tests need quick action we will call you with the results.  The phone number we will call with results is # none (home) . If this is not the best number please call our clinic and change the number.    Medication Refills:  If you need any refills please call your pharmacy and they will contact us. Our fax number for refills is [phone]. Please allow three business for refill processing.   If you need to  your refill at a new pharmacy, please contact the new pharmacy directly. The new pharmacy will help you get your medications transferred.     Scheduling:  If you have any concerns about today's visit or wish to schedule another appointment please call our office during normal business hours [phone] (8-5:00 M-F)    Contact Us:  Please call [phone] during business hours (8-5:00 M-F).  If after clinic hours, or on the weekend, please call  [phone].    Financial Assistance [phone]  [employer] Billing [phone]  [facility] Billing [phone]  Medical Records [phone]      MENTAL HEALTH CRISIS NUMBERS:  [facility]:   [facility] - [phone]   Crisis Residence [facility] Residence - [phone]   Walk-In Counseling [facility] [phone]   [facility] 24/7 [employer] Mobile Team for Adults - [[phone]]; Child - [[phone]]        [facility]:   [facility] - [phone]   Walk-in counseling [facility] - [phone]   Walk-in counseling [facility] - [phone]   Crisis Residence [facility] Residence - [phone]   Urgent Care Adult Mental Health:   --Drop-in, 24/7 crisis line, and [facility] Mobile Team [[phone]]    CRISIS TEXT LINE: Text [phone] from anywhere, anytime, any  crisis 24/7;    OR SEE [email or web address]     Poison Control Center - [phone]    CHILD: [facility] needs assessment team - [phone]     [facility] - [phone]; or [facility] - [phone]    If you have a medical emergency please call 911or go to the nearest ER.                    _____________________________________________    Again thank you for choosing PSYCHIATRY CLINIC and please let us know how we can best partner with you to improve you and your family's health.  You may be receiving a survey in the mail regarding this appointment. We would love to have your feedback, both positive and negative, so please fill out the survey and return it using the provided envelope. The survey is done by an external company, so your answers are anonymous.

## 2019-03-25 NOTE — TELEPHONE ENCOUNTER
Writer received incoming call from [employer] [phone]. Nurse Osborn requesting a list of patients medications to be faxed to [phone]. Writer agreed with the plan.     Writer drafted a letter and placed in providers folder to review and sign.     Routed to provider as FYCASSI

## 2019-04-05 ENCOUNTER — DOCUMENTATION ONLY (OUTPATIENT)
Dept: OTHER | Facility: CLINIC | Age: 24
End: 2019-04-05

## 2019-04-10 ENCOUNTER — TELEPHONE (OUTPATIENT)
Dept: PSYCHIATRY | Facility: CLINIC | Age: 24
End: 2019-04-10

## 2019-04-10 NOTE — TELEPHONE ENCOUNTER
On 4/9/2019, 38 pages of records were received from [facility]. The original copies were put in Dr. Maynard's folder. A note was routed to forward records to scanning.Shonna Sevilla LPN

## 2019-05-01 DIAGNOSIS — F25.1 SCHIZOAFFECTIVE DISORDER, DEPRESSIVE TYPE (H): ICD-10-CM

## 2019-05-01 DIAGNOSIS — F20.3 SCHIZOPHRENIA, UNDIFFERENTIATED (H): ICD-10-CM

## 2019-05-01 DIAGNOSIS — R45.851 SUICIDAL IDEATION: ICD-10-CM

## 2019-05-02 RX ORDER — BENZTROPINE MESYLATE 1 MG/1
1 TABLET ORAL AT BEDTIME
Qty: 40 TABLET | Refills: 0 | Status: SHIPPED | OUTPATIENT
Start: 2019-05-02 | End: 2019-06-10

## 2019-05-02 RX ORDER — PALIPERIDONE 6 MG/1
12 TABLET, EXTENDED RELEASE ORAL AT BEDTIME
Qty: 80 TABLET | Refills: 0 | Status: SHIPPED | OUTPATIENT
Start: 2019-05-02 | End: 2019-06-10

## 2019-05-02 RX ORDER — LAMOTRIGINE 100 MG/1
100 TABLET ORAL DAILY
Qty: 40 TABLET | Refills: 0 | Status: SHIPPED | OUTPATIENT
Start: 2019-05-02 | End: 2019-06-10

## 2019-05-02 NOTE — TELEPHONE ENCOUNTER
Medication requested:   COGENTIN 1 MG TAB  LAMICTAL 100 MG TAB  INVEGA 6 MG TAB    Last refilled: 4/1/19  Qty:  30  30  60        Last seen: 3/25/19  RTC: 8 WEEKS  Cancel: 0  No-show: 0  Next appt: 6/10/19    Refill decision:   Refilled for 30 days per protocol.

## 2019-06-10 ENCOUNTER — OFFICE VISIT (OUTPATIENT)
Dept: PSYCHIATRY | Facility: CLINIC | Age: 24
End: 2019-06-10
Attending: PSYCHIATRY & NEUROLOGY
Payer: COMMERCIAL

## 2019-06-10 VITALS
HEART RATE: 96 BPM | BODY MASS INDEX: 32.17 KG/M2 | DIASTOLIC BLOOD PRESSURE: 70 MMHG | SYSTOLIC BLOOD PRESSURE: 102 MMHG | WEIGHT: 205.4 LBS

## 2019-06-10 DIAGNOSIS — F25.1 SCHIZOAFFECTIVE DISORDER, DEPRESSIVE TYPE (H): ICD-10-CM

## 2019-06-10 DIAGNOSIS — Z79.899 ENCOUNTER FOR LONG-TERM (CURRENT) USE OF MEDICATIONS: ICD-10-CM

## 2019-06-10 DIAGNOSIS — F20.3 SCHIZOPHRENIA, UNDIFFERENTIATED (H): ICD-10-CM

## 2019-06-10 DIAGNOSIS — F41.9 ANXIETY: Primary | ICD-10-CM

## 2019-06-10 DIAGNOSIS — R45.851 SUICIDAL IDEATION: ICD-10-CM

## 2019-06-10 PROCEDURE — 93010 ELECTROCARDIOGRAM REPORT: CPT | Performed by: INTERNAL MEDICINE

## 2019-06-10 PROCEDURE — G0463 HOSPITAL OUTPT CLINIC VISIT: HCPCS | Mod: ZF

## 2019-06-10 PROCEDURE — 93005 ELECTROCARDIOGRAM TRACING: CPT | Mod: ZF | Performed by: PSYCHIATRY & NEUROLOGY

## 2019-06-10 RX ORDER — BENZTROPINE MESYLATE 0.5 MG/1
0.5 TABLET ORAL AT BEDTIME
Qty: 30 TABLET | Refills: 0 | Status: SHIPPED | OUTPATIENT
Start: 2019-06-10 | End: 2019-07-31

## 2019-06-10 RX ORDER — PALIPERIDONE 6 MG/1
12 TABLET, EXTENDED RELEASE ORAL AT BEDTIME
Qty: 80 TABLET | Refills: 1 | Status: SHIPPED | OUTPATIENT
Start: 2019-06-10 | End: 2019-07-29

## 2019-06-10 RX ORDER — FLUOXETINE 40 MG/1
80 CAPSULE ORAL DAILY
Qty: 60 CAPSULE | Refills: 1 | Status: SHIPPED | OUTPATIENT
Start: 2019-06-10 | End: 2019-07-29

## 2019-06-10 RX ORDER — HYDROXYZINE HYDROCHLORIDE 25 MG/1
12.5-25 TABLET, FILM COATED ORAL EVERY 4 HOURS PRN
Qty: 60 TABLET | Refills: 1 | Status: SHIPPED | OUTPATIENT
Start: 2019-06-10 | End: 2019-07-29

## 2019-06-10 RX ORDER — LAMOTRIGINE 100 MG/1
100 TABLET ORAL DAILY
Qty: 40 TABLET | Refills: 1 | Status: SHIPPED | OUTPATIENT
Start: 2019-06-10 | End: 2019-07-29

## 2019-06-10 ASSESSMENT — PATIENT HEALTH QUESTIONNAIRE - PHQ9: SUM OF ALL RESPONSES TO PHQ QUESTIONS 1-9: 15

## 2019-06-10 ASSESSMENT — PAIN SCALES - GENERAL: PAINLEVEL: NO PAIN (0)

## 2019-06-10 NOTE — PROGRESS NOTES
[facility] PSYCHIATRY CLINIC PROGRESS NOTE     CARE TEAM:  PCP- Becki Valencia    Specialty Providers- no    Therapist- Rylee Samuels 1x/week [facility] -  [phone] (Verbal BRIONNA on file)    [facility] Team- CATHY Salvador  Guardian: Elma Flores through [employer]     Cesia Nettles is a 24 year old female who prefers the name Sudha & pronouns she, her, hers, herself. Date of initial diagnostic assessment is 07/08/2016.  Date of most recent transfer of care assessment is 12/10/18.     Pertinent Background:  This patient first experienced mental health issues in childhood and has received treatment for psychosis and suicidality.  See transfer evaluation for detailed history.  Notably, has history of AH dating back to age 7 yo.  Psychosis has also included command AH in the past.  Additionally she has had 2 SI attempts (2012 Zyrtec OD and 2014 hanging with scarf).  Previous prompts were fear of rejection.  Sudha is very close to both parents and her half siblings. She has good insight about her paranoia.. Per chart, some Cluster B traits and frequent hospitalizations prior to starting  residence and supportive employment, and doing well the past severeal years. Recently changed guardianship, guardian is for legal changes.      Psych critical item history includes suicide attempt [multiple], suicidal ideation, psychosis [sxs include paranoia, AH, ideas of reference], mutiple psychotropic trials and psych hosp (>5).    INTERIM HISTORY                                                                                              4, 4   The patient reports good treatment adherence.  History was provided by the patient who was a good historian.  The last visit ended with no change to the med regimen.   Since the last visit:   - reports overall stability on the current medication regimen.   - had a tough day last week where she felt depressed at work due to some people at work who  "are mean to her. She was able to report this to staff in her group home and felt supported by them.   - she recently got to spend time with her grandpa which she really enjoyed. She had a good time with her family lately.  - she feels safe in the current group home and reportedly \"really likes living there\"  - finds the hydroxyzine helpful with anxiety which \"comes and goes\"  - Denies AH/VH. Denies Paranoia. Denies SI/SIB. Denies substance use.     RECENT SYMPTOMS:   DEPRESSION:  reports-\"up and down\";  DENIES- suicidal ideation, self-destructive thoughts, depressed mood, anhedonia and low energy  PSYCHOSIS:  reports-none;  DENIES- delusions, auditory hallucinations and visual hallucinations  DYSREGULATION:  reports-none;  DENIES- suicidal ideation, violent ideation and SIB  SLEEP:  Has been sleeping more than her usual   EATING DISORDER: no     RECENT SUBSTANCE USE:     ALCOHOL- no ,   TOBACCO- no     CAFFEINE- no  OPIOIDS- no         NARCAN KIT- N/A        CANNABIS- no            OTHER ILLICIT DRUGS- none      CURRENT SOCIAL HISTORY:  FINANCIAL SUPPORT- working       CHILDREN- no      LIVING SITUATION- lives in a new group home.    SOCIAL/ SPIRITUAL SUPPORT- family      FEELS SAFE AT HOME- yes     MEDICAL ROS (2,10):  Reports A comprehensive review of systems was performed and is negative other than noted in the HPI.      Denies headaches, wt gain, tremor, akathisia, akathisia, wt gain and polyphagia     PAST PSYCH MED TRIALS   see EMR Problem List: Hx of psychiatric care    MEDICAL / SURGICAL HISTORY                                   Pregnant or breastfeeding- no      Contraception- implant    Neurologic Hx- no   Patient Active Problem List   Diagnosis     Schizoaffective disorder, depressive type (H)     Hx of psychiatric care       Major Surgery- no    ALLERGY                                Cats and Seasonal allergies  MEDICATIONS                               Current Outpatient Medications   Medication Sig " Dispense Refill     Acetaminophen 325 MG CAPS Take 325-650 mg by mouth every 4 hours as needed       bacitracin ointment Apply 1 g topically daily as needed for wound care       benztropine (COGENTIN) 1 MG tablet Take 1 tablet (1 mg) by mouth At Bedtime 40 tablet 0     etonogestrel (IMPLANON/NEXPLANON) 68 MG IMPL 1 each by Subdermal route once       FLUoxetine (PROZAC) 40 MG capsule Take 2 capsules (80 mg) by mouth daily 60 capsule 1     lamoTRIgine (LAMICTAL) 100 MG tablet Take 1 tablet (100 mg) by mouth daily 40 tablet 0     metFORMIN (GLUCOPHAGE) 500 MG tablet Take 2 tablets (1,000 mg) by mouth 2 times daily (with meals) 60 tablet 6     paliperidone ER (INVEGA) 6 MG 24 hr tablet Take 2 tablets (12 mg) by mouth At Bedtime 80 tablet 0     ketoconazole (NIZORAL) 2 % cream Apply 1 applicator topically daily as needed for itching or irritation       Sodium Fluoride (PREVIDENT 5000 BOOSTER) 1.1 % PSTE Apply 1 Dose to affected area 2 times daily       VITALS                                                                                                                                  3, 3   Wt 93.2 kg (205 lb 6.4 oz)   BMI 32.17 kg/m   102/70, 96bpm  MENTAL STATUS EXAM                                                                         9, 14 cog gs     Alertness: alert  and oriented  Appearance: casually groomed  Behavior/Demeanor: cooperative, pleasant and interruptive, with good  eye contact   Speech: regular rate and rhythm  Language: no problems  Psychomotor: normal or unremarkable  Mood: description consistent with euthymia  Affect: full range and appropriate; was congruent to mood; was congruent to content  Thought Process/Associations: unremarkable  Thought Content:  Reports history of suicidal ideation without plan; without intent [details in Interim History] and paranoid ideation; pt reports paranoia about romantic     relationship  Denies violent ideation and delusions  Perception:  Reports auditory  "hallucinations without commands [details in Interim History];  Denies visual hallucinations  Insight: good  Judgment: good  Cognition: does  appear grossly intact; formal cognitive testing was not done    LABS and DATA     AIMS:  not needed     PHQ9 TODAY = 15  PHQ-9 SCORE 5/9/2018 12/10/2018 3/25/2019   PHQ-9 Total Score - - -   PHQ-9 Total Score - - -   PHQ-9 Total Score 10 13 15     ANTIPSYCHOTIC LABS  [glu, A1C, lipids (jenny LDL), liver enzymes, WBC, ANEU, Hgb, plts]  q12 mo  Recent Labs   Lab Test 02/02/17  0916 09/16/14  0947 12/21/13  1016   GLC 87 69* 70  70   A1C 4.9 5.2  --      Recent Labs   Lab Test 12/10/18  0912 02/02/17  0916 12/15/15 09/16/14  0947   CHOL 205* 186 188 205*   TRIG 201* 195* 210* 265*   * 109* 109 115   HDL 45* 38* 37* 37*     Recent Labs   Lab Test 02/02/17  0916 12/21/13  1016   AST 24 33   ALT 23 18   ALKPHOS 102 107  107     Recent Labs   Lab Test 12/10/18  0912 02/02/17  0916 09/16/14  0947 12/21/13  1016   WBC 6.4 6.8 6.9 6.1  6.1   ANEU 3.3  --  3.9 3.9  3.9   HGB 11.7 12.3 12.8 12.5  12.5    274 317 310  310     DIAGNOSIS     Schizoaffective Disorder, Depressed type, in remission (mood and psychosis)  Unspecified Anxiety Disorder    ASSESSMENT                                                                                                   m2, h3     TODAY: Sudha reports overall stability on the current medication regimen.  She has been tolerating the medications with no reported side effects.  She denies symptoms concerning for worsening mood or psychosis. Discussed medication regimen and agreed to re-start hydroxyzine for anxiety. Discussed current use of Cogentin that was initially prescribed \"for many years\" for RLS. Patient no longer has RLS symptoms. Agreed to gradually discontinue Cogentin to simplify medication regimen and assess how she does without it. Patient educated on possible side effect of sedation when combined with hydroxyzine. Writer called " patient's guardian and left detailed voice message. There did not appear to be current/imminent safety concerns - see below. Follow up in 2-3 months with new provider.     SUICIDE RISK ASSESSMENT [details described above]: Cesia Nettles reported previous SA X3, most recently 10/2017 tying a scarf around her neck while having SI (prior to that was in 2014). No current SI.  In addition, she has notable risk factors for self-harm including similar prompt as seen w/previous self-harm, relationship conflict and previous suicide attempts.  However, risk is mitigated by no suicidal plan or intent, describes a safety plan, h/o seeking help when needed, symptom improvement, future oriented, feeling hopeful, none to minimal alcohol use, commitment to family, good social support and stable housing.  Based on all available evidence she does not appear to be at imminent risk for self-harm therefore does not meet criteria for a 72-hr hold/involuntary hospitalization.  However, based on degree of symptoms DBT and close psych FU was recommended which the pt did agree to.  Additional steps to minimize risk include: SAFETY PLAN completed 07/26/2017 and ID previous SI triggers     [place] PRESCRIPTION MONITORING PROGRAM [] was not checked today:  not using controlled substances.     PSYCHOTROPIC DRUG INTERACTIONS:   Fluoxetine and Hydrozyzine  concurrent use may increase risk of QTc prolongation.   Fluoxetine and Paliperidone concurrent use may increase QTc prolongation  Paliperidone and Hydroxyzine concurrent use may increase Qt prolongation  MANAGEMENT:  Monitoring for adverse effects, routine vitals, routine labs and periodic EKGs     PLAN                                                                                                              m2, h3     1) PSYCHOTROPIC MEDICATIONS:  - Continue Paliperidone 12 mg at bedtime   - Continue Lamotrigine 100 mg Daily  - Continue Fluoxetine 80 mg QHS  - Decrease Cogentin to 0.5mg at  bedtime for a month then discontinue   - Continue Metformin 1000 mg BID  - Restart Hydroxyzine 12.5-25 mg QID PRN for anxiety     2) THERAPY:  Continue individual therapy      3) NEXT DUE:    Labs- Neuroleptic labs ordered, to be obtained, NMDA receptor antibody ordered per patient's mom's request   EKG- ordered   Rating Scales- AIMS as needed     4) REFERRALS:    none     5) RTC: 8 weeks     SAFETY PLAN reviewed: Yes     6) CRISIS NUMBERS:   Provided routinely in AVS.  Especially emphasized:  [facility] [place] [phone] (clinic)    [phone] (after hours)  ONLY if a [facility]: [facility] [place] [phone] (clinic), [phone] (after hours)      TREATMENT RISK STATEMENT:  The risks, benefits, alternatives and potential adverse effects have been discussed and are understood by the pt. The pt understands the risks of using street drugs or alcohol. There are no medical contraindications, the pt agrees to treatment with the ability to do so. The pt knows to call the clinic for any problems or to access emergency care if needed.  Medical and substance use concerns are documented above.  Psychotropic drug interaction check was done, including changes made today.    PROVIDER: Rupal Maynard MD    Patient staffed in clinic with Dr. Schofield who will sign the note.  Supervisor is Dr. Rosales.    Supervisor Attestation:  I met with Cesia Nettles along with the resident physician, Rupal Maynard MD. I participated in key portions of the service, including the mental status examination and developing the plan of care. I reviewed key portions of the history with the resident. I agree with the findings and plan as documented in this note.  Elvis Schofield MD

## 2019-06-10 NOTE — PATIENT INSTRUCTIONS
Re-start Hydroxyzine  Discontinue Cogentin, take 0.5mg for a month then discontinue   Continue individual therapy   Follow up in 2-3 months     Thank you for coming to the PSYCHIATRY CLINIC.    Lab Testing:  If you had lab testing today and your results are reassuring or normal they will be mailed to you or sent through [employer] within 7 days.   If the lab tests need quick action we will call you with the results.  The phone number we will call with results is # [phone] (home) . If this is not the best number please call our clinic and change the number.    Medication Refills:  If you need any refills please call your pharmacy and they will contact us. Our fax number for refills is [phone]. Please allow three business for refill processing.   If you need to  your refill at a new pharmacy, please contact the new pharmacy directly. The new pharmacy will help you get your medications transferred.     Scheduling:  If you have any concerns about today's visit or wish to schedule another appointment please call our office during normal business hours [phone] (8-5:00 M-F)    Contact Us:  Please call [phone] during business hours (8-5:00 M-F).  If after clinic hours, or on the weekend, please call  [phone].    Financial Assistance [phone]  [facility] Billing [phone]  [place] Billing [phone]  Medical Records [phone]      MENTAL HEALTH CRISIS NUMBERS:  [facility]:   [facility] - [phone]   Crisis Residence [facility] - [phone]   Walk-In Counseling [facility] [phone]   COPE 24/7 [place] Mobile Team for Adults - [[phone]]; Child - [[phone]]        [facility]:   [facility] - [phone]   Walk-in counseling [facility] - [phone]   Walk-in counseling [facility] - [phone]   Crisis Residence [facility] - [phone]   Urgent Care  Adult Mental Health:   --Drop-in, 24/7 crisis line, and [facility] Mobile Team [[phone]]    CRISIS TEXT LINE: Text [phone] from anywhere, anytime, any crisis 24/7;    OR SEE www.crisistextline.org     Poison Control Center - [phone]    CHILD: [facility] needs assessment team - [phone]     [facility] Lifeline - [phone]; or [facility] Lif[facility] - [phone]    If you have a medical emergency please call 911or go to the nearest ER.                    _____________________________________________    Again thank you for choosing PSYCHIATRY CLINIC and please let us know how we can best partner with you to improve you and your family's health.  You may be receiving a survey in the mail regarding this appointment. We would love to have your feedback, both positive and negative, so please fill out the survey and return it using the provided envelope. The survey is done by an external company, so your answers are anonymous.

## 2019-06-11 LAB — INTERPRETATION ECG - MUSE: NORMAL

## 2019-06-12 ENCOUNTER — DOCUMENTATION ONLY (OUTPATIENT)
Dept: PSYCHIATRY | Facility: CLINIC | Age: 24
End: 2019-06-12

## 2019-06-12 NOTE — PROGRESS NOTES
Writer called patient and left detailed voice message sharing the results of the recent EKG and strongly recommending the patient to follow up with her PCP to address concern for arrhythmia.    Rupal Maynard MD  PGY 3 Resident

## 2019-06-25 ENCOUNTER — TELEPHONE (OUTPATIENT)
Dept: PSYCHIATRY | Facility: CLINIC | Age: 24
End: 2019-06-25

## 2019-06-25 NOTE — TELEPHONE ENCOUNTER
UPDATE: completed forms signed by Dr. Lanier and faxed to [employer] at [phone]. The original copies were sent to scanning and copies are held in Psych until scanning is complete.Shonna Sevilla LPN    On 6/20/2019, 2 faxed pages was received from [employer] - Request for MD Orders/Changes, requesting Hydroxyzine 25 mg to once daily at bedtime, per patient's request from PRN. Dr Maynard is no longer in Clinic so writer will have Dr. Lanier sign forms.Shonna Sevilla LPN

## 2019-07-09 ENCOUNTER — TELEPHONE (OUTPATIENT)
Dept: PSYCHIATRY | Facility: CLINIC | Age: 24
End: 2019-07-09

## 2019-07-09 NOTE — TELEPHONE ENCOUNTER
----- Message from Kristal Mendoza sent at 7/9/2019 11:34 AM CDT -----  Contact: [phone]  [facility], Tania LAWRENCE or Roni Malik [phone]     Tania called looking to get pt set up for a new DA. Please call him to help coordinate care.

## 2019-07-11 NOTE — TELEPHONE ENCOUNTER
Writer called Pat, who identified that they don't have a deadline for the DA, but were hoping to have it done earlier than the already-scheduled 8/19 appointment. Pat agreed to 7/29 @ 8AM.  Writer routed message to scheduling to convert held time and cancel 8/19 appointment.

## 2019-07-12 ENCOUNTER — DOCUMENTATION ONLY (OUTPATIENT)
Dept: OTHER | Facility: CLINIC | Age: 24
End: 2019-07-12

## 2019-07-18 NOTE — TELEPHONE ENCOUNTER
Mabel Ramirez. I just re-faxed them, called to verify receipt and they told me that they DID receive the original fax I sent on 6/25/2019

## 2019-07-18 NOTE — TELEPHONE ENCOUNTER
Hong with [employer] contacted us back stating they never received the fax for medication change on 06/25/19. [facility] is requesting we redo th fax to the residence at [phone], and to [facility] at [phone]. [facility] call back number is [phone].

## 2019-07-29 ENCOUNTER — TELEPHONE (OUTPATIENT)
Dept: PSYCHIATRY | Facility: CLINIC | Age: 24
End: 2019-07-29

## 2019-07-29 ENCOUNTER — OFFICE VISIT (OUTPATIENT)
Dept: PSYCHIATRY | Facility: CLINIC | Age: 24
End: 2019-07-29
Attending: PSYCHIATRY & NEUROLOGY
Payer: COMMERCIAL

## 2019-07-29 VITALS
SYSTOLIC BLOOD PRESSURE: 102 MMHG | DIASTOLIC BLOOD PRESSURE: 70 MMHG | HEART RATE: 94 BPM | BODY MASS INDEX: 32.55 KG/M2 | WEIGHT: 207.8 LBS

## 2019-07-29 DIAGNOSIS — F41.9 ANXIETY: ICD-10-CM

## 2019-07-29 DIAGNOSIS — F25.1 SCHIZOAFFECTIVE DISORDER, DEPRESSIVE TYPE (H): Primary | ICD-10-CM

## 2019-07-29 PROCEDURE — G0463 HOSPITAL OUTPT CLINIC VISIT: HCPCS | Mod: ZF

## 2019-07-29 RX ORDER — HYDROXYZINE HYDROCHLORIDE 25 MG/1
25 TABLET, FILM COATED ORAL AT BEDTIME
Qty: 60 TABLET | Refills: 1 | COMMUNITY
Start: 2019-07-29 | End: 2019-10-29

## 2019-07-29 ASSESSMENT — PAIN SCALES - GENERAL: PAINLEVEL: NO PAIN (0)

## 2019-07-29 ASSESSMENT — PATIENT HEALTH QUESTIONNAIRE - PHQ9: SUM OF ALL RESPONSES TO PHQ QUESTIONS 1-9: 16

## 2019-07-29 NOTE — PATIENT INSTRUCTIONS
1. Start melatonin 1mg 1-2 hours prior to bedtime; if you experience too much sedation you may reduce dose to 0.5mg; if 1mg is not effective you may increase to 3mg    2. Work on improving your diet with more lean proteins (chicken, fish) and vegetables    3. Work on increasing physical activity - try taking 1 extra walk per week and increase from there    4. Try to go to sleep earlier in the night - limit cell phone/computer use late at night     5. Return to clinic in 6-8 weeks    Thank you for coming to the PSYCHIATRY CLINIC.    Lab Testing:  If you had lab testing today and your results are reassuring or normal they will be mailed to you or sent through [employer] within 7 days.   If the lab tests need quick action we will call you with the results.  The phone number we will call with results is # [phone] (home) . If this is not the best number please call our clinic and change the number.    Medication Refills:  If you need any refills please call your pharmacy and they will contact us. Our fax number for refills is [phone]. Please allow three business for refill processing.   If you need to  your refill at a new pharmacy, please contact the new pharmacy directly. The new pharmacy will help you get your medications transferred.     Scheduling:  If you have any concerns about today's visit or wish to schedule another appointment please call our office during normal business hours [phone] (8-5:00 M-F)    Contact Us:  Please call [phone] during business hours (8-5:00 M-F).  If after clinic hours, or on the weekend, please call  [phone].    Financial Assistance [phone]  [facility] Billing [phone]  Central Billing Office, [facility]: [phone]  [place] Billing [phone]  Medical Records [phone]      MENTAL HEALTH CRISIS NUMBERS:  [facility]:   [facility] - [phone]   Crisis Residence [facility] - [phone]   Walk-In  Counseling Center \[facility]\"" - [phone]   COPE 24/7 [place] Mobile Team for Adults - [[phone]]; Child - [[phone]]        [facility] - [phone]   Walk-in counseling [facility] - [phone]   Walk-in counseling [facility] - [phone]   Crisis Residence [facility] - [phone]   Urgent Care Adult Mental Health:   --Drop-in, 24/7 crisis line, and [facility] Mobile Team [[phone]]    CRISIS TEXT LINE: Text [phone] from anywhere, anytime, any crisis 24/7;    OR SEE www.crisistextline.org     Poison Control Center - [phone]    CHILD: [facility] needs assessment team - [phone]     [facility] Lifeline - [phone]; or [facility] Lifeline - [phone]    If you have a medical emergency please call 911or go to the nearest ER.                    _____________________________________________    Again thank you for choosing PSYCHIATRY CLINIC and please let us know how we can best partner with you to improve you and your family's health.  You may be receiving a survey in the mail regarding this appointment. We would love to have your feedback, both positive and negative, so please fill out the survey and return it using the provided envelope. The survey is done by an external company, so your answers are anonymous.

## 2019-07-29 NOTE — TELEPHONE ENCOUNTER
"On 7/29/2019, two faxes were received from [employer].  1. Five pages of \"Medication Administration\" and 2. Two pages described as \"Medical Referral Form\". The faxes were received by rooming staff at 10:00am (two hours after the patient's appointment with Yudy Chery).  This writer 1. put the records in Dr. Yudy Chery's folder 2. Routed this note to Dr Vik Jimenes please ask the  to send these faxes to scanning after you have reviewed them.  I have held a copy of the faxes in Psychiatry until scanning is complete/confirmed.Elizabeth Velasquez/GABI    "
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
## 2019-08-28 ENCOUNTER — TELEPHONE (OUTPATIENT)
Dept: PSYCHIATRY | Facility: CLINIC | Age: 24
End: 2019-08-28

## 2019-08-28 NOTE — TELEPHONE ENCOUNTER
received an OT Evaluation and Treatment from [facility].  placed the original in Dr. Chery's folder and a copy was held at [employer] desk until completed form returned. A message was routed to Dr. Chery.

## 2019-08-29 ENCOUNTER — MEDICAL CORRESPONDENCE (OUTPATIENT)
Dept: HEALTH INFORMATION MANAGEMENT | Facility: CLINIC | Age: 24
End: 2019-08-29

## 2019-08-29 NOTE — TELEPHONE ENCOUNTER
Writer received completed forms from Dr. Chery. Writer faxed these to [phone], sent the originals to scanning and a copy was held at writers desk until scanning complete.

## 2019-09-02 ENCOUNTER — HOSPITAL ENCOUNTER (EMERGENCY)
Facility: CLINIC | Age: 24
Discharge: HOME | End: 2019-09-02
Attending: EMERGENCY MEDICINE | Admitting: EMERGENCY MEDICINE
Payer: COMMERCIAL

## 2019-09-02 ENCOUNTER — APPOINTMENT (OUTPATIENT)
Dept: GENERAL RADIOLOGY | Facility: CLINIC | Age: 24
End: 2019-09-02
Attending: EMERGENCY MEDICINE
Payer: COMMERCIAL

## 2019-09-02 VITALS
DIASTOLIC BLOOD PRESSURE: 75 MMHG | SYSTOLIC BLOOD PRESSURE: 123 MMHG | TEMPERATURE: 98.1 F | RESPIRATION RATE: 18 BRPM | HEART RATE: 86 BPM | OXYGEN SATURATION: 97 %

## 2019-09-02 DIAGNOSIS — S60.221A CONTUSION OF RIGHT HAND, INITIAL ENCOUNTER: ICD-10-CM

## 2019-09-02 PROCEDURE — 73130 X-RAY EXAM OF HAND: CPT | Mod: RT

## 2019-09-02 PROCEDURE — 99283 EMERGENCY DEPT VISIT LOW MDM: CPT

## 2019-09-02 NOTE — ED NOTES
AO x 4.  ABCs intact.  Slipped and injured finger.  Ecchymosis to Rt 5th finger.  Ice pack applied.

## 2019-09-02 NOTE — ED PROVIDER NOTES
History     Chief Complaint:  Hand Injury    HPI   Cesia Nettles is a 24 year old female who is right handed who presents with hand injury. 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 The patient states this happened around 1900 yesterday. The patient denies any other injury in the fall. She took 400 mg of Ibuprofen around 0400. The patient denies numbness.     Allergies:  seasonal allergies      Medications:    Neplanon  Hydroxyzine  Lamictal  Metformin   Prozac  invega    Past Medical History:    Absence epilepsy   Depressive disorder  Schizophrenia     Past Surgical History:    Tower implant     Family History:    Mother: borderline personality, depression   Maternal grandmother: schizophrenia, depression     Social History:  Smoking Status: Never Smoker  Smokeless Tobacco: Never Used  Alcohol Use: No  Drug Use: No  PCP: Becki Valencia  Marital Status:  Single     Review of Systems   Musculoskeletal:        Right 5th finger pain   All other systems reviewed and are negative.    Physical Exam     Patient Vitals for the past 24 hrs:   BP Temp Temp src Pulse Heart Rate Resp SpO2   09/02/19 0414 123/75 98.1  F (36.7  C) Oral 86 86 18 97 %     Physical Exam    General- alert, cooperative  Pulm- normal respiratory effort, no respiratory distress  Msk- RUE: 2+ pulses, sensation to light touch intact, no wounds or abrasions   ROM normal without difficulty, 5/5 strength. R 5th finger with TTP and mild swelling diffusely.           LUE: 2+ pulses, sensation to light touch intact, no wounds or abrasions   ROM normal without difficulty, 5/5 strength  Skin- no cyanosis or edema, no swelling, no rash or petechiae  Psych- normal mood and affect, normal behavior             Emergency Department Course   Imaging:  Radiology findings were communicated with the patient who voiced understanding of the findings.    XR Hand  Right  Negative hand. No fracture or dislocation.  Reading per radiology     Emergency Department Course:  Nursing notes, past medical history, and vitals reviewed.    0426 I performed an exam of the patient as documented above.     The patient was sent for a XR Hand Right while in the emergency department, results above.     0503 I returned to update the patient.     Findings and plan explained to the Patient. Patient discharged home with instructions regarding supportive care, medications, and reasons to return. The importance of close follow-up was reviewed.     Impression & Plan    Medical Decision Making:  Cesia Nettles is a 24 year old female who presents with injury to the right hand mainly over the ulnar portion and the 5th finger. There is some swelling over the 5th finger, but no dislocation was evident. Not her noted injures. An xray was preformed and did not show any acute fractures or concerns. The patient was given an ice pack here as well as proper dosing on Ibuprofen. She will follow up with her regular doctor if symptoms do not improve with ice, ibuprofen, and tylenol in the next week. If symptoms worsen she certainly can return.     Diagnosis:    ICD-10-CM    1. Contusion of right hand, initial encounter S60.221A         Disposition:   The patient is discharged to home.    Discharge Medications:  No discharge medication.     Scribe Disclosure:  ILacey, am serving as a scribe at 4:25 AM on 9/2/2019 to document services personally performed by Xochitl Hickman MD based on my observations and the provider's statements to me.  [facility] EMERGENCY DEPARTMENT       Xochtil Hickman MD  09/02/19 0604

## 2019-09-02 NOTE — ED AVS SNAPSHOT
[facility] Emergency Department  [address]  [facility] [address]  Phone:  [phone]  Fax:  [phone]                                    Cesia Nettles   MRN: [id number]    Department:  [facility] Emergency Department   Date of Visit:  9/2/2019           After Visit Summary Signature Page    I have received my discharge instructions, and my questions have been answered. I have discussed any challenges I see with this plan with the nurse or doctor.    ..........................................................................................................................................  Patient/Patient Representative Signature      ..........................................................................................................................................  Patient Representative Print Name and Relationship to Patient    ..................................................               ................................................  Date                                   Time    ..........................................................................................................................................  Reviewed by Signature/Title    ...................................................              ..............................................  Date                                               Time          22EPIC Rev 08/18

## 2019-09-02 NOTE — ED TRIAGE NOTES
Slip and fall onto bottom and hand at 7pm injury right pinky. Denies any other pain. Ibuprofen taken about 30 minutes ago. ABCs intact.

## 2019-09-02 NOTE — DISCHARGE INSTRUCTIONS
Ice, motrin 600mg every 6 hour as needed for pain  Recheck if symptoms are not better after 1 week

## 2019-09-13 DIAGNOSIS — R45.851 SUICIDAL IDEATION: ICD-10-CM

## 2019-09-13 DIAGNOSIS — F20.3 SCHIZOPHRENIA, UNDIFFERENTIATED (H): ICD-10-CM

## 2019-09-17 ENCOUNTER — OFFICE VISIT (OUTPATIENT)
Dept: PSYCHIATRY | Facility: CLINIC | Age: 24
End: 2019-09-17
Attending: PSYCHIATRY & NEUROLOGY
Payer: COMMERCIAL

## 2019-09-17 ENCOUNTER — DOCUMENTATION ONLY (OUTPATIENT)
Dept: PSYCHIATRY | Facility: CLINIC | Age: 24
End: 2019-09-17

## 2019-09-17 VITALS
SYSTOLIC BLOOD PRESSURE: 102 MMHG | DIASTOLIC BLOOD PRESSURE: 68 MMHG | BODY MASS INDEX: 32.39 KG/M2 | HEART RATE: 86 BPM | WEIGHT: 206.8 LBS

## 2019-09-17 DIAGNOSIS — F20.3 SCHIZOPHRENIA, UNDIFFERENTIATED (H): ICD-10-CM

## 2019-09-17 DIAGNOSIS — R45.851 SUICIDAL IDEATION: ICD-10-CM

## 2019-09-17 PROCEDURE — G0463 HOSPITAL OUTPT CLINIC VISIT: HCPCS | Mod: ZF

## 2019-09-17 RX ORDER — FLUOXETINE 40 MG/1
CAPSULE ORAL
Qty: 60 CAPSULE | Refills: 1 | OUTPATIENT
Start: 2019-09-17

## 2019-09-17 RX ORDER — FLUOXETINE 40 MG/1
80 CAPSULE ORAL AT BEDTIME
Qty: 60 CAPSULE | Refills: 1 | Status: SHIPPED | OUTPATIENT
Start: 2019-09-17 | End: 2019-10-29

## 2019-09-17 RX ORDER — LAMOTRIGINE 100 MG/1
TABLET ORAL
Qty: 30 TABLET | Refills: 1 | OUTPATIENT
Start: 2019-09-17

## 2019-09-17 RX ORDER — PALIPERIDONE 6 MG/1
TABLET, EXTENDED RELEASE ORAL
Qty: 60 TABLET | Refills: 1 | OUTPATIENT
Start: 2019-09-17

## 2019-09-17 RX ORDER — PALIPERIDONE 6 MG/1
12 TABLET, EXTENDED RELEASE ORAL AT BEDTIME
Qty: 60 TABLET | Refills: 1 | Status: SHIPPED | OUTPATIENT
Start: 2019-09-17 | End: 2019-10-29

## 2019-09-17 RX ORDER — LAMOTRIGINE 100 MG/1
100 TABLET ORAL DAILY
Qty: 30 TABLET | Refills: 1 | Status: SHIPPED | OUTPATIENT
Start: 2019-09-17 | End: 2019-11-12

## 2019-09-17 ASSESSMENT — PATIENT HEALTH QUESTIONNAIRE - PHQ9: SUM OF ALL RESPONSES TO PHQ QUESTIONS 1-9: 18

## 2019-09-17 ASSESSMENT — PAIN SCALES - GENERAL: PAINLEVEL: NO PAIN (0)

## 2019-09-17 NOTE — PROGRESS NOTES
[facility]  Psychiatry Clinic  PSYCHIATRIC PROGRESS NOTE     Date of initial Diagnostic Assessment (DA) is 7/8/2016 and most recent Transfer of Care DA is 07/29/19.    CARE TEAM:  PCP- Becki Valencia PA-C    Specialty Providers- no    Therapist- Rylee CALLEJAS at [facility] -  [phone] (Verbal BRIONNA on file)    Community  Team- [facility] Worker (Poll Everywhere) Samia Garcia [phone],   ([employer]) Marv Carranza [phone]      *Guardian: Elma Flores through [employer] [phone]*     Pertinent Background: Cesia Nettles is a 24 year old female who prefers the name Therese & pronouns she, her, herself.  This patient first experienced mental health issues in childhood and has received treatment for psychosis and suicidality.  See transfer evaluation for detailed history.  Notably, Therese has a history of AH/VH dating back to age 9 yo.  She has good insight about her paranoia. Per chart, she has a h/o some Cluster B traits and frequent hospitalizations which tapered off after starting  residence and supportive employment a few years ago. She has been very stable on paliperidone and fluoxetine in conjunction with  living environment. She had neuropsychiatric testing as a child (see scanned documents 4/16/19). Recently changed guardianship (see above); all medication changes need to be approved by her.      Psych critical item history includes suicide attempt [multiple], suicidal ideation, psychosis [sxs include paranoia, AH, ideas of reference], multiple psychotropic trials, psych hosp (>5), lives in a  and has a guardian.    INTERIM HISTORY      [4, 4]   The patient reports good treatment adherence.  History was provided by the patient who is a fair historian.  The last visit ended with the following med change: schedule hydroxyzine 25mg at bedtime for anxiety.   Since the last visit:   - Therese came alone and was 40min early for her appt  "today  - Continues to report feeling depressed  - Things have been going \"ok\" - some good things some bad things have been happening  - Good things include seeing family last Friday (including her younger siblings Reese and Kellen), going to work, and remembering to take her meds  - She is still at the same job, but interviewed for a new position at different location   - Feels a lot of things have been hard including doing chores and taking care of herself - her room is pretty messy and she has not been showering or brushing her teeth regularly - she has a long history of struggling with self esteem - states she just doesn't feel motivated  - In regards to dietary changes she has been starting to eat more fruits and vegetables, but states it is hard - is trying to eat healthier, but in baby steps  - She endorses intermittent thoughts she would be better off dead, but uses coping skills to distract herself and they go away  - AH are at baseline - states they vary in intensity depending on the day  - Sleep has been up and down - sometimes takes her a while to fall asleep and is unsure if the hydroxyzine is helpful or not - eating in the middle of the night has decreased and she is mostly drinking water at this point - she did not start melatonin at last visit because her insurance won't pay for it - she bought 5mg pills from [facility] and wants to know if she can just start these - is ok with buying the 3mg pills  - Medications are ok - denies side effects including stiffness and abnormal movements  - She does an OT group for professional rehabilitation and socializes some with people there, but in general does not socialize much outside the group home - states she only leaves the home for appointments or work - is interested in   - She does go on walks on occasion   - She has been looking at [employer] at writing groups, but hasn't gone yet because the time either interferes with work or she can't get a ride    RECENT " PSYCH ROS:   Depression:  depressed mood, insomnia and feeling worthless  Elevated:  none  Psychosis:  auditory hallucinations without commands [details in Interim History] and visual hallucinations  Anxiety:  excessive worry  Panic Attack:  none  Trauma Related:  none  Dysregulation:  none  Eating Disorder: none specifically; is a picky eater and does not eat standard meals     Pertinent Negative Symptoms:  NO self-injurious behavior/urges, suicidal and violent ideation, aggression, delusions and malia    RECENT SUBSTANCE USE:     Alcohol- 2-3x per year  Tobacco- none  Caffeine- none  Opioids- none           Narcan Kit- N/A   Cannabis- none     Other illicit drugs- none    CURRENT SOCIAL HISTORY:  Financial/ Work- SSI + 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      Social/ Spiritual Support- Her mother, half-siblings, [facility] workers, ,  staff (Renu Brady).     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 [age]    Patient Active Problem List   Diagnosis     Schizoaffective disorder, depressive type (H)     Hx of psychiatric care     Major Surgery- none    MEDICAL REVIEW OF SYSTEMS    [2, 10]     Reports: none    Denies: fevers, chills, weight loss/gain, headaches, numbness, tingling, weakness, nausea, vomiting, diarrhea, constipation    ALLERGY       Cats and Seasonal allergies     MEDICATIONS        Current Outpatient  Medications   Medication Sig Dispense Refill     Acetaminophen 325 MG CAPS Take 325-650 mg by mouth every 4 hours as needed       bacitracin ointment Apply 1 g topically daily as needed for wound care       etonogestrel (IMPLANON/NEXPLANON) 68 MG IMPL 1 each by Subdermal route once       FLUoxetine (PROZAC) 40 MG capsule Take 2 capsules (80 mg) by mouth At Bedtime 60 capsule 1     hydrOXYzine (ATARAX) 25 MG tablet Take 1 tablet (25 mg) by mouth At Bedtime 60 tablet 1     ketoconazole (NIZORAL) 2 % cream Apply 1 applicator topically daily as needed for itching or irritation       lamoTRIgine (LAMICTAL) 100 MG tablet Take 1 tablet (100 mg) by mouth daily 30 tablet 1     melatonin 1 MG TABS tablet Take 0.5-3 tablets (0.5-3 mg) by mouth nightly as needed for sleep 45 tablet 1     metFORMIN (GLUCOPHAGE) 500 MG tablet Take 2 tablets (1,000 mg) by mouth 2 times daily (with meals) 60 tablet 6     paliperidone ER (INVEGA) 6 MG 24 hr tablet Take 2 tablets (12 mg) by mouth At Bedtime 60 tablet 1     Sodium Fluoride (PREVIDENT 5000 BOOSTER) 1.1 % PSTE Apply 1 Dose to affected area 2 times daily       VITALS      [3, 3]   /68   Pulse 86   Wt 93.8 kg (206 lb 12.8 oz)   BMI 32.39 kg/m       MENTAL STATUS EXAM      [9, 14 cog gs]     Alertness: alert  and oriented  Appearance: adequately groomed, purple hair and glassess, tattoos  Behavior/Demeanor: cooperative, pleasant and calm, with fair  eye contact   Speech: normal and regular rate and rhythm , non-pressured  Language: intact  Psychomotor: fidgety  Mood: depressed  Affect: full range; was not congruent to mood; was not congruent to content  Thought Process/Associations: unremarkable  Thought Content:  Reports negative self-talk driven by AH;  Denies suicidal and violent ideation and paranoid ideation  Perception:  Reports auditory hallucinations without commands [details in Interim History] and visual hallucinations;  Denies depersonalization, derealization and  none  Insight: adequate  Judgment: good  Cognition: does  appear grossly intact; formal cognitive testing was not done  Gait and Station: unremarkable    LABS and DATA     AIMS:  last done 9/17/19 with score(s): 0    PHQ9 TODAY = 18  PHQ-9 SCORE 6/10/2019 7/29/2019 9/17/2019   PHQ-9 Total Score - - -   PHQ-9 Total Score - - -   PHQ-9 Total Score 15 16 18     ANTIPSYCHOTIC LABS  [glu, A1C, lipids (jenny LDL), liver enzymes, WBC, ANEU, Hgb, plts]  q12 mo  Recent Labs   Lab Test 02/02/17  0916 09/16/14  0947 12/21/13  1016   GLC 87 69* 70  70   A1C 4.9 5.2  --      Recent Labs   Lab Test 12/10/18  0912 02/02/17  0916 12/15/15 09/16/14  0947   CHOL 205* 186 188 205*   TRIG 201* 195* 210* 265*   * 109* 109 115   HDL 45* 38* 37* 37*     Recent Labs   Lab Test 02/02/17  0916 12/21/13  1016   AST 24 33   ALT 23 18   ALKPHOS 102 107  107     Recent Labs   Lab Test 12/10/18  0912 02/02/17  0916 09/16/14  0947 12/21/13  1016   WBC 6.4 6.8 6.9 6.1  6.1   ANEU 3.3  --  3.9 3.9  3.9   HGB 11.7 12.3 12.8 12.5  12.5    274 317 310  310       Labs drawn 6/17/19  W/ PCP at [facility] (in Care Everywhere):  Fasting glucose 87 mg/dL  A1C 5.2%  TSH 1.32  Lipid panel Ch 200 (H),  (H), ,       PSYCHOTROPIC DRUG INTERACTIONS     fluoxetine and hydrozyzine: concurrent use may increase risk of QTc prolongation.   fluoxetine and paliperidone: concurrent use may increase risk of QTc prolongation  paliperidone and hydroxyzine: concurrent use may increase risk of Qt prolongation    MANAGEMENT:  Monitoring for adverse effects, routine vitals, routine labs, periodic EKGs and using lowest therapeutic dose of [psychotropics]    RISK STATEMENT for SAFETY     Cesia Geffre did not appear to be an imminent safety risk to self or others.     Cesia Geffre denied any current or recent SI and last experienced passive thoughts of not wanting to be alive 3-4 months ago. She has notable risk factors for  self-harm including: recent bullying, slightly worsening depression, derogatory AH (not command in nature), and previous suicide attempts.  However, risk is mitigated by no recent SI, h/o seeking help when needed, medication adherence, future oriented, none to minimal alcohol use, commitment to family, good social support and stable housing.  Based on all available evidence she does not appear to be at imminent risk for self-harm therefore does not meet criteria for a 72-hr hold/involuntary hospitalization.  However, based on degree of symptoms close psych FU was recommended which the pt did agree to.  Additional steps to minimize risk include: frequent clinic visits, lives in supportive  environment, able to identify previous SI triggers    PSYCHIATRIC DIAGNOSIS     Schizoaffective Disorder, Depressed type  Unspecified Anxiety Disorder    ASSESSMENT      [m2, h3]     TODAY Therese reports ongoing depression and derogatory AH in the context of recent psychosocial stressors (bullying at work, poor diet, and difficulty sleeping). Given recent symptoms have been mild and the status of her current medication regimen (includes multiple psychotropics) will continue to try non-medication interventions to avoid adding to her polypharmacy. We discussed ways to improve her mood including increasing social opportunities and activities that help get her out of the home as well as switching jobs. She hopes to pursue some Meetup groups for writers and has already interviewed for a new job which would start in the next week or two. She identified her [facility] worker as the best person to work on this with so agreed to reach out to her to discuss this plan.      PLAN      [m2, h3]     1) PSYCHOTROPIC MEDICATIONS:  - Continue paliperidone 12 mg at bedtime   - Continue lamotrigine 100 mg Daily  - Continue fluoxetine 80 mg QHS  - Continue metformin 1000 mg BID  - Continue hydroxyzine 25 mg at bedtime for anxiety  - Continue melatonin 3mg  1-2 hours before bedtime PRN sleep     2) [place]  was not checked today:  not using controlled substances.    3) THERAPY:    - Continue weekly individual therapy with Rylee CALLEJAS at [facility]     4) NEXT DUE:    Labs- AP labs due June 2020, NMDA receptor antibody ordered several months ago per patient's mom's request (not drawn yet)  EKG- QTc 445 w/ T wave abnormality on 6/10/19  Rating Scales- PHQ9 at every visit; AIMS next due Sep 2020    5) REFERRALS:    No Referrals needed     6) OTHER:  - Work on improving diet with more regular meals, decreased midnight snacking, and more lean proteins and vegetables  - Work on increasing physical activity by adding a walk in the park 1x per week   - Decrease evening and late night screen time and work on earlier bedtime  - Requested guardianship paperwork from patient's guardian as it is not present in the chart    6) RTC: in 6 weeks    7) CRISIS NUMBERS:   Provided routinely in AVS   ONLY if a [facility]: [facility], [facility] within [facility] [phone] (clinic), [phone] (after hours)     TREATMENT RISK STATEMENT:  The risks, benefits, alternatives and potential adverse effects have been discussed and are understood by the pt. The pt understands the risks of using street drugs or alcohol. There are no medical contraindications, the pt agrees to treatment with the ability to do so. The pt knows to call the clinic for any problems or to access emergency care if needed.  Medical and substance use concerns are documented above.  Psychotropic drug interaction check was done, including changes made today.    PROVIDER: Yudy Chery MD    Patient staffed in clinic with Dr. Lanier who will sign the note.  Supervisor is Dr. Rosales.  I saw the patient with the resident, and participated in key portions of the service, including the mental status examination and developing the plan of care. I reviewed key portions of the history with the resident. I agree with the findings and plan as  documented in this note.    Angie Lanier MD

## 2019-09-17 NOTE — PATIENT INSTRUCTIONS
1. Please work with your [facility] worker to find more opportunities for socialization and activities outside the group home.    2. No medication changes today.    3. Return to clinic in 6 weeks.    Thank you for coming to the PSYCHIATRY CLINIC.    Lab Testing:  If you had lab testing today and your results are reassuring or normal they will be mailed to you or sent through [employer] within 7 days.   If the lab tests need quick action we will call you with the results.  The phone number we will call with results is # [phone] (home) . If this is not the best number please call our clinic and change the number.    Medication Refills:  If you need any refills please call your pharmacy and they will contact us. Our fax number for refills is [phone]. Please allow three business for refill processing.   If you need to  your refill at a new pharmacy, please contact the new pharmacy directly. The new pharmacy will help you get your medications transferred.     Scheduling:  If you have any concerns about today's visit or wish to schedule another appointment please call our office during normal business hours [phone] (8-5:00 M-F)    Contact Us:  Please call [phone] during business hours (8-5:00 M-F).  If after clinic hours, or on the weekend, please call  [phone].    Financial Assistance [phone]  [facility] Billing [phone]  Central Billing Office, [facility]: [phone]  [facility] Billing [phone]  Medical Records [phone]      MENTAL HEALTH CRISIS NUMBERS:  [facility]:   [facility] - [phone]   Crisis Residence [facility] - [phone]   Walk-In Counseling Center [facility] - [phone]   COPE 24/7 [place] Mobile Team for Adults - [[phone]]; Child - [[phone]]        [facility]:   [facility] - [phone]   Walk-in counseling [facility] [phone]   Walk-in counseling [facility]  - [phone]   Crisis Residence [facility] [place] [facility] includes the [facility] - [phone]   Urgent Care Adult Mental Health:   --Drop-in, 24/7 crisis line, and [facility] Mobile Team [[phone]]    CRISIS TEXT LINE: Text [phone] from anywhere, anytime, any crisis 24/7;    OR SEE www.crisistextline.org     Poison Control Center - [phone]    CHILD: [facility] needs assessment team - [phone]     [facility] Lifeline - [phone]; or [employer] Lifeline - 1-407.485.1324    If you have a medical emergency please call 911or go to the nearest ER.                    _____________________________________________    Again thank you for choosing PSYCHIATRY CLINIC and please let us know how we can best partner with you to improve you and your family's health.  You may be receiving a survey in the mail regarding this appointment. We would love to have your feedback, both positive and negative, so please fill out the survey and return it using the provided envelope. The survey is done by an external company, so your answers are anonymous.

## 2019-09-17 NOTE — PROGRESS NOTES
Called patient's [facility] worker, Samia Garcia [phone], to discuss the plan made in today's clinic visit. Samia is aware of the plan to get Therese more engaged socially and has actually been in discussion with Therese about doing Meetup groups for the last couple weeks.    Additionally, called patient's guardian, Elma Flores through [facility] [phone], to get a copy of guardianship paperwork as there is none present in the chart under the media tab. She agreed to fax a copy of the paperwork to the clinic so it can be placed into the chart.     Yudy Chery MD

## 2019-09-24 ENCOUNTER — TELEPHONE (OUTPATIENT)
Dept: PSYCHIATRY | Facility: CLINIC | Age: 24
End: 2019-09-24

## 2019-09-24 NOTE — TELEPHONE ENCOUNTER
received Successor Letters of Guardianship stating that [employer] having been appointed and having qualified, is hereby authorized to act as successor guardian of the patient. Mimir sent the original to scanning and a copy was held at writers desk until scanning complete.

## 2019-09-25 DIAGNOSIS — F41.9 ANXIETY: ICD-10-CM

## 2019-09-27 RX ORDER — HYDROXYZINE HYDROCHLORIDE 25 MG/1
25 TABLET, FILM COATED ORAL
Qty: 30 TABLET | Refills: 0 | Status: SHIPPED | OUTPATIENT
Start: 2019-09-27 | End: 2019-10-18

## 2019-09-27 NOTE — TELEPHONE ENCOUNTER
Medication requested: ATARAX 25 MG TAB  Last refilled: 8/6/19  Qty: 60      Last seen: 9/17/19  RTC: 6 weeks  Cancel: 0  No-show: 0  Next appt: 10/29/19    Refill decision:   Refilled for 30 days per protocol.

## 2019-10-08 ENCOUNTER — DOCUMENTATION ONLY (OUTPATIENT)
Dept: OTHER | Facility: CLINIC | Age: 24
End: 2019-10-08

## 2019-10-17 ENCOUNTER — DOCUMENTATION ONLY (OUTPATIENT)
Dept: OTHER | Facility: CLINIC | Age: 24
End: 2019-10-17

## 2019-10-29 ENCOUNTER — OFFICE VISIT (OUTPATIENT)
Dept: PSYCHIATRY | Facility: CLINIC | Age: 24
End: 2019-10-29
Attending: PSYCHIATRY & NEUROLOGY
Payer: COMMERCIAL

## 2019-10-29 VITALS
SYSTOLIC BLOOD PRESSURE: 125 MMHG | HEART RATE: 108 BPM | WEIGHT: 205 LBS | BODY MASS INDEX: 32.11 KG/M2 | DIASTOLIC BLOOD PRESSURE: 82 MMHG

## 2019-10-29 DIAGNOSIS — F25.1 SCHIZOAFFECTIVE DISORDER, DEPRESSIVE TYPE (H): ICD-10-CM

## 2019-10-29 DIAGNOSIS — F20.3 SCHIZOPHRENIA, UNDIFFERENTIATED (H): ICD-10-CM

## 2019-10-29 DIAGNOSIS — F41.9 ANXIETY: ICD-10-CM

## 2019-10-29 PROCEDURE — G0463 HOSPITAL OUTPT CLINIC VISIT: HCPCS | Mod: ZF

## 2019-10-29 RX ORDER — HYDROXYZINE HYDROCHLORIDE 25 MG/1
25 TABLET, FILM COATED ORAL AT BEDTIME
Qty: 30 TABLET | Refills: 1 | Status: SHIPPED | OUTPATIENT
Start: 2019-10-29 | End: 2019-12-13

## 2019-10-29 RX ORDER — PALIPERIDONE 6 MG/1
12 TABLET, EXTENDED RELEASE ORAL AT BEDTIME
Qty: 60 TABLET | Refills: 1 | Status: SHIPPED | OUTPATIENT
Start: 2019-10-29 | End: 2019-12-13

## 2019-10-29 RX ORDER — FLUOXETINE 40 MG/1
80 CAPSULE ORAL AT BEDTIME
Qty: 60 CAPSULE | Refills: 1 | Status: SHIPPED | OUTPATIENT
Start: 2019-10-29 | End: 2019-12-13

## 2019-10-29 ASSESSMENT — PATIENT HEALTH QUESTIONNAIRE - PHQ9: SUM OF ALL RESPONSES TO PHQ QUESTIONS 1-9: 17

## 2019-10-29 ASSESSMENT — PAIN SCALES - GENERAL: PAINLEVEL: NO PAIN (0)

## 2019-10-29 NOTE — PROGRESS NOTES
[facility]  Psychiatry Clinic  PSYCHIATRIC PROGRESS NOTE     Date of initial Diagnostic Assessment (DA) is 7/8/2016 and most recent Transfer of Care DA is 07/29/19.    CARE TEAM:  PCP- Becki Valencia PA-C    Specialty Providers- no    Therapist- Rylee CALLEJAS at [facility] -  [phone] (Verbal BRIONNA on file)    Community  Team- [facility] Worker ([place]) Samia Garcia [phone],   (ProAct) Marv Carranza [phone]    Guardian: Elma Flores through [facility] [phone]     Pertinent Background: Cesia Nettles is a 24 year old female who prefers the name Therese & pronouns she, her, herself.  This patient first experienced mental health issues in childhood and has received treatment for psychosis and suicidality.  Notably, Therese has a history of chronic AH/VH dating back to age 9 yo, and has good insight into her symptoms. Per chart review, she has a h/o some Cluster B traits and frequent hospitalizations which tapered off after starting  residence and supportive employment a few years ago. She has been very stable on paliperidone and fluoxetine in conjunction with  living environment. She had neuropsychiatric testing as a child (see scanned documents 4/16/19). She is under guardianship (see above); all medication changes need to be approved by them (see Media section 10/17/19 for paperwork).      Psych critical item history includes suicide attempt [multiple], suicidal ideation, psychosis [sxs include paranoia, AH, ideas of reference], multiple psychotropic trials, psych hosp (>5), lives in a  and has a guardian.    INTERIM HISTORY      [4, 4]   The patient reports good treatment adherence.  History was provided by the patient who is a fair historian.  The last visit ended with the following med change: schedule hydroxyzine 25mg at bedtime for anxiety.   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 - states people are nicer to her there - it is walkable from her home so she has been walking to work  - She is currently in a streak of working 8 days in a row - is on day 6 - has been doing self care (showers, youtube, netflix, sleeping), but it is stressful  - Feels her life has been a lot of sleeping, eating, and working lately - if she weren't working she would want to be at [employer], but this is in [place]  - She is still looking for a writer's group - had one picked out, but it was in [place] at night - she would like to find a group that is earlier in the day  - AH are at baseline - states they vary in intensity depending on the day  - Depression is improved and stable  - Medications are going ok - denies side effects including stiffness and abnormal movements  - She denies recent SI  - She has been using melatonin at 5mg instead of 3mg and asks for the prescription to be changed to match what she is actually taking    RECENT PSYCH ROS:   Depression:  insomnia and feeling worthless  Elevated:  none  Psychosis:  auditory hallucinations without commands [details in Interim History] and visual hallucinations  Anxiety:  excessive worry  Panic Attack:  none  Trauma Related:  none  Dysregulation:  none  Eating Disorder: none specifically; is a picky eater and does not eat standard meals     Pertinent Negative Symptoms:  NO self-injurious behavior/urges, suicidal and violent ideation, aggression, delusions and malia    RECENT SUBSTANCE USE:     Alcohol- 2-3x per year  Tobacco- none  Caffeine- none  Opioids- none           Narcan Kit- N/A   Cannabis- none     Other illicit drugs- none    CURRENT SOCIAL HISTORY:  Financial/ Work- SSI + works part time as a  at [employer].      Partner/ - none  Children- none      Living situation- Therese lives in [facility] through  [employer].      Social/ Spiritual Support- Her mother, half-siblings, [facility] workers, ,  staff (Renu Brady).  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   FEELS SAFE AT HOME- yes     PSYCH and CD Critical Summary Points since July 2019 7/29/19- resident transition; scheduled hydroxyzine 25mg at bedtime (was previously PRN), started melatonin for sleep  9/17/19- no med changes; encouraged patient to socialize more due to ongoing depressive symptoms  10/29/19- no med changes    PAST MED TRIALS          See last Diagnostic Assessment     MEDICAL / SURGICAL HISTORY          Pregnant or breastfeeding- no      Contraception- implant    H/o abscence epilepsy from age [age]    Patient Active Problem List   Diagnosis     Schizoaffective disorder, depressive type (H)     Hx of psychiatric care     Major Surgery- none    MEDICAL REVIEW OF SYSTEMS    [2, 10]     Reports: none    Denies: fevers, chills, weight loss/gain, headaches, numbness, tingling, weakness, nausea, vomiting, diarrhea, constipation    ALLERGY       Cats and Seasonal allergies     MEDICATIONS        Current Outpatient Medications   Medication Sig Dispense Refill     Acetaminophen 325 MG CAPS Take 325-650 mg by mouth every 4 hours as needed       bacitracin ointment Apply 1 g topically daily as needed for wound care       etonogestrel (IMPLANON/NEXPLANON) 68 MG IMPL 1 each by Subdermal route once       FLUoxetine (PROZAC) 40 MG capsule Take 2 capsules (80 mg) by mouth At Bedtime 60 capsule 1     hydrOXYzine (ATARAX) 25 MG tablet Take 1 tablet (25 mg) by mouth At Bedtime 30 tablet 1     hydrOXYzine (ATARAX) 25 MG tablet Take 1 tablet (25 mg) by mouth At Bedtime 30 tablet 0     ketoconazole (NIZORAL) 2 % cream Apply 1 applicator topically daily as needed for itching or irritation       lamoTRIgine (LAMICTAL) 100 MG tablet Take 1 tablet (100 mg) by mouth daily 30 tablet 1     melatonin 5 MG tablet Take 1 tablet (5  mg) by mouth nightly as needed for sleep 30 tablet 3     metFORMIN (GLUCOPHAGE) 500 MG tablet Take 2 tablets (1,000 mg) by mouth 2 times daily (with meals) 60 tablet 6     paliperidone ER (INVEGA) 6 MG 24 hr tablet Take 2 tablets (12 mg) by mouth At Bedtime 60 tablet 1     Sodium Fluoride (PREVIDENT 5000 BOOSTER) 1.1 % PSTE Apply 1 Dose to affected area 2 times daily       VITALS      [3, 3]   /82   Pulse 108   Wt 93 kg (205 lb)   BMI 32.11 kg/m       MENTAL STATUS EXAM      [9, 14 cog gs]     Alertness: alert  and oriented  Appearance: adequately groomed, purple hair and glassess, tattoos  Behavior/Demeanor: cooperative, pleasant and calm, with fair  eye contact   Speech: normal and regular rate and rhythm , non-pressured  Language: intact  Psychomotor: normal or unremarkable  Mood: description consistent with euthymia  Affect: full range; was congruent to mood; was congruent to content  Thought Process/Associations: unremarkable  Thought Content:  Reports negative self-talk driven by AH;  Denies suicidal and violent ideation and paranoid ideation  Perception:  Reports auditory hallucinations without commands [details in Interim History] and visual hallucinations;  Denies depersonalization, derealization and none  Insight: adequate  Judgment: good  Cognition: does  appear grossly intact; formal cognitive testing was not done  Gait and Station: unremarkable    LABS and DATA     AIMS:  last done 9/17/19 with score(s): 0    PHQ9 TODAY =  17  PHQ-9 SCORE 7/29/2019 9/17/2019 10/29/2019   PHQ-9 Total Score - - -   PHQ-9 Total Score - - -   PHQ-9 Total Score 16 18 17     ANTIPSYCHOTIC LABS  [glu, A1C, lipids (jenny LDL), liver enzymes, WBC, ANEU, Hgb, plts]  q12 mo  Recent Labs   Lab Test 02/02/17  0916 09/16/14  0947 12/21/13  1016   GLC 87 69* 70  70   A1C 4.9 5.2  --      Recent Labs   Lab Test 12/10/18  0912 02/02/17  0916 12/15/15 09/16/14  0947   CHOL 205* 186 188 205*   TRIG 201* 195* 210* 265*   *  109* 109 115   HDL 45* 38* 37* 37*     Recent Labs   Lab Test 02/02/17  0916 12/21/13  1016   AST 24 33   ALT 23 18   ALKPHOS 102 107  107     Recent Labs   Lab Test 12/10/18  0912 02/02/17  0916 09/16/14  0947 12/21/13  1016   WBC 6.4 6.8 6.9 6.1  6.1   ANEU 3.3  --  3.9 3.9  3.9   HGB 11.7 12.3 12.8 12.5  12.5    274 317 310  310       Labs drawn 6/17/19  W/ PCP at [facility] (in Care Everywhere):  Fasting glucose 87 mg/dL  A1C 5.2%  TSH 1.32  Lipid panel Ch 200 (H),  (H), ,       PSYCHOTROPIC DRUG INTERACTIONS     fluoxetine and hydrozyzine: concurrent use may increase risk of QTc prolongation.   fluoxetine and paliperidone: concurrent use may increase risk of QTc prolongation  paliperidone and hydroxyzine: concurrent use may increase risk of Qt prolongation    MANAGEMENT:  Monitoring for adverse effects, routine vitals, routine labs, periodic EKGs and using lowest therapeutic dose of [psychotropics]    RISK STATEMENT for SAFETY     Cesia Nettles did not appear to be an imminent safety risk to self or others.     Cesia Nettles denied any current or recent SI and last experienced passive thoughts of not wanting to be alive 3-4 months ago. She has notable risk factors for self-harm including: recent bullying, slightly worsening depression, derogatory AH (not command in nature), and previous suicide attempts.  However, risk is mitigated by no recent SI, h/o seeking help when needed, medication adherence, future oriented, none to minimal alcohol use, commitment to family, good social support and stable housing.  Based on all available evidence she does not appear to be at imminent risk for self-harm therefore does not meet criteria for a 72-hr hold/involuntary hospitalization.  However, based on degree of symptoms close psych FU was recommended which the pt did agree to.  Additional steps to minimize risk include: frequent clinic visits, lives in supportive  environment,  able to identify previous SI triggers    PSYCHIATRIC DIAGNOSIS     Schizoaffective Disorder, Depressed type  Unspecified Anxiety Disorder    ASSESSMENT      [m2, h3]     TODAY Therese reports improvement in depression and attributes this to changes in employment. 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 She is also making more money at this new job and is able to walk to work which is helping her be more active.  Additionally, she continues to work on finding a writer's group to help with her social isolation. No indication to make a medication change today, however, will increase melatonin to 5mg at patient's request.      PLAN      [m2, h3]     1) PSYCHOTROPIC MEDICATIONS:  - Continue paliperidone 12 mg at bedtime   - Continue lamotrigine 100 mg Daily  - Continue fluoxetine 80 mg QHS  - Continue metformin 1000 mg BID  - Continue hydroxyzine 25 mg at bedtime for anxiety  - Increase melatonin from 3mg tp 5mg 1-2 hours before bedtime PRN (at patient's request)    2) [place]  was not checked today:  not using controlled substances.    3) THERAPY:    - Continue weekly individual therapy with Rylee CALLEJAS at [facility]     4) NEXT DUE:    Labs- AP labs due June 2020, NMDA receptor antibody ordered several months ago per patient's mom's request (not drawn yet)  EKG- QTc 445 w/ T wave abnormality on 6/10/19  Rating Scales- PHQ9 at every visit; AIMS next due Sep 2020    5) REFERRALS:    No Referrals needed     6) OTHER:  - Work on improving diet with more regular meals, decreased midnight snacking, and more lean proteins and vegetables  - Work on increasing physical activity by adding a walk in the park 1x per week   - Decrease evening and late night screen time and work on earlier bedtime  - Requested guardianship paperwork from patient's guardian as it is not present in the chart    6) RTC: in 6 weeks    7) CRISIS NUMBERS:   Provided  routinely in AVS   ONLY if a [facility] PT: [facility] [phone] (clinic), [phone] (after hours)     TREATMENT RISK STATEMENT:  The risks, benefits, alternatives and potential adverse effects have been discussed and are understood by the pt. The pt understands the risks of using street drugs or alcohol. There are no medical contraindications, the pt agrees to treatment with the ability to do so. The pt knows to call the clinic for any problems or to access emergency care if needed.  Medical and substance use concerns are documented above.  Psychotropic drug interaction check was done, including changes made today.    PROVIDER: Yudy Chery MD    Patient staffed in clinic with Dr. Lanier who will sign the note.  Supervisor is Dr. Rosales.    I saw the patient with the resident, and participated in key portions of the service, including the mental status examination and developing the plan of care. I reviewed key portions of the history with the resident. I agree with the findings and plan as documented in this note.    Angie Lanier MD

## 2019-10-29 NOTE — PATIENT INSTRUCTIONS
Thank you for coming to the PSYCHIATRY CLINIC.    Lab Testing:  If you had lab testing today and your results are reassuring or normal they will be mailed to you or sent through [employer] within 7 days.   If the lab tests need quick action we will call you with the results.  The phone number we will call with results is # [phone] (home) . If this is not the best number please call our clinic and change the number.    Medication Refills:  If you need any refills please call your pharmacy and they will contact us. Our fax number for refills is [phone]. Please allow three business for refill processing.   If you need to  your refill at a new pharmacy, please contact the new pharmacy directly. The new pharmacy will help you get your medications transferred.     Scheduling:  If you have any concerns about today's visit or wish to schedule another appointment please call our office during normal business hours [phone] (8-5:00 M-F)    Contact Us:  Please call [phone] during business hours (8-5:00 M-F).  If after clinic hours, or on the weekend, please call  [phone].    Financial Assistance [phone]  Local [facility] Billing [phone]  Central Billing Office, [facility]: [phone]  [place] Billing [phone]  Medical Records [phone]      MENTAL HEALTH CRISIS NUMBERS:  [facility]:   [facility] - [phone]   Crisis Residence [facility] - [phone]   Walk-In Counseling [facility] [phone]   COPE 24/7 [place] Mobile Team for Adults - [[phone]]; Child - [[phone]]        [facility]:   [facility] - [phone]   Walk-in counseling [facility] - [phone]   Walk-in counseling [facility] - [phone]   Crisis Residence [facility] - [phone]   Urgent Care Adult Mental Health:   --Drop-in, 24/7 crisis line, and [place] Mobile Team  [[phone]]    CRISIS TEXT LINE: Text [phone] from anywhere, anytime, any crisis 24/7;    OR SEE www.crisistextline.org     Poison Control Center - [phone]    CHILD: [facility] needs assessment team - [phone]     [facility] - [phone]; or [facility] - [phone]    If you have a medical emergency please call 911or go to the nearest ER.                    _____________________________________________    Again thank you for choosing PSYCHIATRY CLINIC and please let us know how we can best partner with you to improve you and your family's health.  You may be receiving a survey in the mail regarding this appointment. We would love to have your feedback, both positive and negative, so please fill out the survey and return it using the provided envelope. The survey is done by an external company, so your answers are anonymous.

## 2019-10-29 NOTE — NURSING NOTE
Chief Complaint   Patient presents with     Recheck Medication     Schizophrenia, undifferentiated

## 2019-10-30 ENCOUNTER — TELEPHONE (OUTPATIENT)
Dept: PSYCHIATRY | Facility: CLINIC | Age: 24
End: 2019-10-30

## 2019-10-30 NOTE — TELEPHONE ENCOUNTER
Writer called Gia ([phone]) and identified that the melatonin is an as-needed medication and verified the 5 mg dose.

## 2019-10-30 NOTE — TELEPHONE ENCOUNTER
On 8/30/2019 the patient's guardian signed an BRIONNA authorizing medical records to be exchanged between [facility] and [facility] for the purpose of care coordination. I sent the request to medical records via the tube system and kept a copy in psychiatry until scanning is complete.  Genet Ruggiero, [facility]

## 2019-10-30 NOTE — TELEPHONE ENCOUNTER
[facility] Call Center    Phone Message    May a detailed message be left on voicemail: yes    Reason for Call: Other: Gia Jonas, Staff at [facility], calling about a question: It says she should increase her melatonin at bedtime. They wanted to know if it should be a PRN or should be scheduled.      Action Taken: Other: [facility] Psych Pool

## 2019-11-12 DIAGNOSIS — R45.851 SUICIDAL IDEATION: ICD-10-CM

## 2019-11-12 RX ORDER — LAMOTRIGINE 100 MG/1
TABLET ORAL
Qty: 30 TABLET | Refills: 0 | Status: ON HOLD | OUTPATIENT
Start: 2019-11-12 | End: 2019-11-27

## 2019-11-12 NOTE — TELEPHONE ENCOUNTER
Medication requested:  lamoTRIgine  100 MG   Last refilled: 9/17/19  Qty: 30  : 1    Last seen: 10/29/19  RTC: 6 WEEKS  Cancel: 0  No-show: 0  Next appt: 12/9/19    Refill decision: Refilled for 30 days per protocol.  *Continue lamotrigine 100 mg Daily

## 2019-11-24 ENCOUNTER — HOSPITAL ENCOUNTER (INPATIENT)
Facility: CLINIC | Age: 24
LOS: 3 days | Discharge: HOME | DRG: 885 | End: 2019-11-27
Attending: PSYCHIATRY & NEUROLOGY | Admitting: PSYCHIATRY & NEUROLOGY
Payer: COMMERCIAL

## 2019-11-24 ENCOUNTER — TELEPHONE (OUTPATIENT)
Dept: PSYCHIATRY | Facility: CLINIC | Age: 24
End: 2019-11-24

## 2019-11-24 DIAGNOSIS — R45.851 SUICIDAL IDEATION: ICD-10-CM

## 2019-11-24 DIAGNOSIS — F25.1 SCHIZOAFFECTIVE DISORDER, DEPRESSIVE TYPE (H): ICD-10-CM

## 2019-11-24 DIAGNOSIS — R44.0 CONTINUOUS AUDITORY HALLUCINATIONS: ICD-10-CM

## 2019-11-24 PROBLEM — F29 PSYCHOSIS (H): Status: ACTIVE | Noted: 2019-11-24

## 2019-11-24 PROCEDURE — 12400001 ZZH R&B MH UMMC

## 2019-11-24 PROCEDURE — 25000132 ZZH RX MED GY IP 250 OP 250 PS 637: Performed by: STUDENT IN AN ORGANIZED HEALTH CARE EDUCATION/TRAINING PROGRAM

## 2019-11-24 PROCEDURE — 81025 URINE PREGNANCY TEST: CPT | Performed by: PSYCHIATRY & NEUROLOGY

## 2019-11-24 PROCEDURE — 90791 PSYCH DIAGNOSTIC EVALUATION: CPT

## 2019-11-24 PROCEDURE — 99285 EMERGENCY DEPT VISIT HI MDM: CPT | Mod: 25 | Performed by: PSYCHIATRY & NEUROLOGY

## 2019-11-24 PROCEDURE — 80320 DRUG SCREEN QUANTALCOHOLS: CPT | Performed by: PSYCHIATRY & NEUROLOGY

## 2019-11-24 PROCEDURE — 99284 EMERGENCY DEPT VISIT MOD MDM: CPT | Mod: Z6 | Performed by: PSYCHIATRY & NEUROLOGY

## 2019-11-24 PROCEDURE — 80307 DRUG TEST PRSMV CHEM ANLYZR: CPT | Performed by: PSYCHIATRY & NEUROLOGY

## 2019-11-24 RX ORDER — PALIPERIDONE 6 MG/1
12 TABLET, EXTENDED RELEASE ORAL AT BEDTIME
Status: DISCONTINUED | OUTPATIENT
Start: 2019-11-24 | End: 2019-11-27 | Stop reason: HOSPADM

## 2019-11-24 RX ORDER — IBUPROFEN 200 MG
400 TABLET ORAL EVERY 4 HOURS PRN
Status: DISCONTINUED | OUTPATIENT
Start: 2019-11-24 | End: 2019-11-27 | Stop reason: HOSPADM

## 2019-11-24 RX ORDER — FLUTICASONE PROPIONATE 50 MCG
2 SPRAY, SUSPENSION (ML) NASAL DAILY
COMMUNITY
End: 2024-02-19

## 2019-11-24 RX ORDER — HYDROXYZINE HYDROCHLORIDE 25 MG/1
25 TABLET, FILM COATED ORAL AT BEDTIME
Status: DISCONTINUED | OUTPATIENT
Start: 2019-11-24 | End: 2019-11-27 | Stop reason: HOSPADM

## 2019-11-24 RX ORDER — FLUTICASONE PROPIONATE 50 MCG
2 SPRAY, SUSPENSION (ML) NASAL DAILY
Status: DISCONTINUED | OUTPATIENT
Start: 2019-11-25 | End: 2019-11-27 | Stop reason: HOSPADM

## 2019-11-24 RX ORDER — IBUPROFEN 200 MG
400 TABLET ORAL EVERY 4 HOURS PRN
COMMUNITY

## 2019-11-24 RX ORDER — LAMOTRIGINE 100 MG/1
100 TABLET ORAL DAILY
Status: DISCONTINUED | OUTPATIENT
Start: 2019-11-25 | End: 2019-11-27 | Stop reason: HOSPADM

## 2019-11-24 RX ADMIN — PALIPERIDONE 12 MG: 6 TABLET, EXTENDED RELEASE ORAL at 23:24

## 2019-11-24 RX ADMIN — FLUOXETINE 80 MG: 20 CAPSULE ORAL at 21:31

## 2019-11-24 RX ADMIN — Medication 5 MG: at 21:31

## 2019-11-24 RX ADMIN — HYDROXYZINE HYDROCHLORIDE 25 MG: 25 TABLET, FILM COATED ORAL at 21:31

## 2019-11-24 ASSESSMENT — ENCOUNTER SYMPTOMS
APPETITE CHANGE: 1
CARDIOVASCULAR NEGATIVE: 1
HYPERACTIVE: 0
MUSCULOSKELETAL NEGATIVE: 1
DECREASED CONCENTRATION: 1
RESPIRATORY NEGATIVE: 1
NERVOUS/ANXIOUS: 1
GASTROINTESTINAL NEGATIVE: 1
ACTIVITY CHANGE: 1
NEUROLOGICAL NEGATIVE: 1
HALLUCINATIONS: 1
EYES NEGATIVE: 1
SLEEP DISTURBANCE: 1

## 2019-11-24 ASSESSMENT — ACTIVITIES OF DAILY LIVING (ADL)
RETIRED_COMMUNICATION: 0-->UNDERSTANDS/COMMUNICATES WITHOUT DIFFICULTY
TOILETING: 0-->INDEPENDENT
BATHING: 0-->INDEPENDENT
SWALLOWING: 0-->SWALLOWS FOODS/LIQUIDS WITHOUT DIFFICULTY
RETIRED_EATING: 0-->INDEPENDENT
DRESS: 0-->INDEPENDENT

## 2019-11-24 ASSESSMENT — MIFFLIN-ST. JEOR
SCORE: 1700.27
SCORE: 1697.55

## 2019-11-24 NOTE — TELEPHONE ENCOUNTER
-----------------------------------------------------------------------------------------------------------  Brief Psychiatry Phone note  Cesia Nettles MRN# [id number]   Age: 24 year old   Clinic Provider:  YOB: 1995   PCP: Becki Valencia          Phone note:   Writer was notified that Cesia Nettles has called and requested to have resident speak her mother regarding the fact that she was not doing well. Writer called pts mother (Tiff Hastings) at this number: [phone]  at 3:54 PM. She reported that patient not doing well over last week. Has been having increased hallucination and paranoia, reporting conversations that did not happen to mother. She is not currently suicidal but, mother voiced concern that she would continue to decompensate and was worried about what would happen if she continued to decompensate. Writer stated that mother could wait with patient if she felt like patient would be stable until she could call the clinic in the morning to be seen before the holiday or she could come into the emergency department to be evaluated. Mother expressed concern about patient's ability to be seen in clinic prior to the holiday and stated she would arrange to bring in her daughter to the ED.     Kojo Castillo MD  PGY-2 Psychiatry Resident

## 2019-11-24 NOTE — ED TRIAGE NOTES
Pt BIB mother due to auditory hallucinations.  Pt is diagnosed with schizoaffective disorder and is compliant with medications.  Feelings were sparked by conversation with grandmother yesterday.  Pt told to come here by psychiatrist. Pt has established therapist, seen weekly and psychiatrist seen every six weeks.

## 2019-11-25 PROCEDURE — 12400001 ZZH R&B MH UMMC

## 2019-11-25 PROCEDURE — 99223 1ST HOSP IP/OBS HIGH 75: CPT | Mod: AI | Performed by: PSYCHIATRY & NEUROLOGY

## 2019-11-25 PROCEDURE — 25000132 ZZH RX MED GY IP 250 OP 250 PS 637: Performed by: STUDENT IN AN ORGANIZED HEALTH CARE EDUCATION/TRAINING PROGRAM

## 2019-11-25 PROCEDURE — G0177 OPPS/PHP; TRAIN & EDUC SERV: HCPCS

## 2019-11-25 RX ADMIN — LAMOTRIGINE 100 MG: 100 TABLET ORAL at 10:09

## 2019-11-25 RX ADMIN — METFORMIN HYDROCHLORIDE 1000 MG: 500 TABLET, FILM COATED ORAL at 09:25

## 2019-11-25 RX ADMIN — Medication 5 MG: at 21:09

## 2019-11-25 RX ADMIN — FLUOXETINE 80 MG: 20 CAPSULE ORAL at 21:09

## 2019-11-25 RX ADMIN — PALIPERIDONE 12 MG: 6 TABLET, EXTENDED RELEASE ORAL at 21:55

## 2019-11-25 RX ADMIN — FLUTICASONE PROPIONATE 2 SPRAY: 50 SPRAY, METERED NASAL at 17:00

## 2019-11-25 RX ADMIN — HYDROXYZINE HYDROCHLORIDE 25 MG: 25 TABLET, FILM COATED ORAL at 21:09

## 2019-11-25 RX ADMIN — METFORMIN HYDROCHLORIDE 1000 MG: 500 TABLET, FILM COATED ORAL at 19:05

## 2019-11-25 ASSESSMENT — ACTIVITIES OF DAILY LIVING (ADL)
DRESS: INDEPENDENT
ORAL_HYGIENE: INDEPENDENT
LAUNDRY: WITH SUPERVISION
HYGIENE/GROOMING: INDEPENDENT
LAUNDRY: WITH SUPERVISION
HYGIENE/GROOMING: INDEPENDENT
ORAL_HYGIENE: INDEPENDENT
DRESS: SCRUBS (BEHAVIORAL HEALTH);INDEPENDENT

## 2019-11-25 NOTE — PROGRESS NOTES
"Initial Psychosocial Assessment    I have reviewed the chart, met with the patient, and developed Care Plan.  Information for assessment was obtained from:     Patient: Interview  and Chart: Review  Guardian: Elma Flores through [facility] [phone]     Presenting Problem:    Cesia Nettles is a 24 year old single white female previously diagnosed with schizoaffective disorder, depressed type and unspecified anxiety disorder who presents with increased paranoia and worsening auditory hallucinations in the context of 4-6 months of worsening symptoms and upcoming anniversary of pet cat's death 1 year ago (December 11). Per Dec report, pt has been struggling to complete ADL's, she has been isolating, and pt has become suicidal in the past when she has heard voices.  Pt is only going to work but becomes overwelmed with her job as it is new to her.      Reviewed pt' biggest concerns and what she felt led up to hospitalization: She indicated she felt her medications needed to be situated and the other concerns were aauditory hallucinations;  She reported her goals were to get her \"medications situated\" and that she \"needs to learn to utilize supports better\".    History of Mental Health and Chemical Dependency:  Per H & P:  Prior Hospitalizations: Most recent hospitalization 2014, ~7 other previous hospitalizations.  Suicide attempts: Most recent 2014, impulsive attempt to hang herself with a scarf, also overdose  in 2012    [facility] RTC at age 16-17  Family Description ([place], Family Psychiatric History):  Sudha was born in [place] to both her parents who were  when she was about 3 mo old. They later  when she was 4 yo. Both parents moved to [place] when she was 5 yo, and from then on Sudha spent more time with mom, but continued to be very close with dad as well.  Both parents have since remarried and Sudha now has 2 half siblings on each side of the family.  Notably, Therese has 4 half sibs with " "2 siblings from each her mom and her dad (2 are 10 yo and 2 are 7 yo).   Significant Life Events (Illness, Abuse, Trauma, Death):  My cat passed away almost a year ago-she reported there were co-workers at her previous job that laughed at her when she was crying in the bathroom.     Pt reported the other loss was break up with bf prior to losing her cat.       Living Situation:   Pt lives at [facility] through [employer]    Educational Background:  Sudha has a HS degree    Occupational History:   Pt works part time as a  at [employer]     Financial Status:  Social Security and part time work.     Legal Issues:  Has a legal guardian.    Ethnic/Cultural Issues:      Spiritual Orientation:  I am not Zoroastrianism myself, she feels people should \"believe what you want but be respectful.\"      Service History:  None.    Social Functioning (organization, interests):  [employer], she enjoys [employer].  Her mother is a support.  She enjoys writing, in particular poetry and journal.      Current Treatment Providers are:  Guardian: Elma Flores through [employer] [phone]   Primary Outpatient Psychiatrist: Yudy Chery MD  Primary Physician: Becki Valencia  Therapist: Rylee Samuels  [phone] (Verbal BRIONNA on file)  : Ely Castaneda of  [employer]  Family Members: Tiff Hastings (mother) [phone]  CADI Manager: Mariela Jorge of [facility] Worker ([facility]) Samia Garcia [phone],   ([employer]) Marv Carranza [phone]      Social Service Assessment/Plan:  The plan is to assess the patient for mental health and medication needs. The patient will be prescribed medications to treat the identified symptoms. Patient will participate in therapeutic skill building groups on the unit. CTC to coordinate discharge/after care planning.  She would like a doctor's note for missed days of employment.      "

## 2019-11-25 NOTE — PROGRESS NOTES
Pt was visible in the milieu, social with peers. Pt reports feeling tired, did not take melatonin last night. Pt denies hallucinations, SI/SIB, and medication side effects. Pt attended group today, ate meals, denies any current questions and concerns.        11/25/19 1404   Behavioral Health   Hallucinations denies / not responding to hallucinations   Thinking distractable   Orientation place: oriented;person: oriented;date: oriented   Memory baseline memory   Insight poor   Judgement impaired   Eye Contact at examiner   Affect full range affect   Mood mood is calm   Hygiene well groomed   Suicidality other (see comments)  (denies)   1. Wish to be Dead (Recent) No   2. Non-Specific Active Suicidal Thoughts (Recent) No   Self Injury other (see comment)  (denies)   Activity other (see comment)  (visible in the milieu )   Speech clear;coherent   Medication Sensitivity no stated side effects;no observed side effects   Psychomotor / Gait balanced;steady   Activities of Daily Living   Hygiene/Grooming independent   Oral Hygiene independent   Dress scrubs (behavioral health);independent   Laundry with supervision   Room Organization independent   Activity   Activity Assistance Provided independent

## 2019-11-25 NOTE — ED PROVIDER NOTES
"  History     Chief Complaint   Patient presents with     Hallucinations     Pt stated \"Having auditory hallucinations due to family issues.\"  Diagnosed with schizoaffective, compliant with medications.     The history is provided by the patient and medical records.     Cesia Nettles is a 24 year old female who is here accompanied by mother due to concerns of persistent auditory hallucinations. Patient has history of schizoaffective disorder versus schizophrenia and is followed by the  Psych resident in the [facility]. Patient reports taking her meds but the voices have not abated. Mother reports she has been decompensating past 3 months as she gets distracted by the voices and cannot perform her ADLs. Parents come to help her out because of this. They are concerned that she will continue to decompensate and eventually become suicidal. They want to prevent this. They called the resident on call, and was told to come in as they could not wait for their follow-up appointment. Patient would like to be hospitalized to get stabilized. She denies drug use. She has no acute medical concerns.    Please see DEC Crisis Assessment on 11/24/19 in Epic for further details.    PERSONAL MEDICAL HISTORY  Past Medical History:   Diagnosis Date     Absence epilepsy (H) age 9-12     Depressive disorder      Schizophrenia (H)      PAST SURGICAL HISTORY  Past Surgical History:   Procedure Laterality Date     CROWN, IMPLNT SUP, PORC/CERA       ENT SURGERY       SOFT TISSUE SURGERY       FAMILY HISTORY  Family History   Problem Relation Age of Onset     Psychotic Disorder Mother         borderline personality      Depression Mother      Schizophrenia Maternal Grandmother      Depression Maternal Grandmother      Depression Paternal Grandmother      Schizophrenia Maternal Grandfather      SOCIAL HISTORY  Social History     Tobacco Use     Smoking status: Never Smoker     Smokeless tobacco: Never Used   Substance Use Topics     Alcohol " "use: No     MEDICATIONS  No current facility-administered medications for this encounter.      Current Outpatient Medications   Medication     FLUoxetine (PROZAC) 40 MG capsule     hydrOXYzine (ATARAX) 25 MG tablet     hydrOXYzine (ATARAX) 25 MG tablet     lamoTRIgine (LAMICTAL) 100 MG tablet     melatonin 5 MG tablet     metFORMIN (GLUCOPHAGE) 500 MG tablet     paliperidone ER (INVEGA) 6 MG 24 hr tablet     Acetaminophen 325 MG CAPS     bacitracin ointment     etonogestrel (IMPLANON/NEXPLANON) 68 MG IMPL     ketoconazole (NIZORAL) 2 % cream     Sodium Fluoride (PREVIDENT 5000 BOOSTER) 1.1 % PSTE     ALLERGIES  Allergies   Allergen Reactions     Cats      Seasonal Allergies          I have reviewed the Medications, Allergies, Past Medical and Surgical History, and Social History in the Epic system.    Review of Systems   Constitutional: Positive for activity change and appetite change.   HENT: Negative.    Eyes: Negative.    Respiratory: Negative.    Cardiovascular: Negative.    Gastrointestinal: Negative.    Genitourinary: Negative.    Musculoskeletal: Negative.    Neurological: Negative.    Psychiatric/Behavioral: Positive for decreased concentration, hallucinations and sleep disturbance. The patient is nervous/anxious. The patient is not hyperactive.    All other systems reviewed and are negative.      Physical Exam   BP: 119/67  Pulse: 77  Temp: 96.7  F (35.9  C)  Resp: 16  Height: 170.2 cm (5' 7\")  Weight: 91 kg (200 lb 9.6 oz)  SpO2: 100 %      Physical Exam  Vitals signs and nursing note reviewed.   Constitutional:       Appearance: Normal appearance.   HENT:      Head: Normocephalic and atraumatic.      Nose: Nose normal.      Mouth/Throat:      Mouth: Mucous membranes are moist.   Eyes:      Pupils: Pupils are equal, round, and reactive to light.   Neck:      Musculoskeletal: Normal range of motion.   Cardiovascular:      Rate and Rhythm: Normal rate and regular rhythm.   Pulmonary:      Effort: Pulmonary " effort is normal.      Breath sounds: Normal breath sounds.   Abdominal:      General: Abdomen is flat.   Musculoskeletal: Normal range of motion.   Skin:     General: Skin is warm.   Neurological:      General: No focal deficit present.      Mental Status: She is alert and oriented to person, place, and time.   Psychiatric:         Attention and Perception: Attention and perception normal. She does not perceive visual hallucinations.         Mood and Affect: Mood is anxious and depressed.         Speech: Speech normal.         Behavior: Behavior normal. Behavior is cooperative.         Thought Content: Thought content is not paranoid or delusional. Thought content does not include homicidal ideation.         Cognition and Memory: Cognition and memory normal.         Judgment: Judgment normal.         ED Course        Procedures             Labs Ordered and Resulted from Time of ED Arrival Up to the Time of Departure from the ED   DRUG ABUSE SCREEN 6 CHEM DEP URINE ([facility])   HCG QUALITATIVE URINE            Assessments & Plan (with Medical Decision Making)   Patient with schizophrenia with persistent auditory hallucinations. She has been struggling with her functioning. She would like admission. Given her breakthrough voices despite taking her meds, I find it reasonable to refer for admission for stabilization. Patient is voluntary.    I have reviewed the nursing notes.    I have reviewed the findings, diagnosis, plan and need for follow up with the patient.    New Prescriptions    No medications on file       Final diagnoses:   Continuous auditory hallucinations   Schizoaffective disorder, depressive type (H)       11/24/2019   [facility], [place], EMERGENCY DEPARTMENT     Marlo Mcgrath MD  11/24/19 1922

## 2019-11-25 NOTE — ED NOTES
Resident from [facility] here interviewing patient.  Unable to give report to unit at this time

## 2019-11-25 NOTE — H&P
"    ----------------------------------------------------------------------------------------------------------  [facility]  History and Physical  Cesia Ntetles MRN# [id number]   Age: 24 year old YOB: 1995   Date of Admission:  11/24/2019  (information obtained from patient interview and chart review)    Contacts:   Guardian: Elma Flores through [facility] [phone]   Primary Outpatient Psychiatrist: Yudy Chery MD  Primary Physician: Becki Valencia  Therapist: Rylee Samuels  [phone] (Verbal BRIONNA on file)  : [facility] Worker ([employer]) Samia Garcia [phone],   ([employer]) Marv Carranza [phone]  Family Members: Tiff Hastings (mother) [phone]     HPI:    Chief Complaint: \"I need a tune up\"    History of Present Illness:  Cesia Nettles is a 24 year old single white female previously diagnosed with schizoaffective disorder, depressed type and unspecified anxiety disorder who presents with increased paranoia and worsening auditory hallucinations in the context of 4-6 months of worsening symptoms and upcoming anniversary of pet cat's death 1 year ago (December 11).     Every notes that when she realized that her symptoms were \"bad\" and that she needed to come into the hospital or after getting off the phone with her grandmother yesterday.  She reports talking to her grandmother and believing that her grandmother was telling her that \"my dad's side does not love me as much as my other cousins\".  Mother was with patient today and stated that she has spoken with grandmother since her daughter got off the phone with her and that her grandmother did not even talk about her cousins, her dad, or that side of the family.  \"There was nothing that she talked about that could have been misunderstood as her father's side not loving her as much.\"  Both patient and mother noted that in past episodes this has been an early " "occurrence and her progression of symptoms.  They also note that she has not been cleaning her bedroom as well and her hygiene has been worse.  Both physical hygiene as well as emotional/mental hygiene.  Patient reports not walking as much however, attributes that it being cold outside.  She also notes having some decreased energy after work and believes at this time that she might be working \"too many hours\".  He is currently working as a  at a nursing home.  Patient reports that she is also having some auditory hallucinations which have increased more recently however, are not yet command hallucinations.  Will often tell her about her fears primarily that her dad's side of the family does not love her \"because I was born out of wedlock\".  Patient and mother report to know that this is not true.  Patient notes that all the symptoms have been getting worse over the last 6-7 months.  Mother is in agreement with this and states that she starts to notice \"a look in Sudha's eyes when she is questioning her surroundings or people's intentions\" which happens intermittently at baseline, maybe once a month, however, has been occurring much more frequently recently.  Patient notes primary stressor at this point in time is upcoming anniversary of her cat's death, cat was 16 years old when it passed away one year ago on December 11, 2018.  When started to talk with patient about cat's death she became very tearful and could not continue conversations about it.    Patient notes that her sleep has been \"much better\" since scheduling melatonin but, is not really sure if her other medications are working.  However, notes that they were working at one point in time.  Is hoping to get assistance with medication titration during this hospitalization prior to complete decompensation of schizoaffective disorder... At this time denies any suicidal, homicidal, or self-injurious ideation.  Patient continues to enjoy " writing, neck flexed, and reading Dimitris Potter.    She was medically cleared for admission to inpatient psychiatric unit. The risks, benefits, alternatives, and side effects of treatments including no treatment have been discussed and are understood by the patient and other caregivers.    Psychiatric ROS:  Depression: Denies depressed mood, anhedonia, low energy, insomnia, hypersomnia, appetite changes, poor concentration /memory, feeling worthless and excessive guilt  Wilma/Hypomania: Denies increased energy, decreased sleep need, increased activity, grandiosity, distractibility , racing thoughts and pressured speech  Anxiety: excessive worry, feeling fearful and social anxiety  Panic Attack:  palpitations and GI distresss  Psychosis: delusions- paranoid type related to dad's family not loving her [details in Interim History] and auditory hallucinations without commands [details in Interim History]  Trauma Related: none  Personality Symptoms: fear of abandonment/rejection  Obsessive Compulsive Disorder: negative    Eating Disorders: negative     Medical ROS: A complete medical review of systems was preformed and is negative other than noted in the HPI     Psychiatric History:   Prior diagnoses: Schizoaffective disorder, unspecified anxiety disorder  Prior Hospitalizations: Most recent hospitalization 2014, ~7 other previous hospitalizations.  Suicide attempts: Most recent 2014, impulsive attempt to hang herself with a scarf, also overdose on Zyrtec in 2012  Self-injurious behavior: None  Violence: None  ECT/TMS: None  Past medications:        Medication  Dose (mg) Effect  Dates of Use   risperidone 3 'flattened her out'     aripiprazole 30 ineffective     quetiapine 800 ineffective     Depakote   enuresis     topiramate         ethosuximide         perphenazine 16       paliperidone 12 effective, well tolerated current   hydroxyzine 25 effective for anxiety current   benztropine 1-2       fluoxetine 80   current        Substance Use History:   Nicotine: none  Alcohol: 2-3x/year         Cannabis: none  Denies current or past addiction to stimulants, opiates, benzodiazepines, hallucinogens, or other elicit substances.   Prior CD treatments: none     Social History:   Early History: Sudha was born in [place] to both her parents who were  when she was about 3 mo old. They later  when she was 6 yo. Both parents moved to [place] when she was 7 yo, and from then on Sudha spent more time with mom, but continued to be very close with dad as well.  Both parents have since remarried and Sudha now has 2 half siblings on each side of the family.  Notably, Therese has 4 half sibs with 2 siblings from each her mom and her dad (2 are 12 yo and 2 are 7 yo). Sudha has a HS degree.  Occupation: "[employer] and works part time as a  at [employer]   Current Living Situation: lives at [facility] through people incorporated  Legal: has legal guardian listed above  Social support: mother, half-siblings, [facility] workers, ,  staff. She does OT group for professional rehabilitation.      Medical History:     Past Medical History:   Diagnosis Date     Absence epilepsy (H) age 9-12     Depressive disorder      Schizophrenia (H)       Surgical History:     Past Surgical History:   Procedure Laterality Date     CROWN, IMPLNT SUP, PORC/CERA       ENT SURGERY       SOFT TISSUE SURGERY       No History of head trauma.   Family History:   Maternal great- grandfather had schizophrenia. Maternal grandmother and paternal grandfather have depression. Mom has BPD and depression. Dad has depression, step mom anxiety.   Problem (# of Occurrences) Relation (Name,Age of Onset)    Depression (3) Mother, Maternal Grandmother, Paternal Grandmother    Psychotic Disorder (1) Mother: borderline personality     Schizophrenia (2) Maternal Grandmother, Maternal Grandfather         Allergies:     Allergies   Allergen Reactions     Cats      Seasonal  Allergies       Medications:     Prior to Admission Medications   Prescriptions Last Dose Informant Patient Reported? Taking?   FLUoxetine (PROZAC) 40 MG capsule 2019 at Unknown time Self No Yes   Sig: Take 2 capsules (80 mg) by mouth At Bedtime   etonogestrel (IMPLANON/NEXPLANON) 68 MG IMPL  Self Yes Yes   Si each by Subdermal route once   fluticasone (FLONASE) 50 MCG/ACT nasal spray Past Week at Unknown time  Yes Yes   Sig: Spray 2 sprays into both nostrils daily   hydrOXYzine (ATARAX) 25 MG tablet 2019 at Unknown time Self No Yes   Sig: Take 1 tablet (25 mg) by mouth At Bedtime   ibuprofen (ADVIL/MOTRIN) 200 MG tablet Past Month at Unknown time Self Yes Yes   Sig: Take 400 mg by mouth every 4 hours as needed for mild pain   lamoTRIgine (LAMICTAL) 100 MG tablet 2019 at Unknown time Self No Yes   Sig: TAKE 1 TABLET BY MOUTH DAILY   melatonin 5 MG tablet 2019 at Unknown time Self No Yes   Sig: Take 1 tablet (5 mg) by mouth nightly as needed for sleep   metFORMIN (GLUCOPHAGE) 500 MG tablet 2019 at Unknown time Self No Yes   Sig: Take 2 tablets (1,000 mg) by mouth 2 times daily (with meals)   paliperidone ER (INVEGA) 6 MG 24 hr tablet 2019 at Unknown time Self No Yes   Sig: Take 2 tablets (12 mg) by mouth At Bedtime      Facility-Administered Medications: None      No current outpatient medications on file.        Data (Labs/Imaging):   Data   Recent Results (from the past 24 hour(s))   Drug abuse screen 6 urine (tox)    Collection Time: 19  6:46 PM   Result Value Ref Range    Amphetamine Qual Urine Negative NEG^Negative    Barbiturates Qual Urine Negative NEG^Negative    Benzodiazepine Qual Urine Negative NEG^Negative    Cannabinoids Qual Urine Negative NEG^Negative    Cocaine Qual Urine Negative NEG^Negative    Ethanol Qual Urine Negative NEG^Negative    Opiates Qualitative Urine Negative NEG^Negative   HCG qualitative urine    Collection Time: 19  6:46 PM   Result  "Value Ref Range    HCG Qual Urine Negative NEG^Negative     Pending Results      Physical & Psychiatric Examinations:   /62   Pulse 71   Temp 97  F (36.1  C) (Oral)   Resp 18   Ht 1.702 m (5' 7\")   Wt 91 kg (200 lb 9.6 oz)   SpO2 98%   Breastfeeding No   BMI 31.42 kg/m    See ED assessment note by ED physician on 11/24/2019  Appearance:  awake, alert and adequately groomed  Muscle Strength and Tone: normal  Gait and Station: Normal  Behavior (Psychomotor):  no evidence of tardive dyskinesia, dystonia, or tics  Eye Contact:  fair  Speech:  clear, coherent  Mood:  \"okay\"  Affect:  appropriate and in normal range, patient tearful at times when discussing cat.  Attitude:  cooperative  Thought Process:  logical, linear and goal oriented  Thought Content:  no evidence of suicidal ideation or homicidal ideation, auditory hallucinations present and no visual hallucinations present, paranoia related to family not loving her present.  Associations:  no loose associations  Insight:  good  Judgment:  fair  Oriented to:  time, person, and place  Attention Span and Concentration:  intact  Recent and Remote Memory:  intact  Language: Fluent in English with appropriate syntax and vocabulary.     Assessment & Plan                 Cesia Nettles is a 24 year old single white female previously diagnosed with schizoaffective disorder, depressed type and unspecified anxiety disorder who presents with increased paranoia and worsening auditory hallucinations in the context of 4-6 months of worsening symptoms and upcoming anniversary of pet cat's death 1 year ago (December 11). Substances are not a contributing factor to their presentation.  Biological contributions to mental health presentation include genetic loading for mood and psychosis, history of abscence seizures (age 9-12), and chronic mental illness. Psychosocial factors contributing to current presentation include approaching 1 year anniversary of cat's death " (12/11/2018).                The patient's presentation is consistent with schizoaffective disorder, depressed type.    Psychiatric diagnoses:   # Schizoaffective disorder, depressed type  # Unspecified anxiety disorder  - Admit to station 22 with Attending Physician Tino Mendoza MD and will be treated in the therapeutic milieu with appropriate individual and group therapies.  - Medications:   -- Continue pta paliperidone, lamotrigine, fluoxetine, metformin, melatonin, and hydroxyzine.  -Prn medications: ibuprofen, melatonin    Medical diagnoses:  none  - Consult: None  - Labs: UDS negative on admission.     Legal Status: Voluntary  Disposition: TBD, pending clinical stabilization, medication optimization, and formulation of a safe discharge plan.   Safety Assessment:   Behavioral Orders   Procedures     Code 1 - Restrict to Unit     Routine Programming     As clinically indicated     Status 15     Every 15 minutes.      Patient seen and evaluated by me and will be staffed in the morning.    Kojo Castillo MD  PGY-2 Psychiatry Resident    Attending Admission Attestation Note:    I personally interviewed and examined Cesia on November 25, 2019, I reviewed the admission history/examination and other documents related to the admission.  I confirmed the findings in the admission note and I agree with the diagnosis and treatment plan with the following corrections, clarifications, additional findings, and assessments: I certify that the treatment plan was reviewed and approved or developed by me in accordance with standard psychiatric practice. I certifiy that the inpatient services were ordered in accordance with the Medicare regulations governing the order. This includes certification that hospital inpatient services are reasonable and necessary and in the case of services not specified as inpatient-only under 42 .22(n), that they are appropriately provided as inpatient services in accordance with the  2-midnight benchmark under 42 .3(e).     The reason for inpatient status is Acute Psychosis and Unable to care for self due to mental illness.     Tino Mendoza M.D.  of Psychiatry  Pager: [phone]    email: [email or web address]

## 2019-11-25 NOTE — PROGRESS NOTES
Writer attempted to contact  Guardian, Elma Flores at [phone] and requested she contact the unit re: consent for treatment.

## 2019-11-25 NOTE — PROGRESS NOTES
11/24/19 2046   Valuables   Patient Belongings locker   Patient Belongings Put in Hospital Secure Location (Security or Locker, etc.) clothing;purse/wallet;shoes   Did you bring any home meds/supplements to the hospital?  No     Pt. Belongings in Locker:   Shirt, Jeans, Scarf, Leggings, coat, phone, boots, Wallet in it- ID, Library Card, Metro transit card, insurance card, Metro card, [employer] Card $1    A               Admission:  I am responsible for any personal items that are not sent to the safe or pharmacy.  [facility] is not responsible for loss, theft or damage of any property in my possession.    Signature:  _________________________________ Date: _______  Time: _____                                              Staff Signature:  ____________________________ Date: ________  Time: _____      2nd Staff person, if patient is unable/unwilling to sign:    Signature: ________________________________ Date: ________  Time: _____     Discharge:  [facility] has returned all of my personal belongings:    Signature: _________________________________ Date: ________  Time: _____                                          Staff Signature:  ____________________________ Date: ________  Time: _____

## 2019-11-25 NOTE — PROGRESS NOTES
Initially seen by OT on this date. Therese   attended 1 of 3 OT groups today. Quiet & attentive during an OT movement & stretching group this a.m. More observation needed to complete initial evaluation at this time.      11/25/19 9724   Occupational Therapy   Type of Intervention structured groups   Response Initiates, socially acceptable

## 2019-11-25 NOTE — ED NOTES
Pt cooperative with safety screen search.  Backpack taken to be stored securely as has meds from .  Pt and mom updated on MH eval process.

## 2019-11-25 NOTE — ED NOTES
".  ED to Behavioral Floor Handoff    SITUATION  Cesia Nettles is a 24 year old female who speaks English and lives in a group home with others The patient arrived in the ED by private car from home with a complaint of Hallucinations (Pt stated \"Having auditory hallucinations due to family issues.\"  Diagnosed with schizoaffective, compliant with medications.)  .The patient's current symptoms started/worsened 2 day(s) ago and during this time the symptoms have remained the same.   In the ED, pt was diagnosed with   Final diagnoses:   Continuous auditory hallucinations   Schizoaffective disorder, depressive type (H)   Hallucinations are not command type.    During conversations, she interprets the speaker's words differently than what the person said.  She is hearing totally different words.    Initial vitals were: BP: 119/67  Pulse: 77  Temp: 96.7  F (35.9  C)  Resp: 16  Height: 170.2 cm (5' 7\")  Weight: 91 kg (200 lb 9.6 oz)  SpO2: 100 %   --------  Is the patient diabetic? No   If yes, last blood glucose? --     If yes, was this treated in the ED? --  --------  Is the patient inebriated (ETOH) No or Impaired on other substances? No  MSSA done? N/A  Last MSSA score: --    Were withdrawal symptoms treated? N/A     Does the patient have a seizure history?   Yes, takes Lamictal     If yes, date of most recent seizure: 10 years ago--  --------  Is the patient patient experiencing suicidal ideation? denies current or recent suicidal ideation     Homicidal ideation? denies current or recent homicidal ideation or behaviors.    Self-injurious behavior/urges? denies current or recent self injurious behavior or ideation.  ------  Was pt aggressive in the ED No  Was a code called No  Is the pt now cooperative? Yes  -------  Meds given in ED: Medications - No data to display   Family present during ED course? Yes  Family currently present? Yes    BACKGROUND  Does the patient have a cognitive impairment or developmental " "disability? Yes  Allergies:   Allergies   Allergen Reactions     Cats      Seasonal Allergies    .   Social demographics are   Social History     Socioeconomic History     Marital status: Single     Spouse name: None     Number of children: None     Years of education: None     Highest education level: None   Occupational History     None   Social Needs     Financial resource strain: None     Food insecurity:     Worry: None     Inability: None     Transportation needs:     Medical: None     Non-medical: None   Tobacco Use     Smoking status: Never Smoker     Smokeless tobacco: Never Used   Substance and Sexual Activity     Alcohol use: No     Drug use: No     Sexual activity: Yes     Birth control/protection: Implant   Lifestyle     Physical activity:     Days per week: None     Minutes per session: None     Stress: None   Relationships     Social connections:     Talks on phone: None     Gets together: None     Attends [employer] service: None     Active member of club or organization: None     Attends meetings of clubs or organizations: None     Relationship status: None     Intimate partner violence:     Fear of current or ex partner: None     Emotionally abused: None     Physically abused: None     Forced sexual activity: None   Other Topics Concern     Parent/sibling w/ CABG, MI or angioplasty before 65F 55M? Not Asked   Social History Narrative     None        ASSESSMENT  Labs results   Labs Ordered and Resulted from Time of ED Arrival Up to the Time of Departure from the ED   DRUG ABUSE SCREEN 6 CHEM DEP URINE ([facility])   HCG QUALITATIVE URINE      Imaging Studies: No results found for this or any previous visit (from the past 24 hour(s)).   Most recent vital signs /67   Pulse 77   Temp 96.7  F (35.9  C)   Resp 16   Ht 1.702 m (5' 7\")   Wt 91 kg (200 lb 9.6 oz)   SpO2 100%   Breastfeeding No   BMI 31.42 kg/m     Abnormal labs/tests/findings requiring intervention:---   Pain control: pt had " none  Nausea control: pt had none    RECOMMENDATION  Are any infection precautions needed (MRSA, VRE, etc.)? No If yes, what infection? --  ---    Does the patient have mobility issues? independently. If yes, what device does the pt use? ---  ---  Is patient on 72 hour hold or commitment? No If on 72 hour hold, have hold and rights been given to patient? N/A  Are admitting orders written if after 10 p.m. ?N/A  Tasks needing to be completed:---     Merline Watson, RN   [facility]--    [phone] [place] [facility]   [phone] [facility]

## 2019-11-25 NOTE — PLAN OF CARE
BEHAVIORAL TEAM DISCUSSION    Participants:   Attending: Dr. Tino Mendoza  Residents: Dr. Richard Breyen Coffin, Dr. Rondon  CTC: Petty Gonzáles  Progress: New patient, continue to assess. Team discussed potential medication adjustments.    Anticipated length of stay: Unknown, pending stabilization and appropriate discharge plan.   Continued Stay Criteria/Rationale: Assessment, treatment, and stabilization.   Medical/Physical: None discussed.   Precautions:   Behavioral Orders   Procedures     Code 1 - Restrict to Unit     Routine Programming     As clinically indicated     Status 15     Every 15 minutes.     Plan: Assessment and potential medication adjustment.  Rationale for change in precautions or plan: New patient.

## 2019-11-25 NOTE — PHARMACY-ADMISSION MEDICATION HISTORY
Admission Medication History status for the 2019 admission is complete.  See EPIC admission navigator for Prior to Admission medications.  Patient's Name: Cesia Nettles  Patient's : 1995   24 year old, female    Medication History Sources: Patient, patient's mom and dispense report  Primary Pharmacy: [id number] - [place], [place] - [address]    Medication history source reliability: Good  Medication adherence:  Good    Changes made to PTA medication list (reason)  Added: Flonase, ibuprofen  Deleted:    Tylenol 325-650 mg q4h   Bacitracin ointment 1g topically as needed for wound care   Ketoconazole 2% cream 1 application daily prn for itching or irritation   Prevident 5000 booster 1.1% 1 application to affected area bid   Hydroxyzine (duplicate entry)  Changed: None    High Risk Medications (Warfarin, Immunosuppressants, Insulin, Antibiotics, or Other)    None    Additional medication history information (including actions taken by pharmacist):    None    Time spent in this activity: 20 minutes    Medication history completed by:  Rupinder Tolliver PD2 Pharmacy Intern    Prior to Admission Medications   Prescriptions Last Dose Informant Patient Reported? Taking?   FLUoxetine (PROZAC) 40 MG capsule 2019 at Unknown time Self No Yes   Sig: Take 2 capsules (80 mg) by mouth At Bedtime   etonogestrel (IMPLANON/NEXPLANON) 68 MG IMPL  Self Yes Yes   Si each by Subdermal route once   fluticasone (FLONASE) 50 MCG/ACT nasal spray Past Week at Unknown time  Yes Yes   Sig: Spray 2 sprays into both nostrils daily   hydrOXYzine (ATARAX) 25 MG tablet 2019 at Unknown time Self No Yes   Sig: Take 1 tablet (25 mg) by mouth At Bedtime   ibuprofen (ADVIL/MOTRIN) 200 MG tablet Past Month at Unknown time Self Yes Yes   Sig: Take 400 mg by mouth every 4 hours as needed for mild pain   lamoTRIgine (LAMICTAL) 100 MG tablet 2019 at Unknown time Self No Yes   Sig: TAKE 1 TABLET BY MOUTH  DAILY   melatonin 5 MG tablet 11/23/2019 at Unknown time Self No Yes   Sig: Take 1 tablet (5 mg) by mouth nightly as needed for sleep   metFORMIN (GLUCOPHAGE) 500 MG tablet 11/24/2019 at Unknown time Self No Yes   Sig: Take 2 tablets (1,000 mg) by mouth 2 times daily (with meals)   paliperidone ER (INVEGA) 6 MG 24 hr tablet 11/23/2019 at Unknown time Self No Yes   Sig: Take 2 tablets (12 mg) by mouth At Bedtime      Facility-Administered Medications: None

## 2019-11-26 PROCEDURE — 99232 SBSQ HOSP IP/OBS MODERATE 35: CPT | Mod: GC | Performed by: PSYCHIATRY & NEUROLOGY

## 2019-11-26 PROCEDURE — 12400001 ZZH R&B MH UMMC

## 2019-11-26 PROCEDURE — 80175 DRUG SCREEN QUAN LAMOTRIGINE: CPT | Performed by: STUDENT IN AN ORGANIZED HEALTH CARE EDUCATION/TRAINING PROGRAM

## 2019-11-26 PROCEDURE — G0177 OPPS/PHP; TRAIN & EDUC SERV: HCPCS

## 2019-11-26 PROCEDURE — 25000132 ZZH RX MED GY IP 250 OP 250 PS 637: Performed by: STUDENT IN AN ORGANIZED HEALTH CARE EDUCATION/TRAINING PROGRAM

## 2019-11-26 PROCEDURE — 80342 ANTIPSYCHOTICS NOS 1-3: CPT | Performed by: STUDENT IN AN ORGANIZED HEALTH CARE EDUCATION/TRAINING PROGRAM

## 2019-11-26 PROCEDURE — 36415 COLL VENOUS BLD VENIPUNCTURE: CPT | Performed by: STUDENT IN AN ORGANIZED HEALTH CARE EDUCATION/TRAINING PROGRAM

## 2019-11-26 RX ADMIN — PALIPERIDONE 12 MG: 6 TABLET, EXTENDED RELEASE ORAL at 22:46

## 2019-11-26 RX ADMIN — METFORMIN HYDROCHLORIDE 1000 MG: 500 TABLET, FILM COATED ORAL at 18:10

## 2019-11-26 RX ADMIN — Medication 5 MG: at 21:55

## 2019-11-26 RX ADMIN — HYDROXYZINE HYDROCHLORIDE 25 MG: 25 TABLET, FILM COATED ORAL at 21:55

## 2019-11-26 RX ADMIN — METFORMIN HYDROCHLORIDE 1000 MG: 500 TABLET, FILM COATED ORAL at 08:39

## 2019-11-26 RX ADMIN — LAMOTRIGINE 100 MG: 100 TABLET ORAL at 08:39

## 2019-11-26 RX ADMIN — FLUOXETINE 80 MG: 20 CAPSULE ORAL at 21:55

## 2019-11-26 RX ADMIN — FLUTICASONE PROPIONATE 2 SPRAY: 50 SPRAY, METERED NASAL at 08:39

## 2019-11-26 ASSESSMENT — ACTIVITIES OF DAILY LIVING (ADL)
LAUNDRY: UNABLE TO COMPLETE
DRESS: SCRUBS (BEHAVIORAL HEALTH);INDEPENDENT
HYGIENE/GROOMING: INDEPENDENT
ORAL_HYGIENE: INDEPENDENT

## 2019-11-26 ASSESSMENT — MIFFLIN-ST. JEOR: SCORE: 1696.18

## 2019-11-26 NOTE — DISCHARGE INSTRUCTIONS
Behavioral Discharge Planning and Instructions    Summary:  You were admitted on 11/24/2019 due to psychotic symptomology and suicidal ideations.  You were treated by Dr. Tino Mendoza MD and the treatment team  and discharged on 11/26/19 from [facility] back to your group home to follow up with your outpatient providers. You are scheduled to see your therapist weekly.    Principal Diagnosis:   Schizoaffective Disorder, depressed type    Health Care Follow-up Appointments:   Date/Time: Monday 12/9/19 at 10:30am   Provider: Dr. Yudy Chery  Address: [facility] [address] [facility], [address], [address], [facility] [address]  Phone: [phone]      Date/Time: Thursday 12/5/19 at 10am   Provider: Rylee Samuels   Address: [facility]  Phone: [phone]  Fax: [phone]    Support team  : Ely Castaneda of [employer]  Guardian: Elma Flores through [facility] [phone]  CADI Manager: Mariela Jorge of [facility] Worker ([facility]) Samia Garcia [phone],     ([employer]) Marv Carranza [phone]    Attend all scheduled appointments with your outpatient providers. Call at least 24 hours in advance if you need to reschedule an appointment to ensure continued access to your outpatient providers.   Major Treatments, Procedures and Findings:  You were provided with: a psychiatric assessment, assessed for medical stability, medication evaluation and/or management, group therapy and milieu management    Symptoms to Report: Feeling more aggressive, increased confusion, losing more sleep, mood getting worse or thoughts of suicide    Early warning signs can include: Increased depression or anxiety sleep disturbances increased thoughts or behaviors of suicide or self-harm  increased unusual thinking, such as paranoia or hearing voices    Safety and Wellness:  Take all medicines as directed.  Make no changes unless your doctor suggests  them. Follow treatment recommendations.  Refrain from alcohol and non-prescribed drugs.  If there is a concern for safety, call 911.    Resources:   Crisis Intervention: 105.171.1783 or 932-926-1192 (TTY: 853.269.2784).  Call anytime for help.  National [place] on [place]ness (www.mn.kenn.org): 888.288.1377 or 654-082-1457.  Suicide Awareness Voices of Education (SAVE) (www.save.org): 034-176-DLPH (2474)  National Suicide Prevention Line (www.mentalhealthmn.org): 221-507-CCMD (7990)  Mental Health Consumer/Survivor Network of [place] (www.mhcsn.net): 346.845.3493 or 153-736-8957  [facility] Crisis Response [phone]    The treatment team has appreciated the opportunity to work with you.     If you have any questions or concerns our unit number is [phone]

## 2019-11-26 NOTE — PROGRESS NOTES
Patient visible early this morning. Pleasant and approachable. Social and appropriate. Active with all groups. Acclimating to unit.          11/26/19 1400   Behavioral Health   Hallucinations denies / not responding to hallucinations   Thinking intact   Orientation place: oriented;date: oriented;time: oriented;person: oriented   Memory baseline memory   Insight poor   Judgement impaired   Affect full range affect   Mood   (Bright.)   1. Wish to be Dead (Recent) No   Activity   (Visible, active and social with peers.)   Speech coherent;clear

## 2019-11-26 NOTE — PROGRESS NOTES
"INITIAL OT ASSESSMENT    Pt attended 3 out of 3 OT groups offered. Pt actively participated in a structured occupational therapy group with a focus on connecting song titles to life events and personal feelings. Using a list of song titles, pt identified Stand By Me (\"I rely on others for support but I need to support myself too\"), I Can See Clearly Now (related this to insight she has gained throughout this hospitalization), Sellers of Broken Dreams, and Praying (\"not for Orthodoxy reasons, but I wish the best for people who have hurt me and people who I have hurt\") as song titles that relate to her life, as well as Walking on Sunshine as a song title that indicates something about her future. Pt also independently identified \"Don't Stop Me Now by Medina\" as a song that she personally relates to, and was future-oriented in her explanation of this song choice. Pt then completed a visual scanning task while listening to identified songs to facilitate focus and organization. Pt actively participated in occupational therapy clinic. Pt was able to ask for assistance as needed, and independently initiated a creative expression task. Pt demonstrated good focus, planning, and problem solving. Appropriately initiated conversation with peers and writer throughout. Pt actively participated in a structured occupational therapy group with a focus on facilitating social and leisure engagement. She appropriately took on a leadership role by explaining task parameters to group members. Calm, pleasant, cooperative, and engaged throughout groups today. Bright affect.       OT Self-Assessment  Pt was given and completed a written self-assessment. Cited \"med adjustment\" as the reason for current hospitalization. Identified \"my family\" as primary social supports. When asked what staff can do to be most helpful during hospitalization, pt responded \"talk with me when I need it.\"     Goals prioritized for hospitalization (selected from " "list): Self-care, getting through this, and self-advocacy    Goals prioritized for hospitalization (self-described): \"get meds adjusted.\"    OT staff explained the purpose of pt being involved in current treatment plan.    Plan: Encourage ongoing attendance as able to meet self-stated goals, implement positive coping skills, engage in graded opportunities for success, and provide assessment ongoing.        11/25/19 1400   General Information   Date Initially Attended OT 11/25/19   Clinical Impression   Affect Appropriate to situation   Orientation Oriented to person, place and time   Appearance and ADLs General cleanliness observed in most areas   Attention to Internal Stimuli No observed signs   Interaction Skills Initiates appropriately with staff;Initiates appropriately with peers   Ability to Communicate Needs Independent   Verbal Content Articulate;Clear;Appropriate to topic   Ability to Maintain Boundaries Maintains appropriate physical boundaries;Maintains appropriate verbal boundaries   Participation Initiates participation   Concentration Concentrates 30+ minutes   Ability to Concentrate Without difficulty;With structure   Follows and Comprehends Directions Independently follows multi-step directions   Memory Delayed and immediate recall intact   Organization Independently organizes medium tasks   Decision Making Independent   Planning and Problem Solving Occasionally needs assist/feedback   Ability to Apply and Learn Concepts Applies within group structure   Frustrations / Stress Tolerance Independently identifies skills    Level of Insight Identifies needs with structure/support   Self Esteem Takes risks with support and encouragement;Accepts positive feedback   Social Supports Has knowledge of support systems     "

## 2019-11-26 NOTE — PROGRESS NOTES
Pt was out in the milieu this evening positively interacting with other pts. Pt presented with full range affect and her mood was calm. Pt said she had a good day and was enjoying her evening. Pt did not disclose any information without prompting. Pt stated her depression on a scale from 1 to 10 (1=no feelings, 10=worst) was a 2.9 and her anxiety was a 4 on the same scale. Pt denied all SI, SIB, HI, and other mental health symptoms.        11/25/19 2000   Behavioral Health   Hallucinations denies / not responding to hallucinations   Thinking intact   Orientation person: oriented;place: oriented;date: oriented;time: oriented   Memory baseline memory   Insight poor   Judgement impaired   Eye Contact at examiner   Affect full range affect   Mood mood is calm   Hygiene well groomed   1. Wish to be Dead (Recent) No   2. Non-Specific Active Suicidal Thoughts (Recent) No   Activities of Daily Living   Hygiene/Grooming independent   Oral Hygiene independent   Dress independent   Laundry with supervision   Room Organization independent

## 2019-11-26 NOTE — PROGRESS NOTES
Inpatient Diagnostic Assessment order acknowledged.  Patient is anticipated to be seen on the next few business days or possibly sooner if schedule allows.    Jesus Dickerson [facility]

## 2019-11-26 NOTE — CONSULTS
Inpatient Diagnostic Assessment order acknowledged.  Patient is anticipated to be seen in next day or 2 if there are cancellations or possibly sooner if schedule allows.    Tonya Hale, LICSW

## 2019-11-26 NOTE — PROGRESS NOTES
"    ----------------------------------------------------------------------------------------------------------  [facility]   Psychiatric Progress Note  Hospital Day #2     Interim History:   The patient's care was discussed with the treatment team and chart notes were reviewed.    Sleep: 6.75 hours (11/26/19 0500)  Scheduled Medications: Taking as prescribed.   PRN medications: none.    Staff Report: Patient quiet, active in milieu. Rating depression 3/10, anxiety 4/10.     Patient Interview: Therese is interviewed in the conference room. She reports feeling \"better, having more hope, and more confidence in myself.\" She reports that she is doing okay and is open to not making any medication changes during this hospitalization stay. She reports that the respite from work and daily life has been therapeutic. She discusses her stress management techniques, including drinking cold water and deep breathing. She has considered cutting back on work hours to reduce stress. Therese is open to going to Day Treatment at [facility] to work on coping skills and social functioning.     Therese requests that her parents are contacted and involved in her care.     Mother called: states Therese presents well, minimizes symptoms. Mom thinks Therese doesn't trust her staff at group home. Last several months, notes Therese has been neglecting hygiene, parents need to clean room because it is malodorous, 7 bags of dirty laundry. Mom wants to make sure medications are optimized. Mom is concerned Therese little insight into hallucinations. Believes medication regimen has worked for a while, but has not been as effective lately. Has difficulty using coping skills, does not engage with staff. Mom knows she won't hurt self and won't hurt others. Completed Day program in 2014, asks about other outpatient optons.    Spoke with [employer]: reported they have not seen same behaviors that family is reporting. Not " "reporting delusions or AH to staff. Fully engages in conversations with staff. Positive behavior towards staff. Discusses her family, cat, celebrity gossip. Some anxiety about work. Spends more time in room over past few weeks. Social media has been an issue-- worked on decreasing time spent on [employer]. Reports Therese chooses not to go to OT group in [facility] 3x/week. Has not been going to therapy for a few weeks. [employer] worker comes once per week- staff notes superficial conversations not focused on mental health.     The risks, benefits, alternatives and side effects of any medication changes have been discussed and are understood by the patient and other caregivers.    Review of systems:     ROS was negative unless noted above.          Allergies:     Allergies   Allergen Reactions     Cats      Seasonal Allergies             Psychiatric Examination:   /63   Pulse 73   Temp 98.5  F (36.9  C) (Tympanic)   Resp 12   Ht 1.702 m (5' 7\")   Wt 90.9 kg (200 lb 4.8 oz)   SpO2 96%   Breastfeeding No   BMI 31.37 kg/m    Weight is 200 lbs 4.8 oz  Body mass index is 31.37 kg/m .     Appearance:  awake, alert and adequately groomed, showered.  Muscle Strength and Tone: normal  Gait and Station: Normal  Behavior (Psychomotor):  no evidence of tardive dyskinesia, dystonia, or tics  Eye Contact:  fair  Speech:  clear, coherent  Mood:  \"better\"  Affect:  euthymic, reactive, normal range and intensity for situation.   Attitude:  cooperative  Thought Process:  logical, linear and goal oriented  Thought Content:  no evidence of suicidal ideation or homicidal ideation, auditory hallucinations present and no visual hallucinations present, paranoia related to family not loving her present.  Associations:  no loose associations  Insight:  good  Judgment:  fair  Oriented to:  time, person, and place  Attention Span and Concentration:  intact  Recent and Remote Memory:  intact        Labs, Imaging, other studies:   Lamotrigine " "level total/free: in process  paliperidone level: in process      Assessment      Cesia \"Therese\" Tho is a 24 year old single white female with a guardian (Elma Flores) and previously diagnosed with schizoaffective disorder, depressed type and unspecified anxiety disorder who presents with increased paranoia and worsening auditory hallucinations in the context of 4-6 months of worsening symptoms and upcoming anniversary of pet cat's death 1 year ago (December 11).  Mental status exam on presentation most notable for cooperative individual with some ongoing/intermittent paranoia and ego-dystonic non-command auditory hallucinations with fair-good insight and judgement.  Biological contributions to mental health presentation include genetic loading for mood and psychosis, history of abscence seizures (age 9-12), and chronic mental illness; substances do not appear to be a contributing factor to her presentation. Psychosocial factors contributing to current presentation include approaching 1 year anniversary of cat's death (12/11/2018), recent job transition (although this seems to be a largely positive move) which she is able to commute to independently.  The patient's presentation is consistent with schizoaffective disorder, depressed type.                Therese Nettles was admitted to station 22 with attending Tino Mendoza MD as a voluntary patient and will be treated in the therapeutic milieu with appropriate individual and group therapies.  PTA medications were continued and levels of lamotrigine and paliperidone were checked, results pending.    Today's changes:   - Consult for IP Day Treatment for MH     # Schizoaffective Disorder, depressed type  - paliperidone 12mg at bedtime   - lamotrigine 100mg daily  - metformin 1000mg BID for weight gain prevention  - fluoxetine 80mg at bedtime   - labs: paliperidone and lamotrigine levels - pending     # Unspecified anxiety disorder     # Insomnia  - " melatonin 5mg at bedtime    - hydroxyzine 25mg at bedtime      # Discontinued Medications (& Rationale): none     Medical course:   Patient was medically cleared for admission to inpatient unit. PTA medications were resumed.  - Consults: none  - Lab/Imaging: UDS neg on admission.     Legal Status: Voluntary  Disposition: TBD, pending clinical stabilization, medication optimization, and formulation of a safe discharge plan.   Safety Assessment:        Behavioral Orders   Procedures     Code 1 - Restrict to Unit     Routine Programming       As clinically indicated     Status 15       Every 15 minutes.      ------------------------------------------------------------------  This patient was seen and discussed with the attending physician.    Becki Rondon MD   PGY-1 Psychiatry    Psychiatry Attending Attestation:  This patient has been seen and evaluated by me, Tino Mendoza M.D.  The patient's condition and treatment plan were discussed with the resident, and care coordinated with the CTC and RN. I reviewed, edited and agree with the findings and plan in this note.     Tino Mendoza M.D.   of Psychiatry

## 2019-11-26 NOTE — PLAN OF CARE
The patient specific goals include:   Patient will participate in unit programming  Patient will identify triggers and positive coping skills  Patient will take medications as prescribed by physician both for mental health and medical  Patient coached to work on coping packet    The patient identified the following reasons for hospitalization:  BRANDI RODRIGUEZ    The patient identified the following goals for discharge:  Medication management

## 2019-11-27 VITALS
HEART RATE: 102 BPM | RESPIRATION RATE: 12 BRPM | HEIGHT: 67 IN | SYSTOLIC BLOOD PRESSURE: 91 MMHG | DIASTOLIC BLOOD PRESSURE: 61 MMHG | BODY MASS INDEX: 31.44 KG/M2 | WEIGHT: 200.3 LBS | TEMPERATURE: 98.3 F | OXYGEN SATURATION: 97 %

## 2019-11-27 LAB
9OH-RISPERIDONE SERPL-MCNC: 37.6 NG/ML (ref 20–60)
LAMOTRIGINE FREE LEVEL: 1.2 UG/ML (ref 1–9)
LAMOTRIGINE SERPL-MCNC: 3.3 UG/ML (ref 2.5–15)

## 2019-11-27 PROCEDURE — 90791 PSYCH DIAGNOSTIC EVALUATION: CPT

## 2019-11-27 PROCEDURE — 25000132 ZZH RX MED GY IP 250 OP 250 PS 637: Performed by: STUDENT IN AN ORGANIZED HEALTH CARE EDUCATION/TRAINING PROGRAM

## 2019-11-27 PROCEDURE — H2032 ACTIVITY THERAPY, PER 15 MIN: HCPCS

## 2019-11-27 PROCEDURE — 99238 HOSP IP/OBS DSCHRG MGMT 30/<: CPT | Mod: GC | Performed by: PSYCHIATRY & NEUROLOGY

## 2019-11-27 RX ORDER — LAMOTRIGINE 100 MG/1
150 TABLET ORAL DAILY
Qty: 30 TABLET | Refills: 0 | Status: SHIPPED | OUTPATIENT
Start: 2019-11-27 | End: 2019-12-13

## 2019-11-27 RX ADMIN — LAMOTRIGINE 100 MG: 100 TABLET ORAL at 09:10

## 2019-11-27 RX ADMIN — FLUTICASONE PROPIONATE 2 SPRAY: 50 SPRAY, METERED NASAL at 09:10

## 2019-11-27 RX ADMIN — METFORMIN HYDROCHLORIDE 1000 MG: 500 TABLET, FILM COATED ORAL at 09:10

## 2019-11-27 ASSESSMENT — COLUMBIA-SUICIDE SEVERITY RATING SCALE - C-SSRS
1. IN THE PAST MONTH, HAVE YOU WISHED YOU WERE DEAD OR WISHED YOU COULD GO TO SLEEP AND NOT WAKE UP?: YES
1. IN THE PAST MONTH, HAVE YOU WISHED YOU WERE DEAD OR WISHED YOU COULD GO TO SLEEP AND NOT WAKE UP?: YES
TOTAL  NUMBER OF ABORTED OR SELF INTERRUPTED ATTEMPTS PAST 3 MONTHS: NO
TOTAL  NUMBER OF INTERRUPTED ATTEMPTS LIFETIME: NO
TOTAL  NUMBER OF INTERRUPTED ATTEMPTS PAST 3 MONTHS: NO
3. HAVE YOU BEEN THINKING ABOUT HOW YOU MIGHT KILL YOURSELF?: NO
REASONS FOR IDEATION PAST MONTH: DOES NOT APPLY
MOST RECENT DATE: 62635
TOTAL  NUMBER OF ABORTED OR SELF INTERRUPTED ATTEMPTS PAST LIFETIME: NO
ATTEMPT PAST THREE MONTHS: NO
4. HAVE YOU HAD THESE THOUGHTS AND HAD SOME INTENTION OF ACTING ON THEM?: NO
ATTEMPT LIFETIME: YES
5. HAVE YOU STARTED TO WORK OUT OR WORKED OUT THE DETAILS OF HOW TO KILL YOURSELF? DO YOU INTEND TO CARRY OUT THIS PLAN?: NO
4. HAVE YOU HAD THESE THOUGHTS AND HAD SOME INTENTION OF ACTING ON THEM?: NO
REASONS FOR IDEATION LIFETIME: DOES NOT APPLY
2. HAVE YOU ACTUALLY HAD ANY THOUGHTS OF KILLING YOURSELF?: YES
6. HAVE YOU EVER DONE ANYTHING, STARTED TO DO ANYTHING, OR PREPARED TO DO ANYTHING TO END YOUR LIFE?: NO
5. HAVE YOU STARTED TO WORK OUT OR WORKED OUT THE DETAILS OF HOW TO KILL YOURSELF? DO YOU INTEND TO CARRY OUT THIS PLAN?: NO
2. HAVE YOU ACTUALLY HAD ANY THOUGHTS OF KILLING YOURSELF LIFETIME?: YES

## 2019-11-27 ASSESSMENT — ACTIVITIES OF DAILY LIVING (ADL)
ORAL_HYGIENE: INDEPENDENT
DRESS: INDEPENDENT
HYGIENE/GROOMING: INDEPENDENT
LAUNDRY: WITH SUPERVISION

## 2019-11-27 NOTE — PROGRESS NOTES
Pt discharged home is stable condition to guardian , pt denies suicidal ideation . Pt given discharge instruct ructions and appointments and medications

## 2019-11-27 NOTE — DISCHARGE SUMMARY
"    Psychiatric Discharge Summary    Hospital Day #3    Cesia Nettles MRN# [id number]   Age: 24 year old YOB: 1995     Date of Admission:  11/24/2019  Date of Discharge:  11/27/2019  Admitting Physician:  Sarai Koroma MD  Discharge Physician:  Tino Mendoza MD         Event Leading to Hospitalization:     Cesia Nettles is a 24 year old single white female previously diagnosed with schizoaffective disorder, depressed type and unspecified anxiety disorder who presents with increased paranoia and worsening auditory hallucinations in the context of 4-6 months of worsening symptoms and upcoming anniversary of pet cat's death 1 year ago (December 11).      Every notes that when she realized that her symptoms were \"bad\" and that she needed to come into the hospital or after getting off the phone with her grandmother yesterday.  She reports talking to her grandmother and believing that her grandmother was telling her that \"my dad's side does not love me as much as my other cousins\".  Mother was with patient today and stated that she has spoken with grandmother since her daughter got off the phone with her and that her grandmother did not even talk about her cousins, her dad, or that side of the family.  \"There was nothing that she talked about that could have been misunderstood as her father's side not loving her as much.\"  Both patient and mother noted that in past episodes this has been an early occurrence and her progression of symptoms.  They also note that she has not been cleaning her bedroom as well and her hygiene has been worse.  Both physical hygiene as well as emotional/mental hygiene.  Patient reports not walking as much however, attributes that it being cold outside.  She also notes having some decreased energy after work and believes at this time that she might be working \"too many hours\".  He is currently working as a  at a nursing home.  Patient reports that she is also " "having some auditory hallucinations which have increased more recently however, are not yet command hallucinations.  Will often tell her about her fears primarily that her dad's side of the family does not love her \"because I was born out of wedlock\".  Patient and mother report to know that this is not true.  Patient notes that all the symptoms have been getting worse over the last 6-7 months.  Mother is in agreement with this and states that she starts to notice \"a look in Sudha's eyes when she is questioning her surroundings or people's intentions\" which happens intermittently at baseline, maybe once a month, however, has been occurring much more frequently recently.  Patient notes primary stressor at this point in time is upcoming anniversary of her cat's death, cat was 16 years old when it passed away one year ago on December 11, 2018.  When started to talk with patient about cat's death she became very tearful and could not continue conversations about it.     Patient notes that her sleep has been \"much better\" since scheduling melatonin but, is not really sure if her other medications are working.  However, notes that they were working at one point in time.  Is hoping to get assistance with medication titration during this hospitalization prior to complete decompensation of schizoaffective disorder... At this time denies any suicidal, homicidal, or self-injurious ideation.  Patient continues to enjoy writing, neck flexed, and reading [email or web address].    See Admission note by Tino Mendoza MD on 11/24/2019 for additional details.          Diagnoses:   Schizoaffective disorder, depressed type          Consults:     Outpatient Behavioral Health Day Treatment program          Hospital Course:   Psychiatric Course:  Cesia Nettles was admitted to [facility] with attending Tino Mendoza MD as a voluntary patient. PTA medication were continued. Lamotrigine level was drawn and was 3.3. Lamotrigine was increased to " 150 daily. Therese was calm and cooperative. She indicated that she was feeling better after admission and was feeling more hopeful and confident in her ability to cope with stress. She socialized in the milieu with peers, attended groups with positive participation, and was able to actively plan for treatment and strategize for coping with upcoming stress related to work, anniversaries, and the holidays. Therese requested that her parents be involved in her care. Group home, legal guardian and parents were contacted for collateral. Both mother and father expressed concern that Sudha has a pattern of being quite guarded with providers and her group home staff, and is more open about her symptoms like AH only with family. Sudha calls her mother frequently to disclose symptoms and to cope with stress. They were worried that she is was sicker than she appeared to be on the unit. They were comfortable with her discharging home, however. Sudha was open to going to Day Treatment, and was evaluated on the unit and accepted for a program starting a week after discharge. She spoke with her  about cutting back on work hours to reduce stress.     Changes to Medications:  Increased lamotrigine from 100 to 150mg     Cesia Nettles was discharged to group home. At the time of discharge Cesia Nettles was determined to not be a danger to herself or others.    Medical Course:  None.       Risk Assessment:  Cesia Nettles has notable risk factors for self-harm, including history of mood disorder and limited coping skills. However, risk is mitigated by living in group home, hope for future, no depressive symptoms or SI expressed, engagement with treatment plan and safety planning. Therefore, based on all available evidence including the factors cited above, Cesia Nettles does not appear to be at imminent risk for self-harm, and is appropriate for outpatient level of care.     This document serves as a transfer of care to [facility]  "Thos outpatient providers.         Discharge Medications:     Current Discharge Medication List      CONTINUE these medications which have CHANGED    Details   lamoTRIgine (LAMICTAL) 100 MG tablet Take 1.5 tablets (150 mg) by mouth daily  Qty: 30 tablet, Refills: 0    Associated Diagnoses: Suicidal ideation         CONTINUE these medications which have NOT CHANGED    Details   etonogestrel (IMPLANON/NEXPLANON) 68 MG IMPL 1 each by Subdermal route once      FLUoxetine (PROZAC) 40 MG capsule Take 2 capsules (80 mg) by mouth At Bedtime  Qty: 60 capsule, Refills: 1    Associated Diagnoses: Schizophrenia, undifferentiated (H)      fluticasone (FLONASE) 50 MCG/ACT nasal spray Spray 2 sprays into both nostrils daily      hydrOXYzine (ATARAX) 25 MG tablet Take 1 tablet (25 mg) by mouth At Bedtime  Qty: 30 tablet, Refills: 1    Associated Diagnoses: Anxiety      ibuprofen (ADVIL/MOTRIN) 200 MG tablet Take 400 mg by mouth every 4 hours as needed for mild pain      melatonin 5 MG tablet Take 1 tablet (5 mg) by mouth nightly as needed for sleep  Qty: 30 tablet, Refills: 3    Associated Diagnoses: Schizoaffective disorder, depressive type (H)      metFORMIN (GLUCOPHAGE) 500 MG tablet Take 2 tablets (1,000 mg) by mouth 2 times daily (with meals)  Qty: 60 tablet, Refills: 6    Associated Diagnoses: Schizophrenia, undifferentiated (H)      paliperidone ER (INVEGA) 6 MG 24 hr tablet Take 2 tablets (12 mg) by mouth At Bedtime  Qty: 60 tablet, Refills: 1    Associated Diagnoses: Schizophrenia, undifferentiated (H)                    Psychiatric Examination:     Appearance:  awake, alert and adequately groomed, showered.  Muscle Strength and Tone: normal  Gait and Station: Normal  Behavior (Psychomotor):  no evidence of tardive dyskinesia, dystonia, or tics  Eye Contact:  good  Speech:  clear, coherent  Mood:  \"better\"  Affect:  bright, euthymic, reactive, normal range and intensity for situation.   Attitude:  cooperative  Thought " Process:  logical, linear and goal oriented  Thought Content:  no evidence of suicidal ideation or homicidal ideation, auditory hallucinations present and no visual hallucinations present, paranoia related to family not loving her present.  Associations:  no loose associations  Insight:  good  Judgment:  fair  Oriented to:  time, person, and place  Attention Span and Concentration:  intact  Recent and Remote Memory:  intact         Discharge Plan:     Health Care Follow-up Appointments:   Date/Time: Monday 12/9/19 at 10:30am   Provider: Dr. Yudy Chery  Address: [facility] [facility], [address], [address], [facility] [address]  Phone: [phone]      LAmotrigine was increased due to the ongoing mood symptoms and LTG level=3.3, as levels >5 are reported to be more effective.  Her paliperidone level was adequate and increasing the dose is not recommended. If psychotic symptoms worsen so that patient is highly distressed or has functional impairment, a trial of clozapine while continuing the paliperidone is advised.     Date/Time: Thursday 12/5/19 at 10am   Provider: Rylee Samuels   Address: [facility] Location  Phone: [phone]  Fax: [phone]     Support team  : Ely Castaneda of  Resources  Guardian: Elma Flores through [facility] [phone]  CADI Manager: Mariela Jorge of [facility] Worker ([employer]) Samia Garcia [phone],     ([employer]) Marv Carranza [phone]     Patient was seen and discussed with attending physician.   Becki Rondon MD  PGY-1 Psychiatry     Psychiatry Attending Attestation:  This patient has been seen and evaluated by me, Tino Mendoza M.D. The hospital care and discharge plan were formulated in team discussion with the patient, psychiatry resident and/or medical student, the [facility] Clinical Treatment Coordinator, and RN. I reviewed, edited and agree with the findings and plan in  this discharge summary. Total time on discharge date involved with planning and face-to-face discussion with the patient was 25 minutes.    It was our pleasure and privilege to participate in the care of this patient. Please contact any of the team for any questions about the above information.     Kelvin Mendoza M.D.   of Psychiatry  [facility]  Email [email or web address]  Phone [phone]                Appendix A: All Labs This Admission:     Results for orders placed or performed during the hospital encounter of 11/24/19   Drug abuse screen 6 urine (tox)     Status: None   Result Value Ref Range    Amphetamine Qual Urine Negative NEG^Negative    Barbiturates Qual Urine Negative NEG^Negative    Benzodiazepine Qual Urine Negative NEG^Negative    Cannabinoids Qual Urine Negative NEG^Negative    Cocaine Qual Urine Negative NEG^Negative    Ethanol Qual Urine Negative NEG^Negative    Opiates Qualitative Urine Negative NEG^Negative   HCG qualitative urine     Status: None   Result Value Ref Range    HCG Qual Urine Negative NEG^Negative   Lamotrigine Level     Status: None   Result Value Ref Range    Lamotrigine Level 3.3 2.5 - 15.0 ug/mL   Lamotrigine free level: 1.2  paliperidone level: 37.6

## 2019-11-27 NOTE — PROGRESS NOTES
"Pt was cognitively clear and socially-engaged.  She stated she is preparing for discharge and the only thing she \"needed\" before that time was to let her peers know they have been \"awesome.\"      The group was cohesive and supportive of each other.  They enjoyed movement together and affect was positively impacted in all who joined.  The group allowed for creative personal expression in a non-judgemental environment.  Pt participated joyfully to these group processes.       11/27/19 1130   Dance Movement Therapy   Type of Intervention structured groups   Response participates, initiates socially appropriate   Hours 1       "

## 2019-11-27 NOTE — PROGRESS NOTES
Pt denies SI/SIB thoughts  Pt denies visual or auditory hallucination  Pt indicated good appetite and ate dinner  Pt denies thought of wanting to die  Pt denies no pain with no concern  Pt socialized and was visible in the milieu     11/26/19 2153   Behavioral Health   Hallucinations denies / not responding to hallucinations   Thinking intact   Orientation person: oriented;place: oriented;date: oriented;time: oriented   Memory baseline memory   Insight poor   Judgement impaired   Affect blunted, flat   Mood mood is calm   Physical Appearance/Attire disheveled;attire appropriate to age and situation;neat   Hygiene well groomed   Suicidality   (Pt denies SI thoughts)   1. Wish to be Dead (Recent) No   2. Non-Specific Active Suicidal Thoughts (Recent) No   Self Injury   (Pt denies SIB thoughts)   Elopement   (No attempt  for elopement observed)   Activity   (Pt waqs out in the [address] visible in the milieu)   Speech clear;coherent   Medication Sensitivity no stated side effects;no observed side effects   Psychomotor / Gait balanced;steady   Daily Care   Activity activity encouraged   Activities of Daily Living   Hygiene/Grooming independent   Oral Hygiene independent   Dress scrubs (behavioral health);independent   Laundry unable to complete   Room Organization independent   Behavioral Health Interventions   Social and Therapeutic Interventions (Psychotic Symptoms) encourage socialization with peers;encourage effective boundaries with peers;encourage participation in therapeutic groups and milieu activities

## 2019-11-27 NOTE — PROGRESS NOTES
"Adult Mental Health Day Treatment    PATIENT'S NAME: Cesia Nettles  PREFERRED NAME: Therese  PREFERRED PRONOUNS: She/Her/Hers/Herself  MRN:   [id number]  :   1995  ACCT. NUMBER: [id number]  DATE OF SERVICE: 19  START TIME:  0900  END TIME:  1100  PREFERRED PHONE: cell [phone]  May we leave a program related message: Yes    STANDARD DIAGNOSTIC ASSESSMENT    VIDEO VISIT: No    Identifying Information:  Patient is a 24 year old, .  The pronoun use throughout this assessment reflects the sex of the patient at birth.  Patient was referred for an assessment by self.Patient attended the session alone. Pt reside in the Inpatient mental health unit station 22.    The patient describes their cultural background as .  Cultural influences and impact on patient's life structure, values, norms, and healthcare: N/A.  The patient reports there are no ethnic, cultural or Yazdanism factors that may be relevant for therapy.  Patient identified her preferred language to be English. Patient reported she does not need the assistance of an  or other support involved in therapy. Modifications will not be used to assist communication in therapy.  Patient reports she is able to understand written materials.    Chief Complaint:   The reason for seeking services at this time is: \"Pt states she has been experiencing 'intense auditory hallucinations and knew she needed help.\"        History of Presenting Concern:  The problem(s) began \"pt states having anxiety around 5yrs old, at Pre-school, parents forgot to pick her up from school, she encountered her first therapy.  Pt states psychotic symptoms in 7th grade, and being diagnosed in 9th grade with Schizophrenia and then changed to Schizoaffective disorder. Patient has attempted to resolve these concerns in the past through Day Tx at [facility] 2x 2014; 2016; Therapist; and Psychiatrist.. Patient reports that other professional(s) are currently " "involved in providing support / services.  Pt has a legal guardian; therapist; and psychiatrist.    Social/Family History:  Patient reported she grew up in [facility] in [place], [place] and moved to hospitals at 6yrs old and resides in hospitals since..  They were raised by biological parents and step-parents. They were the first born of one children. Pt states 4 half-siblings. Both parents re- with 2 kids each.  Parents  18 years ago when the client was 6 years old. The client's mother did remarry 2006 years ago Pt states father remarried in 2003.Patient reported that her childhood was \"interesting, some things were good and some things that were not good.\".  Patient described her current relationships with family of origin as \"pretty good, pt reports being closest to her mother\".      Patient's highest education level was high school graduate. Patient did not identify any learning problems.     Patient reported the following relationship history of a couple sig others..  Patient's current relationship status is single for since Mar. 2019.   Patient identified their sexual orientation as heterosexual.  Patient reported having no children.     Patient's current living/housing situation involves living in a Group home in [place], [place].  Pt states being at current house for 1yr and with the [employer] For 5yrs.  Pt reports having a legal [facility]. Patient identified maternal grandfather; therapist; psychiatrist, and guardian```````````````````````````````````````````````````````````````` as part of their support system.  Patient identified the quality of these relationships as good.      Patient is currently on disability.(sliding scale insulin)..  Patient did not serve in the .  Patient reports their finances are obtained through disability.  Patient does identify finances as a current stressor. (sometimes).    Patient reported that she has not been involved with the legal system.  Patient " denies being on probation / parole / under the jurisdiction of the court.    Medical Issues:  Patient reports family history includes Depression in her maternal grandmother, mother, and paternal grandmother; Psychotic Disorder in her mother; Schizophrenia in her maternal grandfather and maternal grandmother.    Patient has had a physical exam to rule out medical causes for current symptoms.  Date of last physical exam was within the past year. Client was encouraged to follow up with PCP if symptoms were to develop. The patient has a [facility] Primary Care Provider, who is named Becki Valencia.  Patient reports no current medical concerns.  They did not report dental concerns.  There are not significant appetite / nutritional concerns / weight changes.  The patient has not been diagnosed with an eating disorder.  The patient denies the presence of chronic or episodic pain.  Patient does not report a history of head injury / trauma / cognitive impairment.      Patient reports current meds as:   Outpatient Medications Marked as Taking for the 11/24/19 encounter (Hospital Encounter)   Medication Sig     etonogestrel (IMPLANON/NEXPLANON) 68 MG IMPL 1 each by Subdermal route once     FLUoxetine (PROZAC) 40 MG capsule Take 2 capsules (80 mg) by mouth At Bedtime     fluticasone (FLONASE) 50 MCG/ACT nasal spray Spray 2 sprays into both nostrils daily     hydrOXYzine (ATARAX) 25 MG tablet Take 1 tablet (25 mg) by mouth At Bedtime     ibuprofen (ADVIL/MOTRIN) 200 MG tablet Take 400 mg by mouth every 4 hours as needed for mild pain     lamoTRIgine (LAMICTAL) 100 MG tablet TAKE 1 TABLET BY MOUTH DAILY     melatonin 5 MG tablet Take 1 tablet (5 mg) by mouth nightly as needed for sleep     metFORMIN (GLUCOPHAGE) 500 MG tablet Take 2 tablets (1,000 mg) by mouth 2 times daily (with meals)     paliperidone ER (INVEGA) 6 MG 24 hr tablet Take 2 tablets (12 mg) by mouth At Bedtime       Medication Adherence:  Patient reports taking  prescribed medications as prescribed    Patient Allergies:  Allergies   Allergen Reactions     Cats      Seasonal Allergies        Medical History:  Past Medical History:   Diagnosis Date     Absence epilepsy (H) age 9-12     Depressive disorder      Schizophrenia (H)        Mental Health History:  Patient did report a family history of mental health concerns; see medical history section for details.  Patient previously received the following mental health diagnosis: Anxiety, Depression and Schizophrenia.   Formerly [facility] with Samia Garcia @ [facility], therapy with Rylee Epperson @ [facility] and psychiatry with Dr Yudy Chery @ [facility]..   Hospitalizations: [facility] 4x for Inpatient. Pt states 3x at [facility]; 1x [facility]. Patient denies a history of civil commitment.  Patient is currently receiving the following services: ARM with ., therapy with . and psychiatry with Dr Chery @ [facility].  Next appointment: 12/2019.      Current Mental Status Exam:   Appearance:  Appropriate   Eye Contact:  Good   Psychomotor:  Normal       Gait / station:  no problem  Attitude / Demeanor: Cooperative   Speech      Rate / Production: Normal       Volume:  Normal  volume      Language:  Rate/Production: Normal    Mood:   Normal  Affect:   Blunted   Thought Content: Delusions  Hallucinations  Paranoia  Rumination   Thought Process: Coherent  Logical  Circumstantial      Associations: Volume: Normal    Insight:   Good   Judgment:  Intact   Orientation:  All  Attention/concentration: Good      Review of Symptoms:  Depression: Change in sleep, Lack of interest, Excessive or inappropriate guilt, Difficulties concentrating, Psychomotor slowing or agitation, Suicidal ideation, Feelings of hopelessness, Low self-worth, Ruminations, Irritability, Feling sad, down, or depressed, Withdrawn, Poor hygeine and Frequent crying  Wilma:  Decreased need for sleep  Psychosis: Auditory hallucinations, Ideas  of reference, Thought insertions or deletions and Delusions  Anxiety: Excessive worry, Nervousness, Social anxiety, Fears/phobias to leave the house, Sleep disturbance, Psychomotor agitation, Ruminations, Poor concentration and Irritaiblity  Panic:  No symptoms and faintness or dizziness  Post Traumatic Stress Disorder: Experienced traumatic event Pt's kitten passed away 1yr ago, Dec. 11, 2018.  Eating Disorder: No Symptoms  Oppositional Defiant Disorder:  No Symptoms  ADD / ADHD:  No symptoms  Conduct Disorder: No symptoms  Autism Spectrum Disorder: No symptoms  Obsessive Compulsive Disorder: No Symptoms  Other Compulsive Behaviors: N/A   Substance Use: No symptoms    Rating Scales:  PHQ9     PHQ-9 SCORE 7/29/2019 9/17/2019 10/29/2019   PHQ-9 Total Score - - -   PHQ-9 Total Score - - -   PHQ-9 Total Score 16 18 17     GAD7     TINO-7 SCORE 6/16/2014 6/16/2016   Total Score - 13   Total Score BEH Adult 18 -     CGI   Clinical Global Impressions  Initial result:     Most recent result:       Substance Use History:  Patient did report a family history of substance use concerns; see medical history section for details.  Pt reports her Maternal Grandfather is a recovering alcoholic.  Patient has not received chemical dependency treatment in the past.  Patient has not ever been to detox.      Patient is not currently receiving any chemical dependency treatment. Patient reported the following problems as a result of their substance use: N/A.     Patient reports using alcohol 1 times per few months and has 1 mixed drinks at a time. Patient first started drinking at age 22yrs old, and a sip at 8yrs..  Patient reported date of last use was couple months ago..  Patient reports heaviest use was N/A..  Patient denies using tobacco.  Patient denies using marijuana.  Patient denies using caffeine.  Patient denies cocaine/crack use.  Patient denies meth/amphetamine use.  Patient denies use of heroin  Patient denies use of other  "opiates.  Patient denies inhalant use  Patient denies use of benzodiazepines.  Patient denies use of hallucinogens.  Patient denies use of barbiturates, sedatives, or hypnotics.  Patient denies use of over the counter drugs.  Patient denies use of other substances.    No flowsheet data found.    Patient is not concerned about substance use.       Based on the negative CAGE score and clinical interview there  are not indications of drug or alcohol abuse.    Significant Losses / Trauma / Abuse / Neglect Issues:   There are indications or report of significant loss, trauma, abuse or neglect issues related to: death of her rosie Dec. 2018.  Pt states having a difficult break-up about 2yrs ago.     Concerns for possible neglect are not present.     Safety Assessment:  Current Safety Concerns:  [place] Suicide Severity Rating Scale (Lifetime/Recent)  [place] Suicide Severity Rating (Lifetime/Recent) 11/24/2019 11/25/2019 11/25/2019 11/26/2019 11/26/2019 11/27/2019   1. Wish to be Dead (Lifetime) No - - - - Yes   1. Wish to be Dead (Recent) No No No No No Yes   2. Non-Specific Active Suicidal Thoughts (Lifetime) No - - - - Yes   2. Non-Specific Active Suicidal Thoughts (Recent) No No No - No Yes   Non-Specific Active Suicidal Thought Description (Recent) - - - - - (No Data)   Comments - - - - - pt states \"really wanted to ommit suicide, but no plan.   3. Active Suicidal Ideation with any Methods (Not Plan) Without Intent to Act (Lifetime) No - - - - No   Active Suicidal Ideation with any Methods (Not Plan) Description (Lifetime) - - - - - (No Data)   Comments - - - - - pt states \"really wanted to ommit suicide, but did not have    3. Active Sucidal Ideation with any Methods (Not Plan) Without Intent to Act (Recent) No - - - - No   4. Active Suicidal Ideation with Some Intent to Act, Without Specific Plan (Lifetime) No - - - - No   4. Active Suicidal Ideation with Some Intent to Act, Without Specific Plan (Recent) No - - " - - No   5. Active Suicidal Ideation with Specific Plan and Intent (Lifetime) No - - - - No   5. Active Suicidal Ideation with Specific Plan and Intent (Recent) No - - - - No   Most Severe Ideation Rating (Lifetime) NA - - - - 4   Most Severe Ideation Description (Lifetime) na - - - - -   Frequency (Lifetime) NA - - - - 2   Duration (Lifetime) NA - - - - 2   Controllability (Lifetime) NA - - - - 2   Protective Factors  (Lifetime) NA - - - - 1   Reasons for Ideation (Lifetime) NA - - - - 0   Most Severe Ideation Rating (Past Month) NA - - - - 3   Most Severe Ideation Description (Past Month) na - - - - -   Frequency (Past Month) NA - - - - 2   Duration (Past Month) NA - - - - 2   Controllability (Past Month) NA - - - - 2   Protective Factors (Past Month) NA - - - - 1   Reasons for Ideation (Past Month) NA - - - - 0   Actual Attempt (Lifetime) No - - - - Yes   Actual Attempt Description (Lifetime) - - - - - (No Data)   Comments - - - - - High School, tried to overdose/Zyrtec/cold meds-Hospitalized   Total Number of Actual Attempts (Lifetime) - - - - - (No Data)   Comments - - - - - 1x lifetime   Actual Attempt (Past 3 Months) No - - - - No   Has subject engaged in non-suicidal self-injurious behavior? (Lifetime) No - - - - No   Has subject engaged in non-suicidal self-injurious behavior? (Past 3 Months) No - - - - No   Interrupted Attempts (Lifetime) No - - - - No   Interrupted Attempts (Past 3 Months) No - - - - No   Aborted or Self-Interrupted Attempt (Lifetime) No - - - - No   Aborted or Self-Interrupted Attempt (Past 3 Months) No - - - - No   Preparatory Acts or Behavior (Lifetime) No - - - - No   Preparatory Acts or Behavior (Past 3 Months) No - - - - -   Most Recent Attempt Date - - - - - [id number]     Patient denies current homicidal ideation and behaviors.  Patient denies current self-injurious ideation and behaviors.    Patient denied risk behaviors associated with substance use.  Patient denies any high risk  behaviors associated with mental health symptoms.  Patient reports the following current concerns for their personal safety: None.  Patient reports there are no firearms in the house.     History of Safety Concerns:  Patient denied a history of homicidal ideation.     Patient denied a history of self-injurious ideation and behaviors.    Patient denied a history of personal safety concerns.    Patient denied a history of assaultive behaviors.    Patient denied a history of assaultive behaviors.    Patient denied a history of risk behaviors associated with substance use.  Patient denies any history of high risk behaviors associated with mental health symptoms.    Patient reports the following protective factors: positive relationships positive social network and positive family connections, forward/future oriented thinking, dedication to family/friends, safe and stable environment, regular sleep, effectively controls impulses, sense of belonging ., purpose ., secure attachment, help seeking behaviors when distressed ., abstinence from substances, adherence with prescribed medication, agreement to use safety plan, living with other people, daily obligations, structured day, uses community crisis resources, effective problem-solving skills, committment to well-being, sense of meaning, positive social skills, financial stability, sense of personal control or determination, access to a variety of clinical interventions and pets    See Preliminary Treatment Plan for Safety and Risk Management Plan    Patient's Strengths and Limitations:  Patient identified the following strengths or resources that will help her succeed in treatment: , commitment to health and well being, linden / spirituality, friends / good social support, family support, insight, intelligence, motivation, sense of humor and strong social skills. Things that may interfere with the patient's success in treatment include: N/A..     Diagnostic  Criteria:  Mixed anxiety-depressive disorder: clinically significant symptoms of anxiety and depression, but the criteria are not met for either a specific Mood Disorder or a specific Anxiety Disorder.  Clinically significant social phobic symptoms that are related to the social impact of having a general medical condition or mental disorder  The client does not report enough symptoms for the full criteria of any specific Anxiety Disorder to have been met  Anxiety disorder is present, but at this time therapist is unable to determine whether it is primary.  Further assessment needed.  Client reports the following symptoms of anxiety:   - Excessive anxiety and worry about a number of events or activities (such as work or school performance).    - The person finds it difficult to control the worry.   - Restlessness or feeling keyed up or on edge.    - Being easily fatigued.    - Difficulty concentrating or mind going blank.    - Irritability.    - Sleep disturbance (difficulty falling or staying asleep, or restless unsatisfying sleep).    - The focus of the anxiety and worry is not confined to features of an Axis I disorder.   - The anxiety, worry, or physical symptoms cause clinically significant distress or impairment in social, occupational, or other important areas of functioning.    - The disturbance is not due to the direct physiological effects of a substance (e.g., a drug of abuse, a medication) or a general medical condition (e.g., hyperthyroidism) and does not occur exclusively during a Mood Disorder, a Psychotic Disorder, or a Pervasive Developmental Disorder.  **MUST MEET ALL CRITERIA FOR DIAGNOSIS OF SCHIZOPHRENIA**  A. Characteristic symptoms: Two (or more) of the following, each present for a significant portion of time during a 1-month period (or less if successfully treated)*:    - Delusions   - Hallucinations   - Disorganized speech (eg, frequent derailment or incoherence)    - Grossly disorganized  or catatonic behavior    - Negative symptoms, ie, affective flattening, alogia, or avolition   B. Marked functional impairment in Occupational or Academic/Interpersonal Relationships/Self-care: For a significant portion of the time since the onset of the disturbance, one or more major areas of functioning such as work, interpersonal relations, or self-care are markedly below the level achieved prior to the onset (or when the onset is in childhood or adolescence, failure to achieve expected level of interpersonal, academic, or occupational achievement).  C. Duration: Continuous signs of the disturbance persist for at least 6 months. This 6-month period must include at least 1 month of symptoms (or less if successfully treated) that meet Criterion A (ie, active-phase symptoms) and may include periods of prodromal or residual symptoms. During these prodromal or residual periods, the signs of the disturbance may be manifested by only negative symptoms or two or more symptoms listed in Criterion A present in an attenuated form (eg, odd beliefs, unusual perceptual experiences).  E. Substance/general medical condition exclusion: The disturbance is not due to the direct physiological effects of a substance (eg, a drug of abuse, a medication) or a general medical condition.  F. Relationship to a pervasive developmental disorder: If there is a history of autistic disorder or another pervasive developmental disorder, the additional diagnosis of schizophrenia is made only if prominent delusions or hallucinations are also present for at least a month (or less if successfully treated).  **FYI: Only one Criterion A symptom is required if delusions are bizarre or hallucinations consist of a voice keeping up a running commentary on the person's behavior or thoughts, or two or more voices conversing with each other.**  Multiple episodes, currently in partial remission    Functional Status:  Patient's  symptoms have resulted in the  following functional impairments: academic performance, health maintenance, management of the household and or completion of tasks, money management, organization, relationship(s) and self-care    DSM5 Diagnoses: (Sustained by DSM5 Criteria Listed Above)  Diagnoses: 295.70  (F25.1) Schizoaffective Disorder Depressive Type  300.00 (F41.9) Unspecified Anxiety Disorder  Psychosocial & Contextual Factors: Pt reports living in [employer] Group home for the past 5yrs and specifically a house in [place] for the past year.  Pt reports having a legal guardian-Elma Flores @ [facility]; an outside Psychiatrist @ [facility]; and a Therapist-Rylee Samuels @ [facility].  WHODAS:   WHODAS 2.0 Total Score 2/2/2017 12/11/2017   Total Score 25 32       Preliminary Treatment Plan:  Plan for Safety and Risk Management:   A safety and risk management plan has been developed including: Patient consented to co-developed safety plan.  Safety and risk management plan was completed.  Patient agreed to use safety plan should any safety concerns arise.  A copy was given to the patient.     Collaboration:  Collaboration / coordination of treatment will be initiated with the following support professionals: [facility] ([facility]), day treatment, outpatient therapist, psychiatry and [facility]..    The following referral(s) will be initiated: [facility]  Day Treatment  Psychiatry.  Next Scheduled Appointment: 12/2019..  A Release of Information has been obtained for the following: Adult [facility] Mental [facility] Services ([facility]), outpatient therapist, psychiatry and legal guardian.     Patient's identified N/A    Initial Treatment will focus on: Anxiety - decrease anxeity symptoms  Mood Instability - emotional regulation  Thought Disturbance including: delusions and hallucinations  Coping skills for MH symptoms and increase in social  skills..     Resources/Service Plan:       services are not indicated.     Modifications to assist communication are not indicated.     Additional disability accomodations are not indicated     Discussed the general effects of drugs and alcohol on health and well-being. Provider gave patient printed information about the effects of chemical use on her health and well being.    Records were reviewed at time of assessment.    Report to child / adult protection services was NA.    Information in this assessment was obtained from the medical record and provided by patient who is a good historian.     Patient will have open access to their mental health medical record.    Jesus Dickerson, [facility]  November 27, 2019

## 2019-11-27 NOTE — PROGRESS NOTES
"Outpatient Mental Health Services - Adult    MY COPING PLAN FOR SAFETY    PATIENT'S NAME: Cesia Nettles  MRN:   [id number]  SAFETY PLAN:  Step 1: Warning signs / cues (Thoughts, images, mood, situation, behavior) that a crisis may be developing:    Thoughts: \"I don't matter\", \"People would be better off without me\", \"I'm a burden\", \"I can't do this anymore\" and \"I just want this to end\"    Thinking Processes: ruminations (can't stop thinking about my problems): ., racing thoughts, intrusive thoughts (bothersome, unwanted thoughts that come out of nowhere): ., highly critical and negative thoughts: . and paranoia: .    Mood: worsening depression, hopelessness, helplessness, intense worry, agitation and mood swings    Behaviors: isolating/withdrawing , can't stop crying, impulsive, reckless behaviors (acting without thinking): ., not taking care of myself, not taking care of my responsibilities, sleeping too much, not sleeping enough and increasing frequency and duration of dissociation    Situations: anniversary of KITTEN DYING 12/2018, changes in symptoms: INCREASED HALLUCINATIONS/DELUSIONS and relationship problems   Step 2: Coping strategies - Things I can do to take my mind off of my problems without contacting another person (relaxation technique, physical activity):    Distress Tolerance Strategies:  arts and crafts: ., play with my pet , listen to positive and upbeat music: ., watch a funny movie: ., read a book: ., change body temperature (ice pack/cold water)  and paced breathing/progressive muscle relaxation    Physical Activities: go for a walk and deep breathing    Focus on helpful thoughts:  \"This is temporary\", \"It always passes\" and remind myself of what is important to me: .  Step 3: People and social settings that provide distraction:   Name: Darell Muniz) Phone: [phone]  Step 4: Remind myself of people and things that are important to me and worth living for:  Family; friends; pets  Step 5: " When I am in crisis, I can ask these people to help me use my safety plan:   Name: Elma (Guardian) Phone: [phone]  Step 6: Making the environment safe:     be around others  Step 7: Professionals or agencies I can contact during a crisis:    Suicide Prevention Lifeline: 1-364-588-TALK (8991)    Crisis Text Line Service (available 24 hours a day, 7 days a week): Text [place] to 711354    Call  **CRISIS (048174) from a cell phone to talk to a team of professionals who can help you.  Crisis Services By [facility]: Phone Number:   [place]     [phone]   [place]    [phone]   [place]    [phone]   [place]    [phone]   [place]    [phone]   [place] [phone]   [place]     [phone]       Call 911 or go to my nearest emergency department.     I helped develop this safety plan and agree to use it when needed.  I have been given a copy of this plan.      Client signature _________________________________________________________________  Today s date:  11/27/2019  Adapted from Safety Plan Template 2008 Elizabeth Davis and Elmo Garza is reprinted with the express permission of the authors.  No portion of the Safety Plan Template may be reproduced without the express, written permission.  You can contact the authors at [email or web address] or [email or web address].

## 2019-11-27 NOTE — PROGRESS NOTES
11/27/19 1249   Behavioral Health   Hallucinations denies / not responding to hallucinations   Thinking intact   Orientation person: oriented;date: oriented;place: oriented;time: oriented   Memory baseline memory   Insight poor   Judgement impaired   Eye Contact at examiner   Affect full range affect   Mood mood is calm   Physical Appearance/Attire disheveled;attire appropriate to age and situation   Hygiene well groomed   Suicidality other (see comments)  (denies)   1. Wish to be Dead (Recent) No   2. Non-Specific Active Suicidal Thoughts (Recent) No   Self Injury other (see comment)  (denies)   Activity other (see comment)  (in milieu)   Speech coherent;clear   Psychomotor / Gait balanced;steady   Activities of Daily Living   Hygiene/Grooming independent   Oral Hygiene independent   Dress independent   Laundry with supervision   Room Organization independent     Pt attended groups and ate meals. Pt said she is going home today. Pt denies feeling depressed, SI, SIB, HI, and hallucinations. Pt rates anxiety at a 5 out of 10. Pt says this anxiety is a good anxiety related to going home today. Pt says she is trying to do things to distract herself from being anxious. Pt's plan is to attend out patient treatment starting on Monday. In the meantime, Pt will work tomorrow.

## 2019-11-27 NOTE — CONSULTS
Inpatient Diagnostic Assessment order acknowledged.  Patient was seen on 11/27/2019 in [facility].  DA completed, referral for Day Tx Group 2A 9-12pm, M, T, Th.  Start date Mon. 12/2    Jesus Dickerson, [facility], [facility]

## 2019-12-02 ENCOUNTER — HOSPITAL ENCOUNTER (OUTPATIENT)
Dept: BEHAVIORAL HEALTH | Facility: CLINIC | Age: 24
End: 2019-12-02
Attending: PSYCHIATRY & NEUROLOGY | Admitting: PSYCHIATRY & NEUROLOGY
Payer: COMMERCIAL

## 2019-12-02 DIAGNOSIS — F25.1 SCHIZOAFFECTIVE DISORDER, DEPRESSIVE TYPE (H): ICD-10-CM

## 2019-12-02 PROCEDURE — G0177 OPPS/PHP; TRAIN & EDUC SERV: HCPCS

## 2019-12-02 PROCEDURE — 90853 GROUP PSYCHOTHERAPY: CPT | Performed by: PSYCHOLOGIST

## 2019-12-02 ASSESSMENT — ANXIETY QUESTIONNAIRES
7. FEELING AFRAID AS IF SOMETHING AWFUL MIGHT HAPPEN: NEARLY EVERY DAY
5. BEING SO RESTLESS THAT IT IS HARD TO SIT STILL: SEVERAL DAYS
GAD7 TOTAL SCORE: 16
3. WORRYING TOO MUCH ABOUT DIFFERENT THINGS: NEARLY EVERY DAY
2. NOT BEING ABLE TO STOP OR CONTROL WORRYING: NEARLY EVERY DAY
IF YOU CHECKED OFF ANY PROBLEMS ON THIS QUESTIONNAIRE, HOW DIFFICULT HAVE THESE PROBLEMS MADE IT FOR YOU TO DO YOUR WORK, TAKE CARE OF THINGS AT HOME, OR GET ALONG WITH OTHER PEOPLE: VERY DIFFICULT
6. BECOMING EASILY ANNOYED OR IRRITABLE: MORE THAN HALF THE DAYS
1. FEELING NERVOUS, ANXIOUS, OR ON EDGE: MORE THAN HALF THE DAYS

## 2019-12-02 ASSESSMENT — PATIENT HEALTH QUESTIONNAIRE - PHQ9: 5. POOR APPETITE OR OVEREATING: MORE THAN HALF THE DAYS

## 2019-12-02 NOTE — GROUP NOTE
EBP Group Note    PATIENT'S NAME: Cesia Nettles  MRN:   [id number]  :   1995  ACCT. NUMBER: [id number]  DATE OF SERVICE: 19  START TIME: 10:00 AM  END TIME: 10:50 AM  FACILITATOR: Kathy Laguerre PsyD  TOPIC:  EBP Group: Behavioral Activation  Adult Mental Health Day Treatment  TRACK: 2A    NUMBER OF PARTICIPANTS: 7    Summary of Group / Topics Discussed:  Behavioral Activation: The Change Process - Behavior Change: Patients explored the process and types of change, including but not limited to, theories of change, steps to making change, methods of changing behavior, and potential barriers.  Patients worked to identify what changes may benefit their daily lives, and work towards a plan to implement change.      Patient Session Goals / Objectives:    Demonstrate understanding of the change process.      Identify positive and negative behavioral patterns.    Make plans to track and implement changes and share experiences in group.    Identify personal barriers to change      Patient Participation / Response:  Fully participated with the group by sharing personal reflections / insights and openly received / provided feedback with other participants.    Demonstrated understanding of topics discussed through group discussion and participation and Expressed understanding of the relationship between behaviors, thoughts, and feelings    Treatment Plan:  Patient has a current master individualized treatment plan.  See Epic treatment plan for more information.    Francisco Patterson, D,  Licensed Psychologist

## 2019-12-02 NOTE — GROUP NOTE
RN Group Note    PATIENT'S NAME: Cesia Nettles  MRN:   [id number]  :   1995  ACCT. NUMBER: [id number]  DATE OF SERVICE: 19  START TIME: 11:00 AM  END TIME: 11:50 AM  FACILITATOR: Svetlana Khan RN  TOPIC: KATIE RN Group: Health Maintenance  Adult Mental Health Day Treatment  TRACK: 2A    NUMBER OF PARTICIPANTS: 5    Summary of Group / Topics Discussed:  Health Maintenance: Wellness Check-in: Patients met with group facilitator to individually review a holistic wellness check-in to assess patient medication adherence/concerns, appointments, physical and mental health, exercise, nutrition, sleep, socialization, substance use, and need for service/resource referrals.       Patient Session Goals / Objectives:    Discussed various aspects of health management and self-care related to physical and mental health    Demonstrated increased self-awareness of current wellness needs    Developed health literacy skills in navigating the healthcare system and self-advocacy/communicating needs with health care team          Patient Participation / Response:  Fully participated with the group by sharing personal reflections / insights and openly received / provided feedback with other participants.    Demonstrated understanding of topics discussed through group discussion and participation    Treatment Plan:  Patient has a current master individualized treatment plan.  See Epic treatment plan for more information.    Svetlana Khan RN

## 2019-12-03 ENCOUNTER — HOSPITAL ENCOUNTER (OUTPATIENT)
Dept: BEHAVIORAL HEALTH | Facility: CLINIC | Age: 24
End: 2019-12-03
Attending: PSYCHIATRY & NEUROLOGY | Admitting: PSYCHIATRY & NEUROLOGY
Payer: COMMERCIAL

## 2019-12-03 VITALS — HEIGHT: 67 IN | BODY MASS INDEX: 31.49 KG/M2 | WEIGHT: 200.62 LBS

## 2019-12-03 PROCEDURE — 90853 GROUP PSYCHOTHERAPY: CPT | Performed by: PSYCHOLOGIST

## 2019-12-03 PROCEDURE — G0177 OPPS/PHP; TRAIN & EDUC SERV: HCPCS

## 2019-12-03 ASSESSMENT — MIFFLIN-ST. JEOR: SCORE: 1697.75

## 2019-12-03 ASSESSMENT — ANXIETY QUESTIONNAIRES: GAD7 TOTAL SCORE: 16

## 2019-12-03 NOTE — GROUP NOTE
"Process Group Note    PATIENT'S NAME: Cesia Nettles  MRN:   [id number]  :   1995  ACCT. NUMBER: [id number]  DATE OF SERVICE: 19  START TIME: 11:00 AM  END TIME: 11:50 AM  FACILITATOR: Kathy Laguerre PsyD  TOPIC:  Process Group    Diagnoses:  DSM5 Diagnosis  295.70  (F25.1) Schizoaffective Disorder Depressive Type  300.00 (F41.9) Unspecified Anxiety Disorder    Adult Mental Health Day Treatment  TRACK: 2A    NUMBER OF PARTICIPANTS: 7          Data:    Session content: At the start of this group, patients were invited to check in by identifying themselves, describing their current emotional status, and identifying issues to address in this group.   Area(s) of treatment focus addressed in this session included Symptom Management, Personal Safety and Community Resources/Discharge Planning.    Sudha reported being safe today. She stated that she had a nice evening, but was frustrated with her medical ride, since they brought her here 1 hour late.  She reported eating healthy food, getting good sleep, and felt her concentration was better since medications were changed.      Therapeutic Interventions/Treatment Strategies:  Psychotherapist offered support, feedback and validation and reinforced use of skills. Treatment modalities used include Cognitive Behavioral Therapy. Interventions include Cognitive Restructuring:  Assisted patient in identifying new neutral/positive core beliefs, Coping Skills: Discussed how the use of intentional \"in the moment\" actions can help reduce distress and Mindfulness: Encouraged a plan to use mindfulness skills in daily life.    Assessment:    Patient response:   Patient responded to session by accepting feedback, giving feedback, listening, focusing on goals, being attentive and accepting support    Possible barriers to participation / learning include: severity of symptoms    Health Issues:   None reported       Substance Use Review:   Substance Use: No active concerns " identified.    Mental Status/Behavioral Observations  Appearance:   Appropriate   Eye Contact:   Good   Psychomotor Behavior: Normal   Attitude:   Cooperative   Orientation:   All  Speech   Rate / Production: Normal    Volume:  Normal   Mood:    Depressed   Affect:    Constricted   Thought Content:   Psychosis denies any symptoms of psychosis  Thought Form:  Coherent  Logical     Insight:    Good     Plan:     Safety Plan: Recommended that patient call 911 or go to the local ED should there be a change in any of these risk factors.     Barriers to treatment: None identified    Patient Contracts (see media tab):  None    Substance Use: Not addressed in session     Continue or Discharge: Patient will continue in Day Treatment (DT)  as planned. Patient is likely to benefit from learning and using skills as they work toward the goals identified in their treatment plan.  Stabilization of symptoms is needed.      Fabien Patterson., D,  Licensed Psychologist      December 3, 2019

## 2019-12-03 NOTE — GROUP NOTE
Life Skills/OT Group Note    PATIENT'S NAME: Cesia Nettles  MRN:   [id number]  :   1995  ACCT. NUMBER: [id number]  DATE OF SERVICE: 19  START TIME:  9:00 AM  END TIME:  9:50 AM  FACILITATOR: Samm Mercado OTR/L  TOPIC:  Life Skills Group: Communication and Social Skills Development  Adult Mental Health Day Treatment  TRACK: 2A    NUMBER OF PARTICIPANTS: 7    Summary of Group / Topics Discussed:  Communication and Social Skills Development: Social Supports: Social Risk Taking: Patients explored and evaluated how effective they are in different aspects of social risk taking.  Patients gained awareness of different areas in which they need/want to take risks, possible benefits of taking this risk, and action steps to take.  Patients identified both personal strengths and opportunities for growth in social risk taking to improve overall communication and connection with other people.      Patient Session Goals / Objectives:    Identified strengths and opportunities for growth in social risk taking skills and how these impact their ability to communicate clearly with other people       Improved awareness of important aspects of social risk taking skills and how this relates to mental health recovery        Established a plan for practice of these skills in their own environments    Practiced and reflected on how to generalize taught skills to their everyday life        Patient Participation / Response:  Fully participated with the group by sharing personal reflections / insights and openly received / provided feedback with other participants.    Patient presentation: Calm,alert,focused with stable mood and thought process. She received a pre-admission score of 18/30 on a mental health recovery scale, Verbalized understanding of content and Patient would benefit from additional opportunities to practice the content to be able to generalize it to their everyday life with increased intentionality,  consistency, and efficacy in support of their psychiatric recovery    Treatment Plan:  Patient has a current master individualized treatment plan.  See Epic treatment plan for more information.    Samm Mercado, OTR/L

## 2019-12-03 NOTE — PROGRESS NOTES
RN Review of Medical History / Physical Health Screen  Outpatient Mental Health Programs - Adult    Adult Mental Health Day Treatment    PATIENT'S NAME: Cesia Nettles  MRN:   [id number]  :   1995  ACCT. NUMBER: [id number]  CURRENT AGE:  24 year old    DATE OF DIAGNOSTIC ASSESSMENT:12/3/19  DATE OF ADMISSION: 19    Please see Diagnostic Assessment for additional Medical History.     General Health:   Have you had any exposure to any communicable disease in the past 2-3 weeks? yes     Are you aware of safe sex practices? yes       Nutrition:    Are you on a special diet? If yes, please explain:  no   Do you have any concerns regarding your nutritional status? If yes, please explain:  no   Have you had any appetite changes in the last 3 months?  Yes, increased appetite     Have you had any weight loss or weight gain in the last 3 months?  Yes, how much? 5 lbs     Do you have a history of an eating disorder? no   Do you have a history of being in an eating disorder program? no     Patient height and weight recorded by RN in epic flowsheet: yes      BMI Review:  Was the patient informed of BMI? yes      Findings Above,  General nutrition education         Fall Risk:   Have you had any falls in the past 3 months? no     Do you currently use any assistive devices for mobility?   no      Additional Comments/Assessment: Nutrition counseling for BMI, no pain    Per completion of the Medical History / Physical Health Screen, is there a recommendation to see / follow up with a primary care physician/clinic or dentist?    Yes, Recommendations:   nutritional risk      Svetlana Khan RN  12/3/2019

## 2019-12-05 ENCOUNTER — HOSPITAL ENCOUNTER (OUTPATIENT)
Dept: BEHAVIORAL HEALTH | Facility: CLINIC | Age: 24
End: 2019-12-05
Attending: PSYCHIATRY & NEUROLOGY | Admitting: PSYCHIATRY & NEUROLOGY
Payer: COMMERCIAL

## 2019-12-05 PROCEDURE — G0177 OPPS/PHP; TRAIN & EDUC SERV: HCPCS

## 2019-12-05 PROCEDURE — 90853 GROUP PSYCHOTHERAPY: CPT | Performed by: PSYCHOLOGIST

## 2019-12-05 NOTE — GROUP NOTE
Life Skills/OT Group Note    PATIENT'S NAME: Cesia Nettles  MRN:   [id number]  :   1995  ACCT. NUMBER: [id number]  DATE OF SERVICE: 19  START TIME: 10:00 AM  END TIME: 10:50 AM  FACILITATOR: Ag Wan OT  TOPIC:  Life Skills Group: Sensory Approaches in Mental Health  Adult Mental Health Day Treatment  TRACK: 2A    NUMBER OF PARTICIPANTS: 7    Summary of Group / Topics Discussed:  Sensory Approaches in Mental Health:  Sensory Enhanced Mindfulness: Patients were taught and provided with an opportunity to explore and practice how using sensory enhanced mindfulness practices can help them stay grounded in the present moment as a way to manage mental health symptoms and stressors.         Patient Session Goals / Objectives:    Identified how using sensory enhanced mindfulness practices can be used for grounding, stress management, and self regulation      Improved awareness of different types of sensory enhanced mindfulness activities that assist with healthy coping of stress and symptoms      Established a plan for practice of these skills in their own environments    Practiced and reflected on how to generalize taught skills to their everyday life        Patient Participation / Response:  Fully participated with the group by sharing personal reflections / insights and openly received / provided feedback with other participants.    Verbalized understanding of content and Patient would benefit from additional opportunities to practice the content to be able to generalize it to their everyday life with increased intentionality, consistency, and efficacy in support of their psychiatric recovery    Treatment Plan:  Patient has a current master individualized treatment plan.  See Epic treatment plan for more information.    Ag Wan OT

## 2019-12-05 NOTE — GROUP NOTE
Life Skills/OT Group Note    PATIENT'S NAME: Cesia Nettles  MRN:   [id number]  :   1995  ACCT. NUMBER: [id number]  DATE OF SERVICE: 19  START TIME: 10:00 AM  END TIME: 10:50 AM  FACILITATOR: Ag Wan OT  TOPIC:  PHP OT Group: Self- Regulation Skills  Adult Mental Health Day Treatment  TRACK: 2A    NUMBER OF PARTICIPANTS: 7    Summary of Group / Topics Discussed:  Self-Regulation Skills: Sensory Enhanced Mindfulness: Proprioception Patients were provided with written and verbal psychoeducation on the concept of integrating mindfulness with a bottom up, sensory rich, experiential intervention activities to develop self-awareness and skills for self-regulation.  Emphasis placed on the benefits of proprioceptive input through experiential activities to emotionally ground oneself or provide a healthy distraction to self-regulate when distressed. Patients worked to increase knowledge and skills during a guided skilled sensory enhanced activity: Adapted Xi gong and Dallin Chi, an active meditation practice that has been shown to reduce sympathetic activation and develop and improve self regulation skills through practice. Facilitation of reflection to reinforce taught concepts was provided.     Patient Session Goals / Objectives:  Identified how using sensory (proprioception) enhanced mindfulness practices can be used for grounding, stress management, and self-regulation    Improved awareness of different types of sensory enhanced mindfulness activities that assist with healthy coping of stress and symptoms    Established a plan for practice of these skills in their own environments  Practiced and reflected on how to generalize taught skills to their everyday life        Patient Participation / Response:  {OP  PROGRESS NOTE PATIENT PARTICIPATION:212685}    {OP  PROGRESS NOTE PATIENT RESPONSE - LIFE SKILLS :084886}    Treatment Plan:  Patient has {OP  PROGRAMMATIC TX PLAN STATUS:608687}    Ag  Soco, OT

## 2019-12-05 NOTE — GROUP NOTE
Process Group Note    PATIENT'S NAME: Cesia Nettles  MRN:   [id number]  :   1995  ACCT. NUMBER: [id number]  DATE OF SERVICE: 19  START TIME:  9:00 AM  END TIME:  9:50 AM  FACILITATOR: Kathy Laguerre PsyD  TOPIC:  Process Group    Diagnoses:    295.70  (F25.1) Schizoaffective Disorder Depressive Type  300.00 (F41.9) Unspecified Anxiety Disorder    Adult Mental Health Day Treatment  TRACK: 2A      NUMBER OF PARTICIPANTS: 7          Data:    Session content: At the start of this group, patients were invited to check in by identifying themselves, describing their current emotional status, and identifying issues to address in this group.   Area(s) of treatment focus addressed in this session included Symptom Management, Personal Safety and Community Resources/Discharge Planning.    Therese reported being safe today. She stated that she talked to her manager at work about another co-worker who said something insulting to her. She problem-sovled with the group about how to react, what to say, and how to manage it.     Therapeutic Interventions/Treatment Strategies:  Psychotherapist offered support, feedback and validation and reinforced use of skills. Treatment modalities used include Cognitive Behavioral Therapy. Interventions include Cognitive Restructuring:  Assisted patient in formulating new neutral/positive alternatives to challenge less helpful / ineffective thoughts, Coping Skills: Assisted patient in understanding the purpose of planning / creating / participating / sharing in positive experiences and Mindfulness: Facilitated discussion of when/how to use mindfulness skills to benefit general health, mental health symptoms, and stressors.    Assessment:    Patient response:   Patient responded to session by accepting feedback, giving feedback, listening, focusing on goals and being attentive    Possible barriers to participation / learning include: severity of symptoms    Health Issues:   None  reported       Substance Use Review:   Substance Use: No active concerns identified.    Mental Status/Behavioral Observations  Appearance:   Appropriate   Eye Contact:   Good   Psychomotor Behavior: Normal   Attitude:   Cooperative   Orientation:   All  Speech   Rate / Production: Normal    Volume:  Normal   Mood:    Anxious  Normal  Affect:    Appropriate   Thought Content:   Rumination  Thought Form:  Coherent  Logical     Insight:    Good     Plan:     Safety Plan: Recommended that patient call 911 or go to the local ED should there be a change in any of these risk factors.     Barriers to treatment: None identified    Patient Contracts (see media tab):  None    Substance Use: Not addressed in session     Continue or Discharge: Patient will continue in Day Treatment (DT)  as planned. Patient is likely to benefit from learning and using skills as they work toward the goals identified in their treatment plan.     Stabilization of symptoms is needed.        Francisco Patterson, D,  Licensed Psychologist   December 5, 2019

## 2019-12-09 ENCOUNTER — HOSPITAL ENCOUNTER (OUTPATIENT)
Dept: BEHAVIORAL HEALTH | Facility: CLINIC | Age: 24
End: 2019-12-09
Attending: PSYCHIATRY & NEUROLOGY | Admitting: PSYCHIATRY & NEUROLOGY
Payer: COMMERCIAL

## 2019-12-09 PROCEDURE — 90853 GROUP PSYCHOTHERAPY: CPT | Performed by: PSYCHOLOGIST

## 2019-12-09 PROCEDURE — G0177 OPPS/PHP; TRAIN & EDUC SERV: HCPCS

## 2019-12-09 ASSESSMENT — PATIENT HEALTH QUESTIONNAIRE - PHQ9: SUM OF ALL RESPONSES TO PHQ QUESTIONS 1-9: 16

## 2019-12-09 NOTE — GROUP NOTE
EBP Group Note    PATIENT'S NAME: Cesia Nettles  MRN:   [id number]  :   1995  ACCT. NUMBER: [id number]  DATE OF SERVICE: 19  START TIME:  10:00 AM  END TIME:  10:50 AM  FACILITATOR: Kathy Laguerre PsyD  TOPIC:  EBP Group: Emotions Management  Adult Mental Health Day Treatment  TRACK: 2A    NUMBER OF PARTICIPANTS: 4    Summary of Group / Topics Discussed:  Emotions Management: Guilt and Shame: Patients explored and shared personal experiences associated with feelings of guilt and shame.  Group explored how these feelings develop, what they mean to each individual, and how to increase acceptance and usefulness of these feelings.  Group members assisted one another to contextualize these concepts and promote healing.     Patient Session Goals / Objectives:    Discuss and review definitions and personal views/experiences with guilt and shame    Understand the differences between guilt and shame    Explore how feelings of guilt and shame impact functioning    Understand and practice strategies to manage difficult emotions and move towards healing    Understand and normalize difficult emotions through group discussion    Understand the utility of guilt and shame    Target  unwanted  emotions for change      Patient Participation / Response:  Fully participated with the group by sharing personal reflections / insights and openly received / provided feedback with other participants.    Expressed understanding of the relevance / importance of emotions management skills at distressing times in life    Treatment Plan:  Patient has a current master individualized treatment plan.  See Epic treatment plan for more information.    Francisco Patterson, D,  Licensed Psychologist

## 2019-12-09 NOTE — GROUP NOTE
RN Group Note    PATIENT'S NAME: Cesia Nettles  MRN:   [id number]  :   1995  ACCT. NUMBER: [id number]  DATE OF SERVICE: 19  START TIME:  9:00 AM  END TIME:  9:50 AM  FACILITATOR: Cathy Aleman RN  TOPIC:  RN Group: [facility]  Adult Mental Health Day Treatment  TRACK: 2A    NUMBER OF PARTICIPANTS: 3    Summary of Group / Topics Discussed:  Foundations of Health: Nutrition: Macronutrients: Patient were provided education on major macronutrients, their role in the body, and why it is important to meet daily nutritional needs. Obstacles to meeting daily nutritional needs were identified in group discussion and strategies to promote improved nutrition were explored. Macronutrients discussed include: carbohydrates, proteins and amino acids, fats, fiber, and water.     Patient Session Goals / Objectives:  ? Discussed the role of diet on mood, physical health, energy level, and the development of chronic disease.  ? Identified daily nutritional needs recommended by the USDA via My Plate education resources  ? Developing increased health literacy skills in finding credible nutrition information from reliable sources    Patient Participation / Response:  Fully participated with the group by sharing personal reflections / insights and openly received / provided feedback with other participants.    Demonstrated understanding of topics discussed through group discussion and participation, Identified / Expressed personal readiness to practice skills and Verbalized understanding of foundations of health topic    Treatment Plan:  Patient has a current master individualized treatment plan.  See Epic treatment plan for more information.    Cathy Aleman RN

## 2019-12-09 NOTE — ADDENDUM NOTE
Encounter addended by: James Cristina on: 12/9/2019 3:48 PM   Actions taken: Visit Navigator Flowsheet section accepted

## 2019-12-09 NOTE — GROUP NOTE
Process Group Note    PATIENT'S NAME: Cesia Nettles  MRN:   [id number]  :   1995  ACCT. NUMBER: [id number]  DATE OF SERVICE: 19  START TIME: 11:00 AM  END TIME: 11:50 AM  FACILITATOR: Kathy Laguerre PsyD  TOPIC:  Process Group    Diagnoses:  295.70  (F25.1) Schizoaffective Disorder Depressive Type  300.00 (F41.9) Unspecified Anxiety Disorder    Adult Mental Health Day Treatment  TRACK: 2A    NUMBER OF PARTICIPANTS: 4          Data:    Session content: At the start of this group, patients were invited to check in by identifying themselves, describing their current emotional status, and identifying issues to address in this group.   Area(s) of treatment focus addressed in this session included Symptom Management, Personal Safety and Community Resources/Discharge Planning.    Therese reported being safe today. She stated that she was nervous about work, since she worked with a co-worker that was critical of her work, but she said everything went ok, and that her manager must have talked to the co-worker. She reported that she did give this person a compliment for talking with the residents about the meal.  She was validated by the group.     Therapeutic Interventions/Treatment Strategies:  Psychotherapist offered support, feedback and validation and reinforced use of skills. Treatment modalities used include Cognitive Behavioral Therapy. Interventions include Cognitive Restructuring:  Explored impact of ineffective thoughts / distortions on mood and activity, Coping Skills: Promoted understanding of how and when to apply grounding strategies to reduce distress and increase presence in the moment and Mindfulness: Encouraged a plan to use mindfulness skills in daily life.    Assessment:    Patient response:   Patient responded to session by accepting feedback, giving feedback, listening, focusing on goals, being attentive and accepting support    Possible barriers to participation / learning include:  severity of symptoms    Health Issues:   None reported       Substance Use Review:   Substance Use: No active concerns identified.    Mental Status/Behavioral Observations  Appearance:   Appropriate   Eye Contact:   Good   Psychomotor Behavior: Normal   Attitude:   Cooperative   Orientation:   All  Speech   Rate / Production: Normal    Volume:  Normal   Mood:    Anxious  Depressed  Normal  Affect:    Appropriate   Thought Content:   Rumination   Psychosis symptoms have decreased  Thought Form:  Coherent  Logical     Insight:    Good     Plan:     Safety Plan: Recommended that patient call 911 or go to the local ED should there be a change in any of these risk factors.     Barriers to treatment: None identified    Patient Contracts (see media tab):  None    Substance Use: Not addressed in session     Continue or Discharge: Patient will continue in Day Treatment (DT)  as planned. Patient is likely to benefit from learning and using skills as they work toward the goals identified in their treatment plan.     Stabilization of symptoms is needed.          Fabien Patterson., D,  Licensed Psychologist   December 9, 2019

## 2019-12-10 ENCOUNTER — HOSPITAL ENCOUNTER (OUTPATIENT)
Dept: BEHAVIORAL HEALTH | Facility: CLINIC | Age: 24
End: 2019-12-10
Attending: PSYCHIATRY & NEUROLOGY | Admitting: PSYCHIATRY & NEUROLOGY
Payer: COMMERCIAL

## 2019-12-10 DIAGNOSIS — R45.851 SUICIDAL IDEATION: ICD-10-CM

## 2019-12-10 PROCEDURE — G0177 OPPS/PHP; TRAIN & EDUC SERV: HCPCS | Performed by: COUNSELOR

## 2019-12-10 PROCEDURE — 90853 GROUP PSYCHOTHERAPY: CPT | Performed by: PSYCHOLOGIST

## 2019-12-10 PROCEDURE — G0177 OPPS/PHP; TRAIN & EDUC SERV: HCPCS

## 2019-12-10 NOTE — GROUP NOTE
Life Skills/OT Group Note    PATIENT'S NAME: Cesia Nettles  MRN:   [id number]  :   1995  ACCT. NUMBER: [id number]  DATE OF SERVICE: 12/10/19  START TIME:  9:00 AM  END TIME:  9:50 AM  FACILITATOR: Samm Mercado OTR/L  TOPIC:  Life Skills Group: Lifestyle Balance and Structure  Adult Mental Health Day Treatment  TRACK: 2A    NUMBER OF PARTICIPANTS: 6    Summary of Group / Topics Discussed:  Lifestyle Balance and Strucure:  Occupations:: Patients were introduced to the importance of daily occupations in support of mental health management by exploring a balanced lifestyle.  Patients identified how they spend their time and skills to bring this into better balance.  Patients were assisted to establish, restore, and/or modify performance skills and patterns for improved engagement and promotion of positive mental health through meaningful occupations.  Patients gained awareness of the connection between who they are (self identity) with what they do.    Patient Session Goals / Objectives:    Increased awareness of how patient s functioning in identified meaningful occupations are impacted by their mental health status     Developed performance skills and performance patterns to enhance occupational engagement through creating a balanced lifestyle    Explored ways to generalize new awareness and skills to their everyday life        Patient Participation / Response:  Fully participated with the group by sharing personal reflections / insights and openly received / provided feedback with other participants.    Patient presentation: Calm,alert,focused with stable mood and thought process and Patient would benefit from additional opportunities to practice the content to be able to generalize it to their everyday life with increased intentionality, consistency, and efficacy in support of their psychiatric recovery    Treatment Plan:  Patient has a current master individualized treatment plan.   See Epic treatment plan for more information.    Samm Mercado, OTR/L

## 2019-12-10 NOTE — GROUP NOTE
RN Group Note    PATIENT'S NAME: Cesia Nettles  MRN:   [id number]  :   1995  ACCT. NUMBER: [id number]  DATE OF SERVICE: 12/10/19  START TIME: 11:00 AM  END TIME: 11:50 AM  FACILITATOR: Marina Arthur LPCC  TOPIC:  RN Group: Health Maintenance  Adult Mental Health Day Treatment  TRACK: 2A    NUMBER OF PARTICIPANTS: 5    Summary of Group / Topics Discussed:  Health Maintenance: Goal Setting: Meaningful goals can bring a sense of direction and purpose in life.  They also highlight our most important values. Patients were assisted by instructor to identify short term goals to promote their mental health recovery and improve overall health and wellness. Patients were educated on SMART goal setting framework as a strategy to increase outcomes and promote success.    Patient Session Goals / Objectives:  ? Explained the key concepts of SMART goal setting framework  ? Identified three goals successfully using SMART goal setting framework  ? Reviewed concept of balance in wellness as it pertains to goal setting        Patient Participation / Response:  Fully participated with the group by sharing personal reflections / insights and openly received / provided feedback with other participants.    Demonstrated understanding of topics discussed through group discussion and participation    Treatment Plan:  Patient has a current master individualized treatment plan.  See Epic treatment plan for more information.    KEVEN Andrade

## 2019-12-10 NOTE — GROUP NOTE
Process Group Note    PATIENT'S NAME: Cesia Nettles  MRN:   [id number]  :   1995  ACCT. NUMBER: [id number]  DATE OF SERVICE: 12/10/19  START TIME: 10:00 AM  END TIME: 10:50 AM  FACILITATOR: Kathy Laguerre PsyD  TOPIC:  Process Group    Diagnoses:     295.70  (F25.1) Schizoaffective Disorder Depressive Type  300.00 (F41.9) Unspecified Anxiety Disorder    : Mental Health Resources: Ely Leonel  Guardian: [place]: Elma Flores : [phone]  CADI:  Mariela Walker: [place] [facility]  [facility]:  [facility]: [address]: [phone]   [employer] : Nino Omarigraeme [phone]    Adult Mental Health Day Treatment  TRACK: 2A    NUMBER OF PARTICIPANTS: 6          Data:    Session content: At the start of this group, patients were invited to check in by identifying themselves, describing their current emotional status, and identifying issues to address in this group.   Area(s) of treatment focus addressed in this session included Symptom Management, Personal Safety and Community Resources/Discharge Planning.    Therese reported being safe today. She reported feeling nervous about tomorrow, which is the 1 year anniversary of her cat dying. The group discussed grief and loss issues with her and how she loved her cat and what to do at her job. The group discussed how she could bring a present to her mom's house and give it to the 4 cats that live there as a  Present from her  cat.    Therapeutic Interventions/Treatment Strategies:  Psychotherapist offered support, feedback and validation and reinforced use of skills. Treatment modalities used include Cognitive Behavioral Therapy. Interventions include Cognitive Restructuring:  Explored impact of ineffective thoughts / distortions on  "mood and activity and Assisted patient in formulating new neutral/positive alternatives to challenge less helpful / ineffective thoughts, Coping Skills: Discussed how the use of intentional \"in the moment\" actions can help reduce distress and Assisted patient in understanding the purpose of planning / creating / participating / sharing in positive experiences and Mindfulness: Encouraged a plan to use mindfulness skills in daily life.    Assessment:    Patient response:   Patient responded to session by accepting feedback, giving feedback, listening, focusing on goals, being attentive and accepting support    Possible barriers to participation / learning include: severity of symptoms    Health Issues:   None reported       Substance Use Review:   Substance Use: No active concerns identified.    Mental Status/Behavioral Observations  Appearance:   Appropriate   Eye Contact:   Good   Psychomotor Behavior: Normal   Attitude:   Cooperative   Orientation:   All  Speech   Rate / Production: Normal    Volume:  Normal   Mood:    Depressed  Normal  Affect:    Appropriate   Thought Content:   Rumination  Thought Form:  Coherent  Logical     Insight:    Good     Plan:     Safety Plan: Recommended that patient call 911 or go to the local ED should there be a change in any of these risk factors.     Barriers to treatment: None identified    Patient Contracts (see media tab):  None    Substance Use: Not addressed in session     Continue or Discharge: Patient will continue in Day Treatment (DT)  as planned. Patient is likely to benefit from learning and using skills as they work toward the goals identified in their treatment plan.     Stabilization of symptoms is needed.    Fabien Patterson., D,  Licensed Psychologist        Kathy Laguerre PsyD  December 10, 2019  "

## 2019-12-10 NOTE — ADDENDUM NOTE
Encounter addended by: Kathy Laguerre PsyD on: 12/10/2019 3:44 PM   Actions taken: Order Reconciliation Section accessed

## 2019-12-11 RX ORDER — LAMOTRIGINE 100 MG/1
TABLET ORAL
Qty: 30 TABLET | Refills: 0 | OUTPATIENT
Start: 2019-12-11

## 2019-12-12 ENCOUNTER — TELEPHONE (OUTPATIENT)
Dept: BEHAVIORAL HEALTH | Facility: CLINIC | Age: 24
End: 2019-12-12

## 2019-12-12 ENCOUNTER — HOSPITAL ENCOUNTER (OUTPATIENT)
Dept: BEHAVIORAL HEALTH | Facility: CLINIC | Age: 24
End: 2019-12-12
Attending: PSYCHIATRY & NEUROLOGY | Admitting: PSYCHIATRY & NEUROLOGY
Payer: COMMERCIAL

## 2019-12-12 PROCEDURE — G0177 OPPS/PHP; TRAIN & EDUC SERV: HCPCS | Performed by: OCCUPATIONAL THERAPIST

## 2019-12-12 PROCEDURE — G0177 OPPS/PHP; TRAIN & EDUC SERV: HCPCS

## 2019-12-12 NOTE — TELEPHONE ENCOUNTER
Therese called to say that she would be late, due to  [place].    Fabien Patterson., D,  Licensed Psychologist

## 2019-12-12 NOTE — GROUP NOTE
Life Skills/OT Group Note    PATIENT'S NAME: Cesia Nettles  MRN:   [id number]  :   1995  ACCT. NUMBER: [id number]  DATE OF SERVICE: 19  START TIME: 10:00 AM  END TIME: 10:50 AM  FACILITATOR: Ag Wan OT  TOPIC:  Life Skills Group: Sensory Approaches in Mental Health  Adult Mental Health Day Treatment  TRACK: 2A    NUMBER OF PARTICIPANTS: 5    Summary of Group / Topics Discussed:  Sensory Approaches in Mental Health:  Focused Activity: Patients were provided with verbal and experiential education to identify physical and sensorimotor based activities that can be utilized for stress management, self care, health maintenance, and self regulation.  Patients increased knowledge and awareness of activities that support good communication and grounding skills through healthy engagement in mindful focused activities for effective coping with illness and reduction of maladaptive coping skills.     Patient Session Goals / Objectives:    Identified specific physical and sensorimotor based activities for stress management, communication, and self regulation      Improved awareness of activities that are meaningful focused activities that support good self awareness and communication and how this applies to current daily life    Established a plan for practice of these skills in their own environments    Practiced and reflected on how to generalize taught skills to their everyday life      Patient Participation / Response:  Fully participated with the group by sharing personal reflections / insights and openly received / provided feedback with other participants.    Verbalized understanding of content and Patient would benefit from additional opportunities to practice the content to be able to generalize it to their everyday life with increased intentionality, consistency, and efficacy in support of their psychiatric recovery    Treatment Plan:  Patient has a current master individualized treatment plan.   See Epic treatment plan for more information.    Ag Wan, OT

## 2019-12-13 DIAGNOSIS — F25.1 SCHIZOAFFECTIVE DISORDER, DEPRESSIVE TYPE (H): ICD-10-CM

## 2019-12-13 DIAGNOSIS — F41.9 ANXIETY: ICD-10-CM

## 2019-12-13 DIAGNOSIS — F20.3 SCHIZOPHRENIA, UNDIFFERENTIATED (H): ICD-10-CM

## 2019-12-13 DIAGNOSIS — R45.851 SUICIDAL IDEATION: ICD-10-CM

## 2019-12-13 RX ORDER — LAMOTRIGINE 100 MG/1
150 TABLET ORAL DAILY
Qty: 30 TABLET | Refills: 1 | Status: SHIPPED | OUTPATIENT
Start: 2019-12-13 | End: 2019-12-13

## 2019-12-13 RX ORDER — LAMOTRIGINE 100 MG/1
150 TABLET ORAL DAILY
Qty: 45 TABLET | Refills: 1 | Status: SHIPPED | OUTPATIENT
Start: 2019-12-13 | End: 2020-02-06

## 2019-12-13 RX ORDER — PALIPERIDONE 6 MG/1
12 TABLET, EXTENDED RELEASE ORAL AT BEDTIME
Qty: 60 TABLET | Refills: 1 | Status: SHIPPED | OUTPATIENT
Start: 2019-12-13 | End: 2020-04-27

## 2019-12-13 RX ORDER — HYDROXYZINE HYDROCHLORIDE 25 MG/1
25 TABLET, FILM COATED ORAL AT BEDTIME
Qty: 30 TABLET | Refills: 1 | Status: SHIPPED | OUTPATIENT
Start: 2019-12-13 | End: 2020-03-06

## 2019-12-13 RX ORDER — FLUOXETINE 40 MG/1
80 CAPSULE ORAL AT BEDTIME
Qty: 60 CAPSULE | Refills: 1 | Status: SHIPPED | OUTPATIENT
Start: 2019-12-13 | End: 2020-05-28

## 2019-12-13 NOTE — TELEPHONE ENCOUNTER
Received call from clinic  staff, who reported that pharmacy called requesting Lamictal order be sent with quantity 45, a 30-day supply.  Writer e-prescribed updated order.

## 2019-12-13 NOTE — GROUP NOTE
Life Skills/OT Group Note    PATIENT'S NAME: Cesia Nettles  MRN:   [id number]  :   1995  ACCT. NUMBER: [id number]  DATE OF SERVICE: 19  START TIME: 11:00 AM  END TIME: 11:50 AM  FACILITATOR: Freda Carroll OTR/L  TOPIC:  Life Skills Group: Lifestyle Balance and Structure  Adult Mental Health Day Treatment  TRACK: 2A    NUMBER OF PARTICIPANTS: 5    Summary of Group / Topics Discussed:  Lifestyle Balance and Strucure:  Occupations: Patients were provided with an overview on the importance of daily occupations in support of mental health management.  Patients were assisted to establish, restore, and/or modify performance skills and patterns for improved engagement and promotion of positive mental health through meaningful occupations.  Patients gained awareness of the connection between who they are (self identity) with what they do.    Patient Session Goals / Objectives:    Increased awareness of how patient s functioning in identified meaningful occupations are impacted by their mental health status     Developed performance skills and performance patterns to enhance occupational engagement    Explored ways to generalize new awareness and skills to their everyday life        Patient Participation / Response:  Fully participated with the group by sharing personal reflections / insights and openly received / provided feedback with other participants.    Patient presentation: constricted affect; social with peers and this writer; anxious mood; fair concentration, Demonstrated understanding of content through identifying meaningful activities and occupations to her and how this helps with symptom recovery and goal attainment  and Patient would benefit from additional opportunities to practice the content to be able to generalize it to their everyday life with increased intentionality, consistency, and efficacy in support of their psychiatric recovery    Treatment Plan:  Patient has a current master  individualized treatment plan.  See Epic treatment plan for more information.    Freda Carroll, OTR/L

## 2019-12-13 NOTE — TELEPHONE ENCOUNTER
Received a call from Yulisa at [facility], who requested refills of Lamictal and metformin for pt for her monthly DisPills. They have one refill of her other meds.    Last seen: 10/29  RTC: 6 weeks  Non-provider cancel: 12/13 Work/School Conflict (Per patient phone call), 12/9 Changed Appt Time, Date or Type  No-show: None  Next appt: 1/22     Incoming refill from [facility] via telephone     Medication requested:   Disp Refills Start End FRACISCO   FLUoxetine (PROZAC) 40 MG capsule 60 capsule 1 10/29/2019  No   Sig - Route: Take 2 capsules (80 mg) by mouth At Bedtime      Disp Refills Start End FRACISCO   hydrOXYzine (ATARAX) 25 MG tablet 30 tablet 1 10/29/2019  No   Sig - Route: Take 1 tablet (25 mg) by mouth At Bedtime      Disp Refills Start End FRACISCO   lamoTRIgine (LAMICTAL) 100 MG tablet 30 tablet 0 11/27/2019  No   Sig - Route: Take 1.5 tablets (150 mg) by mouth daily      Disp Refills Start End FRACISCO   metFORMIN (GLUCOPHAGE) 500 MG tablet 60 tablet 6 6/10/2019  No   Sig - Route: Take 2 tablets (1,000 mg) by mouth 2 times daily (with meals)      Disp Refills Start End FRACISCO   paliperidone ER (INVEGA) 6 MG 24 hr tablet 60 tablet 1 10/29/2019  No   Sig - Route: Take 2 tablets (12 mg) by mouth At Bedtime     Per chart, pharmacy should have sufficient refills of melatonin until after her 1/22 appointment.    Refill routed to provider due to refill protocol (cancellation).        12/13/19 1137 Yudy Yadav MD   E-Prescribing Status: Receipt confirmed by pharmacy (12/13/2019 11:39 AM CST)

## 2019-12-16 ENCOUNTER — HOSPITAL ENCOUNTER (OUTPATIENT)
Dept: BEHAVIORAL HEALTH | Facility: CLINIC | Age: 24
End: 2019-12-16
Attending: PSYCHIATRY & NEUROLOGY | Admitting: PSYCHIATRY & NEUROLOGY
Payer: COMMERCIAL

## 2019-12-16 PROCEDURE — 90853 GROUP PSYCHOTHERAPY: CPT | Performed by: PSYCHOLOGIST

## 2019-12-16 PROCEDURE — G0177 OPPS/PHP; TRAIN & EDUC SERV: HCPCS

## 2019-12-16 NOTE — GROUP NOTE
RN Group Note    PATIENT'S NAME: Cesia Nettles  MRN:   [id number]  :   1995  ACCT. NUMBER: [id number]  DATE OF SERVICE: 19  START TIME:  9:00 AM  END TIME:  9:50 AM  FACILITATOR: Cathy Aleman RN  TOPIC:  RN Group: [facility]  Adult Mental Health Day Treatment  TRACK: 2A    NUMBER OF PARTICIPANTS: 6    Summary of Group / Topics Discussed:  Foundations of Health: Sexual health/risk reduction:   Patients were educated on safe sexual health practices including: safe use of birth control/contraceptive methods, barrier protection, and use of healthy boundaries. Major sexually transmitted infections and their signs, symptoms, transmission routes, and treatment options were discussed and prevention strategies were reviewed. Current recommendations for STI screening was discussed.     Patient Session Goals / Objectives:  ? Verbalized knowledge of safe sexual practices   ? Described sexually transmitted infections types, signs & symptoms, transmission routes and prevention strategies and behaviors  ? Identify current recommendations for STI screening    Patient Participation / Response:  Fully participated with the group by sharing personal reflections / insights and openly received / provided feedback with other participants.    Demonstrated understanding of topics discussed through group discussion and participation, Identified / Expressed personal readiness to practice skills and Verbalized understanding of foundations of health topic    Treatment Plan:  Patient has a current master individualized treatment plan.  See Epic treatment plan for more information.    Cathy Aleman RN

## 2019-12-16 NOTE — GROUP NOTE
Process Group Note    PATIENT'S NAME: Cesia Nettles  MRN:   [id number]  :   1995  ACCT. NUMBER: [id number]  DATE OF SERVICE: 19  START TIME: 10:00 AM  END TIME: 10:50 AM  FACILITATOR: Kathy Laguerre PsyD  TOPIC:  Process Group    Diagnoses:  295.70  (F25.1) Schizoaffective Disorder Depressive Type  300.00 (F41.9) Unspecified Anxiety Disorder     : Mental Health Resources: Ely Leonel  Guardian: [facility]: Elma Flores : [phone]  CADI:  Mariela Walker: [place] [facility]  [facility]:  [facility]: [address]: [phone]   [employer] : Nino Salcidograeme [phone]    Adult Mental Health Day Treatment  TRACK: 2A    NUMBER OF PARTICIPANTS: 6          Data:    Session content: At the start of this group, patients were invited to check in by identifying themselves, describing their current emotional status, and identifying issues to address in this group.   Area(s) of treatment focus addressed in this session included Symptom Management, Personal Safety and Community Resources/Discharge Planning.    Therese reported being safe today. She talked with the group about her job and how she questions if she is doing it correctly, and how she catastrophizes.  The group validated her level of anxiety. She talked about social anxiety and whether it was paranoia or not.      Therapeutic Interventions/Treatment Strategies:  Psychotherapist offered support, feedback and validation and reinforced use of skills. Treatment modalities used include Cognitive Behavioral Therapy. Interventions include Cognitive Restructuring:  Explored impact of ineffective thoughts / distortions on mood and activity, Coping Skills: Promoted understanding of how and when to apply grounding strategies to reduce distress and increase presence in the moment and Mindfulness: Facilitated discussion of when/how to use mindfulness skills to benefit general health, mental health symptoms, and  stressors.    Assessment:    Patient response:   Patient responded to session by accepting feedback, giving feedback, listening, focusing on goals, being attentive and accepting support    Possible barriers to participation / learning include: severity of symptoms    Health Issues:   None reported       Substance Use Review:   Substance Use: No active concerns identified.    Mental Status/Behavioral Observations  Appearance:   Appropriate   Eye Contact:   Good   Psychomotor Behavior: Normal   Attitude:   Cooperative   Orientation:   All  Speech   Rate / Production: Normal    Volume:  Normal   Mood:    Anxious  Normal  Affect:    Constricted   Thought Content:   Rumination and Psychosis denies any symptoms of psychosis  Thought Form:  Coherent  Logical     Insight:    Good     Plan:     Safety Plan: Recommended that patient call 911 or go to the local ED should there be a change in any of these risk factors.     Barriers to treatment: None identified    Patient Contracts (see media tab):  None    Substance Use: Not addressed in session     Continue or Discharge: Patient will continue in Day Treatment (DT)  as planned. Patient is likely to benefit from learning and using skills as they work toward the goals identified in their treatment plan.     Stabilization of symptoms is needed.    Fabien Patterson., D,  Licensed Psychologist        Kathy Laguerre PsyD  December 16, 2019

## 2019-12-19 ENCOUNTER — TELEPHONE (OUTPATIENT)
Dept: BEHAVIORAL HEALTH | Facility: CLINIC | Age: 24
End: 2019-12-19

## 2019-12-23 ENCOUNTER — HOSPITAL ENCOUNTER (OUTPATIENT)
Dept: BEHAVIORAL HEALTH | Facility: CLINIC | Age: 24
End: 2019-12-23
Attending: PSYCHIATRY & NEUROLOGY | Admitting: PSYCHIATRY & NEUROLOGY
Payer: COMMERCIAL

## 2019-12-23 PROCEDURE — 90853 GROUP PSYCHOTHERAPY: CPT | Performed by: PSYCHOLOGIST

## 2019-12-23 PROCEDURE — G0177 OPPS/PHP; TRAIN & EDUC SERV: HCPCS

## 2019-12-23 NOTE — GROUP NOTE
RN Group Note    PATIENT'S NAME: Cesia Nettles  MRN:   [id number]  :   1995  ACCT. NUMBER: [id number]  DATE OF SERVICE: 19  START TIME:  9:00 AM  END TIME:  9:50 AM  FACILITATOR: Cathy Aleman RN  TOPIC:  RN Group: [facility]  Adult Mental Health Day Treatment  TRACK: 2A    NUMBER OF PARTICIPANTS: 4    Summary of Group / Topics Discussed:  Foundations of Health: Sleep: Pathophysiology of sleep disorders: The anatomy of sleep was reviewed including structures, stages, mechanisms, and the purpose that sleep has on the brain. Sleep disorders were discussed within the group with a focus on gaining knowledge about the etiology and pathophysiology of insomnia, sleep apnea, restless leg syndrome, narcolepsy, medications that can cause risk for sleeping disorders, and other symptoms/factors that may interfere with sleep. Risk factors for developing sleep disorders were discussed and treatments/pharmacological options were explored.     Patient Session Goals / Objectives:  ? Described the effect and purpose of sleep on the brain and identify the amount of sleep needed  ? Identified differences between sleep disorders and risks associated with sleep disorders  ? Described the connection between sleep disturbances and mental illness    Increased knowledge about treatments for sleep disorders      Patient Participation / Response:  Fully participated with the group by sharing personal reflections / insights and openly received / provided feedback with other participants.    Demonstrated understanding of topics discussed through group discussion and participation, Identified / Expressed personal readiness to practice skills and Verbalized understanding of foundations of health topic    Treatment Plan:  Patient has a current master individualized treatment plan.  See Epic treatment plan for more information.    Cathy Aleman RN

## 2019-12-23 NOTE — GROUP NOTE
Process Group Note    PATIENT'S NAME: Cesia Nettles  MRN:   [id number]  :   1995  ACCT. NUMBER: [id number]  DATE OF SERVICE: 19  START TIME: 10:00 AM  END TIME: 10:50 AM  FACILITATOR: Kathy Laguerre PsyD  TOPIC:  Process Group    Diagnoses:  295.70  (F25.1) Schizoaffective Disorder Depressive Type  300.00 (F41.9) Unspecified Anxiety Disorder     : Mental Health Resources: Ely Leonel  Guardian: [place]: Elma Flores : [phone]  CADI:  Mariela Walker: [place] [facility]  [facility]:  [facility]: [address]: [phone]   Pro Act : Nino Salcidograeme [phone]    Adult Mental Health Day Treatment  TRACK: 2A    NUMBER OF PARTICIPANTS: 4          Data:    Session content: At the start of this group, patients were invited to check in by identifying themselves, describing their current emotional status, and identifying issues to address in this group.   Area(s) of treatment focus addressed in this session included Symptom Management, Personal Safety and Community Resources/Discharge Planning.    Therese reported being safe today. She talked to the group about a staff person, who she felt was insulting her and what she did, and talked about the anniversary of her cat dying  One year ago. She talked about how she was assertive and told another staff person about the conflict. The group validated her.    Therapeutic Interventions/Treatment Strategies:  Psychotherapist offered support, feedback and validation and reinforced use of skills. Treatment modalities used include Cognitive Behavioral Therapy. Interventions include Cognitive Restructuring:  Explored impact of ineffective thoughts / distortions on mood and activity, Coping Skills: Promoted understanding of how and when to apply grounding strategies to reduce distress and increase presence in the moment and Mindfulness: Facilitated discussion of when/how to use mindfulness skills to benefit general health, mental  health symptoms, and stressors.    Assessment:    Patient response:   Patient responded to session by accepting feedback, giving feedback, listening, focusing on goals, being attentive and accepting support    Possible barriers to participation / learning include: severity of symptoms    Health Issues:   None reported       Substance Use Review:   Substance Use: No active concerns identified.    Mental Status/Behavioral Observations  Appearance:   Appropriate   Eye Contact:   Good   Psychomotor Behavior: Normal   Attitude:   Cooperative   Orientation:   All  Speech   Rate / Production: Normal    Volume:  Normal   Mood:    Anxious  Depressed  Normal  Affect:    Appropriate   Thought Content:   Rumination and Psychosis reports hallucinations auditory and paranoia  Thought Form:  Coherent  Logical     Insight:    Good     Plan:     Safety Plan: Recommended that patient call 911 or go to the local ED should there be a change in any of these risk factors.     Barriers to treatment: None identified    Patient Contracts (see media tab):  None    Substance Use: Not addressed in session     Continue or Discharge: Patient will continue in Day Treatment (DT)  as planned. Patient is likely to benefit from learning and using skills as they work toward the goals identified in their treatment plan.     Stabilization of symptoms is needed.        Francisco Patterson, D,  Licensed Psychologist   December 23, 2019

## 2019-12-23 NOTE — GROUP NOTE
EBP Group Note    PATIENT'S NAME: Cesia Nettles  MRN:   [id number]  :   1995  ACCT. NUMBER: [id number]  DATE OF SERVICE: 19  START TIME: 11:00 AM  END TIME: 11:50 AM  FACILITATOR: Kathy Laguerre PsyD  TOPIC:  EBP Group: Coping Skills  Adult Mental Health Day Treatment  TRACK: 2A    NUMBER OF PARTICIPANTS: 4    Summary of Group / Topics Discussed:  Coping Skills: Calming Strategies: Patients discussed and practiced strategies to increase sense of calm in the body.  Reviewed the benefits of calming strategies as well as past / current practices of each member.  Patients identified situations in which using these strategies would reduce stress. They developed the ability to distinguish when these strategies can be useful in their lives for management and stress and psychological well-being.    Patient Session Goals / Objectives:    Understand the purpose of using calming strategies to reduce stress    Review patients current calming practices and discuss a more formal way of practicing and accessing skills.    Increase ability to decide when to use various calming strategies (e.g. Progressive muscle relaxation, deep breathing, guided imagery, + thoughts).    Choose 1-2 calming strategies to apply during times of distress.        Patient Participation / Response:  Fully participated with the group by sharing personal reflections / insights and openly received / provided feedback with other participants.    Demonstrated understanding of topics discussed through group discussion and participation and Expressed understanding of the relevance / importance of coping skills at distressing times in life    Treatment Plan:  Patient has a current master individualized treatment plan.  See Epic treatment plan for more information.     Francisco Patterson, D,  Licensed Psychologist

## 2019-12-26 ENCOUNTER — HOSPITAL ENCOUNTER (OUTPATIENT)
Dept: BEHAVIORAL HEALTH | Facility: CLINIC | Age: 24
End: 2019-12-26
Attending: PSYCHIATRY & NEUROLOGY | Admitting: PSYCHIATRY & NEUROLOGY
Payer: COMMERCIAL

## 2019-12-26 PROCEDURE — G0177 OPPS/PHP; TRAIN & EDUC SERV: HCPCS

## 2019-12-26 PROCEDURE — 90853 GROUP PSYCHOTHERAPY: CPT | Performed by: PSYCHOLOGIST

## 2019-12-26 ASSESSMENT — ANXIETY QUESTIONNAIRES
1. FEELING NERVOUS, ANXIOUS, OR ON EDGE: MORE THAN HALF THE DAYS
3. WORRYING TOO MUCH ABOUT DIFFERENT THINGS: MORE THAN HALF THE DAYS
6. BECOMING EASILY ANNOYED OR IRRITABLE: MORE THAN HALF THE DAYS
7. FEELING AFRAID AS IF SOMETHING AWFUL MIGHT HAPPEN: MORE THAN HALF THE DAYS
2. NOT BEING ABLE TO STOP OR CONTROL WORRYING: MORE THAN HALF THE DAYS
IF YOU CHECKED OFF ANY PROBLEMS ON THIS QUESTIONNAIRE, HOW DIFFICULT HAVE THESE PROBLEMS MADE IT FOR YOU TO DO YOUR WORK, TAKE CARE OF THINGS AT HOME, OR GET ALONG WITH OTHER PEOPLE: VERY DIFFICULT
5. BEING SO RESTLESS THAT IT IS HARD TO SIT STILL: SEVERAL DAYS
GAD7 TOTAL SCORE: 12

## 2019-12-26 ASSESSMENT — PATIENT HEALTH QUESTIONNAIRE - PHQ9: 5. POOR APPETITE OR OVEREATING: SEVERAL DAYS

## 2019-12-26 NOTE — GROUP NOTE
Life Skills/OT Group Note    PATIENT'S NAME: Cesia Nettles  MRN:   [id number]  :   1995  ACCT. NUMBER: [id number]  DATE OF SERVICE: 19  START TIME: 10:00 AM  END TIME: 10:50 AM  FACILITATOR: Samm Mercado OTR/L  TOPIC: Mental Health Management: Sensory Approaches in Mental Health  Adult Mental Health Day Treatment  TRACK: 2A    NUMBER OF PARTICIPANTS: 3    Summary of Group / Topics Discussed:  Sensory Approaches in Mental Health:  Self Regulation Through Sensory Modulation:  Patients were provided with education on Autonomic Nervous System activation and taught skills that can be used immediately to help them calm down and regain self control.  Patients were taught how to recognize specific signs and symptoms of their individualized state of arousal and how to make changes when needed.  Patients explored body based skills (bottom up processing skills) and techniques to help manage symptoms and change level of arousal when needed to be in control and comfortable so they are able to function in different environments.       Patient Session Goals / Objectives:    Identified specific and individualized neurosensory skills to help when distressed and for crisis management    Identified signs and symptoms of current level of arousal and ways to make changes in level of arousal when needed    Established a plan for practice of these skills in their own environments        Patient Participation / Response:  Fully participated with the group by sharing personal reflections / insights and openly received / provided feedback with other participants.    Patient presentation: Calm,alert,focused with stable mood and thought process.    Treatment Plan:  Patient has a current master individualized treatment plan.  See Epic treatment plan for more information.    KATHI Delcid/NARCISO

## 2019-12-26 NOTE — GROUP NOTE
Life Skills/OT Group Note    PATIENT'S NAME: Cesia Nettles  MRN:   [id number]  :   1995  ACCT. NUMBER: [id number]  DATE OF SERVICE: 19  START TIME: 11:00 AM  END TIME: 11:50 AM  FACILITATOR: Samm Mercado OTR/L  TOPIC:  Life Skills Group: Resiliency Development  Adult Mental Health Day Treatment  TRACK: 2A    NUMBER OF PARTICIPANTS: 2 (One patient left the program early)    Summary of Group / Topics Discussed:  Resiliency Development:  Self Esteem and Self Compassion: Self-Esteem Perception: Patients were given the opportunity to reflect and identified the positive aspects of their life.  Patients were taught about the importance of self kindness on mental health wellbeing.  Patients were also given the opportunity to improve self efficacy, self sufficiency, quality of life and a sense of mastery and competency by identifying positive aspects of their life and to instill hope.      Patient Session Goals / Objectives:    Identified personal strengths and qualities about themselves as a way to provide hope and build self-compassion as a way to effectively manage both mental health and substance abuse/abuse symptoms     Improved awareness of positive qualities and how this contribute to the process of recovery and mental health wellbeing and resiliency      Established a plan for practice of these skills in their own environments    Practiced and reflected on how to generalize taught skills to their everyday life        Patient Participation / Response:  Fully participated with the group by sharing personal reflections / insights and openly received / provided feedback with other participants.    Patient presentation: Calm,alert ,focused with stable mood and thought process    Treatment Plan:  Patient has a current master individualized treatment plan .See Epic treatment plan for progress / updates on goals and plan.      KATHI Delcid/NARCISO

## 2019-12-26 NOTE — GROUP NOTE
Process Group Note    PATIENT'S NAME: Cesia Nettles  MRN:   [id number]  :   1995  ACCT. NUMBER: [id number]  DATE OF SERVICE: 19  START TIME:  9:00 AM  END TIME:  9:50 AM  FACILITATOR: Kathy Laguerre PsyD  TOPIC:  Process Group    Diagnoses:  295.70  (F25.1) Schizoaffective Disorder Depressive Type  300.00 (F41.9) Unspecified Anxiety Disorder     : Mental Health Resources: Ely Leonel  Guardian: [facility]: Elma Flores : [phone]  CADI:  Mariela Walker: [place] [facility]  [facility]:  [facility]: [address]: [phone]   Pro Act : Nino Salcidograeme [phone]    Adult Mental Health Day Treatment  TRACK: 2A    NUMBER OF PARTICIPANTS: 3          Data:    Session content: At the start of this group, patients were invited to check in by identifying themselves, describing their current emotional status, and identifying issues to address in this group.   Area(s) of treatment focus addressed in this session included Symptom Management, Personal Safety and Community Resources/Discharge Planning.    Therese reported being safe today. She stated that she spent the holidays with her family and had a nice time. She talked to the group about a staff person who had been condescending to her and that she talked to a boss about the situation and that the staff person talked to her about this. She reported feeling uncomfortable, but said that they shook hands on it and agreed to work together. The group validated her assertiveness.    Therapeutic Interventions/Treatment Strategies:  Psychotherapist offered support, feedback and validation and reinforced use of skills. Treatment modalities used include Motivational Interviewing and Cognitive Behavioral Therapy. Interventions include Cognitive Restructuring:  Explored impact of ineffective thoughts / distortions on mood and activity, Coping Skills: Assisted patient in identifying 1-2 healthy distraction skills to reduce overall  distress and Mindfulness: Encouraged a plan to use mindfulness skills in daily life.    Assessment:    Patient response:   Patient responded to session by accepting feedback, giving feedback, listening, focusing on goals, being attentive and accepting support    Possible barriers to participation / learning include: severity of symptoms    Health Issues:   None reported       Substance Use Review:   Substance Use: No active concerns identified.    Mental Status/Behavioral Observations  Appearance:   Appropriate   Eye Contact:   Good   Psychomotor Behavior: Normal   Attitude:   Cooperative   Orientation:   All  Speech   Rate / Production: Normal    Volume:  Normal   Mood:    Anxious  Depressed  Normal  Affect:    Appropriate   Thought Content:   Rumination and Psychosis reports hallucinations auditory  Thought Form:  Coherent  Logical     Insight:    Good     Plan:     Safety Plan: Recommended that patient call 911 or go to the local ED should there be a change in any of these risk factors.     Barriers to treatment: None identified    Patient Contracts (see media tab):  None    Substance Use: Not addressed in session     Continue or Discharge: Patient will continue in Day Treatment (DT)  as planned. Patient is likely to benefit from learning and using skills as they work toward the goals identified in their treatment plan.     Stabilization of symptoms is needed.      Francisco Patterson, D,  Licensed Psychologist        December 26, 2019

## 2019-12-26 NOTE — GROUP NOTE
"Process Group Note    PATIENT'S NAME: Cesia Nettles  MRN:   [id number]  :   1995  ACCT. NUMBER: [id number]  DATE OF SERVICE: 19  START TIME:  9:00 AM  END TIME:  9:50 AM  FACILITATOR: Kathy Laguerre PsyD  TOPIC:  Process Group    Diagnoses:  ***    Adult Mental Health Day Treatment  TRACK: 2A    NUMBER OF PARTICIPANTS: 3          Data:    Session content: At the start of this group, patients were invited to check in by identifying themselves, describing their current emotional status, and identifying issues to address in this group.   Area(s) of treatment focus addressed in this session included {OP BEH ADULT  AREA OF TREATMENT FOCUS:098997}.  ***    Therapeutic Interventions/Treatment Strategies:  {OP  THERAPEUTIC INTERVENTIONS/TREATMENT:450801}    Assessment:    Patient response:   Patient responded to session by {PATIENT RESPONSE:527163}    Possible barriers to participation / learning include: {POSSIBLE BARRIERS:420476}    Health Issues:   {YES / NO:906459}       Substance Use Review:   Substance Use: {YES / NO:159868}    Mental Status/Behavioral Observations  Appearance:   {Appearance:814695::\"Appropriate \"}  Eye Contact:   {Eye Contact:820129::\"Good \"}  Psychomotor Behavior: {Psychomotor Behavior:825565::\"Normal \"}  Attitude:   {Attitude:202813::\"Cooperative \"}  Orientation:   {Orientation:080561::\"All\"}  Speech   Rate / Production: {Speech Rate/Production:033474::\"Normal \"}   Volume:  {Speech Volume:867395::\"Normal \"}  Mood:    {Mood:269622::\"Normal\"}  Affect:    {Affect:118325::\"Appropriate \"}  Thought Content:   {Thought Content with Safety:250095}  Thought Form:  {Thought Form:458902::\"Coherent \",\"Logical \"}    Insight:    {Insight:539972::\"Good \"}    Plan:     Safety Plan: {SAFETY PLAN:709929}     Barriers to treatment: {Barriers to Treatment:054370}    Patient Contracts (see media tab):  {Patient Contracts:326426}    Substance Use: {SUBSTANCE USE ASSESSMENT/INTERVENTION:803230} "     Continue or Discharge: {Continue or Discharge:717118}      Kathy Laguerre PsyD  December 26, 2019

## 2019-12-30 ENCOUNTER — HOSPITAL ENCOUNTER (OUTPATIENT)
Dept: BEHAVIORAL HEALTH | Facility: CLINIC | Age: 24
End: 2019-12-30
Attending: PSYCHIATRY & NEUROLOGY | Admitting: PSYCHIATRY & NEUROLOGY
Payer: COMMERCIAL

## 2019-12-30 PROCEDURE — 90853 GROUP PSYCHOTHERAPY: CPT | Performed by: COUNSELOR

## 2019-12-30 PROCEDURE — G0177 OPPS/PHP; TRAIN & EDUC SERV: HCPCS

## 2019-12-30 PROCEDURE — 90853 GROUP PSYCHOTHERAPY: CPT

## 2019-12-30 NOTE — GROUP NOTE
Process Group Note    PATIENT'S NAME: Cesia Nettles  MRN:   [id number]  :   1995  ACCT. NUMBER: [id number]  DATE OF SERVICE: 19  START TIME: 10:00 AM  END TIME: 10:50 AM  FACILITATOR: Marina Arthur LPCC  TOPIC:  Process Group    Diagnoses:  295.70  (F25.1) Schizoaffective Disorder Depressive Type  300.00 (F41.9) Unspecified Anxiety Disorder      Adult Mental Health Day Treatment  TRACK: 2A    NUMBER OF PARTICIPANTS: 4          Data:    Session content: At the start of this group, patients were invited to check in by identifying themselves, describing their current emotional status, and identifying issues to address in this group.   Area(s) of treatment focus addressed in this session included Symptom Management, Develop / Improve Independent Living Skills and Develop Socialization / Interpersonal Relationship Skills.  She reported feeling anxious due to an uncomfortable conversation she will need to have with her supervisor about reducing her work hours and that the residents know she rides [employer]. She also noted that she had an argument with her mother and she stated she has taken accountability for her behavior.     Therapeutic Interventions/Treatment Strategies:  Psychotherapist offered support, feedback and validation, set limits and provided redirection. Treatment modalities used include Cognitive Behavioral Therapy and Dialectical Behavioral Therapy. Interventions include Coping Skills: Discussed use of self-soothe skills to decrease distress in the body and Relationship Skills: Assisted patients in implementing more effective communication skills in their relationships and Encouraged development and maintenance  of healthy boundaries.    Assessment:    Patient response:   Patient responded to session by accepting feedback, giving feedback, listening and being attentive    Possible barriers to participation / learning include: and no barriers identified    Health Issues:   None  reported       Substance Use Review:   Substance Use: No active concerns identified.    Mental Status/Behavioral Observations  Appearance:   Appropriate   Eye Contact:   Fair   Psychomotor Behavior: Restless   Attitude:   Cooperative   Orientation:   All  Speech   Rate / Production: Normal    Volume:  Normal   Mood:    Anxious   Affect:    Blunted   Thought Content:   Clear and Rumination  Thought Form:  Circumstantial    Insight:    Fair     Plan:     Safety Plan: No current safety concerns identified.  Recommended that patient call 911 or go to the local ED should there be a change in any of these risk factors.     Barriers to treatment: None identified    Patient Contracts (see media tab):  None    Substance Use: Not addressed in session     Continue or Discharge: Patient will continue in Adult Day Treatment (ADT)  as planned. Patient is likely to benefit from learning and using skills as they work toward the goals identified in their treatment plan.      Marina Arthur, RADHAC  December 30, 2019

## 2019-12-30 NOTE — GROUP NOTE
RN Group Note    PATIENT'S NAME: Cesia Nettles  MRN:   [id number]  :   1995  ACCT. NUMBER: [id number]  DATE OF SERVICE: 19  START TIME:  9:00 AM  END TIME:  9:50 AM  FACILITATOR: Cathy Aleman RN  TOPIC:  RN Group: Health Maintenance  Adult Mental Health Day Treatment  TRACK: 2A    NUMBER OF PARTICIPANTS: 4    Summary of Group / Topics Discussed:  Health Maintenance: Discharge planning/Community resources: Patients worked on completing an instructor-facilitated discharge planning activity. Discharge planning begins for all patients after admission. Competent discharge planning promotes a successful transition and decreases the likelihood of mental health relapse. In this group, all dimensions of wellness were reviewed to assess for needs/discharge readiness. These dimensions included: physical, emotional, occupational/productivity, environmental, social, spiritual, intellectual, and financial. Patients worked on completing/updating their discharge planning and identifying their treatment needs prior to time of discharge.     Patient Session Goals / Objectives:  ? Identified unmet treatment needs to accomplish before discharge  ? Completed all dimensions of the discharge planning packet  ? Participated in the planning process, make phone calls, set up appointments, got connected with community resources, followed up with treatment team as needed         Patient Participation / Response:  Fully participated with the group by sharing personal reflections / insights and openly received / provided feedback with other participants.    Demonstrated understanding of topics discussed through group discussion and participation, Identified / Expressed personal readiness to practice skills and Verbalized understanding of health maintenance topic    Treatment Plan:  Patient has a current master individualized treatment plan.  See Epic treatment plan for more information.    Cathy Aleman RN

## 2019-12-30 NOTE — GROUP NOTE
EBP Group Note    PATIENT'S NAME: Cesia Nettles  MRN:   [id number]  :   1995  ACCT. NUMBER: [id number]  DATE OF SERVICE: 19  START TIME: 11:00 AM  END TIME: 11:50 AM  FACILITATOR: Geno York LICSW  TOPIC:  EBP Group: Specialty Awareness  Adult Mental Health Day Treatment  TRACK: 2A    NUMBER OF PARTICIPANTS: 4    Summary of Group / Topics Discussed:  Specialty Topics: Grief/Transitions: Patients received an overview of the grief process.  Patients explored their relationship to loss and how that has affected their mental health symptoms.  Strategies for recognizing loss and ideas for engaging in the grief process was presented and discussed.  Patients identified needs for support and coping skills to manage loss.  The purpose of this specialty topic is to help patients identify the aspects of change due to loss that individuals experience in addition to mental health symptoms to better cope with the grief, loss, and life transitions.      Patient Session Goals / Objectives:    Identified grief, loss, and life transitions     Discussed how grief impacts mental health symptoms and disrupts usual functioning    Identified needs for support and coping with grief and planned further action for coping        Patient Participation / Response:  Fully participated with the group by sharing personal reflections / insights and openly received / provided feedback with other participants.  Shared about her loss of her cat, and a relationship loss two years ago.  Had good feedback for other group members.     Demonstrated understanding of topics discussed through group discussion and participation, Identified / Expressed readiness to act on skill suggestions discussed in topic and Identified plan to address barriers to practicing skills discussed in topic    Treatment Plan:  Patient has a current master individualized treatment plan.  See Epic treatment plan for more information.    JÚNIOR Ahumada

## 2019-12-31 NOTE — ADDENDUM NOTE
Encounter addended by: Cathy Aleman RN on: 12/31/2019 2:50 PM   Actions taken: Charge Capture section accepted

## 2020-01-02 ENCOUNTER — HOSPITAL ENCOUNTER (OUTPATIENT)
Dept: BEHAVIORAL HEALTH | Facility: CLINIC | Age: 25
End: 2020-01-02
Attending: PSYCHIATRY & NEUROLOGY | Admitting: PSYCHIATRY & NEUROLOGY
Payer: COMMERCIAL

## 2020-01-02 PROCEDURE — G0177 OPPS/PHP; TRAIN & EDUC SERV: HCPCS | Performed by: PSYCHIATRY & NEUROLOGY

## 2020-01-02 PROCEDURE — G0177 OPPS/PHP; TRAIN & EDUC SERV: HCPCS

## 2020-01-02 PROCEDURE — 90853 GROUP PSYCHOTHERAPY: CPT | Performed by: PSYCHIATRY & NEUROLOGY

## 2020-01-02 PROCEDURE — 90853 GROUP PSYCHOTHERAPY: CPT | Performed by: COUNSELOR

## 2020-01-02 NOTE — GROUP NOTE
Process Group Note    PATIENT'S NAME: Cesia Nettles  MRN:   [id number]  :   1995  ACCT. NUMBER: [id number]  DATE OF SERVICE: 20  START TIME:  9:00 AM  END TIME:  9:50 AM  FACILITATOR: Marina Arthur LPCC  TOPIC:  Process Group    Diagnoses:  295.70  (F25.1) Schizoaffective Disorder Depressive Type  300.00 (F41.9) Unspecified Anxiety Disorder    Adult Mental Health Day Treatment  TRACK: 2A    NUMBER OF PARTICIPANTS: 5          Data:    Session content: At the start of this group, patients were invited to check in by identifying themselves, describing their current emotional status, and identifying issues to address in this group.   Area(s) of treatment focus addressed in this session included Symptom Management and Develop Socialization / Interpersonal Relationship Skills.  She denied any safety concerns and that she is medication compliant. She noted feeling tired and she has a cold. Patient shared that she is connecting with a resident at work. She is going to ask staff to help her create a plan to clean her room.      Therapeutic Interventions/Treatment Strategies:  Psychotherapist offered support, feedback and validation and reinforced use of skills. Interventions include Relationship Skills: Assisted patients in implementing more effective communication skills in their relationships and Encouraged development and maintenance  of healthy boundaries.    Assessment:    Patient response:   Patient responded to session by accepting feedback, giving feedback and listening    Possible barriers to participation / learning include: and no barriers identified    Health Issues:   None reported       Substance Use Review:   Substance Use: No active concerns identified.    Mental Status/Behavioral Observations  Appearance:   Appropriate   Eye Contact:   Poor  Psychomotor Behavior: Restless   Attitude:   Cooperative   Orientation:   All  Speech   Rate / Production: Normal    Volume:  Normal   Mood:    Anxious   Depressed   Affect:    Blunted  Flat   Thought Content:   Rumination  Thought Form:  Coherent  Logical     Insight:    Fair     Plan:     Safety Plan: No current safety concerns identified.  Recommended that patient call 911 or go to the local ED should there be a change in any of these risk factors.     Barriers to treatment: None identified    Patient Contracts (see media tab):  None    Substance Use: Not addressed in session     Continue or Discharge: Patient will continue in Adult Day Treatment (ADT)  as planned. Patient is likely to benefit from learning and using skills as they work toward the goals identified in their treatment plan.      Marina Arthur, [facility]  January 2, 2020

## 2020-01-02 NOTE — GROUP NOTE
Life Skills/OT Group Note    PATIENT'S NAME: Cesia Nettles  MRN:   [id number]  :   1995  ACCT. NUMBER: [id number]  DATE OF SERVICE: 20  START TIME: 11:00 AM  END TIME: 11:50 AM  FACILITATOR: Samm Mercado OTR/L  TOPIC:  Life Skills Group: Cognitive Functioning  Adult Mental Health Day Treatment  TRACK: 2A    NUMBER OF PARTICIPANTS: 4    Summary of Group / Topics Discussed:  Cognitive Functioning(Executive Skills/Effective Use of Time): Patients were taught and provided with an opportunity to gain awareness of how their mental health symptoms impact their current cognitive functioning as well as how this impacts their performance and participation in meaningful roles, relationships, and routines.  Patients were taught skills and strategies on how to monitor and improve cognitive performance through remediation or compensatory strategies.  Patients were given opportunities to practice taught skills and techniques in session and how to apply to everyday life.        Patient Session Goals / Objectives:    Identified how their mental health symptoms impact their functioning, focusing on specific cognitive challenges     Improved awareness of specific remediation and/or compensatory strategies to improve  executive functioning skills and how this relates to mental health recovery        Established a plan for practice of these skills in their own environments    Practiced and reflected on how to generalize taught skills to their everyday life          Patient Participation / Response:  Fully participated with the group by sharing personal reflections / insights and openly received / provided feedback with other participants.    Patient presentation: Calm,alert ,focused with stable mood and thought process. Social with other group members.    Treatment Plan:  Patient has a current master individualized treatment plan.  See Epic treatment plan for more information.    KATHI Delcid/NARCISO

## 2020-01-02 NOTE — GROUP NOTE
Life Skills/OT Group Note    PATIENT'S NAME: Cesia Nettles  MRN:   [id number]  :   1995  ACCT. NUMBER: [id number]  DATE OF SERVICE: 20  START TIME: 10:00 AM  END TIME: 10:50 AM  FACILITATOR: Ag Wan OT  TOPIC:  Life Skills Group: Sensory Approaches in Mental Health  Adult Mental Health Day Treatment  TRACK: 2A    NUMBER OF PARTICIPANTS: 5    Summary of Group / Topics Discussed:  Sensory Approaches in Mental Health:  Self Regulation Through Sensory Modulation:  Patients were provided with education on Autonomic Nervous System activation and taught skills that can be used immediately to help them calm down and regain self control.  Patients were taught how to recognize specific signs and symptoms of their individualized state of arousal and how to make changes when needed.  Patients explored body based skills (bottom up processing skills) and techniques to help manage symptoms and change level of arousal when needed to be in control and comfortable so they are able to function in different environments.  They processed their day and identified specific sensory input that can help them if needed, noting the impact of time of day, difficulty of task, energy level as ways to help themselves participate more fully in their roles, routines, responsibilities, and relationships.    Patient Session Goals / Objectives:    Identified specific and individualized neurosensory preferences to help when distressed and for crisis management.    Identified signs and symptoms of current level of arousal and ways to make changes in level of arousal focusing on need for calming vs need for activation.    Established a plan for practice of these skills in their own environments for the rest of the day.        Patient Participation / Response:  Fully participated with the group by sharing personal reflections / insights and openly received / provided feedback with other participants.    Patient presentation:  Struggles with a cold/flu symptoms today. Reports she has to go to work later. Identified specific sensory based skills she can use throughout the day to be able to get done what she wants/needs to do. Shares easily., Verbalized understanding of content and Patient would benefit from additional opportunities to practice the content to be able to generalize it to their everyday life with increased intentionality, consistency, and efficacy in support of their psychiatric recovery    Treatment Plan:  Patient has a current master individualized treatment plan.  See Epic treatment plan for more information.    Ag Wan, OT

## 2020-01-06 ENCOUNTER — HOSPITAL ENCOUNTER (OUTPATIENT)
Dept: BEHAVIORAL HEALTH | Facility: CLINIC | Age: 25
End: 2020-01-06
Attending: PSYCHIATRY & NEUROLOGY | Admitting: PSYCHIATRY & NEUROLOGY
Payer: COMMERCIAL

## 2020-01-06 PROCEDURE — G0177 OPPS/PHP; TRAIN & EDUC SERV: HCPCS

## 2020-01-06 PROCEDURE — 90853 GROUP PSYCHOTHERAPY: CPT | Performed by: PSYCHIATRY & NEUROLOGY

## 2020-01-06 PROCEDURE — G0177 OPPS/PHP; TRAIN & EDUC SERV: HCPCS | Performed by: PSYCHIATRY & NEUROLOGY

## 2020-01-06 PROCEDURE — 90853 GROUP PSYCHOTHERAPY: CPT | Performed by: PSYCHOLOGIST

## 2020-01-06 NOTE — GROUP NOTE
EBP Group Note    PATIENT'S NAME: Cesia Nettles  MRN:   [id number]  :   1995  ACCT. NUMBER: [id number]  DATE OF SERVICE: 20  START TIME: 11:00 AM  END TIME: 11:50 AM  FACILITATOR: Kathy Laguerre PsyD  TOPIC:  EBP Group: Mindfulness  Adult Mental Health Day Treatment  TRACK: 2A    NUMBER OF PARTICIPANTS: 4    Summary of Group / Topics Discussed:  Mindfulness: What is Mindfulness: Patients received an overview on what mindfulness is and how mindfulness can benefit general health, mental health symptoms, and stressors. The history of mindfulness, its application to mental health therapies, and key concepts were also discussed. Patients discussed current awareness, knowledge, and practice of mindfulness skills. Patients also discussed barriers to mindfulness practice.     Patient Session Goals / Objectives:    Demonstrated understanding of key concepts and application to daily life    Identified when/how to use mindfulness     Resolved barriers to practice    Identified plan to use mindfulness in daily life      Patient Participation / Response:  Fully participated with the group by sharing personal reflections / insights and openly received / provided feedback with other participants.    Demonstrated understanding of topics discussed through group discussion and participation    Treatment Plan:  Patient has a current master individualized treatment plan.  See Epic treatment plan for more information.    Francisco Patterson, TARI,  Licensed Psychologist

## 2020-01-06 NOTE — GROUP NOTE
RN Group Note    PATIENT'S NAME: Cesia Nettles  MRN:   [id number]  :   1995  ACCT. NUMBER: [id number]  DATE OF SERVICE: 20  START TIME:  9:00 AM  END TIME:  9:50 AM  FACILITATOR: Cathy Aleman RN  TOPIC:  RN Group: Health Maintenance  Adult Mental Health Day Treatment  TRACK: 2A    NUMBER OF PARTICIPANTS: 4    Summary of Group / Topics Discussed:  Health Maintenance: Wellness Check-in: Patients met with group facilitator to individually review a holistic wellness check-in to assess patient medication adherence/concerns, appointments, physical and mental health, exercise, nutrition, sleep, socialization, substance use, and need for service/resource referrals.       Patient Session Goals / Objectives:    Discussed various aspects of health management and self-care related to physical and mental health    Demonstrated increased self-awareness of current wellness needs    Developed health literacy skills in navigating the healthcare system and self-advocacy/communicating needs with health care team      Patient Participation / Response:  Fully participated with the group by sharing personal reflections / insights and openly received / provided feedback with other participants.    Demonstrated understanding of topics discussed through group discussion and participation, Identified / Expressed personal readiness to practice skills and Verbalized understanding of health maintenance topic    Treatment Plan:  Patient has a current master individualized treatment plan.  See Epic treatment plan for more information.    Cathy Aleman RN

## 2020-01-06 NOTE — GROUP NOTE
Process Group Note    PATIENT'S NAME: Cesia Nettles  MRN:   [id number]  :   1995  ACCT. NUMBER: [id number]  DATE OF SERVICE: 20  START TIME: 10:00 AM  END TIME: 10:50 AM  FACILITATOR: Kathy Laguerre PsyD  TOPIC:  Process Group    Diagnoses:  295.70  (F25.1) Schizoaffective Disorder Depressive Type  300.00 (F41.9) Unspecified Anxiety Disorder     : Mental Health Resources: Ely Leonel  Guardian: [facility]: Elma Flores : [phone]  CADI:  Mariela Walker: [facility]  [facility], [facility]:  [facility]: [address]: [phone]   [employer] : Nino Salcidograeme [phone]       Adult Mental Health Day Treatment  TRACK: 2A    NUMBER OF PARTICIPANTS: 4          Data:    Session content: At the start of this group, patients were invited to check in by identifying themselves, describing their current emotional status, and identifying issues to address in this group.   Area(s) of treatment focus addressed in this session included Symptom Management, Personal Safety and Community Resources/Discharge Planning.    Therese reported being safe today. She talked to the group about relationships and how she is reactin in a different way from a year ago. She talked about being more mindful of her response and thiking about she wants to respond.  She talked about grief and loss and her cat that  and how she went through the process.     Therapeutic Interventions/Treatment Strategies:  Psychotherapist offered support, feedback and validation and reinforced use of skills. Treatment modalities used include Cognitive Behavioral Therapy. Interventions include Cognitive Restructuring:  Explored impact of ineffective thoughts / distortions on mood and activity, Coping Skills: Assisted patient in identifying 1-2 healthy distraction skills to reduce overall distress and Mindfulness: Encouraged a plan to use mindfulness skills in daily life.    Assessment:    Patient response:   Patient responded  to session by accepting feedback, giving feedback, listening, focusing on goals, being attentive and accepting support    Possible barriers to participation / learning include: severity of symptoms    Health Issues:   None reported       Substance Use Review:   Substance Use: No active concerns identified.    Mental Status/Behavioral Observations  Appearance:   Appropriate   Eye Contact:   Good   Psychomotor Behavior: Normal   Attitude:   Cooperative   Orientation:   All  Speech   Rate / Production: Normal    Volume:  Normal   Mood:    Normal  Affect:    Appropriate   Thought Content:   Rumination and Psychosis reports hallucinations visual  Thought Form:  Coherent  Logical     Insight:    Good     Plan:     Safety Plan: Recommended that patient call 911 or go to the local ED should there be a change in any of these risk factors.     Barriers to treatment: None identified    Patient Contracts (see media tab):  None    Substance Use: Not addressed in session     Continue or Discharge: Patient will continue in Adult Day Treatment (ADT)  as planned. Patient is likely to benefit from learning and using skills as they work toward the goals identified in their treatment plan.  Stabilization of symptoms is needed.      Francisco Patterson, D,  Licensed Psychologist   January 6, 2020

## 2020-01-07 ENCOUNTER — HOSPITAL ENCOUNTER (OUTPATIENT)
Dept: BEHAVIORAL HEALTH | Facility: CLINIC | Age: 25
End: 2020-01-07
Attending: PSYCHIATRY & NEUROLOGY | Admitting: PSYCHIATRY & NEUROLOGY
Payer: COMMERCIAL

## 2020-01-07 PROCEDURE — 90853 GROUP PSYCHOTHERAPY: CPT | Performed by: COUNSELOR

## 2020-01-07 PROCEDURE — G0177 OPPS/PHP; TRAIN & EDUC SERV: HCPCS | Performed by: PSYCHIATRY & NEUROLOGY

## 2020-01-07 PROCEDURE — 90853 GROUP PSYCHOTHERAPY: CPT | Performed by: PSYCHIATRY & NEUROLOGY

## 2020-01-07 PROCEDURE — G0177 OPPS/PHP; TRAIN & EDUC SERV: HCPCS | Performed by: COUNSELOR

## 2020-01-07 PROCEDURE — G0177 OPPS/PHP; TRAIN & EDUC SERV: HCPCS

## 2020-01-07 ASSESSMENT — PATIENT HEALTH QUESTIONNAIRE - PHQ9: SUM OF ALL RESPONSES TO PHQ QUESTIONS 1-9: 13

## 2020-01-07 NOTE — GROUP NOTE
Process Group Note    PATIENT'S NAME: Cesia Nettles  MRN:   [id number]  :   1995  ACCT. NUMBER: [id number]  DATE OF SERVICE: 20  START TIME: 10:00 AM  END TIME: 10:50 AM  FACILITATOR: Marina Arthur LPCC  TOPIC:  Process Group    Diagnoses:  295.70  (F25.1) Schizoaffective Disorder Depressive Type  300.00 (F41.9) Unspecified Anxiety Disorder    Adult Mental Health Day Treatment  TRACK: 2A    NUMBER OF PARTICIPANTS: 6          Data:    Session content: At the start of this group, patients were invited to check in by identifying themselves, describing their current emotional status, and identifying issues to address in this group.   Area(s) of treatment focus addressed in this session included Symptom Management.  She reported things are going well. She shared that she cleaned her room and will be getting new bedding and a chair. Patient expressed interest in getting a gym member and to increase her socialization. She denied any safety concerns.     Therapeutic Interventions/Treatment Strategies:  Psychotherapist offered support, feedback and validation.    Assessment:    Patient response:   Patient responded to session by accepting feedback, giving feedback and listening    Possible barriers to participation / learning include: and no barriers identified    Health Issues:   None reported       Substance Use Review:   Substance Use: No active concerns identified.    Mental Status/Behavioral Observations  Appearance:   Appropriate   Eye Contact:   Fair   Psychomotor Behavior: Retarded (Slowed)   Attitude:   Cooperative   Orientation:   All  Speech   Rate / Production: Normal    Volume:  Normal   Mood:    Depressed   Affect:    Blunted   Thought Content:   Rumination  Thought Form:  Coherent  Logical     Insight:    Fair     Plan:     Safety Plan: No current safety concerns identified.  Recommended that patient call 911 or go to the local ED should there be a change in any of these risk factors.      Barriers to treatment: None identified    Patient Contracts (see media tab):  None    Substance Use: Not addressed in session     Continue or Discharge: Patient will continue in Adult Day Treatment (ADT)  as planned. Patient is likely to benefit from learning and using skills as they work toward the goals identified in their treatment plan.      Marina Arthur, [facility]  January 7, 2020

## 2020-01-07 NOTE — ADDENDUM NOTE
Encounter addended by: Elaine Cristina on: 1/7/2020 7:53 AM   Actions taken: Visit Navigator Flowsheet section accepted

## 2020-01-07 NOTE — GROUP NOTE
RN Group Note    PATIENT'S NAME: Cesia Nettles  MRN:   [id number]  :   1995  ACCT. NUMBER: [id number]  DATE OF SERVICE: 20  START TIME: 11:00 AM  END TIME: 11:50 AM  FACILITATOR: Marina Arthur LPCC  TOPIC:  RN Group: Health Maintenance  Adult Mental Health Day Treatment  TRACK: 2A    NUMBER OF PARTICIPANTS: 6    Summary of Group / Topics Discussed:  Health Maintenance: Goal Setting: Meaningful goals can bring a sense of direction and purpose in life.  They also highlight our most important values. Patients were assisted by instructor to identify short term goals to promote their mental health recovery and improve overall health and wellness. Patients were educated on SMART goal setting framework as a strategy to increase outcomes and promote success.    Patient Session Goals / Objectives:  ? Explained the key concepts of SMART goal setting framework  ? Identified three goals successfully using SMART goal setting framework  ? Reviewed concept of balance in wellness as it pertains to goal setting        Patient Participation / Response:  Fully participated with the group by sharing personal reflections / insights and openly received / provided feedback with other participants.    Demonstrated understanding of topics discussed through group discussion and participation    Treatment Plan:  Patient has a current master individualized treatment plan.  See Epic treatment plan for more information.    KEVEN Andrade

## 2020-01-07 NOTE — GROUP NOTE
Life Skills/OT Group Note    PATIENT'S NAME: Cesia Nettles  MRN:   [id number]  :   1995  ACCT. NUMBER: [id number]  DATE OF SERVICE: 20  START TIME:  9:00 AM  END TIME:  9:50 AM  FACILITATOR: Ag Wan OT  TOPIC:  Life Skills Group: Communication and Social Skills Development  Adult Mental Health Day Treatment  TRACK: 2A    NUMBER OF PARTICIPANTS: 5    Summary of Group / Topics Discussed:  Communication and Social Skills Development: Social Supports: Social Support Scale: Patients completed a social support self assessment to identify people who are supportive and to evaluate the effectiveness of their social support system.  Patients were taught and gained awareness of characteristics of an effective support and how to develop this in their lives.  Patients identified both personal strengths and opportunities for growth in this areas to improve overall communication and connection with other people.    Patient Session Goals / Objectives:    Identified strengths and opportunities for growth in developing their social support system and how this impacts their ability to connect and communicate with other people       Improved awareness of important aspects of social support systems and how this relates to mental health recovery        Established a plan for practice of these skills in their own environments    Practiced and reflected on how to generalize taught skills to their everyday life        Patient Participation / Response:  Moderately participated, sharing some personal reflections / insights and adequately adequately received / provided feedback with other participants.    Verbalized understanding of content and Patient would benefit from additional opportunities to practice the content to be able to generalize it to their everyday life with increased intentionality, consistency, and efficacy in support of their psychiatric recovery    Treatment Plan:  Patient has a current master  individualized treatment plan.  See Epic treatment plan for more information.    Ag Wan, OT

## 2020-01-08 ASSESSMENT — ANXIETY QUESTIONNAIRES: GAD7 TOTAL SCORE: 12

## 2020-01-09 ENCOUNTER — HOSPITAL ENCOUNTER (OUTPATIENT)
Dept: BEHAVIORAL HEALTH | Facility: CLINIC | Age: 25
End: 2020-01-09
Attending: PSYCHIATRY & NEUROLOGY | Admitting: PSYCHIATRY & NEUROLOGY
Payer: COMMERCIAL

## 2020-01-09 PROCEDURE — 90853 GROUP PSYCHOTHERAPY: CPT | Performed by: COUNSELOR

## 2020-01-09 PROCEDURE — G0177 OPPS/PHP; TRAIN & EDUC SERV: HCPCS | Performed by: OCCUPATIONAL THERAPIST

## 2020-01-09 PROCEDURE — G0177 OPPS/PHP; TRAIN & EDUC SERV: HCPCS | Performed by: PSYCHIATRY & NEUROLOGY

## 2020-01-09 PROCEDURE — G0177 OPPS/PHP; TRAIN & EDUC SERV: HCPCS

## 2020-01-09 PROCEDURE — 90853 GROUP PSYCHOTHERAPY: CPT | Performed by: PSYCHIATRY & NEUROLOGY

## 2020-01-09 NOTE — GROUP NOTE
"Process Group Note    PATIENT'S NAME: Cesia Nettles  MRN:   [id number]  :   1995  ACCT. NUMBER: [id number]  DATE OF SERVICE: 20  START TIME:  9:00 AM  END TIME:  9:50 AM  FACILITATOR: Marina Arthur LPCC  TOPIC:  Process Group    Diagnoses:  295.70  (F25.1) Schizoaffective Disorder Depressive Type  300.00 (F41.9) Unspecified Anxiety Disorder    Adult Mental Health Day Treatment  TRACK: 2A    NUMBER OF PARTICIPANTS: 6          Data:    Session content: At the start of this group, patients were invited to check in by identifying themselves, describing their current emotional status, and identifying issues to address in this group.   Area(s) of treatment focus addressed in this session included Symptom Management and Develop / Improve Independent Living Skills.  She reported doing \"pretty well.\" She denied safety concerns. She noted that she is looking forward to decorating her room and going shopping this weekend. She provided feedback to the group.    Therapeutic Interventions/Treatment Strategies:  Psychotherapist offered support, feedback and validation.    Assessment:    Patient response:   Patient responded to session by accepting feedback, giving feedback, listening and being attentive    Possible barriers to participation / learning include: and no barriers identified    Health Issues:   None reported       Substance Use Review:   Substance Use: No active concerns identified.    Mental Status/Behavioral Observations  Appearance:   Appropriate   Eye Contact:   Fair   Psychomotor Behavior: Normal   Attitude:   Cooperative   Orientation:   All  Speech   Rate / Production: Normal    Volume:  Normal   Mood:    Anxious   Affect:    Blunted   Thought Content:   Rumination  Thought Form:  Coherent  Logical     Insight:    Fair     Plan:     Safety Plan: No current safety concerns identified.  Recommended that patient call 911 or go to the local ED should there be a change in any of these risk factors. "     Barriers to treatment: None identified    Patient Contracts (see media tab):  None    Substance Use: Not addressed in session     Continue or Discharge: Patient will continue in Adult Day Treatment (ADT)  as planned. Patient is likely to benefit from learning and using skills as they work toward the goals identified in their treatment plan.      Marina Arthur, [facility]  January 9, 2020

## 2020-01-09 NOTE — GROUP NOTE
Life Skills/OT Group Note    PATIENT'S NAME: Cesia Nettles  MRN:   [id number]  :   1995  ACCT. NUMBER: [id number]  DATE OF SERVICE: 20  START TIME: 11:00 AM  END TIME: 11:50 AM  FACILITATOR: Radha Goode OTR/L  TOPIC:  Life Skills Group: Cognitive Functioning  Adult Mental Health Day Treatment  TRACK: 2A    NUMBER OF PARTICIPANTS: 6    Summary of Group / Topics Discussed:  Cognitive Functioning: Patients were taught and provided with an opportunity to gain awareness of how their mental health symptoms impact their current cognitive functioning as well as how this impacts their performance and participation in meaningful roles, relationships, and routines.  Patients were taught skills and strategies on how to monitor and improve cognitive performance through remediation or compensatory strategies.  Patients were given opportunities to practice taught skills and techniques in session and how to apply to everyday life. Topic for session was specific to problem solving skills    Patient Session Goals / Objectives:    Identified how their mental health symptoms impact their functioning, focusing on specific cognitive challenges     Improved awareness of specific remediation and/or compensatory strategies to improve  executive functioning skills and how this relates to mental health recovery        Established a plan for practice of these skills in their own environments    Practiced and reflected on how to generalize taught skills to their everyday life          Patient Participation / Response:  Fully participated with the group by sharing personal reflections / insights and openly received / provided feedback with other participants.    Patient presentation: talkative and not aware of interrupting others at times, poor focus this session and impulsive with deciosn making; noted she would work on provided topic and then did not; chose several focused activitiees and eventually able to complete a full  step, Verbalized understanding of content and Patient would benefit from additional opportunities to practice the content to be able to generalize it to their everyday life with increased intentionality, consistency, and efficacy in support of their psychiatric recovery    Treatment Plan:  Patient has a current master individualized treatment plan.  See Epic treatment plan for more information.    Radha Goode, OTR/L

## 2020-01-09 NOTE — GROUP NOTE
Life Skills/OT Group Note    PATIENT'S NAME: Cesia Nettles  MRN:   [id number]  :   1995  ACCT. NUMBER: [id number]  DATE OF SERVICE: 20  START TIME: 10:00 AM  END TIME: 10:50 AM  FACILITATOR: Ag Wan OT  TOPIC:  Life Skills Group: Resiliency Development  Adult Mental Health Day Treatment  TRACK: 2A    NUMBER OF PARTICIPANTS: 6    Summary of Group / Topics Discussed:  Resiliency Development:  Coping Skills: Aftercare Coping Cards: Patients were taught how to begin to develop a relapse management kit for both mental and substance use/abuse by creating individualized coping cards.    Patient Session Goals / Objectives:    Identified individualized coping skills they can use for effectively managing both mental health and substance abuse/abuse symptoms     Improved awareness of different types of coping skills and how to personalize them to their unique situation and circumstances      Established a plan for practice of these skills in their own environments    Practiced and reflected on how to generalize taught skills to their everyday life        Patient Participation / Response:  Fully participated with the group by sharing personal reflections / insights and openly received / provided feedback with other participants.    Verbalized understanding of content and Patient would benefit from additional opportunities to practice the content to be able to generalize it to their everyday life with increased intentionality, consistency, and efficacy in support of their psychiatric recovery    Treatment Plan:  Patient has a current master individualized treatment plan.  See Epic treatment plan for more information.    Ag Wan OT

## 2020-01-10 NOTE — PROGRESS NOTES
Past Medical History:   Diagnosis Date     Absence epilepsy (H) age 9-12     Depressive disorder      Schizophrenia (H)        Current Outpatient Medications:      etonogestrel (IMPLANON/NEXPLANON) 68 MG IMPL, 1 each by Subdermal route once, Disp: , Rfl:      FLUoxetine (PROZAC) 40 MG capsule, Take 2 capsules (80 mg) by mouth At Bedtime, Disp: 60 capsule, Rfl: 1     fluticasone (FLONASE) 50 MCG/ACT nasal spray, Spray 2 sprays into both nostrils daily, Disp: , Rfl:      hydrOXYzine (ATARAX) 25 MG tablet, Take 1 tablet (25 mg) by mouth At Bedtime, Disp: 30 tablet, Rfl: 1     ibuprofen (ADVIL/MOTRIN) 200 MG tablet, Take 400 mg by mouth every 4 hours as needed for mild pain, Disp: , Rfl:      lamoTRIgine (LAMICTAL) 100 MG tablet, Take 1.5 tablets (150 mg) by mouth daily, Disp: 45 tablet, Rfl: 1     melatonin 5 MG tablet, Take 1 tablet (5 mg) by mouth nightly as needed for sleep, Disp: 30 tablet, Rfl: 3     metFORMIN (GLUCOPHAGE) 500 MG tablet, Take 2 tablets (1,000 mg) by mouth 2 times daily (with meals), Disp: 120 tablet, Rfl: 1     paliperidone ER (INVEGA) 6 MG 24 hr tablet, Take 2 tablets (12 mg) by mouth At Bedtime, Disp: 60 tablet, Rfl: 1  Psychiatry staffing: case discussed  Diagnosis:  Schizoaffective, and anxiety, improved

## 2020-01-10 NOTE — ADDENDUM NOTE
Encounter addended by: Raudel Palacios MD on: 1/10/2020 8:47 AM   Actions taken: Clinical Note Signed

## 2020-01-13 ENCOUNTER — HOSPITAL ENCOUNTER (OUTPATIENT)
Dept: BEHAVIORAL HEALTH | Facility: CLINIC | Age: 25
End: 2020-01-13
Attending: PSYCHIATRY & NEUROLOGY | Admitting: PSYCHIATRY & NEUROLOGY
Payer: COMMERCIAL

## 2020-01-13 PROCEDURE — G0177 OPPS/PHP; TRAIN & EDUC SERV: HCPCS

## 2020-01-13 PROCEDURE — 90853 GROUP PSYCHOTHERAPY: CPT | Performed by: PSYCHIATRY & NEUROLOGY

## 2020-01-13 PROCEDURE — 90853 GROUP PSYCHOTHERAPY: CPT | Performed by: PSYCHOLOGIST

## 2020-01-13 PROCEDURE — G0177 OPPS/PHP; TRAIN & EDUC SERV: HCPCS | Performed by: PSYCHIATRY & NEUROLOGY

## 2020-01-13 NOTE — GROUP NOTE
EBP Group Note    PATIENT'S NAME: Cesia Nettles  MRN:   [id number]  :   1995  ACCT. NUMBER: [id number]  DATE OF SERVICE: 20  START TIME: 11:00 AM  END TIME: 11:50 AM  FACILITATOR: Kathy Laguerre PsyD  TOPIC:  EBP Group: Relationship Skills  Adult Mental Health Day Treatment  TRACK: 2A    NUMBER OF PARTICIPANTS: 5    Summary of Group / Topics Discussed:  Relationship Skills: Boundaries: Patients were provided with a general overview of interpersonal boundaries and how lack of boundaries relates to symptoms and functioning. The purpose is to help patients identify boundary issues and gain awareness and skills to work towards healthier interpersonal boundaries. Current awareness of healthy boundary characteristics and barriers to establishing healthy boundaries were discussed.    Patient Session Goals / Objectives:    Familiarized patients with the concept of interpersonal boundaries and their characteristics    Discussed and practiced strategies to promote healthier interpersonal boundaries    Identified boundary issues and identified plan to improve boundaries      Patient Participation / Response:  Fully participated with the group by sharing personal reflections / insights and openly received / provided feedback with other participants.    Demonstrated understanding of relationship skills and communication skills    Treatment Plan:  Patient has a current master individualized treatment plan.  See Epic treatment plan for more information.    Francisco Patterson, D,  Licensed Psychologist
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
## 2020-01-13 NOTE — GROUP NOTE
RN Group Note    PATIENT'S NAME: Cesia Nettles  MRN:   [id number]  :   1995  ACCT. NUMBER: [id number]  DATE OF SERVICE: 20  START TIME:  9:00 AM  END TIME:  9:50 AM  FACILITATOR: Cathy Aleman RN  TOPIC:  RN Group: Mental Health Maintenance  Adult Mental Health Day Treatment  TRACK: 2B    NUMBER OF PARTICIPANTS: 4    Summary of Group / Topics Discussed:  Mental Health Maintenance:  Coping Bingo: Patients were educated on the importance of balance in meeting our wellness needs. Topic of coping was reviewed and patients participated in playing a verbal response style coping BINGO game. In this game, patients were challenged to utilize their understanding of themselves and their coping strategies to respond to the questions on their BINGO cards.    Patient Session Goals / Objectives:  ? Identified the importance of balance in wellness  ? Explained the important aspects of effective coping strategies  ? Listed ways of improving weak areas in coping skills      Patient Participation / Response:  Fully participated with the group by sharing personal reflections / insights and openly received / provided feedback with other participants.    Demonstrated understanding of topics discussed through group discussion and participation, Identified / Expressed personal readiness to practice skills and Verbalized understanding of mental health maintenance topic    Treatment Plan:  Patient has a current master individualized treatment plan.  See Epic treatment plan for more information.    Cathy Aleman RN

## 2020-01-14 ENCOUNTER — HOSPITAL ENCOUNTER (OUTPATIENT)
Dept: BEHAVIORAL HEALTH | Facility: CLINIC | Age: 25
End: 2020-01-14
Attending: PSYCHIATRY & NEUROLOGY | Admitting: PSYCHIATRY & NEUROLOGY
Payer: COMMERCIAL

## 2020-01-14 PROCEDURE — 90853 GROUP PSYCHOTHERAPY: CPT | Performed by: PSYCHIATRY & NEUROLOGY

## 2020-01-14 PROCEDURE — 90853 GROUP PSYCHOTHERAPY: CPT | Performed by: PSYCHOLOGIST

## 2020-01-14 PROCEDURE — G0177 OPPS/PHP; TRAIN & EDUC SERV: HCPCS

## 2020-01-14 PROCEDURE — G0177 OPPS/PHP; TRAIN & EDUC SERV: HCPCS | Performed by: PSYCHIATRY & NEUROLOGY

## 2020-01-14 PROCEDURE — G0177 OPPS/PHP; TRAIN & EDUC SERV: HCPCS | Performed by: COUNSELOR

## 2020-01-14 ASSESSMENT — ANXIETY QUESTIONNAIRES
3. WORRYING TOO MUCH ABOUT DIFFERENT THINGS: MORE THAN HALF THE DAYS
1. FEELING NERVOUS, ANXIOUS, OR ON EDGE: MORE THAN HALF THE DAYS
7. FEELING AFRAID AS IF SOMETHING AWFUL MIGHT HAPPEN: NEARLY EVERY DAY
2. NOT BEING ABLE TO STOP OR CONTROL WORRYING: MORE THAN HALF THE DAYS
5. BEING SO RESTLESS THAT IT IS HARD TO SIT STILL: SEVERAL DAYS
GAD7 TOTAL SCORE: 13
6. BECOMING EASILY ANNOYED OR IRRITABLE: MORE THAN HALF THE DAYS
IF YOU CHECKED OFF ANY PROBLEMS ON THIS QUESTIONNAIRE, HOW DIFFICULT HAVE THESE PROBLEMS MADE IT FOR YOU TO DO YOUR WORK, TAKE CARE OF THINGS AT HOME, OR GET ALONG WITH OTHER PEOPLE: VERY DIFFICULT

## 2020-01-14 ASSESSMENT — PATIENT HEALTH QUESTIONNAIRE - PHQ9: 5. POOR APPETITE OR OVEREATING: SEVERAL DAYS

## 2020-01-14 NOTE — GROUP NOTE
RN Group Note    PATIENT'S NAME: Cesia Nettles  MRN:   [id number]  :   1995  ACCT. NUMBER: [id number]  DATE OF SERVICE: 20  START TIME: 11:00 AM  END TIME: 11:50 AM  FACILITATOR: Marina Arthur LPCC  TOPIC:  RN Group: Health Maintenance  Adult Mental Health Day Treatment  TRACK: 2A    NUMBER OF PARTICIPANTS: 4    Summary of Group / Topics Discussed:  Health Maintenance: Goal Setting: Meaningful goals can bring a sense of direction and purpose in life.  They also highlight our most important values. Patients were assisted by instructor to identify short term goals to promote their mental health recovery and improve overall health and wellness. Patients were educated on SMART goal setting framework as a strategy to increase outcomes and promote success.    Patient Session Goals / Objectives:  ? Explained the key concepts of SMART goal setting framework  ? Identified three goals successfully using SMART goal setting framework  ? Reviewed concept of balance in wellness as it pertains to goal setting        Patient Participation / Response:  Fully participated with the group by sharing personal reflections / insights and openly received / provided feedback with other participants.    Demonstrated understanding of topics discussed through group discussion and participation    Treatment Plan:  Patient has a current master individualized treatment plan.  See Epic treatment plan for more information.    KEVEN Andrade

## 2020-01-14 NOTE — GROUP NOTE
Life Skills/OT Group Note    PATIENT'S NAME: Cesai Nettles  MRN:   [id number]  :   1995  ACCT. NUMBER: [id number]  DATE OF SERVICE: 20  START TIME:  9:00 AM  END TIME:  9:50 AM  FACILITATOR: Samm Mercado OTR/L  TOPIC:  Life Skills Group: Communication and Social Skills Development  Adult Mental Health Day Treatment  TRACK: 2A    NUMBER OF PARTICIPANTS: 4    Summary of Group / Topics Discussed:  Communication and Social Skills Development: Communication Styles: Communication Skills Scale:Patients completed a self assessment of personal communication skills by identifying both strengths and opportunities for growth in areas of messages, emotions, assertiveness and listening skills.  Patients gained awareness of effective communication skills to improve overall communication and connection with other people.      Patient Session Goals / Objectives:    Identified strengths and opportunities for growth in communication skills and how these  impact their ability to communicate clearly with other people       Improved awareness of important aspects of communication skills and how this relates to mental health recovery        Established a plan for practice of these skills in their own environments    Practiced and reflected on how to generalize taught skills to their everyday life      Patient Participation / Response:  Fully participated with the group by sharing personal reflections / insights and openly received / provided feedback with other participants.    Patient presentation: Calm,alert and focused with stable mood and thought process.    Treatment Plan:  Patient has a current master individualized treatment plan.  See Epic treatment plan for more information.    KATHI Delcid/NARCISO
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
## 2020-01-16 ENCOUNTER — HOSPITAL ENCOUNTER (OUTPATIENT)
Dept: BEHAVIORAL HEALTH | Facility: CLINIC | Age: 25
End: 2020-01-16
Attending: PSYCHIATRY & NEUROLOGY | Admitting: PSYCHIATRY & NEUROLOGY
Payer: COMMERCIAL

## 2020-01-16 PROCEDURE — G0177 OPPS/PHP; TRAIN & EDUC SERV: HCPCS | Performed by: PSYCHIATRY & NEUROLOGY

## 2020-01-16 PROCEDURE — G0177 OPPS/PHP; TRAIN & EDUC SERV: HCPCS

## 2020-01-16 PROCEDURE — 90853 GROUP PSYCHOTHERAPY: CPT | Performed by: PSYCHIATRY & NEUROLOGY

## 2020-01-16 PROCEDURE — 90853 GROUP PSYCHOTHERAPY: CPT | Performed by: PSYCHOLOGIST

## 2020-01-16 NOTE — GROUP NOTE
Process Group Note    PATIENT'S NAME: Cesia Nettles  MRN:   [id number]  :   1995  ACCT. NUMBER: [id number]  DATE OF SERVICE: 20  START TIME:  9:00 AM  END TIME:  9:50 AM  FACILITATOR: Kathy Laguerre PsyD  TOPIC:  Process Group    Diagnoses:  295.70  (F25.1) Schizoaffective Disorder Depressive Type  300.00 (F41.9) Unspecified Anxiety Disorder     : Mental Health Resources: Ely Leonel  Guardian: [facility]: Elma Flores : [phone]  CADI:  Mariela Walker: [facility] [facility]  [facility]:  [facility]: [address]: [phone]   Pro Act : Nino Salcidograeme [phone]      Adult Mental Health Day Treatment  TRACK: 2A    NUMBER OF PARTICIPANTS: 4          Data:    Session content: At the start of this group, patients were invited to check in by identifying themselves, describing their current emotional status, and identifying issues to address in this group.   Area(s) of treatment focus addressed in this session included Symptom Management, Personal Safety and Community Resources/Discharge Planning.    Therese reported being safe today. She reported a goal of talking to her boss at work about a probem with not enough cake for a birthday.  She talked to the group about the situation. She offered supportive help to another member. She reported feeling better about her mood today, and that she was anxious about her work.      Therapeutic Interventions/Treatment Strategies:  Psychotherapist offered support, feedback and validation and reinforced use of skills. Treatment modalities used include Cognitive Behavioral Therapy. Interventions include Cognitive Restructuring:  Assisted patient in identifying new neutral/positive core beliefs, Coping Skills: Assisted patient in identifying 1-2 healthy distraction skills to reduce overall distress and Mindfulness: Encouraged a plan to use mindfulness skills in daily life.    Assessment:    Patient response:   Patient responded to session  by accepting feedback, giving feedback, listening, focusing on goals, being attentive and accepting support    Possible barriers to participation / learning include: severity of symptoms    Health Issues:   None reported       Substance Use Review:   Substance Use: No active concerns identified.    Mental Status/Behavioral Observations  Appearance:   Appropriate   Eye Contact:   Good   Psychomotor Behavior: Normal   Attitude:   Cooperative   Orientation:   All  Speech   Rate / Production: Normal    Volume:  Normal   Mood:    Anxious  Depressed   Affect:    Appropriate   Thought Content:   Rumination and Psychosis reports hallucinations visual  Thought Form:  Coherent  Logical     Insight:    Good     Plan:     Safety Plan: Recommended that patient call 911 or go to the local ED should there be a change in any of these risk factors.     Barriers to treatment: None identified    Patient Contracts (see media tab):  None    Substance Use: Not addressed in session     Continue or Discharge: Patient will continue in Adult Day Treatment (ADT)  as planned. Patient is likely to benefit from learning and using skills as they work toward the goals identified in their treatment plan.     Stabilization of symptoms is needed.        Fabien Patterson., D,  Licensed Psychologist    January 16, 2020

## 2020-01-16 NOTE — GROUP NOTE
Life Skills/OT Group Note    PATIENT'S NAME: Cesia Nettles  MRN:   [id number]  :   1995  ACCT. NUMBER: [id number]  DATE OF SERVICE: 20  START TIME: 10:00 AM  END TIME: 10:50 AM  FACILITATOR: Ag Wan OT  TOPIC:  Life Skills Group: Sensory Approaches in Mental Health  Adult Mental Health Day Treatment  TRACK:2A    NUMBER OF PARTICIPANTS: 4    Summary of Group / Topics Discussed:  Sensory Approaches in Mental Health:  Coping Through the Senses Experiential Education: Patients reviewed and learned about neurosensory based skills and strategies related to grounding and connecting oneself to the moment.  Patients were taught about the benefits of and how to tap into the internal senses of proprioception and vestibular through non tool based muscle activation and movement activities and then worked to identify one way to apply taught skills to their everyday contexts such as at home or work or out socially. They practiced each of the skills and then made a plan to use one at some point today to apply the skill.     Patient Session Goals / Objectives:    Identified specific and individualized neurosensory skills to help when distressed      Identified skills learned and how this applies to current daily life    Established a plan for practice of these skills in their own environments        Patient Participation / Response:  Fully participated with the group by sharing personal reflections / insights and openly received / provided feedback with other participants.    Patient presentation: Engaged in experiential practice and identified on skill to use at work to help herself stay grounded. , Verbalized understanding of content and Patient would benefit from additional opportunities to practice the content to be able to generalize it to their everyday life with increased intentionality, consistency, and efficacy in support of their psychiatric recovery    Treatment Plan:  Patient has a current master  individualized treatment plan.  See Epic treatment plan for more information.    Ag Wan, OT

## 2020-01-16 NOTE — GROUP NOTE
Life Skills/OT Group Note    PATIENT'S NAME: Cesia Nettles  MRN:   [id number]  :   1995  ACCT. NUMBER: [id number]  DATE OF SERVICE: 20  START TIME: 11:00 AM  END TIME: 11:50 AM  FACILITATOR: Samm Mercado OTR/L  TOPIC:  Life Skills Group: Lifestyle Balance and Structure  Adult Mental Health Day Treatment  TRACK: 2A    NUMBER OF PARTICIPANTS: 4    Summary of Group / Topics Discussed:  Lifestyle Balance and Strucure:  Benefits of Leisure on Mental Health: Patients explored and learned about the benefits and possibilities of leisure activity to create lifestyle balance that supports their mental and physical wellbeing.  Patients were assisted to identify individualized leisure values and interests, recognized the benefits of leisure activity on mental health, and problem solved barriers to leisure engagement and strategies to overcome.  Patient engaged in an experiential leisure activity to gain self-awareness and build milieu social aspects and reflected on the impact the experiential activity had on their mood.       Patient Session Goals / Objectives:    Increased awareness of the importance of engagement in leisure activities to support lifestyle balance and perceived quality of life    Identified strategies to recognize and challenge barriers to leisure participation     Facilitated exploration of meaningful leisure interests and values    Practiced and reflected on how to generalize taught skills to their everyday life        Patient Participation / Response:  Fully participated with the group by sharing personal reflections / insights and openly received / provided feedback with other participants.    Patient presentation: Calm,alert,focused with stable mood and thought process.    Treatment Plan:  Patient has a current master individualized treatment plan.  See Epic treatment plan for more information.    KATHI Delcid/NARCISO

## 2020-01-20 ENCOUNTER — HOSPITAL ENCOUNTER (OUTPATIENT)
Dept: BEHAVIORAL HEALTH | Facility: CLINIC | Age: 25
End: 2020-01-20
Attending: PSYCHIATRY & NEUROLOGY | Admitting: PSYCHIATRY & NEUROLOGY
Payer: COMMERCIAL

## 2020-01-20 VITALS — BODY MASS INDEX: 30.92 KG/M2 | WEIGHT: 197 LBS | HEIGHT: 67 IN

## 2020-01-20 PROCEDURE — 90853 GROUP PSYCHOTHERAPY: CPT | Performed by: PSYCHOLOGIST

## 2020-01-20 PROCEDURE — G0177 OPPS/PHP; TRAIN & EDUC SERV: HCPCS

## 2020-01-20 PROCEDURE — G0177 OPPS/PHP; TRAIN & EDUC SERV: HCPCS | Performed by: PSYCHIATRY & NEUROLOGY

## 2020-01-20 PROCEDURE — 90853 GROUP PSYCHOTHERAPY: CPT | Performed by: PSYCHIATRY & NEUROLOGY

## 2020-01-20 ASSESSMENT — MIFFLIN-ST. JEOR: SCORE: 1681.22

## 2020-01-20 NOTE — ADDENDUM NOTE
Encounter addended by: Kathy Laguerre PsyD on: 1/20/2020 3:37 PM   Actions taken: Clinical Note Signed
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
## 2020-01-20 NOTE — GROUP NOTE
EBP Group Note    PATIENT'S NAME: Cesia Nettles  MRN:   [id number]  :   1995  ACCT. NUMBER: [id number]  DATE OF SERVICE: 20  START TIME: 11:00 AM  END TIME: 11:50 AM  FACILITATOR: Kathy Laguerre PsyD  TOPIC:  EBP Group: Self-Awareness  Adult Mental Health Day Treatment  TRACK: 2A    NUMBER OF PARTICIPANTS: 4    Summary of Group / Topics Discussed:  Self-Awareness: Personal Strengths: Topic focused on assisting patients in identifying personal strengths and how they relate to the management of mental health symptoms. Patients discussed the benefits of acknowledging their personal strengths and their impact on mood improvement, mindfulness, and perspective. Patients worked to increase time spent on recognition and appreciation of what is positive and working in their lives. The goal is to reduce rumination and negative thinking resulting in increased mindfulness and resilience. Patients will work to put skills into practice and problem-solve barriers.     Patient Session Goals / Objectives:    Identified personal strengths    Identified barriers to recognition of personal strengths    Verbalized understanding of strategies to increase use of their strengths in management of daily symptoms      Patient Participation / Response:  Fully participated with the group by sharing personal reflections / insights and openly received / provided feedback with other participants.    Demonstrated understanding of topics discussed through group discussion and participation    Treatment Plan:  Patient has a master individualized treatment plan is in the process of being developed with the patient and multi-disciplinary care team.    Francisco Patterson, D,  Licensed Psychologist

## 2020-01-20 NOTE — GROUP NOTE
RN Group Note    PATIENT'S NAME: Cesia Nettles  MRN:   [id number]  :   1995  ACCT. NUMBER: [id number]  DATE OF SERVICE: 20  START TIME:  9:00 AM  END TIME:  9:50 AM  FACILITATOR: Cathy Aleman RN  TOPIC:  RN Group: Mental Health Maintenance  Adult Mental Health Day Treatment  TRACK: 2A    NUMBER OF PARTICIPANTS: 4    Summary of Group / Topics Discussed:  Mental Health Maintenance:  Stigma: In this group patients explored stigma surrounding a mental health diagnosis.  The group discussed the way stigma impacts their own life, and discussed strategies to reduce. The relationship between physical and mental health were also explored in the context of healthcare access, treatment, and support.    Patient Session Goals / Objectives:  ? Patients identified the importance of practicing emotional hygiene  ? Patients identified ways to decrease the  impact of stigma in their own life      Patient Participation / Response:  Fully participated with the group by sharing personal reflections / insights and openly received / provided feedback with other participants.    Demonstrated understanding of topics discussed through group discussion and participation, Identified / Expressed personal readiness to practice skills and Verbalized understanding of mental health maintenance topic    Treatment Plan:  Patient has a current master individualized treatment plan.  See Epic treatment plan for more information.    Cathy Aleman RN

## 2020-01-21 ENCOUNTER — HOSPITAL ENCOUNTER (OUTPATIENT)
Dept: BEHAVIORAL HEALTH | Facility: CLINIC | Age: 25
End: 2020-01-21
Attending: PSYCHIATRY & NEUROLOGY | Admitting: PSYCHIATRY & NEUROLOGY
Payer: COMMERCIAL

## 2020-01-21 PROCEDURE — G0177 OPPS/PHP; TRAIN & EDUC SERV: HCPCS | Performed by: PSYCHIATRY & NEUROLOGY

## 2020-01-21 PROCEDURE — G0177 OPPS/PHP; TRAIN & EDUC SERV: HCPCS | Performed by: COUNSELOR

## 2020-01-21 PROCEDURE — G0177 OPPS/PHP; TRAIN & EDUC SERV: HCPCS

## 2020-01-21 PROCEDURE — 90853 GROUP PSYCHOTHERAPY: CPT | Performed by: PSYCHIATRY & NEUROLOGY

## 2020-01-21 PROCEDURE — 90853 GROUP PSYCHOTHERAPY: CPT | Performed by: PSYCHOLOGIST
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
## 2020-01-21 NOTE — GROUP NOTE
Life Skills/OT Group Note    PATIENT'S NAME: Cesia Nettles  MRN:   [id number]  :   1995  ACCT. NUMBER: [id number]  DATE OF SERVICE: 20  START TIME:  9:00 AM  END TIME:  9:50 AM  FACILITATOR: Samm Mercado OTR/L  TOPIC:  Life Skills Group: Lifestyle Balance and Structure  Adult Mental Health Day Treatment  TRACK: 2A    NUMBER OF PARTICIPANTS: 6    Summary of Group / Topics Discussed:  Lifestyle Balance and Strucure:  Occupations: A Balanced Lifestyle: Patients were introduced to the importance of daily occupations in support of mental health management by exploring a balanced lifestyle.  Patients identified how they spend their time and skills to bring this into better balance.  Patients were assisted to establish, restore, and/or modify performance skills and patterns for improved engagement and promotion of positive mental health through meaningful occupations.  Patients gained awareness of the connection between who they are (self identity) with what they do.    Patient Session Goals / Objectives:    Increased awareness of how patient s functioning in identified meaningful occupations are impacted by their mental health status     Developed performance skills and performance patterns to enhance occupational engagement through creating a balanced lifestyle    Explored ways to generalize new awareness and skills to their everyday life        Patient Participation / Response:  Fully participated with the group by sharing personal reflections / insights and openly received / provided feedback with other participants.    Patient presentation: Calma,alert,focused with stable mood and thought process.    Treatment Plan:  Patient has a current master individualized treatment plan.  See Epic treatment plan for more information.    KATHI Delcid/NARCISO

## 2020-01-21 NOTE — GROUP NOTE
RN Group Note    PATIENT'S NAME: Cesia Nettles  MRN:   [id number]  :   1995  ACCT. NUMBER: [id number]  DATE OF SERVICE: 20  START TIME: 11:00 AM  END TIME: 11:50 AM  FACILITATOR: Marina Arthur LPCC  TOPIC:  RN Group: Health Maintenance  Adult Mental Health Day Treatment  TRACK: 2A    NUMBER OF PARTICIPANTS: 5    Summary of Group / Topics Discussed:  Health Maintenance: Goal Setting: Meaningful goals can bring a sense of direction and purpose in life.  They also highlight our most important values. Patients were assisted by instructor to identify short term goals to promote their mental health recovery and improve overall health and wellness. Patients were educated on SMART goal setting framework as a strategy to increase outcomes and promote success.    Patient Session Goals / Objectives:  ? Explained the key concepts of SMART goal setting framework  ? Identified three goals successfully using SMART goal setting framework  ? Reviewed concept of balance in wellness as it pertains to goal setting        Patient Participation / Response:  Fully participated with the group by sharing personal reflections / insights and openly received / provided feedback with other participants.    Demonstrated understanding of topics discussed through group discussion and participation and Identified / Expressed personal readiness to practice skills    Treatment Plan:  Patient has a current master individualized treatment plan.  See Epic treatment plan for more information.    KEVEN Andrade

## 2020-01-22 ENCOUNTER — OFFICE VISIT (OUTPATIENT)
Dept: PSYCHIATRY | Facility: CLINIC | Age: 25
End: 2020-01-22
Attending: PSYCHIATRY & NEUROLOGY
Payer: COMMERCIAL

## 2020-01-22 VITALS
BODY MASS INDEX: 30.85 KG/M2 | DIASTOLIC BLOOD PRESSURE: 77 MMHG | HEART RATE: 103 BPM | WEIGHT: 197 LBS | SYSTOLIC BLOOD PRESSURE: 120 MMHG

## 2020-01-22 DIAGNOSIS — F25.1 SCHIZOAFFECTIVE DISORDER, DEPRESSIVE TYPE (H): Primary | ICD-10-CM

## 2020-01-22 PROCEDURE — G0463 HOSPITAL OUTPT CLINIC VISIT: HCPCS | Mod: ZF

## 2020-01-22 ASSESSMENT — PAIN SCALES - GENERAL: PAINLEVEL: NO PAIN (0)

## 2020-01-22 ASSESSMENT — PATIENT HEALTH QUESTIONNAIRE - PHQ9: SUM OF ALL RESPONSES TO PHQ QUESTIONS 1-9: 10

## 2020-01-22 NOTE — PROGRESS NOTES
[facility]  Psychiatry Clinic  PSYCHIATRIC PROGRESS NOTE     Date of initial Diagnostic Assessment (DA) is 7/8/2016 and most recent Transfer of Care DA is 07/29/19.    CARE TEAM:  PCP- Becki Valencia PA-C    Specialty Providers- no    Therapist- Rylee CALLEJAS at [facility] -  [phone] (Verbal BRIONNA on file)    Community  Team- [facility] Worker (STWA) Samia Garcia [phone],   (ProAct) Marv Carranza [phone]    Guardian: Elma Flores through [facility] [phone]     Pertinent Background: Cesia Nettles is a 24 year old female who prefers the name Therese & pronouns she, her, herself.  This patient first experienced mental health issues in childhood and has received treatment for psychosis and suicidality.  Notably, Therese has a history of chronic AH/VH dating back to age 7 yo, and has good insight into her symptoms. Per chart review, she has a h/o some Cluster B traits and frequent hospitalizations which tapered off after starting  residence and supportive employment a few years ago. She has been very stable on paliperidone and fluoxetine in conjunction with  living environment. She had neuropsychiatric testing as a child (see scanned documents 4/16/19). She is under guardianship (see above); all medication changes need to be approved by them (see Media section 10/17/19 for paperwork).      Psych critical item history includes suicide attempt [multiple], suicidal ideation, psychosis [sxs include paranoia, AH, ideas of reference], multiple psychotropic trials, psych hosp (>5), lives in a  and has a guardian.    INTERIM HISTORY      [4, 4]   The patient reports good treatment adherence.  History was provided by the patient who is a fair historian.  The last visit ended with the following med change: increase melatonin from 3mg tp 5mg 1-2 hours before bedtime PRN (patient's request).   Since the last visit:   - Therese came  "alone and was on time for her appt today  - Therese was hospitalized [facility] from 11/24-11/27 for worsening psychosis and anxiety; no medication changes were made during the admission, and she was referred to day treatment on discharge - she was scheduled for a post-hospitalization visit on 12/13, but cancelled last minute due to a GI illness   - She started day treatment a few days after discharge and feels this has been very helpful - she notes that she feels heard and understood in groups and like people there are going through similar things - she thinks she is on track to graduate from day treatment in early March  - She feels like the things that led up to the hospitalization were \"on the back burner\" when she came for clinic visits and she was focusing more on stress/anxiety with her job - she states she was experiencing more paranoia, AH, and delusions this Fall which is what led to the hospitalization - she states she is now able to see the distinction between paranoia and delusions she experiences - she states the paranoia is more consistent with fears that her neighbor will come over and kill her with an axe whereas the delusions are things like thinking Rolly will come to her house in the future and deliver a signed book - she states overall these symptoms are much improved from the hospitalization, however, has had a slight increase in frequency of psychosis the last week or two because she has been working almost every day and it makes it difficult to use her coping skills  - She is meeting with her  and her manager at work later today to discuss decreasing her hours  - They did not make a medication change in the hospital. But symptoms are improved and she relates this to the day treatment as well as utilizing her coping skills - she states she is better able to recognize the differences in her psychotic symptoms   - She notes an ongoing stressor for her is her family dynamics - both of her " "parents remarried and had kids who are much younger than her and she feels like both her mom and her dad do family things without telling her - she states that she has called them before and asked what they were doing that day and they responded \"not much\", but later saw on Facebook or heard through her grandma that they had a band concert or some other event - she feels hurt and left out a lot because she thinks that they purposefully don't tell her about these events   - She is interested in family therapy, but does not think her dad would be willing to go - she has talked about having her dad come into one or two of her individual therapy sessions and he seemed open to this - she declines a referral for family therapy in our clinic at this time  - She shared a few text messages she sent her mom when she was feeling worried that they were going to do something without her - they read :\"If you go to the [employer] with the kids without asking me to go I will be extremely mad. I will block your number, block you on Facebook, and not talk to you for a long time.\" - her mom responded \"I will talk to you next week.\"  - She states her therapist will be leaving in a few months and she will need to find a new one - her current therapist will be helping her with referrals   - Depression is improved and stable  - Medications are going ok - denies side effects including stiffness and abnormal movements  - She denies recent SI    RECENT PSYCH ROS:   Depression:  insomnia and feeling worthless  Elevated:  none  Psychosis:  auditory hallucinations without commands [details in Interim History] and visual hallucinations  Anxiety:  excessive worry  Panic Attack:  none  Trauma Related:  none  Dysregulation:  none  Eating Disorder: none specifically; is a picky eater and does not eat standard meals     Pertinent Negative Symptoms:  NO self-injurious behavior/urges, suicidal and violent ideation, aggression, delusions and " malia    RECENT SUBSTANCE USE:     Alcohol- 2-3x per year  Tobacco- none  Caffeine- none  Opioids- none           Narcan Kit- N/A   Cannabis- none     Other illicit drugs- none    CURRENT SOCIAL HISTORY:  Financial/ Work- SSI + works part time as a  at [employer].      Partner/ - none  Children- none      Living situation- Therese lives in [facility] through [employer].      Social/ Spiritual Support- Her mother, half-siblings, [facility] workers, ,  staff (Renu Brady).  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   FEELS SAFE AT HOME- yes     PSYCH and CD Critical Summary Points since July 2019 7/29/19- resident transition; scheduled hydroxyzine 25mg at bedtime (was previously PRN), started melatonin for sleep  9/17/19- no med changes; encouraged patient to socialize more due to ongoing depressive symptoms  10/29/19- increase melatonin from 3mg to 5mg 1-2 hours before bedtime PRN (patient's request)  11/24-11/27: hospitalization on [facility] for worsening depression and psychosis; no med changes; referred to day treatment on discharge  1/22/20- no med changes     PAST MED TRIALS          See last Diagnostic Assessment     MEDICAL / SURGICAL HISTORY          Pregnant or breastfeeding- no      Contraception- implant    H/o abscence epilepsy from age [age]    Patient Active Problem List   Diagnosis     Schizoaffective disorder, depressive type (H)     Hx of psychiatric care     Psychosis (H)     Major Surgery- none    MEDICAL REVIEW OF SYSTEMS    [2, 10]     Reports: none    Denies: fevers, chills, weight loss/gain, headaches, numbness, tingling, weakness, nausea, vomiting, diarrhea, constipation    ALLERGY       Cats and Seasonal allergies     MEDICATIONS        Current Outpatient Medications   Medication Sig Dispense Refill     etonogestrel (IMPLANON/NEXPLANON) 68 MG IMPL 1 each by Subdermal route once       FLUoxetine (PROZAC) 40 MG capsule Take 2  capsules (80 mg) by mouth At Bedtime 60 capsule 1     fluticasone (FLONASE) 50 MCG/ACT nasal spray Spray 2 sprays into both nostrils daily       hydrOXYzine (ATARAX) 25 MG tablet Take 1 tablet (25 mg) by mouth At Bedtime 30 tablet 1     ibuprofen (ADVIL/MOTRIN) 200 MG tablet Take 400 mg by mouth every 4 hours as needed for mild pain       lamoTRIgine (LAMICTAL) 100 MG tablet Take 1.5 tablets (150 mg) by mouth daily 45 tablet 1     melatonin 5 MG tablet Take 1 tablet (5 mg) by mouth nightly as needed for sleep 30 tablet 3     metFORMIN (GLUCOPHAGE) 500 MG tablet Take 2 tablets (1,000 mg) by mouth 2 times daily (with meals) 120 tablet 1     paliperidone ER (INVEGA) 6 MG 24 hr tablet Take 2 tablets (12 mg) by mouth At Bedtime 60 tablet 1     VITALS      [3, 3]   /77   Pulse 103   Wt 89.4 kg (197 lb)   BMI 30.85 kg/m       MENTAL STATUS EXAM      [9, 14 cog gs]     Alertness: alert  and oriented  Appearance: adequately groomed, purple hair and glassess, tattoos  Behavior/Demeanor: cooperative, pleasant and calm, with fair  eye contact   Speech: normal and regular rate and rhythm , non-pressured  Language: intact  Psychomotor: normal or unremarkable  Mood: description consistent with euthymia  Affect: full range; was congruent to mood; was congruent to content  Thought Process/Associations: unremarkable  Thought Content:  Reports negative self-talk driven by AH;  Denies suicidal and violent ideation and paranoid ideation  Perception:  Reports auditory hallucinations without commands [details in Interim History] and visual hallucinations;  Denies depersonalization, derealization and none  Insight: adequate  Judgment: good  Cognition: does  appear grossly intact; formal cognitive testing was not done  Gait and Station: unremarkable    LABS and DATA     AIMS:  last done 9/17/19 with score(s): 0    PHQ9 TODAY =  11  PHQ-9 SCORE 12/9/2019 12/26/2019 1/14/2020   PHQ-9 Total Score - - -   PHQ-9 Total Score - - -    PHQ-9 Total Score 16 13 10     ANTIPSYCHOTIC LABS  [glu, A1C, lipids (jenny LDL), liver enzymes, WBC, ANEU, Hgb, plts]  q12 mo  Recent Labs   Lab Test 02/02/17  0916 09/16/14  0947 12/21/13  1016   GLC 87 69* 70  70   A1C 4.9 5.2  --      Recent Labs   Lab Test 12/10/18  0912 02/02/17  0916 12/15/15 09/16/14  0947   CHOL 205* 186 188 205*   TRIG 201* 195* 210* 265*   * 109* 109 115   HDL 45* 38* 37* 37*     Recent Labs   Lab Test 02/02/17  0916 12/21/13  1016   AST 24 33   ALT 23 18   ALKPHOS 102 107  107     Recent Labs   Lab Test 12/10/18  0912 02/02/17  0916 09/16/14  0947 12/21/13  1016   WBC 6.4 6.8 6.9 6.1  6.1   ANEU 3.3  --  3.9 3.9  3.9   HGB 11.7 12.3 12.8 12.5  12.5    274 317 310  310       Labs drawn 6/17/19  W/ PCP at [facility] (in Care Everywhere):  Fasting glucose 87 mg/dL  A1C 5.2%  TSH 1.32  Lipid panel Ch 200 (H),  (H), ,       PSYCHOTROPIC DRUG INTERACTIONS     fluoxetine + hydrozyzine + paliperidone: concurrent use may increase risk of QTc prolongation.   paliperidone + lamotrigine: concurrent use may increase risk of CNS depression    MANAGEMENT:  Monitoring for adverse effects, routine vitals, routine labs, periodic EKGs and using lowest therapeutic dose of [psychotropics]    RISK STATEMENT for SAFETY     Cesia Nettles did not appear to be an imminent safety risk to self or others.     Cesia Nettles denied any current or recent SI and last experienced passive thoughts of not wanting to be alive 3-4 months ago. She has notable risk factors for self-harm including: recent bullying, slightly worsening depression, derogatory AH (not command in nature), and previous suicide attempts.  However, risk is mitigated by no recent SI, h/o seeking help when needed, medication adherence, future oriented, none to minimal alcohol use, commitment to family, good social support and stable housing.  Based on all available evidence she does not appear to be at  imminent risk for self-harm therefore does not meet criteria for a 72-hr hold/involuntary hospitalization.  However, based on degree of symptoms close psych FU was recommended which the pt did agree to.  Additional steps to minimize risk include: frequent clinic visits, lives in supportive  environment, able to identify previous SI triggers    PSYCHIATRIC DIAGNOSIS     Schizoaffective Disorder, Depressed type  Unspecified Anxiety Disorder    ASSESSMENT      [m2, h3]     TODAY Therese reports improvement in depression and psychosis following her hospitalization in late November and current day treatment program. There were no medication changes made during admission and Therese attributes her improvement to coping skills she has learned in day treatment. She appears to have more insight into her symptoms than earlier this Fall and we discussed ways for us to better communicate about the intensity level of her symptoms at clinic visits as it was not clear at previous visits that things were getting worse. It is also clear that family dynamics are playing a large role in her mental health as both of her parents have remarried and had children and Therese feels left out a lot of the time. Did offer her a referral for family therapy in this clinic with Dr. Live, however, she declined for now. She was open to the idea of bringing one of her parents to an individual therapy session which she has discussed with her therapist in the past. Additionally, her therapist will be leaving in a few months, but has offered to help her with a referral. Therese agrees to let us know if this falls through and she needs assistance with finding a new therapist.     PLAN      [m2, h3]     1) PSYCHOTROPIC MEDICATIONS:  - Continue paliperidone 12 mg at bedtime   - Continue lamotrigine 100 mg Daily  - Continue fluoxetine 80 mg QHS  - Continue metformin 1000 mg BID  - Continue hydroxyzine 25 mg at bedtime for anxiety  - Continue melatonin from 5mg  1-2 hours before bedtime PRN     2) [place]  was not checked today:  not using controlled substances.    3) THERAPY:    - Continue weekly individual therapy with Rylee CALLEJAS at [facility]   - Recommend family therapy; referral offered for Dr. Live, but patient declined    4) NEXT DUE:    Labs- AP labs due June 2020, NMDA receptor antibody ordered several months ago per patient's mom's request (not drawn yet)  EKG- QTc 445 w/ T wave abnormality on 6/10/19  Rating Scales- PHQ9 at every visit; AIMS next due Sep 2020    5) REFERRALS:    No Referrals needed (see therapy above)    6) OTHER:  - Work on improving diet with more regular meals, decreased midnight snacking, and more lean proteins and vegetables  - Work on increasing physical activity by adding a walk in the park 1x per week   - Decrease evening and late night screen time and work on earlier bedtime    6) RTC: in 6 weeks; sooner if needed     7) CRISIS NUMBERS:   Provided routinely in AVS   ONLY if a [facility] PT: [facility] [phone] (clinic), [phone] (after hours)     TREATMENT RISK STATEMENT:  The risks, benefits, alternatives and potential adverse effects have been discussed and are understood by the pt. The pt understands the risks of using street drugs or alcohol. There are no medical contraindications, the pt agrees to treatment with the ability to do so. The pt knows to call the clinic for any problems or to access emergency care if needed.  Medical and substance use concerns are documented above.  Psychotropic drug interaction check was done, including changes made today.    PROVIDER: Yudy Chery MD    Patient staffed in clinic with Dr. Rosales who will sign the note.  Supervisor is Dr. Rosales.  I saw the patient with the resident, and participated in key portions of the service, including the mental status examination and developing the plan of care. I reviewed key portions of the history with the resident. I agree with the  findings and plan as documented in this note.    Oralia Rosales MD

## 2020-01-22 NOTE — ADDENDUM NOTE
Encounter addended by: Elaine Cristina on: 1/22/2020 7:53 AM   Actions taken: Visit Navigator Flowsheet section accepted

## 2020-01-22 NOTE — PATIENT INSTRUCTIONS
Thank you for coming to the PSYCHIATRY CLINIC.    Lab Testing:  If you had lab testing today and your results are reassuring or normal they will be mailed to you or sent through [employer] within 7 days.   If the lab tests need quick action we will call you with the results.  The phone number we will call with results is # [phone] (home) . If this is not the best number please call our clinic and change the number.    Medication Refills:  If you need any refills please call your pharmacy and they will contact us. Our fax number for refills is [phone]. Please allow three business for refill processing.   If you need to  your refill at a new pharmacy, please contact the new pharmacy directly. The new pharmacy will help you get your medications transferred.     Scheduling:  If you have any concerns about today's visit or wish to schedule another appointment please call our office during normal business hours [phone] (8-5:00 M-F)    Contact Us:  Please call [phone] during business hours (8-5:00 M-F).  If after clinic hours, or on the weekend, please call  [phone].    Financial Assistance [phone]  [facility] Billing [phone]  Central Billing Office, [facility]: [phone]  [place] Billing [phone]  Medical Records [phone]      MENTAL HEALTH CRISIS NUMBERS:  [facility]:   [facility] - [phone]   Crisis Residence [facility] - [phone]   Walk-In Counseling [facility] [phone]   COPE 24/7 [place] Mobile Team for Adults - [[phone]]; Child - [[phone]]        [facility]:   [facility] - [phone]   Walk-in counseling [facility] - [phone]   Walk-in counseling [facility] - [phone]   Crisis Residence [facility] - [phone]   Urgent Care Adult Mental Health:   --Drop-in, 24/7 crisis line, and [place] Mobile Team  [[phone]]    CRISIS TEXT LINE: Text 646-252 from anywhere, anytime, any crisis 24/7;    OR SEE www.crisistextline.org     National Suicide Prevention Lifeline: 071-848-AYIO (756-986-2540) or dial 988    Poison Control Center - 3-680-149-6240    CHILD: [facility] needs assessment team - [phone]     [facility] Lifeline - 1-764.990.1504; or [facility] Lifeline - 1-529.516.5324    If you have a medical emergency please call 911or go to the nearest ER.                    _____________________________________________    Again thank you for choosing PSYCHIATRY CLINIC and please let us know how we can best partner with you to improve you and your family's health.  You may be receiving a survey regarding this appointment. We would love to have your feedback, both positive and negative. The survey is done by an external company, so your answers are anonymous.

## 2020-01-23 ENCOUNTER — HOSPITAL ENCOUNTER (OUTPATIENT)
Dept: BEHAVIORAL HEALTH | Facility: CLINIC | Age: 25
End: 2020-01-23
Attending: PSYCHIATRY & NEUROLOGY | Admitting: PSYCHIATRY & NEUROLOGY
Payer: COMMERCIAL

## 2020-01-23 ENCOUNTER — TELEPHONE (OUTPATIENT)
Dept: PSYCHIATRY | Facility: CLINIC | Age: 25
End: 2020-01-23

## 2020-01-23 PROCEDURE — G0177 OPPS/PHP; TRAIN & EDUC SERV: HCPCS | Performed by: PSYCHIATRY & NEUROLOGY

## 2020-01-23 PROCEDURE — 90853 GROUP PSYCHOTHERAPY: CPT | Performed by: PSYCHOLOGIST

## 2020-01-23 PROCEDURE — G0177 OPPS/PHP; TRAIN & EDUC SERV: HCPCS

## 2020-01-23 PROCEDURE — 90853 GROUP PSYCHOTHERAPY: CPT | Performed by: PSYCHIATRY & NEUROLOGY

## 2020-01-23 ASSESSMENT — ANXIETY QUESTIONNAIRES: GAD7 TOTAL SCORE: 13

## 2020-01-23 NOTE — TELEPHONE ENCOUNTER
[facility] Call Center    Phone Message    May a detailed message be left on voicemail: yes    Reason for Call: Other:   Incoming call from Elma Flores, patient's authorized guardian. She would like to implement a plan that the patient is not able to schedule her own appointments or attend appointments alone. Staff from patient's group home will call to schedule and will coordinate with Elma so she will attend appts with the patient.    Elma said the patient is not an accurate  so she would like to talk to Dr. Cheyr or Mk about what is really happening for the patient.    The patient's [facility] worker has ended services and  will be ending services because the patient was not meeting with either person. At a team meeting today, mehreen found out the patient quit her job.        Action Taken: Message routed to:  Other: Nursing pool

## 2020-01-23 NOTE — GROUP NOTE
Life Skills/OT Group Note    PATIENT'S NAME: Cesia Nettles  MRN:   [id number]  :   1995  ACCT. NUMBER: [id number]  DATE OF SERVICE: 20  START TIME: 11:00 AM  END TIME: 11:50 AM  FACILITATOR: Samm Mercado OTR/L  TOPIC:  Life Skills Group: Lifestyle Balance and Structure  Adult Mental Health Day Treatment  TRACK: 2A    NUMBER OF PARTICIPANTS: 3    Summary of Group / Topics Discussed:  Lifestyle Balance and Strucure:  Time Management: Time for Tips and Tips for Time: Patients were introduced to how effective time management is beneficial to self esteem, relationships with others, life balance, and other aspects of daily life.   Patients were taught strategies on how to improve time management skills.    Patient Session Goals / Objectives:    Facilitated the discussion on challenges/barriers and personal situations with time management     Identified specific techniques and strategies to improve time management to support mental health recovery       Identified a plan to implement strategies into daily life to support improved time management         Patient Participation / Response:  Fully participated with the group by sharing personal reflections / insights and openly received / provided feedback with other participants.    Patient presentation: Anxious and tense to day related to an upcoming meeting at her [facility]. Thought process clear,goal directed and reality based. Seemed to receive some benefit to creating her weekly schedule to show the staff at her meeting.    Treatment Plan:  Patient has a current master individualized treatment plan.  See Epic treatment plan for more information.    KATHI Delcid/NARCISO

## 2020-01-23 NOTE — GROUP NOTE
Life Skills/OT Group Note    PATIENT'S NAME: Cesia Nettles  MRN:   [id number]  :   1995  ACCT. NUMBER: [id number]  DATE OF SERVICE: 20  START TIME: 10:00 AM  END TIME: 10:50 AM  FACILITATOR: Ag Wan OT  TOPIC:  Life Skills Group: Sensory Approaches in Mental Health  Adult Mental Health Day Treatment  TRACK: 2A    NUMBER OF PARTICIPANTS: 3    Summary of Group / Topics Discussed:  Sensory Approaches in Mental Health:  Focused Activity: Patients were provided with verbal and experiential education to identify physical and sensorimotor based activities that can be utilized for stress management, self care, health maintenance, and self regulation.  Patients increased knowledge and awareness of activities that support good self care, build resiliency, and prevent relapse through healthy engagement in mindful focused activities for effective coping with illness and reduction of maladaptive coping skills. They were provided opportunity to engage in a group focused activity to monitor themselves and reflect on how the experiential process helped their mood and their anxiety level.     Patient Session Goals / Objectives:    Identified specific physical and sensorimotor based activities for stress management, self care, health maintenance, and self regulation      Improved awareness of activities that are meaningful focused activities that support good self care, build resiliency, and prevent relapse and how this applies to current daily life    Established a plan for practice of these skills in their own environments    Practiced and reflected on how to generalize taught skills to their everyday life      Patient Participation / Response:  Fully participated with the group by sharing personal reflections / insights and openly received / provided feedback with other participants.    Patient presentation: Good effort and performance results. More easily connecting with peers in meaningful ways. ,  Verbalized understanding of content and Patient would benefit from additional opportunities to practice the content to be able to generalize it to their everyday life with increased intentionality, consistency, and efficacy in support of their psychiatric recovery    Treatment Plan:  Patient has a current master individualized treatment plan.  See Epic treatment plan for more information.    Ag Wan, OT

## 2020-01-23 NOTE — GROUP NOTE
Process Group Note    PATIENT'S NAME: Cesia eNttles  MRN:   [id number]  :   1995  ACCT. NUMBER: [id number]  DATE OF SERVICE: 20  START TIME:  9:00 AM  END TIME:  9:50 AM  FACILITATOR: Kathy Laguerre PsyD  TOPIC:  Process Group    Diagnoses:       : Mental Health Resources: Elyshayy Castaneda  Guardian: [facility]: Elma Flores : [phone]  CADI:  Mariela Walker: [place] [facility]  [facility]:  [facility]: [address]: [phone]   Pro Act : Nino Carranza [phone]      Adult Mental Health Day Treatment  TRACK: 2A    NUMBER OF PARTICIPANTS: 4          Data:    Session content: At the start of this group, patients were invited to check in by identifying themselves, describing their current emotional status, and identifying issues to address in this group.   Area(s) of treatment focus addressed in this session included Symptom Management, Personal Safety and Community Resources/Discharge Planning.    Therese reported being safe today.She talked to the group about her meeting with the  and her manager at her work and they decided to do her schedule in a different manner. She offered supportive help to another member. She reported feeling good about her mood today, and that she was anxious about her work, as she is picking up more hours, but felt better after the meeting.      Therapeutic Interventions/Treatment Strategies:  Psychotherapist offered support, feedback and validation and reinforced use of skills. Treatment modalities used include Cognitive Behavioral Therapy. Interventions include Cognitive Restructuring:  Assisted patient in formulating new neutral/positive alternatives to challenge less helpful / ineffective thoughts, Coping Skills: Discussed meditation skills and addressed ways to implement meditation skills  and Mindfulness: Encouraged a plan to use mindfulness skills in daily life.    Assessment:    Patient response:   Patient responded to  session by accepting feedback, giving feedback, listening, focusing on goals, being attentive and accepting support    Possible barriers to participation / learning include: severity of symptoms    Health Issues:   None reported       Substance Use Review:   Substance Use: No active concerns identified.    Mental Status/Behavioral Observations  Appearance:   Appropriate   Eye Contact:   Good   Psychomotor Behavior: Normal   Attitude:   Cooperative   Orientation:   All  Speech   Rate / Production: Normal    Volume:  Normal   Mood:    Anxious   Affect:    Appropriate   Thought Content:   Rumination and Psychosis reports hallucinations visual  Thought Form:  Coherent  Logical     Insight:    Good     Plan:     Safety Plan: Recommended that patient call 911 or go to the local ED should there be a change in any of these risk factors.     Barriers to treatment: None identified    Patient Contracts (see media tab):  None    Substance Use: Not addressed in session     Continue or Discharge: Patient will continue in Adult Day Treatment (ADT)  as planned. Patient is likely to benefit from learning and using skills as they work toward the goals identified in their treatment plan.      Fabien Patterson., D,  Licensed Psychologist   January 23, 2020

## 2020-01-27 ENCOUNTER — HOSPITAL ENCOUNTER (OUTPATIENT)
Dept: BEHAVIORAL HEALTH | Facility: CLINIC | Age: 25
End: 2020-01-27
Attending: PSYCHIATRY & NEUROLOGY | Admitting: PSYCHIATRY & NEUROLOGY
Payer: COMMERCIAL

## 2020-01-27 PROCEDURE — 90853 GROUP PSYCHOTHERAPY: CPT | Performed by: PSYCHIATRY & NEUROLOGY

## 2020-01-27 PROCEDURE — 90853 GROUP PSYCHOTHERAPY: CPT | Performed by: PSYCHOLOGIST

## 2020-01-27 PROCEDURE — G0177 OPPS/PHP; TRAIN & EDUC SERV: HCPCS

## 2020-01-27 PROCEDURE — G0177 OPPS/PHP; TRAIN & EDUC SERV: HCPCS | Performed by: PSYCHIATRY & NEUROLOGY

## 2020-01-27 NOTE — GROUP NOTE
RN Group Note    PATIENT'S NAME: Cesia Nettles  MRN:   [id number]  :   1995  ACCT. NUMBER: [id number]  DATE OF SERVICE: 20  START TIME:  9:00 AM  END TIME:  9:50 AM  FACILITATOR: Elzbieta Lawson RN  TOPIC:  RN Group: [facility]  Adult Mental Health Day Treatment  TRACK: 2A    NUMBER OF PARTICIPANTS: 5    Summary of Group / Topics Discussed:  Foundations of Health: Nutrition: Design a Meal: Patients were educated on the USDA guidelines for creating meals that meet daily nutritional requirements. With the assistance of the instructor, patients were guided to use these to design well-balanced meals. Barriers and obstacles to meeting daily nutritional needs were discussed in the group and solutions and strategies were explored. Patients were also provided education and resources on healthy snack options, budget saving tips, and safe food handling & preparation.      Patient Session Goals / Objectives:  ? Applied USDA MyPlate guidelines to design a balanced breakfast, lunch, and dinner  ? Verbalized healthy snack options   ? Identified personal barriers/obstacles to meeting nutritional needs (if present) and listed strategies that could be used to overcome them        Patient Participation / Response:  Fully participated with the group by sharing personal reflections / insights and openly received / provided feedback with other participants.    Identified / Expressed personal readiness to practice skills and Verbalized understanding of foundations of health topic    Treatment Plan:  Patient has a current master individualized treatment plan.  See Epic treatment plan for more information.    Elzbieta Lawson RN

## 2020-01-27 NOTE — GROUP NOTE
EBP Group Note    PATIENT'S NAME: Cesia Nettles  MRN:   [id number]  :   1995  ACCT. NUMBER: [id number]  DATE OF SERVICE: 20  START TIME: 11:00 AM  END TIME: 11:50 AM  FACILITATOR: Kathy Laguerre PsyD  TOPIC:  EBP Group: Emotions Management  Adult Mental Health Day Treatment  TRACK: 2A    NUMBER OF PARTICIPANTS: 5    Summary of Group / Topics Discussed:  Emotions Management: Anger: Patients explored and shared personal experiences associated with feelings of anger.  Group explored how these feelings develop, what they mean to each individual, and how to increase acceptance and usefulness of these feelings.  Discussed anger as a  secondary  emotion and reviewed ways to manage anger and challenge associated cognitive distortions. Group members worked to contextualize these concepts and promote healing.     Patient Session Goals / Objectives:    Discuss and review definitions and personal views/experiences with anger    Explore how feelings of anger impact functioning    Understand and practice strategies to manage difficult emotions and move towards healing    Demonstrate understanding of the feelings of anger    Verbalize how these emotions have impacted their lives/functioning    Verbalize of knowledge gained and possible interventions to manage feelings      Patient Participation / Response:  Moderately participated, sharing some personal reflections / insights and adequately adequately received / provided feedback with other participants.    Demonstrated understanding of topics discussed through group discussion and participation    Treatment Plan:  Patient has a current master individualized treatment plan.  See Epic treatment plan for more information.    Francisco Patterson D,  Licensed Psychologist

## 2020-01-27 NOTE — GROUP NOTE
Process Group Note    PATIENT'S NAME: Cesia Nettles  MRN:   [id number]  :   1995  ACCT. NUMBER: [id number]  DATE OF SERVICE: 20  START TIME: 10:00 AM  END TIME: 10:50 AM  FACILITATOR: Kathy Laguerre PsyD  TOPIC:  Process Group    Diagnoses:    295.70  (F25.1) Schizoaffective Disorder Depressive Type  300.00 (F41.9) Unspecified Anxiety Disorder     : Mental Health Resources: Ely Castaneda  Guardian: [facility]: Elma Flores : [phone]  CADI:  Mariela Walker: [facility]  [facility]:  [facility]: [address]: [phone]   Pro Act : Nino Carranza [phone]    Adult Mental Health Day Treatment  TRACK: 2A    NUMBER OF PARTICIPANTS: 5          Data:    Session content: At the start of this group, patients were invited to check in by identifying themselves, describing their current emotional status, and identifying issues to address in this group.   Area(s) of treatment focus addressed in this session included Symptom Management, Personal Safety and Community Resources/Discharge Planning.    Therese reported being safe today. She reported a problem with a meeting over the past weekend and how she felt bad about not cleaning her room, having smelly things there, and not mentioning how many hours she is working. She reported that her guardian wants her to tell her the schedule and help her organize what hours she will work. She reported that guardian does not want her to work at this time. She reported that the  was not at this meeting and it was confusing, because of this.    Therapeutic Interventions/Treatment Strategies:  Psychotherapist offered support, feedback and validation and reinforced use of skills. Treatment modalities used include Cognitive Behavioral Therapy and Dialectical Behavioral Therapy. Interventions include Cognitive Restructuring:  Explored impact of ineffective thoughts / distortions on mood and activity and Assisted patient in  "formulating new neutral/positive alternatives to challenge less helpful / ineffective thoughts, Coping Skills: Discussed use of self-soothe skills to decrease distress in the body and Discussed how the use of intentional \"in the moment\" actions can help reduce distress and Mindfulness: Encouraged a plan to use mindfulness skills in daily life.    Assessment:    Patient response:   Patient responded to session by accepting feedback, giving feedback, listening, focusing on goals, being attentive and accepting support    Possible barriers to participation / learning include: severity of symptoms    Health Issues:   None reported       Substance Use Review:   Substance Use: No active concerns identified.    Mental Status/Behavioral Observations  Appearance:   Appropriate   Eye Contact:   Good   Psychomotor Behavior: Normal   Attitude:   Cooperative   Orientation:   All  Speech   Rate / Production: Normal    Volume:  Normal   Mood:    Sad   Affect:    Constricted   Thought Content:   Rumination and Psychosis reports hallucinations visual  Thought Form:  Coherent  Logical     Insight:    Good     Plan:     Safety Plan: Recommended that patient call 911 or go to the local ED should there be a change in any of these risk factors.     Barriers to treatment: None identified    Patient Contracts (see media tab):  None    Substance Use: Not addressed in session     Continue or Discharge: Patient will continue in Adult Day Treatment (ADT)  as planned. Patient is likely to benefit from learning and using skills as they work toward the goals identified in their treatment plan.      Francisco Patterson, D,  Licensed Psychologist   January 27, 2020  "

## 2020-01-28 ENCOUNTER — HOSPITAL ENCOUNTER (OUTPATIENT)
Dept: BEHAVIORAL HEALTH | Facility: CLINIC | Age: 25
End: 2020-01-28
Attending: PSYCHIATRY & NEUROLOGY | Admitting: PSYCHIATRY & NEUROLOGY
Payer: COMMERCIAL

## 2020-01-28 ENCOUNTER — TELEPHONE (OUTPATIENT)
Dept: PSYCHIATRY | Facility: CLINIC | Age: 25
End: 2020-01-28

## 2020-01-28 DIAGNOSIS — F25.1 SCHIZOAFFECTIVE DISORDER, DEPRESSIVE TYPE (H): ICD-10-CM

## 2020-01-28 DIAGNOSIS — F20.3 SCHIZOPHRENIA, UNDIFFERENTIATED (H): Primary | ICD-10-CM

## 2020-01-28 PROCEDURE — 90853 GROUP PSYCHOTHERAPY: CPT | Performed by: PSYCHOLOGIST

## 2020-01-28 PROCEDURE — G0177 OPPS/PHP; TRAIN & EDUC SERV: HCPCS | Performed by: PSYCHIATRY & NEUROLOGY

## 2020-01-28 PROCEDURE — G0177 OPPS/PHP; TRAIN & EDUC SERV: HCPCS

## 2020-01-28 PROCEDURE — 90853 GROUP PSYCHOTHERAPY: CPT | Performed by: PSYCHIATRY & NEUROLOGY

## 2020-01-28 PROCEDURE — G0177 OPPS/PHP; TRAIN & EDUC SERV: HCPCS | Performed by: COUNSELOR

## 2020-01-28 NOTE — GROUP NOTE
RN Group Note    PATIENT'S NAME: Cesia Nettles  MRN:   [id number]  :   1995  ACCT. NUMBER: [id number]  DATE OF SERVICE: 20  START TIME: 11:00 AM  END TIME: 11:50 AM  FACILITATOR: Marina Arthur LPCC  TOPIC:  RN Group: Health Maintenance  Adult Mental Health Day Treatment  TRACK: 2a    NUMBER OF PARTICIPANTS: 5    Summary of Group / Topics Discussed:  Health Maintenance: Goal Setting: Meaningful goals can bring a sense of direction and purpose in life.  They also highlight our most important values. Patients were assisted by instructor to identify short term goals to promote their mental health recovery and improve overall health and wellness. Patients were educated on SMART goal setting framework as a strategy to increase outcomes and promote success.    Patient Session Goals / Objectives:  ? Explained the key concepts of SMART goal setting framework  ? Identified three goals successfully using SMART goal setting framework  ? Reviewed concept of balance in wellness as it pertains to goal setting        Patient Participation / Response:  Fully participated with the group by sharing personal reflections / insights and openly received / provided feedback with other participants.    Demonstrated understanding of topics discussed through group discussion and participation and Identified / Expressed personal readiness to practice skills    Treatment Plan:  Patient has a current master individualized treatment plan.  See Epic treatment plan for more information.    KEVEN Andrade

## 2020-01-28 NOTE — TELEPHONE ENCOUNTER
[facility] Call Center    Phone Message    May a detailed message be left on voicemail: yes    Reason for Call: Order(s): Other:   Reason for requested: [employer] calling to get referral for pt to get a neuropsych eval  Date needed:   Provider name: Dr Chery      Action Taken: Message routed to:  Other: nursing pool

## 2020-01-28 NOTE — GROUP NOTE
Life Skills/OT Group Note    PATIENT'S NAME: Cesia Nettles  MRN:   [id number]  :   1995  ACCT. NUMBER: [id number]  DATE OF SERVICE: 20  START TIME:  9:00 AM  END TIME:  9:50 AM  FACILITATOR: Samm Mercado OTR/L  TOPIC:  Life Skills Group: Lifestyle Balance and Structure  Adult Mental Health Day Treatment  TRACK: 2A    NUMBER OF PARTICIPANTS: 5    Summary of Group / Topics Discussed:  Lifestyle Balance and Strucure:  Money Management: Patients were assisted to identify meaningful roles that they want to promote and the impact their mental health symptoms have on this.  Patients learned, applied, and generalized skills needed to live and participate in meaningful roles as effectively and independently as possible.  Patients developed awareness of strengths and challenges in fulfilling these roles and worked on integrating specific and individualized rituals and habits into their daily life.     Patient Session Goals / Objectives:    Improved awareness and engagement in life s meaningful roles, routines, habits, and rituals    Explored and identified current roles and challenges due to mental health symptoms     Identified ways to establish and integrate daily self care and wellness routines and habits to support mental health recovery    Practiced and reflected on how to generalize taught skills to their everyday life     Learn money management skills and coping skills to manage financial stress      Patient Participation / Response:  Fully participated with the group by sharing personal reflections / insights and openly received / provided feedback with other participants.    Patient presentation: Calm,alert,focused with stable mood and thought process. Patient acknowledged that the meeting at her group home did not go very well last week, She also is considering a new job because her current job is requiring too many hours.    Treatment Plan:  Patient has a current master individualized  treatment plan.  See Epic treatment plan for more information.    Samm Mercado, OTR/L

## 2020-01-28 NOTE — GROUP NOTE
Process Group Note    PATIENT'S NAME: Cesia Nettles  MRN:   [id number]  :   1995  ACCT. NUMBER: [id number]  DATE OF SERVICE: 20  START TIME: 10:00 AM  END TIME: 10:50 AM  FACILITATOR: Kathy Laguerre PsyD  TOPIC:  Process Group    Diagnoses:  295.70  (F25.1) Schizoaffective Disorder Depressive Type  300.00 (F41.9) Unspecified Anxiety Disorder     : Mental Health Resources: Ely Leonel  Guardian: [place]: Elma Flores : [phone]  CADI:  Mariela Walker: [place] [facility]  [facility]:  [facility]: [address]ip: [phone]   Pro Act : Ninojeffry Carranza [phone]      Adult Mental Health Day Treatment  TRACK: 2A    NUMBER OF PARTICIPANTS: 5          Data:    Session content: At the start of this group, patients were invited to check in by identifying themselves, describing their current emotional status, and identifying issues to address in this group.   Area(s) of treatment focus addressed in this session included Symptom Management, Personal Safety and Community Resources/Discharge Planning.    Therese reported being safe today. She reported a goal of keeping her room clean and talked to the group about her methods for doing this. She talked to the group about her relationship with her guardian and how the guardian told her she calls too often. The group pointed out that this was her job. She reported feeling afraid to be assertive and doesn't know what to say all the time.   She offered supportive help to another member. She reported that she may take a break from her job and is working with her  on this, and the group encouraged her to call her guardian.      Therapeutic Interventions/Treatment Strategies:  Psychotherapist offered support, feedback and validation and reinforced use of skills. Treatment modalities used include Cognitive Behavioral Therapy and Dialectical Behavioral Therapy. Interventions include Cognitive Restructuring:  Assisted patient in  formulating new neutral/positive alternatives to challenge less helpful / ineffective thoughts, Coping Skills: Assisted patient in identifying 1-2 healthy distraction skills to reduce overall distress and Mindfulness: Facilitated discussion of when/how to use mindfulness skills to benefit general health, mental health symptoms, and stressors.    Assessment:    Patient response:   Patient responded to session by accepting feedback, giving feedback, listening, focusing on goals, being attentive and accepting support    Possible barriers to participation / learning include: severity of symptoms    Health Issues:   None reported       Substance Use Review:   Substance Use: No active concerns identified.    Mental Status/Behavioral Observations  Appearance:   Appropriate   Eye Contact:   Good   Psychomotor Behavior: Normal   Attitude:   Cooperative   Orientation:   All  Speech   Rate / Production: Normal    Volume:  Normal   Mood:    Anxious   Affect:    Appropriate   Thought Content:   Rumination and Psychosis reports hallucinations visual and paranoia  Thought Form:  Coherent  Logical     Insight:    Good     Plan:     Safety Plan: Recommended that patient call 911 or go to the local ED should there be a change in any of these risk factors.     Barriers to treatment: None identified    Patient Contracts (see media tab):  None    Substance Use: Not addressed in session     Continue or Discharge: Patient will continue in Adult Day Treatment (ADT)  as planned. Patient is likely to benefit from learning and using skills as they work toward the goals identified in their treatment plan.    Stabilization of symptoms is needed.    Fabien Patterson., D,  Licensed Psychologist   January 28, 2020

## 2020-01-30 ENCOUNTER — HOSPITAL ENCOUNTER (OUTPATIENT)
Dept: BEHAVIORAL HEALTH | Facility: CLINIC | Age: 25
End: 2020-01-30
Attending: PSYCHIATRY & NEUROLOGY | Admitting: PSYCHIATRY & NEUROLOGY
Payer: COMMERCIAL

## 2020-01-30 PROCEDURE — G0177 OPPS/PHP; TRAIN & EDUC SERV: HCPCS | Performed by: PSYCHIATRY & NEUROLOGY

## 2020-01-30 PROCEDURE — G0177 OPPS/PHP; TRAIN & EDUC SERV: HCPCS

## 2020-01-30 PROCEDURE — 90853 GROUP PSYCHOTHERAPY: CPT | Performed by: PSYCHIATRY & NEUROLOGY

## 2020-01-30 PROCEDURE — 90853 GROUP PSYCHOTHERAPY: CPT

## 2020-01-30 NOTE — GROUP NOTE
Life Skills/OT Group Note    PATIENT'S NAME: Cesia Nettles  MRN:   [id number]  :   1995  ACCT. NUMBER: [id number]  DATE OF SERVICE: 20  START TIME: 10:00 AM  END TIME: 10:50 AM  FACILITATOR: Ag Wan OT  [facility] Self- Regulation Skills  Adult Mental Health Day Treatment  TRACK: 2A    NUMBER OF PARTICIPANTS: 4    Summary of Group / Topics Discussed:  Self-Regulation Skills: Sensory Modulation: Patients were provided with education on grounding, highlighting body based sensory input that can be helpful. Reviewed and engaged in an experiential opportunity to get in touch with proprioceptive sense. Patients identified how they use movement and proprioception on a daily basis.  Patient's explored body based skills (bottom up processing skills) and techniques to help bring proprioceptive awareness to the forefront for grounding including stretching, mindful walking, and pushing and pulling to manage symptoms and change level of arousal when needed to be in control and comfortable so they are able to function in different environments.         Patient Session Goals / Objectives:    [place] how proprioception works and importance of its  impact on functioning and mental wellbeing    [place] how developing sensory awareness can help one ground themselves when needed.     Identified signs and symptoms of current level of arousal and ways to make changes in level of arousal when needed    Identified specific and individualized neurosensory skills to help when distressed and for crisis management    Established a plan for practice of these skills in their own environments        Patient Participation / Response:  Fully participated with the group by sharing personal reflections / insights and openly received / provided feedback with other participants.    Verbalized understanding of content and Patient would benefit from additional opportunities to practice the content to be able to  generalize it to their everyday life with increased intentionality, consistency, and efficacy in support of their psychiatric recovery    Treatment Plan:  Patient has a current master individualized treatment plan.  See Epic treatment plan for more information.    Ag Wan, OT

## 2020-01-30 NOTE — GROUP NOTE
Process Group Note    PATIENT'S NAME: Cesia Nettles  MRN:   [id number]  :   1995  ACCT. NUMBER: [id number]  DATE OF SERVICE: 20  START TIME:  9:59 AM  END TIME: 10:00 AM  FACILITATOR: Geno York LICSW  TOPIC:  Process Group    Diagnoses:  295.70  (F25.1) Schizoaffective Disorder Depressive Type  300.00 (F41.9) Unspecified Anxiety Disorder    Adult Mental Health Day Treatment  TRACK: 2A    NUMBER OF PARTICIPANTS: 4          Data:    Session content: At the start of this group, patients were invited to check in by identifying themselves, describing their current emotional status, and identifying issues to address in this group.   Area(s) of treatment focus addressed in this session included Symptom Management, Personal Safety, Develop / Improve Independent Living Skills and Develop Socialization / Interpersonal Relationship Skills.    Therese reported today that her depressive symptoms have increased.  Endorses some thoughts of paranoia at times.  She discussed her social anxiety symptoms and stated in the future she has a goal to participate in a meet up group.   Therese reported that she had an individual therapy meeting yesterday with her guardian.  They talked about residential level of care due to low motivation, not taking care of self and room at the group home.  She does not want to do this because she has been given a two week break at her job with a return date of . This will help with focusing on her symptoms of depression. She denies suicidal intent.  She did report she has completed 3 out of 5 of her scheduled chores for the week at her home.  She has made an adjustment to eating in the kitchen, instead of in her room.  Discussed advocating for her self and worked on addressing cognitive distortions around her belief that she has the right to be assertive, particularly when she is not certain if thoughts are based in reality.   Therese looks forward to visitng with family over the weekend.      Therapeutic Interventions/Treatment Strategies:  Psychotherapist offered support, feedback and validation, set limits, provided redirection and reinforced use of skills. Treatment modalities used include Motivational Interviewing and Cognitive Behavioral Therapy.    Assessment:    Patient response:   Patient responded to session by accepting feedback, giving feedback, listening, focusing on goals, being attentive, accepting support and verbalizing understanding    Possible barriers to participation / learning include: and no barriers identified    Health Issues:   None reported       Substance Use Review:   Substance Use: No active concerns identified.    Mental Status/Behavioral Observations  Appearance:   Appropriate   Eye Contact:   Fair   Psychomotor Behavior: Normal   Attitude:   Cooperative   Orientation:   All  Speech   Rate / Production: Normal    Volume:  Soft   Mood:    Anxious  Depressed  Normal  Affect:    Blunted   Thought Content:   Psychosis Psychosis symptoms have stayed the same  Thought Form:  Coherent  Logical     Insight:    Fair     Plan:     Safety Plan: No current safety concerns identified.  Recommended that patient call 911 or go to the local ED should there be a change in any of these risk factors.     Barriers to treatment: None identified    Patient Contracts (see media tab):  None    Substance Use: Not addressed in session     Continue or Discharge: Patient will continue in Adult Day Treatment (ADT)  as planned. Patient is likely to benefit from learning and using skills as they work toward the goals identified in their treatment plan.      Geno York, JÚNIOR  January 30, 2020

## 2020-01-30 NOTE — GROUP NOTE
Life Skills/OT Group Note    PATIENT'S NAME: Cesia Nettles  MRN:   [id number]  :   1995  ACCT. NUMBER: [id number]  DATE OF SERVICE: 20  START TIME: 11:00 AM  END TIME: 11:50 AM  FACILITATOR: Samm Mercado OTR/L  TOPIC:  Life Skills Group: Resiliency Development  Adult Mental Health Day Treatment  TRACK: 2A    NUMBER OF PARTICIPANTS: 3    Summary of Group / Topics Discussed:  Resiliency Development: Stress Management: Patients were taught how to identify stressors, signs of stress, coping skills, and prevention strategies for overall stress management.  Patients were given the opportunity to identify both ongoing and acute mental health symptoms and how to effectively manage these symptoms by developing an effective aftercare plan.  Patients increased awareness of community based resources.    Patient Session Goals / Objectives:    Identified how using coping skills can be used for symptom and stress management       Improved awareness of individualed symptoms and stressors and how to effectively cope     Established a relapse prevention plan to practice these skills in their own environments    Practiced and reflected on how to generalize taught skills to their everyday life          Patient Participation / Response:  Fully participated with the group by sharing personal reflections / insights and openly received / provided feedback with other participants.    Patient presentation: Calm,alert and focused with stable mood and thought process.    Treatment Plan:  Patient has a current master individualized treatment plan.  See Epic treatment plan for more information.    KATHI Delcid/NARCISO

## 2020-01-30 NOTE — TELEPHONE ENCOUNTER
"Writer called Elma to explore her concerns.    Elma requested that the involvement of herself and the GH team be increased due to the limitations of the pt.    Elma stated that \"Therese's not truthful\" and that the GH team is on the verge of having pt placed in residential treatment facility due to her limited progress.  Elma identified that  sends her weekly summaries and this is how the team learns of pt's actual status.  Elma stated that the pt's hygiene is \"terrible.\"    Elma reviewed her need to have herself GH staff set up appointments for the pt and either she or the GH staff need to attend them.    Elma provided the e-mail address of   [email or web address]. Writer sent Elma an e-mail so she would [facility] to send reports to.    She's aware of the neurophsych referral requested by [facility] staff. She is also aware of the pt's 3/4 appointment and will attend.    Routed to provider.        Carl <[email or web address]>  Thu 1/30/2020 2:53 PM  CAUTION: This email originated from outside of the organization. Do not click links or open attachments unless you recognize the sender and know the content is safe.   Thank you,  Elma Flores  Dennise/Napartner  [facility] [place] [address]  P [phone]  F [phone]    Carl <[email or web address]>  Thu 1/30/2020 2:51 PM  CAUTION: This email originated from outside of the organization. Do not click links or open attachments unless you recognize the sender and know the content is safe.  Hello,  I appreciate your time and I am glad we were able to resolve this. By the way, I have attached my auth letter from [place]. The  auth  letters are renewed every 180 day s but, the Guardianship is still enforce indefinitely.  Thank you,  Elma Flores  Dennise/KAYAKomaira  [address]  [address] [place] [address]  P [phone]  F [phone]

## 2020-01-31 ENCOUNTER — TELEPHONE (OUTPATIENT)
Dept: PSYCHIATRY | Facility: CLINIC | Age: 25
End: 2020-01-31

## 2020-01-31 NOTE — TELEPHONE ENCOUNTER
Yudy Chery MD Snyder, David J, RN   Caller: Unspecified (3 days ago, 10:55 AM)             Yes. This would be just fine. Thanks!    Previous Messages      ----- Message -----   From: Mk Navarro, RN   Sent: 1/30/2020  11:59 AM CST   To: Yudy Chery MD, Mk Navarro RN     ----- Message from Mk Navarro RN sent at 1/30/2020 11:59 AM CST -----   Yudy:   Pt's  staff is requesting a neuropsych testing referral.   Guardian is also in favor of testing.   It appears that it was last done in 2007, when pt was 12yo.   OK to make referral?   Chau

## 2020-01-31 NOTE — TELEPHONE ENCOUNTER
Received call from Nurse Terrazas at [facility], who reported that upon review the pt used melatonin all days in January except 1 or 2 days and so requested that it be changed to a scheduled me. Mk reported that it has been helpful for pt's sleep.

## 2020-01-31 NOTE — TELEPHONE ENCOUNTER
Yudy Chery MD Snyder, David J, RN   Caller: Unspecified (1 week ago)             Vicente Ritchie,     In reviewing this new information from the guardian I think we should get Therese in sooner than 3/4. It sounds like she might be experiencing some severe depression if she has stopped meeting with her workers and quit her job. That is not at all what she presents to me in clinic visits and mostly makes It seem like everything is fine (and no one is ever with her to contradict this). Could you please help get her a sooner appointment and coordinate with Elma? Thank you so much for your help!     Yudy

## 2020-02-03 ENCOUNTER — HOSPITAL ENCOUNTER (OUTPATIENT)
Dept: BEHAVIORAL HEALTH | Facility: CLINIC | Age: 25
End: 2020-02-03
Attending: PSYCHIATRY & NEUROLOGY | Admitting: PSYCHIATRY & NEUROLOGY
Payer: COMMERCIAL

## 2020-02-03 PROCEDURE — 90853 GROUP PSYCHOTHERAPY: CPT | Performed by: COUNSELOR

## 2020-02-03 PROCEDURE — G0177 OPPS/PHP; TRAIN & EDUC SERV: HCPCS

## 2020-02-03 PROCEDURE — 90853 GROUP PSYCHOTHERAPY: CPT | Performed by: PSYCHOLOGIST

## 2020-02-03 NOTE — GROUP NOTE
EBP Group Note    PATIENT'S NAME: Cesia Nettles  MRN:   [id number]  :   1995  ACCT. NUMBER: [id number]  DATE OF SERVICE: 20  START TIME: 11:00 AM  END TIME: 11:50 AM  FACILITATOR: Kathy Laguerre PsyD  TOPIC:  EBP Group: Cognitive Restructuring  Adult Mental Health Day Treatment  TRACK: 2A    NUMBER OF PARTICIPANTS: 3    Summary of Group / Topics Discussed:  Cognitive Restructuring: Distortions: Patients received an overview of how to identify common cognitive distortions. Patients will explore alternatives to cognitive distortions and practice challenging their negative thought patterns. The goal is to help patients target modify ineffective thought patterns.     Patient Session Goals / Objectives:    Familiarized self with ineffective / unhealthy thoughts and how they develop.      Explored impact of ineffective thoughts / distortions on mood and activity    Formulated new neutral/positive alternatives to challenge less helpful / ineffective thoughts.    Practiced and plan to apply in daily life               Patient Participation / Response:  Fully participated with the group by sharing personal reflections / insights and openly received / provided feedback with other participants.    Demonstrated understanding of topics discussed through group discussion and participation and Expressed understanding of the relationship between behaviors, thoughts, and feelings    Treatment Plan:  Patient has a current master individualized treatment plan.  See Epic treatment plan for more information.    Francisco Patterson, D,  Licensed Psychologist

## 2020-02-03 NOTE — GROUP NOTE
"Process Group Note    PATIENT'S NAME: Cesai Nettles  MRN:   [id number]  :   1995  ACCT. NUMBER: [id number]  DATE OF SERVICE: 20  START TIME: 10:00 AM  END TIME: 10:50 AM  FACILITATOR: Hien Ramirez LPCC  TOPIC:  Process Group    Diagnoses:  295.70  (F25.1) Schizoaffective Disorder Depressive Type  300.00 (F41.9) Unspecified Anxiety Disorder      Adult Mental Health Day Treatment  TRACK: 2A    NUMBER OF PARTICIPANTS: 5          Data:    Session content: At the start of this group, patients were invited to check in by identifying themselves, describing their current emotional status, and identifying issues to address in this group.   Area(s) of treatment focus addressed in this session included Symptom Management, Personal Safety and Community Resources/Discharge Planning.  Patient reported she is \"doing pretty well this week\" and reported she cancelled a job interview she had scheduled because she has decided to return to her previous job. Patient noted feeling \"pretty excited about that.\" Patient is tentatively scheduled to return to work on . Patient expressed she would like to prepare communication strategies to address residents that ask about her time away. Patient accepted support and ideas from the group. Patient goal planned to clean her room today.     Therapeutic Interventions/Treatment Strategies:  Psychotherapist offered support, feedback and validation and reinforced use of skills. Treatment modalities used include Motivational Interviewing and Cognitive Behavioral Therapy. Interventions include Coping Skills: Discussed use of self-soothe skills to decrease distress in the body and Addressed barriers to utilizing coping skills when in distress, Symptoms Management: Promoted understanding of their diagnoses and how it impacts their functioning and Emotions Management:  Discussed barriers to emotional regulation.    Assessment:    Patient response:   Patient responded to session by " accepting feedback, giving feedback, listening, focusing on goals, being attentive and accepting support    Possible barriers to participation / learning include: and no barriers identified    Health Issues:   None reported       Substance Use Review:   Substance Use: No active concerns identified.    Mental Status/Behavioral Observations  Appearance:   Appropriate   Eye Contact:   Fair   Psychomotor Behavior: Normal   Attitude:   Cooperative   Orientation:   All  Speech   Rate / Production: Normal    Volume:  Normal   Mood:    Anxious  Normal  Affect:    Appropriate   Thought Content:   Clear  Thought Form:  Coherent  Logical     Insight:    Fair     Plan:     Safety Plan: No current safety concerns identified.  Recommended that patient call 911 or go to the local ED should there be a change in any of these risk factors.     Barriers to treatment: None identified    Patient Contracts (see media tab):  None    Substance Use: Not addressed in session     Continue or Discharge: Patient will continue in Adult Day Treatment (ADT)  as planned. Patient is likely to benefit from learning and using skills as they work toward the goals identified in their treatment plan.      Hien Ramirez, [facility]  February 3, 2020

## 2020-02-03 NOTE — GROUP NOTE
RN Group Note    PATIENT'S NAME: Cesia Nettles  MRN:   [id number]  :   1995  ACCT. NUMBER: [id number]  DATE OF SERVICE: 20  START TIME:  9:00 AM  END TIME:  9:50 AM  FACILITATOR: Cathy Aleman RN  TOPIC:  RN Group: Mind/Body Practice & Complementary  Adult Mental Health Day Treatment  TRACK: 2A    NUMBER OF PARTICIPANTS: 6    Summary of Group / Topics Discussed:  Mind/Body Practice & Complementary Therapies:  Progressive Muscle Relaxation: In addition to affecting our mood and behavior, psychological stress can cause a myriad of physical symptoms in our body. Patients were educated on these effects and guided to increased self-awareness of how stress affects their body. The purpose, benefits, history, and techniques of progressive muscle relaxation were discussed. In an instructor guided experiential, patients were guided to practice PMR to help reduce physical symptoms of psychological stress and achieve a more balanced feeling of well-being.    Patient Session Goals / Objectives:  ? Identified physiological symptoms of stress on the body  ? Listed & Explained the purpose and benefits to practicing PMR   ? Practiced progressive muscle relaxation experiential      Patient Participation / Response:  Fully participated with the group by sharing personal reflections / insights and openly received / provided feedback with other participants.    Demonstrated understanding of topics discussed through group discussion and participation, Identified / Expressed personal readiness to practice skills and Verbalized understanding of Mind/Body Practice & Complementary Therapies topic    Treatment Plan:  Patient has a current master individualized treatment plan.  See Epic treatment plan for more information.    Cathy Aleman RN

## 2020-02-04 ENCOUNTER — HOSPITAL ENCOUNTER (OUTPATIENT)
Dept: BEHAVIORAL HEALTH | Facility: CLINIC | Age: 25
End: 2020-02-04
Attending: PSYCHIATRY & NEUROLOGY | Admitting: PSYCHIATRY & NEUROLOGY
Payer: COMMERCIAL

## 2020-02-04 DIAGNOSIS — R45.851 SUICIDAL IDEATION: ICD-10-CM

## 2020-02-04 PROCEDURE — G0177 OPPS/PHP; TRAIN & EDUC SERV: HCPCS

## 2020-02-04 PROCEDURE — 90853 GROUP PSYCHOTHERAPY: CPT | Performed by: COUNSELOR

## 2020-02-04 NOTE — GROUP NOTE
RN Group Note    PATIENT'S NAME: Cesia Nettles  MRN:   [id number]  :   1995  ACCT. NUMBER: [id number]  DATE OF SERVICE: 20  START TIME: 11:00 AM  END TIME: 11:50 AM  FACILITATOR: Cielo Palacios  TOPIC:  RN Group: Health Maintenance  Adult Mental Health Day Treatment  TRACK: 2A    NUMBER OF PARTICIPANTS: 4    Summary of Group / Topics Discussed:  Health Maintenance: Goal Setting: Meaningful goals can bring a sense of direction and purpose in life.  They also highlight our most important values. Patients were assisted by instructor to identify short term goals to promote their mental health recovery and improve overall health and wellness. Patients were educated on SMART goal setting framework as a strategy to increase outcomes and promote success.    Patient Session Goals / Objectives:  ? Explained the key concepts of SMART goal setting framework  ? Identified three goals successfully using SMART goal setting framework  ? Reviewed concept of balance in wellness as it pertains to goal setting        Patient Participation / Response:  Fully participated with the group by sharing personal reflections / insights and openly received / provided feedback with other participants.    Demonstrated understanding of topics discussed through group discussion and participation and Verbalized understanding of health maintenance topic    Treatment Plan:  Patient has a current master individualized treatment plan.  See Epic treatment plan for more information.    Cielo Palacios

## 2020-02-04 NOTE — GROUP NOTE
"Process Group Note    PATIENT'S NAME: Cesia Nettles  MRN:   [id number]  :   1995  ACCT. NUMBER: [id number]  DATE OF SERVICE: 20  START TIME: 10:00 AM  END TIME: 10:50 AM  FACILITATOR: Hien Ramirez LPCC  TOPIC:  Process Group    Diagnoses:  295.70  (F25.1) Schizoaffective Disorder Depressive Type  300.00 (F41.9) Unspecified Anxiety Disorder      Adult Mental Health Day Treatment  TRACK: 2A    NUMBER OF PARTICIPANTS: 6          Data:    Session content: At the start of this group, patients were invited to check in by identifying themselves, describing their current emotional status, and identifying issues to address in this group.   Area(s) of treatment focus addressed in this session included Symptom Management, Personal Safety and Community Resources/Discharge Planning.  Patient reported feeling \"really good\" today after she addressed a friend that \"always makes excuses not to hang out.\" Patient processed feeling angry and frustrated in the past when her friend would avoid spending time with her. Patient shared her action urge thoughts about what she wanted to say to the friend and then shared the opposite action skill she utilized to communicate those thoughts effectively. Patient goal planned to talk to her mother about her friend today.     Therapeutic Interventions/Treatment Strategies:  Psychotherapist offered support, feedback and validation and reinforced use of skills. Treatment modalities used include Motivational Interviewing and Cognitive Behavioral Therapy. Interventions include Mindfulness: Facilitated discussion of when/how to use mindfulness skills to benefit general health, mental health symptoms, and stressors, Symptoms Management: Promoted understanding of their diagnoses and how it impacts their functioning and Relationship Skills: Assisted patients in implementing more effective communication skills in their relationships and Encouraged development and maintenance  of healthy " boundaries.    Assessment:    Patient response:   Patient responded to session by accepting feedback, giving feedback, listening, focusing on goals, being attentive and accepting support    Possible barriers to participation / learning include: and no barriers identified    Health Issues:   None reported       Substance Use Review:   Substance Use: No active concerns identified.    Mental Status/Behavioral Observations  Appearance:   Appropriate   Eye Contact:   Good   Psychomotor Behavior: Normal   Attitude:   Cooperative   Orientation:   All  Speech   Rate / Production: Normal    Volume:  Normal   Mood:    Anxious  Normal  Affect:    Appropriate   Thought Content:   Clear  Thought Form:  Coherent  Logical     Insight:    Good     Plan:     Safety Plan: No current safety concerns identified.  Recommended that patient call 911 or go to the local ED should there be a change in any of these risk factors.     Barriers to treatment: None identified    Patient Contracts (see media tab):  None    Substance Use: Not addressed in session     Continue or Discharge: Patient will continue in Adult Day Treatment (ADT)  as planned. Patient is likely to benefit from learning and using skills as they work toward the goals identified in their treatment plan.      Hien Ramirez, [facility]  February 4, 2020

## 2020-02-04 NOTE — GROUP NOTE
Life Skills/OT Group Note    PATIENT'S NAME: Cesia Nettles  MRN:   [id number]  :   1995  ACCT. NUMBER: [id number]  DATE OF SERVICE: 20  START TIME:  9:00 AM  END TIME:  9:50 AM  FACILITATOR: Samm Mercado OTR/L  TOPIC:  Life Skills Group: Communication and Social Skills Development  Adult Mental Health Day Treatment  TRACK: 2A    NUMBER OF PARTICIPANTS: 5    Summary of Group / Topics Discussed:  Communication and Social Skills Development: Listening Skills: {Patients were taught and gained awareness into personal barriers to effective listening and how this can negatively impact relationships.  Patients were taught skills and practiced how to improve communication with others by becoming an active listener.  Patients identified both personal strengths and opportunities for growth in this area to improve overall communication and connection with other people as a way to build meaningful relationships to combat mental health and substance use/abuse symptoms.      Patient Session Goals / Objectives:    Identified importance of active listening skills in effective communication and how this impacts their ability to communicate clearly with other people       Improved awareness of important aspects of listening skills and how this relates to mental health recovery        Established a plan for practice of these skills in their own environments    Practiced and reflected on how to generalize taught skills to their everyday life        Patient Participation / Response:  Fully participated with the group by sharing personal reflections / insights and openly received / provided feedback with other participants.    Patient presentation: Calm,alert and focused with stable mood and thought process.    Treatment Plan:  Patient has a current master individualized treatment plan.  See Epic treatment plan for more information.    KATHI Delcid/NARCISO

## 2020-02-06 ENCOUNTER — HOSPITAL ENCOUNTER (OUTPATIENT)
Dept: BEHAVIORAL HEALTH | Facility: CLINIC | Age: 25
End: 2020-02-06
Attending: PSYCHIATRY & NEUROLOGY | Admitting: PSYCHIATRY & NEUROLOGY
Payer: COMMERCIAL

## 2020-02-06 DIAGNOSIS — F25.1 SCHIZOAFFECTIVE DISORDER, DEPRESSIVE TYPE (H): ICD-10-CM

## 2020-02-06 PROCEDURE — G0177 OPPS/PHP; TRAIN & EDUC SERV: HCPCS

## 2020-02-06 PROCEDURE — 90853 GROUP PSYCHOTHERAPY: CPT | Performed by: COUNSELOR

## 2020-02-06 RX ORDER — LAMOTRIGINE 100 MG/1
150 TABLET ORAL DAILY
Qty: 45 TABLET | Refills: 0 | Status: SHIPPED | OUTPATIENT
Start: 2020-02-06 | End: 2020-03-06

## 2020-02-06 NOTE — TELEPHONE ENCOUNTER
Yudy Chery MD Snyder, David J, RN   Caller: Unspecified (6 days ago, 10:47 AM)             I changed it in the medication list to read scheduled. Also, thanks for letting me know about the lamotrigine. I will fix it in my note the next time she comes in.     Yudy    Previous Messages      ----- Message -----   From: Mk Navarro RN   Sent: 2/6/2020  11:18 AM CST   To: Yudy Chery MD, Mk Navarro RN     ----- Message from Mk Navarro RN sent at 2/6/2020 11:18 AM CST -----   Yudy:   Please see request from  for pt's melatonin as scheduled med.   Chau         Writer faxed updated order to [phone].

## 2020-02-06 NOTE — GROUP NOTE
Process Group Note    PATIENT'S NAME: Cesia Nettles  MRN:   [id number]  :   1995  ACCT. NUMBER: [id number]  DATE OF SERVICE: 20  START TIME:  9:00 AM  END TIME:  9:50 AM  FACILITATOR: Hien Ramirez [facility]; Kathy Laguerre PsyD  TOPIC:  Process Group    Diagnoses:  295.70  (F25.1) Schizoaffective Disorder Depressive Type  300.00 (F41.9) Unspecified Anxiety Disorder      Adult Mental Health Day Treatment  TRACK: 2A    NUMBER OF PARTICIPANTS: 5          Data:    Session content: At the start of this group, patients were invited to check in by identifying themselves, describing their current emotional status, and identifying issues to address in this group.   Area(s) of treatment focus addressed in this session included Symptom Management, Personal Safety and Community Resources/Discharge Planning.  Patient processed a conversation she has been having with her friend regarding his mental health symptoms and avoidance directed at patient. Patient discussed that she was able to communicate with her mother regarding the situation as her mother is friends with his mother; patient reported she hasn't heard from her friend since Tuesday and goal planned to contact his father if she doesn't get a response today. Patient expressed feeling anxious about her friend's current symptoms. Patient also expressed feeling nervous about returning to work on . Patient processed communication strategies she can utilize to address the residents at work. Patient also goal planned to find a writing group.     Therapeutic Interventions/Treatment Strategies:  Psychotherapist offered support, feedback and validation and reinforced use of skills. Treatment modalities used include Motivational Interviewing and Cognitive Behavioral Therapy. Interventions include Mindfulness: Facilitated discussion of when/how to use mindfulness skills to benefit general health, mental health symptoms, and stressors, Symptoms Management:  Promoted understanding of their diagnoses and how it impacts their functioning and Emotions Management:  Discussed barriers to emotional regulation.    Assessment:    Patient response:   Patient responded to session by accepting feedback, giving feedback, listening, focusing on goals and being attentive    Possible barriers to participation / learning include: and no barriers identified    Health Issues:   None reported       Substance Use Review:   Substance Use: No active concerns identified.    Mental Status/Behavioral Observations  Appearance:   Appropriate   Eye Contact:   Fair   Psychomotor Behavior: Normal   Attitude:   Cooperative   Orientation:   All  Speech   Rate / Production: Normal    Volume:  Normal   Mood:    Anxious  Normal  Affect:    Appropriate   Thought Content:   Clear  Thought Form:  Coherent  Logical     Insight:    Fair     Plan:     Safety Plan: No current safety concerns identified.  Recommended that patient call 911 or go to the local ED should there be a change in any of these risk factors.     Barriers to treatment: None identified    Patient Contracts (see media tab):  None    Substance Use: Not addressed in session     Continue or Discharge: Patient will continue in Adult Day Treatment (ADT)  as planned. Patient is likely to benefit from learning and using skills as they work toward the goals identified in their treatment plan.      Hien Ramirez, [facility]  February 6, 2020

## 2020-02-06 NOTE — TELEPHONE ENCOUNTER
Writer left voice mail message for Ms. Mark, requesting callback with information on who should be first contact for appointment scheduling - herself or group home.

## 2020-02-06 NOTE — TELEPHONE ENCOUNTER
Last seen: 1/22  RTC: 6 weeks  Non-provider cancel: None  No-show: None  Next appt: 3/4     Incoming refill from pharmacy via electronic request     Medication requested:   Disp Refills Start End FRACISCO   lamoTRIgine (LAMICTAL) 100 MG tablet 45 tablet 1 12/13/2019  No   Sig - Route: Take 1.5 tablets (150 mg) by mouth daily      Medication refill approved per refill protocol.  Writer e-prescribed 30-day supply to [facility] ([phone]). Qty 45, refills 0.

## 2020-02-06 NOTE — GROUP NOTE
Life Skills/OT Group Note    PATIENT'S NAME: Cesia Nettles  MRN:   [id number]  :   1995  ACCT. NUMBER: [id number]  DATE OF SERVICE: 20  START TIME: 10:00 AM  END TIME: 10:50 AM  FACILITATOR: Ag Wan OT  TOPIC:  Life Skills Group: Sensory Approaches in Mental Health  Adult Mental Health Day Treatment  TRACK: 2A    NUMBER OF PARTICIPANTS: 5    Summary of Group / Topics Discussed:  Sensory Approaches in Mental Health:  Coping Through the Senses: Patients were introduced/reviewed the neurosensory based skills and strategies related to supporting effective self regulation skills including grounding.  Patients were taught/reviewed the external and internal sensory systems and how they can be used for coping with mental health symptoms and stressors.  Patients were provided with an experiential opportunity to increase self-awareness of helpful sensory input with an emphasis on proprioception and vestibular. They worked to identify and name their sensory preferences and how to use them to help them participate in meaningful roles, relationships, routines, and responsibilities. Patients were encouraged to consider how they might create supportive environments that encourage use of these skills.         Patient Session Goals / Objectives:    Identified specific and individualized neurosensory skills to help when distressed      Identified skills learned and how this applies to current daily life    Established a plan for practice of these skills in their own environments        Patient Participation / Response:  Fully participated with the group by sharing personal reflections / insights and openly received / provided feedback with other participants.    Patient presentation: Continues to demosntrate improved insight and application of the neurosensory ways she can self regulate. She shares easily and applied the content to her job. , Verbalized understanding of content and Patient would benefit from  additional opportunities to practice the content to be able to generalize it to their everyday life with increased intentionality, consistency, and efficacy in support of their psychiatric recovery    Treatment Plan:  Patient has a current master individualized treatment plan.  See Epic treatment plan for more information.    Ag Wan, OT

## 2020-02-06 NOTE — GROUP NOTE
Life Skills/OT Group Note    PATIENT'S NAME: Cesia Nettles  MRN:   [id number]  :   1995  ACCT. NUMBER: [id number]  DATE OF SERVICE: 20  START TIME: 11:00 AM  END TIME: 11:50 AM  FACILITATOR: Samm Mercado OTR/L  TOPIC:  Life Skills Group: Resiliency Development  Adult Mental Health Day Treatment  TRACK: 2A    NUMBER OF PARTICIPANTS: 5    Summary of Group / Topics Discussed:  Resiliency Development:  Self Esteem and Self Compassion: A Letter to Myself: Patients were given time for reflection and built self awareness around components of self compassion and how it relates to self esteem and overall functioning.  Patients were also given the opportunity to improve self efficacy, self sufficiency, quality of life and a sense of mastery and competency by identifying what is good and to instill hope. Patients were taught about the importance of self kindness and ways to challenge negative thinking.      Patient Session Goals / Objectives:    Identified personal strengths and qualities about themselves as a way to provide hope and build self-compassion as a way to effectively manage both mental health and substance abuse/abuse symptoms     Improved awareness of positive qualities and how these contribute to the process of recovery        Established a plan for practice of these skills in their own environments    Practiced and reflected on how to generalize taught skills to their everyday life        Patient Participation / Response:  Fully participated with the group by sharing personal reflections / insights and openly received / provided feedback with other participants.    Patient presentation: Calm,alert and focused with stable mood and thought process. Patient shared some of her poetry with the group and seem to benefit from the positive feedback.    Treatment Plan:  Patient has a current master individualized treatment plan.  See Epic treatment plan for more information.    Samm Mercado,  OTR/L

## 2020-02-10 ENCOUNTER — HOSPITAL ENCOUNTER (OUTPATIENT)
Dept: BEHAVIORAL HEALTH | Facility: CLINIC | Age: 25
End: 2020-02-10
Attending: PSYCHIATRY & NEUROLOGY | Admitting: PSYCHIATRY & NEUROLOGY
Payer: COMMERCIAL

## 2020-02-10 PROCEDURE — G0177 OPPS/PHP; TRAIN & EDUC SERV: HCPCS

## 2020-02-10 PROCEDURE — 90853 GROUP PSYCHOTHERAPY: CPT | Performed by: PSYCHOLOGIST

## 2020-02-10 NOTE — GROUP NOTE
RN Group Note    PATIENT'S NAME: Cesia Nettles  MRN:   [id number]  :   1995  ACCT. NUMBER: [id number]  DATE OF SERVICE: 2/10/20  START TIME:  9:00 AM  END TIME:  9:50 AM  FACILITATOR: Cathy Aleman RN  TOPIC:  RN Group: Medication Education and Management  Adult Mental Health Day Treatment  TRACK: 2A    NUMBER OF PARTICIPANTS: 8    Summary of Group / Topics Discussed:  Medication Educations and Management:  Medication Jeopardy (Part 1): Patients provided education regarding medication safety, antidepressants, side effects, neuroleptics, expected medication outcomes, knowledge of diagnosis, symptoms, and symptom management through an engaging jeopardy-style format.     Patient Session Goals / Objectives:    ? Participated in team-based Jeopardy game  ? Identified strategies for safe use, handling, and disposal of medications  ? Discussed basic aspects of medication safety, side effects, adverse outcomes and contraindications    Patient Participation / Response:  Fully participated with the group by sharing personal reflections / insights and openly received / provided feedback with other participants.     Demonstrated understanding of topics discussed through group discussion and participation, Identified / Expressed personal readiness to practice skills and Verbalized understanding of medication education and management topic    Treatment Plan:  Patient has a current master individualized treatment plan.  See Epic treatment plan for more information.    Cathy Aleman RN

## 2020-02-10 NOTE — GROUP NOTE
EBP Group Note    PATIENT'S NAME: Cesia Netltes  MRN:   [id number]  :   1995  ACCT. NUMBER: [id number]  DATE OF SERVICE: 2/10/20  START TIME: 11:00 AM  END TIME: 11:50 AM  FACILITATOR: Kathy Laguerre PsyD  TOPIC:  EBP Group: Mindfulness  Adult Mental Health Day Treatment  TRACK: 2A    NUMBER OF PARTICIPANTS: 6    Summary of Group / Topics Discussed:  Mindfulness: Mindfulness for Psychosis: An interactive exploration of mindfulness activities relevant to psychotic symptoms was facilitated. This topic assisted in gaining patients confident and competence in mindfulness practice and patients were able to experience the benefits of mindfulness in session. Patient s current awareness, knowledge, and practice of mindfulness skills were reviewed in session.      Patient Session Goals / Objectives:    Reviewed and promoted comfort with practice of mindfulness skills     Experienced benefits of mindfulness practice in session    Discussed patient experiences with practice in session    Identified and problem-solved individual barriers that arose in session      Patient Participation / Response:  Fully participated with the group by sharing personal reflections / insights and openly received / provided feedback with other participants.    Demonstrated understanding of mindfulness skills and benefits of practice    Treatment Plan:  Patient has a current master individualized treatment plan.  See Epic treatment plan for more information.    Francisco Patterson, D,  Licensed Psychologist

## 2020-02-10 NOTE — GROUP NOTE
Process Group Note    PATIENT'S NAME: Cesia Nettles  MRN:   [id number]  :   1995  ACCT. NUMBER: [id number]  DATE OF SERVICE: 2/10/20  START TIME: 10:00 AM  END TIME: 10:50 AM  FACILITATOR: Kathy Laguerre PsyD  TOPIC:  Process Group    Diagnoses:    295.70  (F25.1) Schizoaffective Disorder Depressive Type  300.00 (F41.9) Unspecified Anxiety Disorder     : Mental Health Resources: Ely Castaneda  Guardian: [place]: Elma Flores : [phone]  CADI:  Mariela Walker: [employer]  [facility]:  [facility]: [address]: [phone]   [employer] : Nino Salcidograeme [phone]      Adult Mental Health Day Treatment  TRACK: 2A    NUMBER OF PARTICIPANTS: 8          Data:    Session content: At the start of this group, patients were invited to check in by identifying themselves, describing their current emotional status, and identifying issues to address in this group.   Area(s) of treatment focus addressed in this session included Symptom Management, Personal Safety and Community Resources/Discharge Planning.    Therese reported being safe today. She reported a goal of trying to manage her anxiety so she can return to work on Wednesday. She talked to the group about her return to work and how to do it. She offered supportive help to another member. She reported that she was uncomfortable with her guardian coming into her therapy appointments with her, and the group agreed that she should have her own private hour, to problem-solve with the therapist.        Therapeutic Interventions/Treatment Strategies:  Psychotherapist offered support, feedback and validation and reinforced use of skills. Treatment modalities used include Cognitive Behavioral Therapy and Dialectical Behavioral Therapy. Interventions include Cognitive Restructuring:  Assisted patient in formulating new neutral/positive alternatives to challenge less helpful / ineffective thoughts, Coping Skills: Assisted patient in  identifying 1-2 healthy distraction skills to reduce overall distress and Mindfulness: Encouraged a plan to use mindfulness skills in daily life.    Assessment:    Patient response:   Patient responded to session by accepting feedback, giving feedback, listening, focusing on goals, being attentive, accepting support and appearing alert    Possible barriers to participation / learning include: severity of symptoms    Health Issues:   None reported       Substance Use Review:   Substance Use: No active concerns identified.    Mental Status/Behavioral Observations  Appearance:   Appropriate   Eye Contact:   Good   Psychomotor Behavior: Normal   Attitude:   Cooperative   Orientation:   All  Speech   Rate / Production: Normal    Volume:  Normal   Mood:    Anxious   Affect:    Appropriate   Thought Content:   Visual hallucinations  Thought Form:  Coherent  Logical     Insight:    Good     Plan:     Safety Plan: Recommended that patient call 911 or go to the local ED should there be a change in any of these risk factors.     Barriers to treatment: None identified    Patient Contracts (see media tab):  None    Substance Use: Not addressed in session     Continue or Discharge: Patient will continue in Adult Day Treatment (ADT)  as planned. Patient is likely to benefit from learning and using skills as they work toward the goals identified in their treatment plan.    Stabilization of symptoms is needed.    Fabien Patterson., D,  Licensed Psychologist   February 10, 2020

## 2020-02-11 ENCOUNTER — HOSPITAL ENCOUNTER (OUTPATIENT)
Dept: BEHAVIORAL HEALTH | Facility: CLINIC | Age: 25
End: 2020-02-11
Attending: PSYCHIATRY & NEUROLOGY | Admitting: PSYCHIATRY & NEUROLOGY
Payer: COMMERCIAL

## 2020-02-11 ENCOUNTER — CARE COORDINATION (OUTPATIENT)
Dept: PSYCHIATRY | Facility: CLINIC | Age: 25
End: 2020-02-11

## 2020-02-11 PROCEDURE — G0177 OPPS/PHP; TRAIN & EDUC SERV: HCPCS | Performed by: COUNSELOR

## 2020-02-11 PROCEDURE — 90853 GROUP PSYCHOTHERAPY: CPT | Performed by: PSYCHOLOGIST

## 2020-02-11 PROCEDURE — G0177 OPPS/PHP; TRAIN & EDUC SERV: HCPCS

## 2020-02-11 NOTE — PROGRESS NOTES
From: Juwan <[email or web address]>  Sent: Tuesday, February 11, 2020 2:08 PM  To: [email or web address]>; [email or web address] <[email or web address]  Subject: FW: AG Update     CAUTION: This email originated from outside of the organization. Do not click links or open attachments unless you recognize the sender and know the content is safe.  Hello,         This is the first email since I have had contact with you regarding Cesia Nettles . Please email below from her group home staff,              Thank you,    Elma Flores    Guardian/Conservator    [facility]    [address] [place] [id number]    P [phone]    F [phone]                   From: Delfin Preston <[email or web address]>  Sent: Friday, February 7, 2020 7:10 PM  To: Juwan <[email or web address]>; [email or web address]; lkinders <[email or web address]>; Margie Menauax <[email or web address]>  Cc: Kelvin Smiley <[email or web address]>; Taj Gage <[email or web address]>; Shahnaz Umana <[email or web address]>; Renu Parks <[email or web address]>; Iraida Agrawal <[email or web address]>; Pa Singleton <[email or web address]>  Subject: AG Update         Hello Team-              I figured this would be a good time to update you on Therese's progress since our last meeting. Experience has dictated that she typically goes overboard for a day or two after our meetings, and then back to status quo. This time was no different in that there was an initial bump with Therese going all out the first day or two. We had a discussion with her after a particularly rough day, and told her that it's not the first day or two that we're interested in. We'd like to see where she's at after a couple weeks. And  then a month...2 months...etc.         We're now a couple of weeks removed from her meeting, and I'm happy to report that Therese has been very consistent cleaning her dishes, her hygiene has improved, and she has been keeping up with her room. We were careful to not overly reward her during the initial few days, but it's my plan to recognize her progress this evening. It's not perfect by any means, but we've been pleased that she's kept it up thus far.              I will update you all soon-         Pat         [phone]

## 2020-02-11 NOTE — GROUP NOTE
Life Skills/OT Group Note    PATIENT'S NAME: Cesia Nettles  MRN:   [id number]  :   1995  ACCT. NUMBER: [id number]  DATE OF SERVICE: 20  START TIME:  9:00 AM  END TIME:  9:50 AM  FACILITATOR: Samm Mercado OTR/L  TOPIC:  Life Skills Group: Cognitive Functioning  Adult Mental Health Day Treatment  TRACK: 5    NUMBER OF PARTICIPANTS: 2A    Summary of Group / Topics Discussed:  Cognitive Functioning Forgetfulness and Strategies to Improve Memory : Patients were taught and provided with an opportunity to gain awareness of how their mental health symptoms impact their current cognitive functioning as well as how this impacts their performance and participation in meaningful roles, relationships, and routines.  Patients were taught skills and strategies on how to monitor and improve cognitive performance through remediation or compensatory strategies.  Patients were given opportunities to practice taught skills and techniques in session and how to apply to everyday life.        Patient Session Goals / Objectives:    Identified how their mental health symptoms impact their functioning, focusing on specific cognitive challenges     Improved awareness of specific remediation and/or compensatory strategies to improve  executive functioning skills and how this relates to mental health recovery        Established a plan for practice of these skills in their own environments    Practiced and reflected on how to generalize taught skills to their everyday life          Patient Participation / Response:  Fully participated with the group by sharing personal reflections / insights and openly received / provided feedback with other participants.    Patient presentation: Calm,alert and focused with stable mood and thought process.     Treatment Plan:  Patient has a current master individualized treatment plan.  See Epic treatment plan for more information.    KATHI Delcid/NARCISO

## 2020-02-11 NOTE — GROUP NOTE
Process Group Note    PATIENT'S NAME: eCsia Nettles  MRN:   [id number]  :   1995  ACCT. NUMBER: [id number]  DATE OF SERVICE: 20  START TIME: 10:00 AM  END TIME: 10:50 AM  FACILITATOR: Kathy Laguerre PsyD  TOPIC:  Process Group    Diagnoses:295.70  (F25.1) Schizoaffective Disorder Depressive Type  300.00 (F41.9) Unspecified Anxiety Disorder     : Mental Health Resources: Ely Leonel  Guardian: [place]: Elma Flores : [phone]  CADI:  Mariela Walker: [facility]  [facility]:  [facility]: [address]: [phone]   [employer] : Nino Salcidograeme [phone]          Adult Mental Health Day Treatment  TRACK: 2A    NUMBER OF PARTICIPANTS: 5          Data:    Session content: At the start of this group, patients were invited to check in by identifying themselves, describing their current emotional status, and identifying issues to address in this group.   Area(s) of treatment focus addressed in this session included Symptom Management, Personal Safety and Community Resources/Discharge Planning.    Therese reported being safe today. She reported a goal of returning to work, but felt anxious.She talked to the staff at her residence and they complimented her for accomplishing many things, lately.   She offered supportive help to another member. She reported feeling good about her mood today, and that she was anxious about her work, and will tell the  that she can only work 16 hours each week.  She talked to the group about her anxiety around her relationship with her dad.    Therapeutic Interventions/Treatment Strategies:  Psychotherapist offered support, feedback and validation and reinforced use of skills. Treatment modalities used include Cognitive Behavioral Therapy and Dialectical Behavioral Therapy. Interventions include Cognitive Restructuring:  Explored impact of ineffective thoughts / distortions on mood and activity, Coping Skills: Assisted patient in  identifying 1-2 healthy distraction skills to reduce overall distress and Mindfulness: Encouraged a plan to use mindfulness skills in daily life.    Assessment:    Patient response:   Patient responded to session by accepting feedback, giving feedback, listening, focusing on goals, being attentive, accepting support and appearing alert    Possible barriers to participation / learning include: severity of symptoms    Health Issues:   None reported       Substance Use Review:   Substance Use: No active concerns identified.    Mental Status/Behavioral Observations  Appearance:   Appropriate   Eye Contact:   Good   Psychomotor Behavior: Normal   Attitude:   Cooperative   Orientation:   All  Speech   Rate / Production: Normal    Volume:  Normal   Mood:    Anxious   Affect:    Appropriate   Thought Content:   Rumination and visual hallucinations  Thought Form:  Coherent  Logical     Insight:    Good     Plan:     Safety Plan: Recommended that patient call 911 or go to the local ED should there be a change in any of these risk factors.     Barriers to treatment: None identified    Patient Contracts (see media tab):  None    Substance Use: Not addressed in session     Continue or Discharge: Patient will continue in Adult Day Treatment (ADT)  as planned. Patient is likely to benefit from learning and using skills as they work toward the goals identified in their treatment plan.    Stabilization of symptoms is needed.    Francisco Patterson, D,  Licensed Psychologist   February 11, 2020

## 2020-02-11 NOTE — GROUP NOTE
RN Group Note    PATIENT'S NAME: Cesia Nettles  MRN:   [id number]  :   1995  ACCT. NUMBER: [id number]  DATE OF SERVICE: 20  START TIME: 11:00 AM  END TIME: 11:50 AM  FACILITATOR: Marina Arthur LPCC  TOPIC:  RN Group: Health Maintenance  Adult Mental Health Day Treatment  TRACK: 2A    NUMBER OF PARTICIPANTS: 4    Summary of Group / Topics Discussed:  Health Maintenance: Goal Setting: Meaningful goals can bring a sense of direction and purpose in life.  They also highlight our most important values. Patients were assisted by instructor to identify short term goals to promote their mental health recovery and improve overall health and wellness. Patients were educated on SMART goal setting framework as a strategy to increase outcomes and promote success.    Patient Session Goals / Objectives:  ? Explained the key concepts of SMART goal setting framework  ? Identified three goals successfully using SMART goal setting framework  ? Reviewed concept of balance in wellness as it pertains to goal setting        Patient Participation / Response:  Fully participated with the group by sharing personal reflections / insights and openly received / provided feedback with other participants.    Demonstrated understanding of topics discussed through group discussion and participation and Identified / Expressed personal readiness to practice skills    Treatment Plan:  Patient has a current master individualized treatment plan.  See Epic treatment plan for more information.    KEVEN Andrade

## 2020-02-13 ENCOUNTER — HOSPITAL ENCOUNTER (OUTPATIENT)
Dept: BEHAVIORAL HEALTH | Facility: CLINIC | Age: 25
End: 2020-02-13
Attending: PSYCHIATRY & NEUROLOGY | Admitting: PSYCHIATRY & NEUROLOGY
Payer: COMMERCIAL

## 2020-02-13 PROCEDURE — 90853 GROUP PSYCHOTHERAPY: CPT | Performed by: PSYCHOLOGIST

## 2020-02-13 PROCEDURE — G0177 OPPS/PHP; TRAIN & EDUC SERV: HCPCS

## 2020-02-13 NOTE — GROUP NOTE
Life Skills/OT Group Note    PATIENT'S NAME: Cesia Nettles  MRN:   [id number]  :   1995  ACCT. NUMBER: [id number]  DATE OF SERVICE: 20  START TIME: 11:00 AM  END TIME: 11:50 AM  FACILITATOR: Samm Mercado OTR/L  TOPIC:  Life Skills Group: Resiliency Development  Adult Mental Health Day Treatment  TRACK: 2A    NUMBER OF PARTICIPANTS: 5    Summary of Group / Topics Discussed:  Resiliency Development:  Self Awareness Assessment Thinking about Change: Patients explored and identified their strengths, emotions, barriers, skills, and behavior patterns that occur when changes happen in life.  Focus was on recognizing these aspects and developing ways to help support moving forward, making changes as needed to support resiliency.      Patient Session Goals / Objectives:    Identified emotions, barriers, skills, strengths, and behavior patterns that occur when changes happen in life and how to effectively cope     Improved awareness of the process of change and skills and strategies that support this       Established a plan for practice of these skills in their own environments    Practiced and reflected on how to generalize taught skills to their everyday life        Patient Participation / Response:  Fully participated with the group by sharing personal reflections / insights and openly received / provided feedback with other participants.    Patient Presentation: Calm,alert,focused with stable mood and thought process.    Treatment Plan:  Patient has a current master individualized treatment plan.  See Epic treatment plan for more information.    KATHI Delcid/NARCISO

## 2020-02-13 NOTE — GROUP NOTE
Life Skills/OT Group Note    PATIENT'S NAME: Cesia Nettles  MRN:   [id number]  :   1995  ACCT. NUMBER: [id number]  DATE OF SERVICE: 20  START TIME: 10:00 AM  END TIME: 10:50 AM  FACILITATOR: Ag Wan OT  TOPIC:  Life Skills Group: Resiliency Development  Adult Mental Health Day Treatment  TRACK: 2A    NUMBER OF PARTICIPANTS: 6    Summary of Group / Topics Discussed:  Resiliency Development:  Coping Skills: Aftercare Coping Cards: Patients were taught how to begin to develop a relapse management kit for both mental and substance use/abuse by creating individualized coping cards focused on creating a tool that they can carry with them and be helpful when needed.     Patient Session Goals / Objectives:    Identified individualized coping skills they can use for effectively managing both mental health and substance abuse/abuse symptoms     Improved awareness of different types of coping skills and how to personalize them to their unique situation and circumstances      Established a plan for practice of these skills in their own environments    Practiced and reflected on how to generalize taught skills to their everyday life        Patient Participation / Response:  Fully participated with the group by sharing personal reflections / insights and openly received / provided feedback with other participants.    Verbalized understanding of content and Patient would benefit from additional opportunities to practice the content to be able to generalize it to their everyday life with increased intentionality, consistency, and efficacy in support of their psychiatric recovery    Treatment Plan:  Patient has a current master individualized treatment plan.  See Epic treatment plan for more information.    Ag Wan OT

## 2020-02-13 NOTE — GROUP NOTE
Process Group Note    PATIENT'S NAME: Cesia Nettles  MRN:   [id number]  :   1995  ACCT. NUMBER: [id number]  DATE OF SERVICE: 20  START TIME:  9:00 AM  END TIME:  9:50 AM  FACILITATOR: Kathy Laguerre PsyD  TOPIC:  Process Group    Diagnoses:    295.70  (F25.1) Schizoaffective Disorder Depressive Type  300.00 (F41.9) Unspecified Anxiety Disorder     : Mental Health Resources: Ely Leonel  Guardian: [place]: Elma Flores : [phone]  CADI:  Mariela Walker: [facility]  [facility]:  [facility]: [address]: [phone]   Pro Act : Nino Salcidograeme [phone]      Adult Mental Health Day Treatment  TRACK: 2A    NUMBER OF PARTICIPANTS: 7          Data:    Session content: At the start of this group, patients were invited to check in by identifying themselves, describing their current emotional status, and identifying issues to address in this group.   Area(s) of treatment focus addressed in this session included Symptom Management, Personal Safety and Community Resources/Discharge Planning.    Therese reported being safe today. She reported a goal of being assertive with her guardian about her therapy appointments and did express her concerns that she have individual therapy hour by herself and not with her guardian in the room. She talked to the group about her relationship with one staff at the home, who was verbally abusive and the group validated her. She offered supportive help to another member. She reported feeling good about her mood today, and that she was anxious about her work, but it went well..      Therapeutic Interventions/Treatment Strategies:  Psychotherapist offered support, feedback and validation and reinforced use of skills. Treatment modalities used include Cognitive Behavioral Therapy and Dialectical Behavioral Therapy. Interventions include Cognitive Restructuring:  Assisted patient in formulating new neutral/positive alternatives to  challenge less helpful / ineffective thoughts, Coping Skills: Promoted understanding of how and when to apply grounding strategies to reduce distress and increase presence in the moment and Mindfulness: Facilitated discussion of when/how to use mindfulness skills to benefit general health, mental health symptoms, and stressors.    Assessment:    Patient response:   Patient responded to session by accepting feedback, giving feedback, listening, focusing on goals, being attentive and accepting support    Possible barriers to participation / learning include: severity of symptoms    Health Issues:   None reported       Substance Use Review:   Substance Use: No active concerns identified.    Mental Status/Behavioral Observations  Appearance:   Appropriate   Eye Contact:   Good   Psychomotor Behavior: Normal   Attitude:   Cooperative   Orientation:   All  Speech   Rate / Production: Normal    Volume:  Normal   Mood:    Anxious   Affect:    Appropriate   Thought Content:   Rumination and Psychosis reports hallucinations visual  Thought Form:  Coherent  Logical     Insight:    Good     Plan:     Safety Plan: Recommended that patient call 911 or go to the local ED should there be a change in any of these risk factors.     Barriers to treatment: None identified    Patient Contracts (see media tab):  None    Substance Use: Not addressed in session     Continue or Discharge: Patient will continue in Adult Day Treatment (ADT)  as planned. Patient is likely to benefit from learning and using skills as they work toward the goals identified in their treatment plan.    Stabilization of symptoms is needed.    Fabien Patterson., D,  Licensed Psychologist   February 13, 2020

## 2020-02-17 ENCOUNTER — HOSPITAL ENCOUNTER (OUTPATIENT)
Dept: BEHAVIORAL HEALTH | Facility: CLINIC | Age: 25
End: 2020-02-17
Attending: PSYCHIATRY & NEUROLOGY | Admitting: PSYCHIATRY & NEUROLOGY
Payer: COMMERCIAL

## 2020-02-17 PROCEDURE — G0177 OPPS/PHP; TRAIN & EDUC SERV: HCPCS

## 2020-02-17 PROCEDURE — 90853 GROUP PSYCHOTHERAPY: CPT | Performed by: COUNSELOR

## 2020-02-17 PROCEDURE — 90853 GROUP PSYCHOTHERAPY: CPT | Performed by: PSYCHOLOGIST

## 2020-02-17 NOTE — GROUP NOTE
RN Group Note    PATIENT'S NAME: Cesia Nettles  MRN:   [id number]  :   1995  ACCT. NUMBER: [id number]  DATE OF SERVICE: 20  START TIME:  9:00 AM  END TIME:  9:50 AM  FACILITATOR: Cathy Busby RN  TOPIC:  RN Group: Medication Education and Management  Adult Mental Health Day Treatment  TRACK: 2A    NUMBER OF PARTICIPANTS: 6    Summary of Group / Topics Discussed:  Medication Educations and Management:  Medication Jeopardy (Part 2): Patients provided education regarding medication safety, antidepressants, side effects, neuroleptics, expected medication outcomes, knowledge of diagnosis, symptoms, and symptom management through an engaging jeopardy-style format.     Patient Session Goals / Objectives:    ? Participated in team-based Jeopardy game  ? Identified strategies for safe use, handling, and disposal of medications  ? Discussed basic aspects of medication safety, side effects, adverse outcomes and contraindications      Patient Participation / Response:  Fully participated with the group by sharing personal reflections / insights and openly received / provided feedback with other participants.     Demonstrated understanding of topics discussed through group discussion and participation, Identified / Expressed personal readiness to practice skills and Verbalized understanding of medication education and management topic    Treatment Plan:  Patient has a current master individualized treatment plan.  See Epic treatment plan for more information.    Cathy Busby RN

## 2020-02-17 NOTE — GROUP NOTE
Process Group Note    PATIENT'S NAME: Cesia Nettles  MRN:   [id number]  :   1995  ACCT. NUMBER: [id number]  DATE OF SERVICE: 20  START TIME: 10:00 AM  END TIME: 10:50 AM  FACILITATOR: Hien Ramirez LPCC; Kathy Laguerre PsyD  TOPIC:  Process Group    Diagnoses:  295.70  (F25.1) Schizoaffective Disorder Depressive Type  300.00 (F41.9) Unspecified Anxiety Disorder      Adult Mental Health Day Treatment  TRACK: 2A    NUMBER OF PARTICIPANTS: 7          Data:    Session content: At the start of this group, patients were invited to check in by identifying themselves, describing their current emotional status, and identifying issues to address in this group.   Area(s) of treatment focus addressed in this session included Symptom Management, Personal Safety and Community Resources/Discharge Planning.    Patient reported feeling nervous about a new manager at work and worried why her previous manager is no employed. Patient discussed working toward meeting with her  this afternoon. Patient identified advocating for herself as skills they will use to address their goal(s). Patient reported her roommate may be a barrier to working toward their goal(s) and/or addressing mental health symptoms due to their current manic symptoms and client processed that she has been asking her roommate for space. Patient reported no safety concerns and/or self-injurious behaviors. Patient reported no substance use. Patient reported they are taking their medications as prescribed. Patient reported feeling proud that they address their roommate and saw her family this weekend. Patient discussed anxiety regarding her interim manager with the treatment group.       Therapeutic Interventions/Treatment Strategies:  Psychotherapist offered support, feedback and validation and reinforced use of skills. Treatment modalities used include Motivational Interviewing and Cognitive Behavioral Therapy. Interventions include Coping  Skills: Discussed use of self-soothe skills to decrease distress in the body and Assisted patient in identifying 1-2 healthy distraction skills to reduce overall distress and Mindfulness: Facilitated discussion of when/how to use mindfulness skills to benefit general health, mental health symptoms, and stressors and Encouraged a plan to use mindfulness skills in daily life.    Assessment:    Patient response:   Patient responded to session by accepting feedback, giving feedback, listening, focusing on goals and being attentive    Possible barriers to participation / learning include: and no barriers identified    Health Issues:   None reported       Substance Use Review:   Substance Use: No active concerns identified.    Mental Status/Behavioral Observations  Appearance:   Appropriate   Eye Contact:   Good   Psychomotor Behavior: Normal   Attitude:   Cooperative   Orientation:   All  Speech   Rate / Production: Normal    Volume:  Normal   Mood:    Anxious  Normal  Affect:    Appropriate   Thought Content:   Clear  Thought Form:  Coherent  Logical     Insight:    Fair     Plan:     Safety Plan: No current safety concerns identified.  Recommended that patient call 911 or go to the local ED should there be a change in any of these risk factors.     Barriers to treatment: None identified    Patient Contracts (see media tab):  None    Substance Use: Not addressed in session     Continue or Discharge: Patient will continue in Adult Day Treatment (ADT)  as planned. Patient is likely to benefit from learning and using skills as they work toward the goals identified in their treatment plan.      Hien Ramirez, [facility]  February 17, 2020

## 2020-02-17 NOTE — GROUP NOTE
EBP Group Note    PATIENT'S NAME: Cesia Nettles  MRN:   [id number]  :   1995  ACCT. NUMBER: [id number]  DATE OF SERVICE: 20  START TIME: 11:00 AM  END TIME: 11:50 AM  FACILITATOR: Kathy Laguerre PsyD  TOPIC:  EBP Group: Coping Skills  Adult Mental Health Day Treatment  TRACK: 2A    NUMBER OF PARTICIPANTS: 8    Summary of Group / Topics Discussed:  Coping Skills: Calming Strategies: Patients discussed and practiced strategies to increase sense of calm in the body.  Reviewed the benefits of calming strategies as well as past / current practices of each member.  Patients identified situations in which using these strategies would reduce stress. They developed the ability to distinguish when these strategies can be useful in their lives for management and stress and psychological well-being.    Patient Session Goals / Objectives:    Understand the purpose of using calming strategies to reduce stress    Review patients current calming practices and discuss a more formal way of practicing and accessing skills.    Increase ability to decide when to use various calming strategies (e.g. Progressive muscle relaxation, deep breathing, guided imagery, + thoughts).    Choose 1-2 calming strategies to apply during times of distress.        Patient Participation / Response:  Fully participated with the group by sharing personal reflections / insights and openly received / provided feedback with other participants.    Demonstrated understanding of topics discussed through group discussion and participation and Expressed understanding of the relevance / importance of coping skills at distressing times in life    Treatment Plan:  Patient has a current master individualized treatment plan.  See Epic treatment plan for more information.    Francisco Patterson, D,  Licensed Psychologist

## 2020-02-18 ENCOUNTER — TELEPHONE (OUTPATIENT)
Dept: BEHAVIORAL HEALTH | Facility: CLINIC | Age: 25
End: 2020-02-18

## 2020-02-18 NOTE — TELEPHONE ENCOUNTER
Phone call from Therese, who said  She was ill today.    Fabien Patterson., D,  Licensed Psychologist

## 2020-02-20 ENCOUNTER — HOSPITAL ENCOUNTER (OUTPATIENT)
Dept: BEHAVIORAL HEALTH | Facility: CLINIC | Age: 25
End: 2020-02-20
Attending: PSYCHIATRY & NEUROLOGY | Admitting: PSYCHIATRY & NEUROLOGY
Payer: COMMERCIAL

## 2020-02-20 PROCEDURE — 90853 GROUP PSYCHOTHERAPY: CPT | Performed by: PSYCHOLOGIST

## 2020-02-20 PROCEDURE — G0177 OPPS/PHP; TRAIN & EDUC SERV: HCPCS

## 2020-02-20 PROCEDURE — G0177 OPPS/PHP; TRAIN & EDUC SERV: HCPCS | Performed by: OCCUPATIONAL THERAPIST

## 2020-02-20 ASSESSMENT — ANXIETY QUESTIONNAIRES
GAD7 TOTAL SCORE: 10
IF YOU CHECKED OFF ANY PROBLEMS ON THIS QUESTIONNAIRE, HOW DIFFICULT HAVE THESE PROBLEMS MADE IT FOR YOU TO DO YOUR WORK, TAKE CARE OF THINGS AT HOME, OR GET ALONG WITH OTHER PEOPLE: VERY DIFFICULT
3. WORRYING TOO MUCH ABOUT DIFFERENT THINGS: SEVERAL DAYS
5. BEING SO RESTLESS THAT IT IS HARD TO SIT STILL: SEVERAL DAYS
1. FEELING NERVOUS, ANXIOUS, OR ON EDGE: SEVERAL DAYS
6. BECOMING EASILY ANNOYED OR IRRITABLE: SEVERAL DAYS
2. NOT BEING ABLE TO STOP OR CONTROL WORRYING: MORE THAN HALF THE DAYS
7. FEELING AFRAID AS IF SOMETHING AWFUL MIGHT HAPPEN: MORE THAN HALF THE DAYS

## 2020-02-20 ASSESSMENT — PATIENT HEALTH QUESTIONNAIRE - PHQ9: 5. POOR APPETITE OR OVEREATING: MORE THAN HALF THE DAYS

## 2020-02-20 NOTE — GROUP NOTE
Life Skills/OT Group Note    PATIENT'S NAME: Cesia Nettles  MRN:   [id number]  :   1995  ACCT. NUMBER: [id number]  DATE OF SERVICE: 20  START TIME: 10:00 AM  END TIME: 10:50 AM  FACILITATOR: Radha Goode OTR/L  TOPIC:  Life Skills Group: Sensory Approaches in Mental Health  Adult Mental Health Day Treatment  TRACK: 2A    NUMBER OF PARTICIPANTS: 5    Summary of Group / Topics Discussed:  Sensory Approaches in Mental Health:  Focused Activity: Patients were provided with verbal and experiential education to identify physical and sensorimotor based activities that can be utilized for stress management, self care, health maintenance, and self regulation.  Patients increased knowledge and awareness of activities that support good self care, build resiliency, and prevent relapse through healthy engagement in mindful focused activities for effective coping with illness and reduction of maladaptive coping skills.     Patient Session Goals / Objectives:    Identified specific physical and sensorimotor based activities for stress management, self care, health maintenance, and self regulation      Improved awareness of activities that are meaningful focused activities that support good self care, build resiliency, and prevent relapse and how this applies to current daily life    Established a plan for practice of these skills in their own environments    Practiced and reflected on how to generalize taught skills to their everyday life      Patient Participation / Response:  Fully participated with the group by sharing personal reflections / insights and openly received / provided feedback with other participants.    Patient presentation: easily engaged in group focused activity and noted benefitsl focused on being creative and thinking about things from different perspective and Verbalized understanding of content    Treatment Plan:  Patient has See Epic Treatment Plan - Patient is discharging.    Radha  ADARSH GoodeR/L

## 2020-02-20 NOTE — GROUP NOTE
Process Group Note    PATIENT'S NAME: Cesia Nettles  MRN:   [id number]  :   1995  ACCT. NUMBER: [id number]  DATE OF SERVICE: 20  START TIME:  9:00 AM  END TIME:  9:50 AM  FACILITATOR: Kathy Laguerre PsyD  TOPIC:  Process Group    Diagnoses:    295.70  (F25.1) Schizoaffective Disorder Depressive Type  300.00 (F41.9) Unspecified Anxiety Disorder     Psychiatry:  Dr. Yudy Chery, [facility] Psych[facility]: [phone]  PCP:  Becki Valencia PA-C (P: [phone], F: [phone]) with [facility]  -Therapist:  Rylee Samuels (P: [phone]) with [facility] [place]  Therapy:  Rylee ACUÑA , [phone]  : Mental Health Resources: Ely Castaneda:  [phone]  Guardian: [facility]: [facility] : [phone]  CADI:  Mariela Walker: [facility]  [facility]:  [facility]: [address]: [phone]   Pro Act : Ninojeffry Carranza [phone]  -Group Home:  [facility] Sid Lopez (P: [phone])  Mother Savanna Hastings (P: [phone]) and Father Tino Nettles (P: [phone])         Adult Mental Health Day Treatment  TRACK: 2A    NUMBER OF PARTICIPANTS: 5          Data:    Session content: At the start of this group, patients were invited to check in by identifying themselves, describing their current emotional status, and identifying issues to address in this group.   Area(s) of treatment focus addressed in this session included Symptom Management, Personal Safety and Community Resources/Discharge Planning.    Client reported being safe today.  Reported mood is good       Goal for today is to come here and to continue to work.    The client talked to the group about wanting to discharge, and was encouraged to communicate with staff and guardian. She reported that she left a message from her guardian but wasn't able to talk to her about discharging today.  A phone call to the guardian by staff at this program indicated that she would not discharge today, but next week  as scheduled.      Therapeutic Interventions/Treatment Strategies:  Psychotherapist offered support, feedback and validation and reinforced use of skills. Treatment modalities used include Motivational Interviewing, Cognitive Behavioral Therapy and Dialectical Behavioral Therapy. Interventions include Cognitive Restructuring:  Assisted patient in formulating new neutral/positive alternatives to challenge less helpful / ineffective thoughts, Coping Skills: Assisted patient in identifying 1-2 healthy distraction skills to reduce overall distress and Mindfulness: Encouraged a plan to use mindfulness skills in daily life.    Assessment:    Patient response:   Patient responded to session by accepting feedback, giving feedback, listening, focusing on goals, being attentive, accepting support and appearing alert    Possible barriers to participation / learning include: severity of symptoms    Health Issues:   None reported       Substance Use Review:   Substance Use: No active concerns identified.    Mental Status/Behavioral Observations  Appearance:   Appropriate   Eye Contact:   Good   Psychomotor Behavior: Normal   Attitude:   Cooperative   Orientation:   All  Speech   Rate / Production: Normal    Volume:  Normal   Mood:    Anxious  Normal  Affect:    Appropriate   Thought Content:   Rumination and Psychosis reports hallucinations auditory and visual      She reported a decrease in psychosis symptoms  Thought Form:  Coherent  Logical     Insight:    Good     Plan:     Safety Plan: Recommended that patient call 911 or go to the local ED should there be a change in any of these risk factors.     Barriers to treatment: None identified    Patient Contracts (see media tab):  None    Substance Use: Not addressed in session   Continue or Discharge: Patient will continue in Adult Day Treatment (ADT)  as planned. Patient is likely to benefit from learning and using skills as they work toward the goals identified in their  treatment plan.     Stabilization of symptoms is needed.        Fabien Patterson., D,  Licensed Psychologist   February 20, 2020

## 2020-02-20 NOTE — GROUP NOTE
Life Skills/OT Group Note    PATIENT'S NAME: Cesia eNttles  MRN:   [id number]  :   1995  ACCT. NUMBER: [id number]  DATE OF SERVICE: 20  START TIME: 11:00 AM  END TIME: 11:50 AM  FACILITATOR: Samm Mercado OTR/L  TOPIC:  Life Skills Group: Cognitive Functioning  Adult Mental Health Day Treatment  TRACK: 2A    NUMBER OF PARTICIPANTS: 5    Summary of Group / Topics Discussed:  Cognitive Functioning Decision Making Effectiveness: Patients were taught and provided with an opportunity to gain awareness of how their mental health symptoms impact their current cognitive functioning as well as how this impacts their performance and participation in meaningful roles, relationships, and routines.  Patients were taught skills and strategies on how to monitor and improve cognitive performance through remediation or compensatory strategies.  Patients were given opportunities to practice taught skills and techniques in session and how to apply to everyday life.        Patient Session Goals / Objectives:    Identified how their mental health symptoms impact their functioning, focusing on specific cognitive challenges     Improved awareness of specific remediation and/or compensatory strategies to improve  executive functioning skills and how this relates to mental health recovery        Established a plan for practice of these skills in their own environments    Practiced and reflected on how to generalize taught skills to their everyday life    Learn some decision making skills and strategies          Patient Participation / Response:  Fully participated with the group by sharing personal reflections / insights and openly received / provided feedback with other participants.    Patient Presentation: Calm,alert,focused with stable mood and thought process. Per self-assessment decision making style tends to be a spontaneous,fearful and dependent.    Treatment Plan:  Patient has a current master individualized  treatment plan.  See Epic treatment plan for more information.    Samm Mercado, OTR/L

## 2020-02-24 ENCOUNTER — HOSPITAL ENCOUNTER (OUTPATIENT)
Dept: BEHAVIORAL HEALTH | Facility: CLINIC | Age: 25
End: 2020-02-24
Attending: PSYCHIATRY & NEUROLOGY | Admitting: PSYCHIATRY & NEUROLOGY
Payer: COMMERCIAL

## 2020-02-24 PROCEDURE — 90853 GROUP PSYCHOTHERAPY: CPT | Performed by: PSYCHOLOGIST

## 2020-02-24 PROCEDURE — G0177 OPPS/PHP; TRAIN & EDUC SERV: HCPCS

## 2020-02-24 NOTE — ADDENDUM NOTE
Encounter addended by: Kathy Laguerre PsyD on: 2/24/2020 3:26 PM   Actions taken: Charge Capture section accepted

## 2020-02-24 NOTE — GROUP NOTE
EBP Group Note    PATIENT'S NAME: Cesia Nettles  MRN:   [id number]  :   1995  ACCT. NUMBER: [id number]  DATE OF SERVICE: 20  START TIME: 11:00 am  END TIME: 11:50 am  FACILITATOR: Kathy Laguerre PsyD  TOPIC:  EBP Group: Coping Skills  Adult Mental Health Day Treatment  TRACK: 6    NUMBER OF PARTICIPANTS: 6    Summary of Group / Topics Discussed:  Coping Skills: Coping with Psychosis: Patients discussed the nature of psychotic symptoms and how they each currently manage them. Discussed and learned specific coping skills relevant to managing symptoms of psychosis and thought disorders. Patients discussed situations in which using these strategies will help improve emotion regulation and functioning. They began to distinguish when these strategies could be useful in their lives for management of stress and psychological well-being.    Patient Session Goals / Objectives:    Identify and understand what factors may contribute to increased symptoms of psychosis.    Identify actions that may be taken to manage increased symptoms/stressors.    Identify specific skills to help them manage their symptoms of psychosis    Problem solve barriers to the use of skills.    Increase confidence / proficiency with skill application.        Patient Participation / Response:  Fully participated with the group by sharing personal reflections / insights and openly received / provided feedback with other participants.    Expressed understanding of the relevance / importance of coping skills at distressing times in life    Treatment Plan:  Patient has a current master individualized treatment plan.  See Epic treatment plan for more information.     Francisco Patterson, D,  Licensed Psychologist

## 2020-02-24 NOTE — GROUP NOTE
RN Group Note    PATIENT'S NAME: Cesia Nettles  MRN:   [id number]  :   1995  ACCT. NUMBER: [id number]  DATE OF SERVICE: 20  START TIME:  9:00 AM  END TIME:  9:50 AM  FACILITATOR: Cathy Busby RN  TOPIC:  RN Group: [facility]  Adult Mental Health Day Treatment  TRACK: 2A    NUMBER OF PARTICIPANTS: 7    Summary of Group / Topics Discussed:  Foundations of Health: Nutrition: Macronutrients: Patient were provided education on major macronutrients, their role in the body, and why it is important to meet daily nutritional needs. Obstacles to meeting daily nutritional needs were identified in group discussion and strategies to promote improved nutrition were explored. Macronutrients discussed include: carbohydrates, proteins and amino acids, fats, fiber, and water.     Patient Session Goals / Objectives:  ? Discussed the role of diet on mood, physical health, energy level, and the development of chronic disease.  ? Identified daily nutritional needs recommended by the USDA via My Plate education resources  ? Developing increased health literacy skills in finding credible nutrition information from reliable sources      Patient Participation / Response:  Fully participated with the group by sharing personal reflections / insights and openly received / provided feedback with other participants.    Demonstrated understanding of topics discussed through group discussion and participation, Identified / Expressed personal readiness to practice skills and Verbalized understanding of foundations of health topic    Treatment Plan:  Patient has a current master individualized treatment plan.  See Epic treatment plan for more information.    Cathy Busby RN

## 2020-02-25 ENCOUNTER — HOSPITAL ENCOUNTER (OUTPATIENT)
Dept: BEHAVIORAL HEALTH | Facility: CLINIC | Age: 25
End: 2020-02-25
Attending: PSYCHIATRY & NEUROLOGY | Admitting: PSYCHIATRY & NEUROLOGY
Payer: COMMERCIAL

## 2020-02-25 PROCEDURE — G0177 OPPS/PHP; TRAIN & EDUC SERV: HCPCS

## 2020-02-25 PROCEDURE — G0177 OPPS/PHP; TRAIN & EDUC SERV: HCPCS | Performed by: COUNSELOR

## 2020-02-25 PROCEDURE — 90853 GROUP PSYCHOTHERAPY: CPT | Performed by: PSYCHOLOGIST

## 2020-02-25 NOTE — GROUP NOTE
Life Skills/OT Group Note    PATIENT'S NAME: Cesia Nettles  MRN:   [id number]  :   1995  ACCT. NUMBER: [id number]  DATE OF SERVICE: 20  START TIME:  9:00 AM  END TIME:  9:50 AM  FACILITATOR: Samm Mercado OTR/L  TOPIC:  Life Skills Group: Communication and Social Skills Development  Adult Mental Health Day Treatment  TRACK: 2A    NUMBER OF PARTICIPANTS: 7    Summary of Group / Topics Discussed:  Communication and Social Skills Development: Social Risk taking:  Patients were taught and increased awareness of verbal, nonverbal, and other styles and types of communication and how this impacts interactions with other people.  Patients were given an opportunity to build and practice effective communication skills to advocate and get their needs met directly.    Patient Session Goals / Objectives:    Identified their particular style of communication and how that impacts their ability to communicate with other people       Improved awareness of important aspects of communication and how this relates to mental health recovery        Established a plan for practice of these skills in their own environments    Practiced and reflected on how to generalize taught skills to their everyday life    Social risk taking awareness in everyday communication        Patient Participation / Response:  Fully participated with the group by sharing personal reflections / insights and openly received / provided feedback with other participants.    Patient Presentation: Calm,alert,focused with stable mood and thought process.Patient completed a post discharge mental health recovery scale receiving a score of 24/30 placing this individual in stage 4/4 recovery. Pre-admission score was 18/30 on 12/3/20.    Treatment Plan:  Patient has a current master individualized treatment plan.  See Epic treatment plan for more information.    KATHI Delcid/NARCISO

## 2020-02-25 NOTE — GROUP NOTE
Group Note    PATIENT'S NAME: Cesia Nettles  MRN:   [id number]  :   1995  ACCT. NUMBER: [id number]  DATE OF SERVICE: 20  START TIME: 11:00 AM  END TIME: 11:50 AM  FACILITATOR: Marina Arthur LPCC  TOPIC: MH RN Group: Health Maintenance  Adult Mental Health Day Treatment  TRACK: 2A    NUMBER OF PARTICIPANTS: 5    Summary of Group / Topics Discussed:  Health Maintenance: Goal Setting: Meaningful goals can bring a sense of direction and purpose in life.  They also highlight our most important values. Patients were assisted by instructor to identify short term goals to promote their mental health recovery and improve overall health and wellness. Patients were educated on SMART goal setting framework as a strategy to increase outcomes and promote success.    Patient Session Goals / Objectives:  ? Explained the key concepts of SMART goal setting framework  ? Identified three goals successfully using SMART goal setting framework  ? Reviewed concept of balance in wellness as it pertains to goal setting        Patient Participation / Response:  Fully participated with the group by sharing personal reflections / insights and openly received / provided feedback with other participants.    Demonstrated understanding of topics discussed through group discussion and participation and Identified / Expressed personal readiness to practice skills      Client reported being safe today.  Reported mood is anxioys       Goal for today is to come to group.     The client talked to the group about how she had a different assignment at work and didn't think that she could ask for help or shouldn't ask for help, but did and it went better when her co-workers helped her with a large section of the dining room.    Treatment Plan:  Patient has a current master individualized treatment plan.  See Epic treatment plan for more information.    KEVEN Andrade

## 2020-02-25 NOTE — GROUP NOTE
Process Group Note    PATIENT'S NAME: Cesia Nettles  MRN:   [id number]  :   1995  ACCT. NUMBER: [id number]  DATE OF SERVICE: 20  START TIME: 10:00 AM  END TIME: 10:50 AM  FACILITATOR: Kathy Laguerre PsyD  TOPIC:  Process Group  295.70  (F25.1) Schizoaffective Disorder Depressive Type  300.00 (F41.9) Unspecified Anxiety Disorder     : Mental Health Resources: Ely Castaneda  Guardian: [place]: Elma Flores : [phone]  CADI:  Mariela Walker: [facility]  [facility]:  [facility]: [address]: [phone]   [employer] : Nino Salcidograeme [phone]    Diagnoses:      Adult Mental Health Day Treatment  TRACK: 2A    NUMBER OF PARTICIPANTS: 6          Data:    Session content: At the start of this group, patients were invited to check in by identifying themselves, describing their current emotional status, and identifying issues to address in this group.   Area(s) of treatment focus addressed in this session included Symptom Management, Personal Safety and Community Resources/Discharge Planning.    Client reported being safe today.  Reported mood is anxious       Goal for today is to talk to the group about her experiences at work     The client talked to the group about       Therapeutic Interventions/Treatment Strategies:  Psychotherapist offered support, feedback and validation and reinforced use of skills. Treatment modalities used include Cognitive Behavioral Therapy and Dialectical Behavioral Therapy. Interventions include Cognitive Restructuring:  Explored impact of ineffective thoughts / distortions on mood and activity, Coping Skills: Discussed use of self-soothe skills to decrease distress in the body and Mindfulness: Encouraged a plan to use mindfulness skills in daily life.    Assessment:    Patient response:   Patient responded to session by accepting feedback, giving feedback, listening, focusing on goals, being attentive, accepting support and appearing  alert    Possible barriers to participation / learning include: severity of symptoms    Health Issues:   None reported       Substance Use Review:   Substance Use: No active concerns identified.    Mental Status/Behavioral Observations  Appearance:   Appropriate   Eye Contact:   Good   Psychomotor Behavior: Normal   Attitude:   Cooperative   Orientation:   All  Speech   Rate / Production: Normal    Volume:  Normal   Mood:    Anxious   Affect:    Constricted   Thought Content:   Some visual hallucinations  Thought Form:  Coherent  Logical     Insight:    Good     Plan:     Safety Plan: Recommended that patient call 911 or go to the local ED should there be a change in any of these risk factors.     Barriers to treatment: None identified    Patient Contracts (see media tab):  None    Substance Use: Not addressed in session     Continue or Discharge: Patient will continue in Adult Day Treatment (ADT)  as planned. Patient is likely to benefit from learning and using skills as they work toward the goals identified in their treatment plan.     Stabilization of symptoms is needed.    Fabien Patterson., D,  Licensed Psychologist      .j  February 25, 2020

## 2020-02-26 ASSESSMENT — PATIENT HEALTH QUESTIONNAIRE - PHQ9: SUM OF ALL RESPONSES TO PHQ QUESTIONS 1-9: 9

## 2020-02-26 NOTE — PROGRESS NOTES
The Adult Day Treatment Program Medical Director, Dr. Raudel Palacios, has been unavailable to meet face to face during the month of February 2020 for consultation.  KIMBERLY Magana, CNP has been the on-site provider available for consultation on an as needed basis.  Dr. Raudel Palacios will meet with the treatment team upon his return in March 2020 for an official psychiatry staffing review.      [facility]  Adult Day Treatment

## 2020-02-26 NOTE — ADDENDUM NOTE
Encounter addended by: Bandar Moses on: 2/26/2020 8:14 AM   Actions taken: Visit Navigator Flowsheet section accepted

## 2020-02-27 ENCOUNTER — HOSPITAL ENCOUNTER (OUTPATIENT)
Dept: BEHAVIORAL HEALTH | Facility: CLINIC | Age: 25
End: 2020-02-27
Attending: PSYCHIATRY & NEUROLOGY | Admitting: PSYCHIATRY & NEUROLOGY
Payer: COMMERCIAL

## 2020-02-27 PROCEDURE — G0177 OPPS/PHP; TRAIN & EDUC SERV: HCPCS

## 2020-02-27 PROCEDURE — H0035 MH PARTIAL HOSP TX UNDER 24H: HCPCS

## 2020-02-27 PROCEDURE — 90853 GROUP PSYCHOTHERAPY: CPT | Performed by: PSYCHOLOGIST

## 2020-02-27 ASSESSMENT — ANXIETY QUESTIONNAIRES: GAD7 TOTAL SCORE: 10

## 2020-02-27 NOTE — GROUP NOTE
Life Skills/OT Group Note    PATIENT'S NAME: Cesia Nettles  MRN:   [id number]  :   1995  ACCT. NUMBER: [id number]  DATE OF SERVICE: 20  START TIME: 11:00 AM  END TIME: 11:50 AM  FACILITATOR: Samm Mercado OTR/L  TOPIC:  Life Skills Group: Lifestyle Balance and Structure  Adult Mental Health Day Treatment  TRACK: 2A    NUMBER OF PARTICIPANTS: 5    Summary of Group / Topics Discussed:  Lifestyle Balance and Strucure:  Benefits of Leisure on Mental Health: Leisure Resource Inventory: Patients explored and gained awareness of leisure values, benefits, and interests focusing on specific areas of self-development, self-enjoyment, self-support, and self-expenditure.   Patients identified specific activities and skills to employ for effective use of leisure for mental health management and quality of life.      Patient Session Goals / Objectives:    Increased awareness of the importance of engagement in leisure activities to support lifestyle balance and perceived quality of life    Identified specific leisure activities and skills to support mental health management      Facilitated exploration of meaningful leisure interests and values    Practiced and reflected on how to generalize taught skills to their everyday life      Patient Participation / Response:  Moderately participated, sharing some personal reflections / insights and adequately adequately received / provided feedback with other participants.    Patient Presentation: Calm,alert,focused with stable mood and thought process.    Treatment Plan:  Patient has See Epic Treatment Plan - Patient is discharging.    KATHI Delcid/NARCISO

## 2020-02-27 NOTE — GROUP NOTE
Process Group Note    PATIENT'S NAME: Cesia Nettles  MRN:   [id number]  :   1995  ACCT. NUMBER: [id number]  DATE OF SERVICE: 20  START TIME:  9:00 AM  END TIME:  9:50 AM  FACILITATOR: Kathy Laguerre PsyD  TOPIC:  Process Group    Diagnoses:      295.70  (F25.1) Schizoaffective Disorder Depressive Type  300.00 (F41.9) Unspecified Anxiety Disorder     : Mental Health Resources: Ely Castaneda  Guardian: [place]: Elma Flores : [phone]  CADI:  Mariela Walker: [place] [facility]  [facility]:  [facility]: [address]: [phone]   Pro Act : Nino Salcidograeme [phone]      Adult Mental Health Day Treatment  TRACK: 2A    NUMBER OF PARTICIPANTS: 6          Data:    Session content: At the start of this group, patients were invited to check in by identifying themselves, describing their current emotional status, and identifying issues to address in this group.   Area(s) of treatment focus addressed in this session included Symptom Management, Personal Safety and Community Resources/Discharge Planning.    Client reported being safe today.  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       Goal for today is to come to the program and discharge.  The client talked to the group about how she likes working with the residents and how she had to use radical acceptance about not being able to visit her grandpa in [place] and not being able to attend the  for her grandma in [place]. She discussed with the group and explained radical acceptance. She also explained that she sent an article into a magazine and got a response from them.      Therapeutic Interventions/Treatment Strategies:  Psychotherapist offered support, feedback and validation and reinforced use of skills. Treatment modalities used include Cognitive Behavioral Therapy and Dialectical Behavioral Therapy. Interventions include Cognitive Restructuring:  Explored impact of ineffective thoughts /  distortions on mood and activity, Coping Skills: Facilitated discussion on learning and applying radical acceptance skill and Mindfulness: Encouraged a plan to use mindfulness skills in daily life.    Assessment:    Patient response:   Patient responded to session by accepting feedback, giving feedback, listening, focusing on goals, being attentive, accepting support and appearing alert    Possible barriers to participation / learning include: severity of symptoms    Health Issues:   None reported       Substance Use Review:   Substance Use: No active concerns identified.    Mental Status/Behavioral Observations  Appearance:   Appropriate   Eye Contact:   Good   Psychomotor Behavior: Normal   Attitude:   Cooperative   Orientation:   All  Speech   Rate / Production: Normal    Volume:  Normal   Mood:    Anxious  Normal  Affect:    Appropriate   Thought Content:   Clear  Thought Form:  Coherent  Logical     Insight:    Good     Plan:     Safety Plan: Recommended that patient call 911 or go to the local ED should there be a change in any of these risk factors.     Barriers to treatment: None identified    Patient Contracts (see media tab):  None    Substance Use: Not addressed in session     Continue or Discharge: Patient will continue in Adult Day Treatment (ADT)  as planned. Patient is likely to benefit from learning and using skills as they work toward the goals identified in their treatment plan.  She will discharge today from the 2A group      Francisco Patterson D,  Licensed Psychologist    February 27, 2020

## 2020-02-27 NOTE — GROUP NOTE
Life Skills/OT Group Note    PATIENT'S NAME: Cesia Nettles  MRN:   [id number]  :   1995  ACCT. NUMBER: [id number]  DATE OF SERVICE: 20  START TIME: 10:00 AM  END TIME: 10:50 AM  FACILITATOR: Ag Wan OT  TOPIC:  Life Skills Group: Sensory Approaches in Mental Health  Adult Mental Health Day Treatment  TRACK: 2A    NUMBER OF PARTICIPANTS: 6    Summary of Group / Topics Discussed:  Sensory Approaches in Mental Health:  Sensory Enhanced Mindfulness:  Patients were taught and provided with an opportunity to explore and practice how using sensory enhanced mindfulness practices in a group shared experience can help them stay grounded in the present moment as a way to manage mental health symptoms and stressors. They worked on developing interpersonal effectiveness by incorporating eye contact and verbally expressing themselves  to each other to promote mindfulness using sensory/body based experiential engagement.         Patient Session Goals / Objectives:    Identified how using group based sensory enhanced mindfulness practices can be used for grounding, stress management, and self regulation      Improved awareness of different types of sensory enhanced mindfulness activities that assist with healthy coping of stress and symptoms      Established a plan for practice of these skills in their own environments    Practiced and reflected on how to generalize taught skills to their everyday life        Patient Participation / Response:  Fully participated with the group by sharing personal reflections / insights and openly received / provided feedback with other participants.    Demonstrated understanding of content through full engagement in the process, and sharing her reflection on how it impacted her,   and Verbalized understanding of content    Treatment Plan:  Patient has a current master individualized treatment plan.  See Epic treatment plan for more information.    Ag Wan OT

## 2020-02-28 NOTE — ADDENDUM NOTE
Encounter addended by: Ag Wan, OT on: 2/28/2020 3:44 PM   Actions taken: Charge Capture section accepted

## 2020-03-03 ENCOUNTER — TELEPHONE (OUTPATIENT)
Dept: BEHAVIORAL HEALTH | Facility: CLINIC | Age: 25
End: 2020-03-03

## 2020-03-03 NOTE — PROGRESS NOTES
[facility]  Psychiatry Clinic  PSYCHIATRIC PROGRESS NOTE     Date of initial Diagnostic Assessment (DA) is 7/8/2016 and most recent Transfer of Care DA is 07/29/19.    CARE TEAM:  PCP- Becki Valencia PA-C    Specialty Providers- no    Therapist- Cynthia Rueda LICSW at [facility] -  [phone] (Verbal BRIONNA on file)    Community  Team- [facility] Worker (name unknown),   (ProAct) Margiedari Asherx [phone]    Guardian: Elma Flores through [facility] [phone]     Pertinent Background: Cesia Nettles is a 24 year old female who prefers the name Therese & pronouns she, her, herself.  This patient first experienced mental health issues in childhood and has received treatment for psychosis and suicidality.  Notably, Therese has a history of chronic AH/VH dating back to age 7 yo, and has good insight into her symptoms. Per chart review, she has a h/o some Cluster B traits and frequent hospitalizations which tapered off after starting  residence and supportive employment a few years ago. She has been very stable on paliperidone and fluoxetine in conjunction with  living environment. She had neuropsychiatric testing as a child (see scanned documents 4/16/19). She is under guardianship (see above); all medication changes need to be approved by them (see Media section 10/17/19 for paperwork).      Psych critical item history includes suicide attempt [multiple], suicidal ideation, psychosis [sxs include paranoia, AH, ideas of reference], multiple psychotropic trials, psych hosp (>5), lives in a  and has a guardian.    INTERIM HISTORY      [4, 4]   The patient reports good treatment adherence.  History was provided by the patient who is a fair historian.  The last visit ended with no change to the med regimen.   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 - she has started a new mantra where she thanks people for their patience instead of apologizing for taking too long or screwing up and this has been a positive change for her - she notes that the nursing home residents do tell her that she is doing a good job and that the feedback she has received at work has been positive  - She reports that the day after her last visit here in the clinic she had a big meeting with her whole mental health team and she became overwhelmed and impulsively quit her job - she states that she regrets this now and that the decision was made with \"emotional mind\" - since then she has gotten her job back and has been back at work since Feb 12th   - Elma states that the meeting was a \"come to Minh meeting\" because Therese had been not letting her care team in on the issues she was experiencing, was on the verge of losing her [facility] worker, and had not been telling them when her psychiatry appointments were - they worked on several things during this meeting including rebuilding trust with the team and making sure that she was talking to them when she was feeling bad - they also addressed her hygiene which has been reportedly poor (see chart for recent phone/email communications regarding these issues)   - Both Elma and Renu state that Therese has been working hard and things have been much better since the meeting - she has been keeping her  and has been communicating more with them   - The plan moving forward is that   - She completed day treatment last Thursday and has been handling this reasonably well - she was quite anxious at first, but this is improving the more time that passes  - Her previous therapist left and so she had to establish with " someone new this week - she has experienced a lot of change recently because of day treatment ending and her guardian, Elma, will be leaving as well    - Her team is looking into Therese going back to [employer] to continue to work on life and occupational skills - Therese is on board with this  - Her long term goal is to live on her own with a cat   - Her guardian, Elma, is leaving [facility] at the end of this week - until a new guardian is appointed all issues/concerns can be addressed with her manager, Kelvin Aaron, at [phone]  - Moving forward a group home staff will be attending all psychiatric visits with Therese and they have agreed that Therese cannot change the appointment times or cancel them without discussing with her team first   - Therese denies interest in a medication change today, but her  staff report desire to move her second dose of Metformin to bedtime because it is currently scheduled at 5pm when she is at work - they note that she frequently comes home and still has the medication in her jacket pocket even though she says she is taking it - Therese is agreeable to taking it at bedtime when she gets home from work and understands that it works best if taken with a small meal -  staff note that Therese frequently eats late at night after coming home from work  - Renu asks if a dietician or nutrition referral could be provided     RECENT PSYCH ROS:   Depression:  insomnia and feeling worthless  Elevated:  none  Psychosis:  auditory hallucinations without commands [details in Interim History] and visual hallucinations  Anxiety:  excessive worry  Panic Attack:  none  Trauma Related:  none  Dysregulation:  none  Eating Disorder: none specifically; is a picky eater and does not eat standard meals     Pertinent Negative Symptoms:  NO self-injurious behavior/urges, suicidal and violent ideation, aggression, delusions and malia    RECENT SUBSTANCE USE:     Alcohol- 2-3x  per year  Tobacco- none  Caffeine- none  Opioids- none           Narcan Kit- N/A   Cannabis- none     Other illicit drugs- none    CURRENT SOCIAL HISTORY:  Financial/ Work- SSI + works part time (16-20hrs) as a  at [employer].      Partner/ - none  Children- none      Living situation- Therese lives in [facility] through [employer].      Social/ Spiritual Support- Her mother, half-siblings, [facility] workers, ,  staff (Renu Brady).  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   FEELS SAFE AT HOME- yes     PSYCH and CD Critical Summary Points since July 2019 7/29/19- resident transition; scheduled hydroxyzine 25mg at bedtime (was previously PRN), started melatonin for sleep  9/17/19- no med changes  10/29/19- increased melatonin from 3mg to 5mg 1-2 hours before bedtime PRN (patient's request)  11/24-11/27: hospitalization on [address] for worsening depression and psychosis; no med changes; referred to day treatment on discharge  1/22/20- no med changes   3/4/20- moved second Metformin dose to bedtime due to poor adherence at 5pm due to being at work    PAST MED TRIALS          See last Diagnostic Assessment     MEDICAL / SURGICAL HISTORY          Pregnant or breastfeeding- no      Contraception- implant    H/o abscence epilepsy from age [age]    Patient Active Problem List   Diagnosis     Schizoaffective disorder, depressive type (H)     Hx of psychiatric care     Psychosis (H)     Major Surgery- none    MEDICAL REVIEW OF SYSTEMS    [2, 10]     Reports: none    Denies: fevers, chills, weight loss/gain, headaches, numbness, tingling, weakness, nausea, vomiting, diarrhea, constipation    ALLERGY       Cats and Seasonal allergies     MEDICATIONS        Current Outpatient Medications   Medication Sig Dispense Refill     etonogestrel (IMPLANON/NEXPLANON) 68 MG IMPL 1 each by Subdermal route once       FLUoxetine (PROZAC) 40 MG capsule Take 2 capsules (80 mg)  by mouth At Bedtime 60 capsule 1     fluticasone (FLONASE) 50 MCG/ACT nasal spray Spray 2 sprays into both nostrils daily       ibuprofen (ADVIL/MOTRIN) 200 MG tablet Take 400 mg by mouth every 4 hours as needed for mild pain       melatonin 5 MG tablet Take 1 tablet (5 mg) by mouth At Bedtime 30 tablet 3     paliperidone ER (INVEGA) 6 MG 24 hr tablet Take 2 tablets (12 mg) by mouth At Bedtime 60 tablet 1     hydrOXYzine (ATARAX) 25 MG tablet Take 1 tablet (25 mg) by mouth At Bedtime 30 tablet 1     lamoTRIgine (LAMICTAL) 100 MG tablet Take 1.5 tablets (150 mg) by mouth daily 45 tablet 1     metFORMIN (GLUCOPHAGE) 500 MG tablet Take 2 tablets (1,000 mg) by mouth 2 times daily (with meals) .Second dose should be late evening/bedtime with moderate snack or small meal. 120 tablet 1     VITALS      [3, 3]   /67   Pulse 90   Wt 88.6 kg (195 lb 6.4 oz)   BMI 30.60 kg/m       MENTAL STATUS EXAM      [9, 14 cog gs]     Alertness: alert  and oriented  Appearance: adequately groomed, purple hair and glassess, tattoos  Behavior/Demeanor: cooperative, pleasant and calm, with fair  eye contact   Speech: normal and regular rate and rhythm , non-pressured  Language: intact  Psychomotor: normal or unremarkable  Mood: description consistent with euthymia  Affect: full range; was congruent to mood; was congruent to content  Thought Process/Associations: unremarkable  Thought Content:  Reports negative self-talk driven by AH;  Denies suicidal and violent ideation and paranoid ideation  Perception:  Reports auditory hallucinations without commands [details in Interim History] and visual hallucinations;  Denies depersonalization, derealization and none  Insight: adequate  Judgment: good  Cognition: does  appear grossly intact; formal cognitive testing was not done  Gait and Station: unremarkable    LABS and DATA     AIMS:  last done 9/17/19 with score(s): 0    PHQ9 TODAY =  18  PHQ-9 SCORE 12/26/2019 1/14/2020 2/20/2020   PHQ-9  Total Score - - -   PHQ-9 Total Score - - -   PHQ-9 Total Score 13 10 9     ANTIPSYCHOTIC LABS  [glu, A1C, lipids (jenny LDL), liver enzymes, WBC, ANEU, Hgb, plts]  q12 mo  Recent Labs   Lab Test 02/02/17  0916 09/16/14  0947 12/21/13  1016   GLC 87 69* 70  70   A1C 4.9 5.2  --      Recent Labs   Lab Test 12/10/18  0912 02/02/17  0916 12/15/15 09/16/14  0947   CHOL 205* 186 188 205*   TRIG 201* 195* 210* 265*   * 109* 109 115   HDL 45* 38* 37* 37*     Recent Labs   Lab Test 02/02/17  0916 12/21/13  1016   AST 24 33   ALT 23 18   ALKPHOS 102 107  107     Recent Labs   Lab Test 12/10/18  0912 02/02/17  0916 09/16/14  0947 12/21/13  1016   WBC 6.4 6.8 6.9 6.1  6.1   ANEU 3.3  --  3.9 3.9  3.9   HGB 11.7 12.3 12.8 12.5  12.5    274 317 310  310       Labs drawn 6/17/19 w/ PCP at [facility] (in Care Everywhere):  Fasting glucose 87 mg/dL  A1C 5.2%  TSH 1.32  Lipid panel Ch 200 (H),  (H), ,       PSYCHOTROPIC DRUG INTERACTIONS     fluoxetine + hydrozyzine + paliperidone: concurrent use may increase risk of QTc prolongation.   paliperidone + lamotrigine: concurrent use may increase risk of CNS depression    MANAGEMENT:  Monitoring for adverse effects, routine vitals, routine labs, periodic EKGs and using lowest therapeutic dose of [psychotropics]    RISK STATEMENT for SAFETY     Cesia Nettles did not appear to be an imminent safety risk to self or others.     Cesia Nettles denied any current or recent SI and last experienced passive thoughts of not wanting to be alive 3-4 months ago. She has notable risk factors for self-harm including: recent bullying, slightly worsening depression, derogatory AH (not command in nature), and previous suicide attempts.  However, risk is mitigated by no recent SI, h/o seeking help when needed, medication adherence, future oriented, none to minimal alcohol use, commitment to family, good social support and stable housing.  Based on all  available evidence she does not appear to be at imminent risk for self-harm therefore does not meet criteria for a 72-hr hold/involuntary hospitalization.  However, based on degree of symptoms close psych FU was recommended which the pt did agree to.  Additional steps to minimize risk include: frequent clinic visits, lives in supportive  environment, able to identify previous SI triggers    PSYCHIATRIC DIAGNOSIS     Schizoaffective Disorder, Depressed type  Unspecified Anxiety Disorder    ASSESSMENT      [m2, h3]     TODAY Therese reports improvement in depression and psychosis due to developing more insight into her symptoms as well as developing more coping skills in the day treatment program that she just recently completed. She was accompanied by a  staff and her guardian today as they are hoping to get more involved in her care. She had reportedly been not telling them when her appointments were and so no one was accompanying her the past several months. They reports that she had been struggling with very poor hygiene and was on the verge of losing her [facility] worker so they gathered her care team all together to meet with her and create a new treatment plan which she has been following through with. She is currently on a positive trajectory, but still struggles with hygiene around the house and they are working on that. No medication changes today as mostly she is working on behavioral changes in how she interacts with her care team. Did agree to move her second dose of metformin to bedtime with a snack as she works in the evenings and often forgets to take it. Her staff also asked for a nutrition referral so recommended they contact her PCP for this referral.     PLAN      [m2, h3]     1) PSYCHOTROPIC MEDICATIONS:  - Continue paliperidone 12 mg at bedtime   - Continue lamotrigine 150 mg Daily  - Continue fluoxetine 80 mg QHS  - Continue metformin 1000 mg BID (pt may take second dose at bedtime with snack)  -  Continue hydroxyzine 25 mg at bedtime for anxiety  - Continue melatonin from 5 mg 1-2 hours before bedtime PRN     2) [place]  was [place] checked today:  not using controlled substances.    3) THERAPY:    - Continue weekly individual therapy  - Recommend family therapy; referral offered for Dr. Live, but patient declined    4) NEXT DUE:    Labs- AP labs due June 2020, NMDA receptor antibody ordered several months ago per patient's mom's request (not drawn yet)  EKG- QTc 445 w/ T wave abnormality on 6/10/19  Rating Scales- PHQ9 at every visit; AIMS next due Sep 2020    5) REFERRALS:    No Referrals needed - recommended they ask PCP for nutrition/dietician referral    6) OTHER:  - Work on improving diet with more regular meals, decreased midnight snacking, and more lean proteins and vegetables  - Work on maintaining good personal hygiene as well as general house hygiene in room and shared space  - Work on increasing physical activity by adding a walk in the park 1x per week   - Decrease evening and late night screen time and work on earlier bedtime    6) RTC: in 6 weeks; sooner if needed     7) CRISIS NUMBERS:   Provided routinely in AVS   ONLY if a [facility] PT: [facility] [phone] (clinic), [phone] (after hours)     TREATMENT RISK STATEMENT:  The risks, benefits, alternatives and potential adverse effects have been discussed and are understood by the pt. The pt understands the risks of using street drugs or alcohol. There are no medical contraindications, the pt agrees to treatment with the ability to do so. The pt knows to call the clinic for any problems or to access emergency care if needed.  Medical and substance use concerns are documented above.  Psychotropic drug interaction check was done, including changes made today.    PROVIDER: Yudy Chery MD    Patient staffed in clinic with Dr. Lanier who will sign the note.  Supervisor is Dr. Rosales.    I saw the patient with the resident, and participated  in key portions of the service, including the mental status examination and developing the plan of care. I reviewed key portions of the history with the resident. I agree with the findings and plan as documented in this note.    Angie Lanier MD

## 2020-03-04 ENCOUNTER — TELEPHONE (OUTPATIENT)
Dept: PSYCHIATRY | Facility: CLINIC | Age: 25
End: 2020-03-04

## 2020-03-04 ENCOUNTER — DOCUMENTATION ONLY (OUTPATIENT)
Dept: OTHER | Facility: CLINIC | Age: 25
End: 2020-03-04

## 2020-03-04 ENCOUNTER — OFFICE VISIT (OUTPATIENT)
Dept: PSYCHIATRY | Facility: CLINIC | Age: 25
End: 2020-03-04
Attending: PSYCHIATRY & NEUROLOGY
Payer: COMMERCIAL

## 2020-03-04 VITALS
BODY MASS INDEX: 30.6 KG/M2 | WEIGHT: 195.4 LBS | HEART RATE: 90 BPM | SYSTOLIC BLOOD PRESSURE: 120 MMHG | DIASTOLIC BLOOD PRESSURE: 67 MMHG

## 2020-03-04 DIAGNOSIS — F20.3 SCHIZOPHRENIA, UNDIFFERENTIATED (H): ICD-10-CM

## 2020-03-04 DIAGNOSIS — F41.9 ANXIETY: ICD-10-CM

## 2020-03-04 DIAGNOSIS — F25.1 SCHIZOAFFECTIVE DISORDER, DEPRESSIVE TYPE (H): Primary | ICD-10-CM

## 2020-03-04 DIAGNOSIS — R45.851 SUICIDAL IDEATION: ICD-10-CM

## 2020-03-04 PROCEDURE — G0463 HOSPITAL OUTPT CLINIC VISIT: HCPCS | Mod: ZF

## 2020-03-04 ASSESSMENT — PAIN SCALES - GENERAL: PAINLEVEL: NO PAIN (0)

## 2020-03-04 NOTE — TELEPHONE ENCOUNTER
"Writer and Kathy Laguerre LP and primary therapist for Pt during her time in the program called Pt's legal guardian, Elma Flores, [employer] via phone number [phone].  Fax is [phone]  Elma stated, \"I was well aware of Therese's involvement in the program.\"    She acknowledged understanding and approval of Patient being enrolled and subsequently discharged from the outpatient adult treatment program.     Will send her a D/C summary for OP program and will ask that she fax it back after signing - showing her approval and acknowledgment of the Discharge. She will be sending back a new letter appointing her through [employer] as the legal guardian since the one on file is .      "

## 2020-03-04 NOTE — PATIENT INSTRUCTIONS
1. Move the second dose of Metformin to late evening/bedtime with a moderate snack or small meal    2. Ask your PCP for a dietician/nutritionist referral    3. Return to clinic in 4 weeks       Thank you for coming to the PSYCHIATRY CLINIC.    Lab Testing:  If you had lab testing today and your results are reassuring or normal they will be mailed to you or sent through [employer] within 7 days.   If the lab tests need quick action we will call you with the results.  The phone number we will call with results is # [phone] (home) . If this is not the best number please call our clinic and change the number.    Medication Refills:  If you need any refills please call your pharmacy and they will contact us. Our fax number for refills is [phone]. Please allow three business for refill processing.   If you need to  your refill at a new pharmacy, please contact the new pharmacy directly. The new pharmacy will help you get your medications transferred.     Scheduling:  If you have any concerns about today's visit or wish to schedule another appointment please call our office during normal business hours [phone] (8-5:00 M-F)    Contact Us:  Please call [phone] during business hours (8-5:00 M-F).  If after clinic hours, or on the weekend, please call  [phone].    Financial Assistance [phone]  [facility] Billing [phone]  Central Billing Office, [facility]: [phone]  [place] Billing [phone]  Medical Records [phone]      MENTAL HEALTH CRISIS NUMBERS:  [facility]:   [facility] - [phone]   Crisis Residence [facility] - [facility] - [phone]   Walk-In Counseling Center [facility] - [phone]   COPE 24/7 [place] Mobile Team for Adults - [[phone]]; Child - [[phone]]        [facility]:   [facility] - [phone]   Walk-in counseling [facility] - [phone]   Walk-in counseling [facility] -  [phone]   Crisis Residence  [facility] - [phone]   Urgent Care Adult Mental Health:   --Drop-in, 24/7 crisis line, and [place] Mobile Team [[phone]]    CRISIS TEXT LINE: Text [phone] from anywhere, anytime, any crisis 24/7;    OR SEE www.crisistextline.org     National Suicide Prevention Lifeline: 322-132-XRVB (988-312-6868)    Poison Control Center - 9-825-816-2220    CHILD: [facility] needs assessment team - [phone]     Trans Lifeline - 1-423.348.2814; or [employer] Lifeline - 1-880.976.4911    If you have a medical emergency please call 911or go to the nearest ER.                    _____________________________________________    Again thank you for choosing PSYCHIATRY CLINIC and please let us know how we can best partner with you to improve you and your family's health.  You may be receiving a survey regarding this appointment. We would love to have your feedback, both positive and negative. The survey is done by an external company, so your answers are anonymous.

## 2020-03-04 NOTE — TELEPHONE ENCOUNTER
"From 3/4/20 office visit note: \"1. Move the second dose of Metformin to late evening/bedtime with a moderate snack or small meal\"    Writer e-prescribed 30-day supply to [facility] ([phone]). Qty 120, refills 1.    Writer called Thalia and identified that the updated order had been sent.  Thalia reported that she believed the pt was accompanied by  staff member today and they were aware of the 4/7 appointment.          "

## 2020-03-04 NOTE — TELEPHONE ENCOUNTER
----- Message from Shahida Cleveland sent at 3/4/2020  1:04 PM CST -----  Regarding: Vik patient  Contact: [phone]  Thalia, from [employer], 's group [place], is caller.     She says that at today's appt there was a change in the time that the metformin should be given. That information, and a refill, need to be sent to patient's pharmacy. Med change    [facility] [id number] - [place], [place] - [address]

## 2020-03-05 ENCOUNTER — DOCUMENTATION ONLY (OUTPATIENT)
Dept: OTHER | Facility: CLINIC | Age: 25
End: 2020-03-05

## 2020-03-06 RX ORDER — HYDROXYZINE HYDROCHLORIDE 25 MG/1
25 TABLET, FILM COATED ORAL AT BEDTIME
Qty: 30 TABLET | Refills: 1 | Status: SHIPPED | OUTPATIENT
Start: 2020-03-06 | End: 2020-04-27

## 2020-03-06 RX ORDER — LAMOTRIGINE 100 MG/1
150 TABLET ORAL DAILY
Qty: 45 TABLET | Refills: 1 | Status: SHIPPED | OUTPATIENT
Start: 2020-03-06 | End: 2020-04-27

## 2020-03-06 NOTE — TELEPHONE ENCOUNTER
Medication requested:   ATARAX 25 MG TAB  LAMICTAL 100 MG TAB  Last refilled: 2/6/2020  Qty:   30  45      Last seen: 3/4/2020  RTC: 6 WEEKS  Cancel: 0  No-show: 0  Next appt: 4/7/2020    Refill decision:   Refill pended and routed to the provider for review/determination due to   Last note from 3/4/2020 is not signed

## 2020-03-10 ASSESSMENT — PATIENT HEALTH QUESTIONNAIRE - PHQ9: SUM OF ALL RESPONSES TO PHQ QUESTIONS 1-9: 18

## 2020-04-01 NOTE — GROUP NOTE
DIET: You may resume your home diet. If nausea is present, increase your diet gradually with fluids and bland foods    ACTIVITY LEVEL: You have received sedation or an anesthetic, you may feel sleepy for several hours. Rest until you are more awake. Gradually resume your normal activities    No driving, alcoholic beverages or signing legal documents for next 24 hours or while taking pain medication.       CALL THE DOCTOR:    For any obvious bleeding (some dried blood over the incision is normal).      Redness, swelling, foul smell around incision or fever over 101.   Shortness of breath, Coughing up Bloody sputum, Pains or Swelling in your Calves .   Persistent pain or nausea not relieved by medication.    If any unusual problems or difficulties occur contact your doctor. If you cannot contact your doctor but feel your signs and symptoms warrant a physicians attention return to the emergency room.Fall Prevention  Millions of people fall every year and injure themselves. You may have had anesthesia or sedation which may increase your risk of falling. You may have health issues that put you at an increased risk of falling.     Here are ways to reduce your risk of falling.  ·   · Make your home safe by keeping walkways clear of objects you may trip over.  · Use non-slip pads under rugs. Do not use area rugs or small throw rugs.  · Use non-slip mats in bathtubs and showers.  · Install handrails and lights on staircases.  · Do not walk in poorly lit areas.  · Do not stand on chairs or wobbly ladders.  · Use caution when reaching overhead or looking upward. This position can cause a loss of balance.  · Be sure your shoes fit properly, have non-slip bottoms and are in good condition.   · Wear shoes both inside and out. Avoid going barefoot or wearing slippers.  · Be cautious when going up and down stairs, curbs, and when walking on uneven sidewalks.  · If your balance is poor, consider using a cane or walker.  · If  EBP Group Note    PATIENT'S NAME: Cesia Nettles  MRN:   [id number]  :   1995  ACCT. NUMBER: [id number]  DATE OF SERVICE: 19  START TIME: 11:00 AM  END TIME: 11:50 AM  FACILITATOR: Kathy Laguerre PsyD  TOPIC:  EBP Group: Coping Skills  Adult Mental Health Day Treatment  TRACK: 2A    NUMBER OF PARTICIPANTS: 6    Summary of Group / Topics Discussed:  Coping Skills: Building Positive Experiences: Patients discussed the importance of planning and engaging in positive experiences, as strategies to increase positive thinking, hope, and self-worth.  Explored the benefits of planning / creating positive experiences, including recognizing and reducing negativity bias by focusing on and building positive experiences.   Several approaches to building positive experiences were presented and discussed relevant to each patient.      Patient Session Goals / Objectives:    Understand the purpose of planning / creating / participating / sharing in positive experiences.    Explore patient s experiences related to negative thinking and how it influences activities and moodIdentify current positive events in patient s life.     Set goals to increase a variety of positive experiences.    Address barriers to planning / engaging in positive experiences.        Patient Participation / Response:  Fully participated with the group by sharing personal reflections / insights and openly received / provided feedback with other participants.    Demonstrated understanding of topics discussed through group discussion and participation and Expressed understanding of the relevance / importance of coping skills at distressing times in life    Treatment Plan:  Patient has a current master individualized treatment plan.  See Epic treatment plan for more information.    Francisco Patterson, TARI,  Licensed Psychologist     your fall was related to alcohol use, stop or limit alcohol intake.   · If your fall was related to use of sleeping medicines, talk to your doctor about this. You may need to reduce your dosage at bedtime if you awaken during the night to go to the bathroom.    · To reduce the need for nighttime bathroom trips:  ¨ Avoid drinking fluids for several hours before going to bed  ¨ Empty your bladder before going to bed  ¨ Men can keep a urinal at the bedside  · Stay as active as you can. Balance, flexibility, strength, and endurance all come from exercise. They all play a role in preventing falls. Ask your healthcare provider which types of activity are right for you.  · Get your vision checked on a regular basis.  · If you have pets, know where they are before you stand up or walk so you don't trip over them.  Use night lights.

## 2020-04-07 ENCOUNTER — VIRTUAL VISIT (OUTPATIENT)
Dept: PSYCHIATRY | Facility: CLINIC | Age: 25
End: 2020-04-07
Attending: PSYCHIATRY & NEUROLOGY
Payer: COMMERCIAL

## 2020-04-07 DIAGNOSIS — F25.1 SCHIZOAFFECTIVE DISORDER, DEPRESSIVE TYPE (H): Primary | ICD-10-CM

## 2020-04-07 NOTE — PROGRESS NOTES
[facility]  Psychiatry Clinic  PSYCHIATRIC PROGRESS NOTE     Date of initial Diagnostic Assessment (DA) is 7/8/2016 and most recent Transfer of Care DA is 07/29/19.    CARE TEAM:  PCP- Becki Valencia PA-C    Specialty Providers- none    Therapist- Cynthia NAPIERSW at [facility] [phone] (Verbal BRIONNA on file) (sees weekly)   Community  Team- [facility] Reneedaniel Lebron [phone],   ([employer]) Margie Asherx [phone]    Guardian: Shahnaz Bernal through [facility] [phone]     Living Situation: [facility] [phone] - Shahnaz MARQUEZ (manager) [phone]    Cesia Nettles is a 25 year old female who prefers the name 'Therese' & pronouns she, her, herself.    Pertinent Background: This patient first experienced mental health issues in childhood and has received treatment for psychosis and suicidality.  Notably, Therese has a history of chronic AH/VH dating back to age 9 yo, and has good insight into her symptoms. Per chart review, she has a h/o some Cluster B traits and frequent hospitalizations which tapered off after starting  residence and supportive employment a few years ago. She has been very stable on paliperidone and fluoxetine in conjunction with  living environment. She had neuropsychiatric testing as a child (see scanned documents 4/16/19). She is under guardianship (see above); all medication changes need to be approved by them (see Media section 10/17/19 for paperwork).      Psych critical item history includes suicide attempt [multiple], suicidal ideation, psychosis [sxs include paranoia, AH, ideas of reference], multiple psychotropic trials, psych hosp (>5), lives in a  and has a guardian.    INTERIM HISTORY      [4, 4]   The patient reports good treatment adherence.  History was provided by the patient who is a fair historian.  The last visit ended with no change to the med regimen.   Since the last visit:   -  "She states things have \"been up and down with some good times and some not so good times\" - she states the good times are when she is getting her chores done (has been more consistently picking up after herself) and the not so good times are when she is experiencing intense emotions and internalizing negative things  - She states that she has not been sleeping as well since we switched the metformin to after she gets home from work - she states she has been waking up randomly in the middle of the night and is up for 10-20min before falling asleep   - She denies any other issues with her medications - they are generally working well for her    - Her anxiety is a little higher due to Covid and still having to go to work - she is also more anxious because there is a staff member in the house who is being promoted   - She continues to see her therapist weekly and they are now doing video visits - she has been working on the anxious and paranoid ruminations   - She denies any recent SI  - In regards to our previous discussion about a dietician/nutrition consult she states \"I thought about it and I changed my mind.\" - she did see her PCP recently and had her Nexplanon replaced  - Her long term goal is to live on her own with a cat     RECENT PSYCH ROS:   Depression:  insomnia and feeling worthless  Elevated:  none  Psychosis:  auditory hallucinations without commands [details in Interim History] and visual hallucinations  Anxiety:  excessive worry  Panic Attack:  none  Trauma Related:  none  Dysregulation:  none  Eating Disorder: none specifically; is a picky eater and does not eat standard meals     Pertinent Negative Symptoms:  NO self-injurious behavior/urges, suicidal and violent ideation, aggression, delusions and malia    RECENT SUBSTANCE USE:     Alcohol- 2-3x per year  Tobacco- none  Caffeine- none  Opioids- none           Narcan Kit- N/A   Cannabis- none     Other illicit drugs- none    CURRENT SOCIAL " HISTORY:  Financial/ Work- SSI + works part time (16-20hrs) as a  at [employer].      Partner/ - none  Children- none      Living situation- Therese lives in [place]  through [employer].      Social/ Spiritual Support- Her mother, half-siblings, [facility] workers, ,  staff (Renu Brady).  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   FEELS SAFE AT HOME- yes     PSYCH and CD Critical Summary Points since July 2019 7/29/19- resident transition; scheduled hydroxyzine 25mg at bedtime (was previously PRN), started melatonin for sleep  9/17/19- no med changes  10/29/19- increased melatonin from 3mg to 5mg 1-2 hours before bedtime PRN (patient's request)  11/24-11/27: hospitalization on [address] [facility] for worsening depression and psychosis; no med changes; referred to day treatment on discharge  1/22/20- no med changes   3/4/20- moved second Metformin dose to bedtime due to poor adherence at 5pm due to being at work  4/7/20- moved second Metformin dose to lunch due to bedtime dose causing sleep disruption    PAST MED TRIALS          See last Diagnostic Assessment     MEDICAL / SURGICAL HISTORY          Pregnant or breastfeeding- no      Contraception- Nexplanon implant    H/o abscence epilepsy from age [age]    Patient Active Problem List   Diagnosis     Schizoaffective disorder, depressive type (H)     Hx of psychiatric care     Psychosis (H)     Major Surgery- none    MEDICAL REVIEW OF SYSTEMS    [2, 10]     Reports: none    Denies: fevers, chills, weight loss/gain, headaches, numbness, tingling, weakness, nausea, vomiting, diarrhea, constipation    ALLERGY       Cats and Seasonal allergies     MEDICATIONS        Current Outpatient Medications   Medication Sig Dispense Refill     etonogestrel (IMPLANON/NEXPLANON) 68 MG IMPL 1 each by Subdermal route once       FLUoxetine (PROZAC) 40 MG capsule Take 2 capsules (80 mg) by mouth At Bedtime 60 capsule 1      fluticasone (FLONASE) 50 MCG/ACT nasal spray Spray 2 sprays into both nostrils daily       hydrOXYzine (ATARAX) 25 MG tablet Take 1 tablet (25 mg) by mouth At Bedtime 30 tablet 1     ibuprofen (ADVIL/MOTRIN) 200 MG tablet Take 400 mg by mouth every 4 hours as needed for mild pain       lamoTRIgine (LAMICTAL) 100 MG tablet Take 1.5 tablets (150 mg) by mouth daily 45 tablet 1     melatonin 5 MG tablet Take 1 tablet (5 mg) by mouth At Bedtime 30 tablet 3     metFORMIN (GLUCOPHAGE) 500 MG tablet Take 2 tablets (1,000 mg) by mouth 2 times daily (with meals) .Second dose should be late evening/bedtime with moderate snack or small meal. 120 tablet 1     paliperidone ER (INVEGA) 6 MG 24 hr tablet Take 2 tablets (12 mg) by mouth At Bedtime 60 tablet 1     VITALS      [3, 3]   There were no vitals taken for this visit.     MENTAL STATUS EXAM      [9, 14 cog gs]     Alertness: alert  and oriented  Appearance: could not assess  Behavior/Demeanor: cooperative, pleasant and calm  Speech: normal and regular rate and rhythm , non-pressured  Language: intact  Psychomotor: could not assess  Mood: description consistent with euthymia, some anxiety  Affect: full range; was congruent to mood; was congruent to content  Thought Process/Associations: unremarkable  Thought Content:  Reports negative self-talk driven by AH, some paranoia about other's intentions towards her;  Denies suicidal and violent ideation and paranoid ideation  Perception:  Reports auditory hallucinations without commands [details in Interim History] and visual hallucinations;  Denies depersonalization, derealization and none  Insight: adequate  Judgment: good  Cognition: does  appear grossly intact; formal cognitive testing was not done  Gait and Station: could not assess    LABS and DATA     AIMS:  last done 9/17/19 with score(s): 0    PHQ9 TODAY =  Did not complete  PHQ-9 SCORE 1/14/2020 2/20/2020 3/4/2020   PHQ-9 Total Score - - -   PHQ-9 Total Score - - -   PHQ-9  Total Score 10 9 18     ANTIPSYCHOTIC LABS  [glu, A1C, lipids (jenny LDL), liver enzymes, WBC, ANEU, Hgb, plts]  q12 mo  Recent Labs   Lab Test 02/02/17  0916 09/16/14  0947 12/21/13  1016   GLC 87 69* 70  70   A1C 4.9 5.2  --      Recent Labs   Lab Test 12/10/18  0912 02/02/17  0916 12/15/15 09/16/14  0947   CHOL 205* 186 188 205*   TRIG 201* 195* 210* 265*   * 109* 109 115   HDL 45* 38* 37* 37*     Recent Labs   Lab Test 02/02/17  0916 12/21/13  1016   AST 24 33   ALT 23 18   ALKPHOS 102 107  107     Recent Labs   Lab Test 12/10/18  0912 02/02/17  0916 09/16/14  0947 12/21/13  1016   WBC 6.4 6.8 6.9 6.1  6.1   ANEU 3.3  --  3.9 3.9  3.9   HGB 11.7 12.3 12.8 12.5  12.5    274 317 310  310       Labs drawn 6/17/19 w/ PCP at [facility] (in Care Everywhere):  Fasting glucose 87 mg/dL  A1C 5.2%  TSH 1.32  Lipid panel Ch 200 (H),  (H), ,       PSYCHOTROPIC DRUG INTERACTIONS     fluoxetine + hydrozyzine + paliperidone: concurrent use may increase risk of QTc prolongation.   paliperidone + lamotrigine: concurrent use may increase risk of CNS depression    MANAGEMENT:  Monitoring for adverse effects, routine vitals, routine labs, periodic EKGs and using lowest therapeutic dose of [psychotropics]    RISK STATEMENT for SAFETY     Cesia Geffre did not appear to be an imminent safety risk to self or others.     PSYCHIATRIC DIAGNOSIS     Schizoaffective Disorder, depressed type  Unspecified Anxiety Disorder  Emotional Dysregulation    ASSESSMENT      [m2, h3]     TODAY Therese reports her symptoms are at baseline. She continues to work hard in therapy on emotional dysregulation, breaking ruminative thinking, and challenging paranoid thoughts. She also reports ongoing work on personal hygiene around the house as she is working towards a long term goal of living on her own with a cat. Her medication regimen has been stable for several months although she reports that moving her  second dose of metformin to late evening has caused some sleep disruption. Discussed moving it to early afternoon so she can take it before she leaves for work. She was agreeable to this plan, and it will be communicated to her staff.      PLAN      [m2, h3]     1) PSYCHOTROPIC MEDICATIONS:  - Continue paliperidone 12 mg at bedtime   - Continue lamotrigine 150 mg daily   - Continue fluoxetine 80 mg QHS  - Continue metformin 1000 mg BID (pt may take second dose with a snack in the early afternoon before going to work)  - Continue hydroxyzine 25 mg at bedtime for anxiety  - Continue melatonin from 5 mg 1-2 hours before bedtime PRN     2) [place]  was [place]:  not using controlled substances.    3) THERAPY:    - Continue weekly individual therapy  - Recommend family therapy; referral offered for Dr. Live, but patient declined    4) NEXT DUE:    Labs- AP labs due June 2020, NMDA receptor antibody ordered several months ago per patient's mom's request (not drawn yet)  EKG- QTc 445 w/ T wave abnormality on 6/10/19  Rating Scales- PHQ9 at every visit; AIMS next due Sep 2020    5) REFERRALS:    No Referrals needed    6) OTHER:  - Work on improving diet with more regular meals, decreased midnight snacking, and more lean proteins and vegetables  - Work on maintaining good personal hygiene as well as general house hygiene in room and shared space  - Work on increasing physical activity by adding a walk in the park 1x per week   - Decrease evening and late night screen time and work on earlier bedtime    6) RTC: in 4 weeks; sooner if needed     7) CRISIS NUMBERS:   Provided routinely in AVS   ONLY if a DARRIN PT: [facility] [phone] (clinic), [phone] (after hours)     TREATMENT RISK STATEMENT:  The risks, benefits, alternatives and potential adverse effects have been discussed and are understood by the pt. The pt understands the risks of using street drugs or alcohol. There are no medical  "contraindications, the pt agrees to treatment with the ability to do so. The pt knows to call the clinic for any problems or to access emergency care if needed.  Medical and substance use concerns are documented above.  Psychotropic drug interaction check was done, including changes made today.    PROVIDER: Yudy Chery MD    Patient staffed in clinic with Dr. Lainer who will sign the note.  Supervisor is Dr. Rosales.      Start Time: 8:01am                      End Time: 8:37am    Cesia Nettles is a 25 year old female who is being evaluated via a billable telephone visit.    The patient has been notified of following:   \"This telephone visit will be conducted via a call between you and your physician/provider. We have found that certain health care needs can be provided without the need for a physical exam.  This service lets us provide the care you need with a short phone conversation.  If a prescription is necessary we can send it directly to your pharmacy.  If lab work is needed we can place an order for that and you can then stop by our lab to have the test done at a later time.    If during the course of the call the physician/provider feels a telephone visit is not appropriate, you will not be charged for this service.\"   Patient has given verbal consent for Telephone visit?  Yes    Cesia Nettles complains of schizoaffective disorder.  I have reviewed and updated the patient's Past Medical History, Social History, Family History and Medication List.  ALLERGIES  Cats and Seasonal allergies  Additional provider notes: see rest of note for details of visit  Phone call duration: 36 minutes    Yudy Chery MD    TELEHEALTH ATTENDING ATTESTATION  Following the ACGME guidelines on telemedicine and direct supervision due to COVID-19, I was concurrently participating in and/or monitoring the patient care through appropriate telecommunication technology.  I discussed the key portions of the service with the " resident, including the mental status examination and developing the plan of care. I reviewed key portions of the history with the resident. I agree with the findings and plan as documented in this note.     Angie Lanier MD

## 2020-04-13 ENCOUNTER — DOCUMENTATION ONLY (OUTPATIENT)
Dept: OTHER | Facility: CLINIC | Age: 25
End: 2020-04-13

## 2020-04-27 DIAGNOSIS — F20.3 SCHIZOPHRENIA, UNDIFFERENTIATED (H): ICD-10-CM

## 2020-04-27 DIAGNOSIS — F41.9 ANXIETY: ICD-10-CM

## 2020-04-27 DIAGNOSIS — R45.851 SUICIDAL IDEATION: ICD-10-CM

## 2020-04-27 RX ORDER — HYDROXYZINE HYDROCHLORIDE 25 MG/1
TABLET, FILM COATED ORAL
Qty: 30 TABLET | Refills: 0 | Status: SHIPPED | OUTPATIENT
Start: 2020-04-27 | End: 2020-06-10

## 2020-04-27 RX ORDER — LAMOTRIGINE 100 MG/1
TABLET ORAL
Qty: 45 TABLET | Refills: 0 | Status: SHIPPED | OUTPATIENT
Start: 2020-04-27 | End: 2020-06-10

## 2020-04-27 RX ORDER — PALIPERIDONE 6 MG/1
TABLET, EXTENDED RELEASE ORAL
Qty: 60 TABLET | Refills: 0 | Status: SHIPPED | OUTPATIENT
Start: 2020-04-27 | End: 2020-05-22

## 2020-04-27 NOTE — TELEPHONE ENCOUNTER
hydrOXYzine (ATARAX) 25 MG  Last refilled: 3/6/20  Qty: 30  : 1   lamoTRIgine (LAMICTAL) 100 MG  Last refilled: 3/6/20  Qty: 45  : 1  paliperidone ER (INVEGA) 6 MG  Last refilled: 12/13/19  Qty: 60  : 1       Last seen:4/7/20 V VISIT   RTC: 1 MOS  Cancel: 0    No-show: 0  Next appt: NONE  Refill decision:   30 day shabbir refill sent to the pharmacy - including instructions for patient to call the clinic and schedule an appointment.  Scheduling has been notified to contact the pt for appointment.

## 2020-05-20 DIAGNOSIS — F20.3 SCHIZOPHRENIA, UNDIFFERENTIATED (H): ICD-10-CM

## 2020-05-22 RX ORDER — PALIPERIDONE 6 MG/1
12 TABLET, EXTENDED RELEASE ORAL AT BEDTIME
Qty: 60 TABLET | Refills: 0 | Status: SHIPPED | OUTPATIENT
Start: 2020-05-22 | End: 2020-06-10

## 2020-05-22 NOTE — TELEPHONE ENCOUNTER
Medication requested: paliperidone ER (INVEGA) 6 MG 24 hr tablet   Last refilled: 4/27/20  Qty: 60      Last seen: 4/7/20  RTC: 4 weeks  Cancel: 0  No-show: 0  Next appt: 0    Refill decision:   30 day shabbir refill sent to the pharmacy - including instructions for patient to call the clinic and schedule an appointment.  Scheduling has been notified to contact the pt for appointment.

## 2020-06-01 DIAGNOSIS — F20.3 SCHIZOPHRENIA, UNDIFFERENTIATED (H): Primary | ICD-10-CM

## 2020-06-02 NOTE — TELEPHONE ENCOUNTER
DUPLICATE   
Last seen: 4/7  RTC: 4 weeks  Non-provider cancel: None  No-show: None  Next appt: 6/10     Incoming refill from clinic staff reports Yulisa at [facility] called due to refill being denied due to duplicate of 4/7. The 4/7 order was entered as historical, so was never sent.     Medication requested:   Disp  Refills  Start  End  FRACISCO    metFORMIN (GLUCOPHAGE) 500 MG tablet  120 tablet  1  4/7/2020   No    Sig - Route: Take 2 tablets (1,000 mg) by mouth 2 times daily (with meals) .Second dose should be early afternoon with moderate snack or small meal.     Writer e-prescribed 30-day supply to [facility] ([phone]). Qty 120, refills 1.              
2020 02:21

## 2020-06-09 NOTE — PROGRESS NOTES
Video- Visit Details  Type of service:  video visit for medication management  Time of service:    Date:  06/09/2020    Video Start Time:  8:34 AM        Video End Time: 9:04 AM    Reason for video visit:  Patient unable to travel due to Covid-19  Originating Site (patient location):  [facility]   Location- Patient's home  Distant Site (provider location):  Remote location  Mode of Communication:  Video Conference via Doxy.me  Consent:  Patient has given verbal consent for video visit?: Yes        [facility]  Psychiatry Clinic  PSYCHIATRIC PROGRESS NOTE     Date of initial Diagnostic Assessment (DA) is 7/8/2016 and most recent Transfer of Care DA is 07/29/19.    CARE TEAM:  PCP- Becki Valencia PA-C    Specialty Providers- none    Therapist- Cynthia CALLEJAS at [facility] [phone] (Verbal BRIONNA on file) (sees weekly)   [facility] Team- ROSE Lebron [phone],   (ProAct) Margie Velazquez [phone]    Guardian: Shahnaz Bernal through [facility] [phone]     Living Situation: [facility] [phone] - Shahnaz MARQUEZ (manager) [phone]    Cesia Nettles is a 25 year old female who prefers the name 'Therese' & pronouns she, her, herself.    Pertinent Background: This patient first experienced mental health issues in childhood and has received treatment for psychosis and suicidality.  Notably, Therese has a history of chronic AH/VH dating back to age 9 yo, and has good insight into her symptoms. Per chart review, she has a h/o some Cluster B traits and frequent hospitalizations which tapered off after starting  residence and supportive employment a few years ago. She has been very stable on paliperidone and fluoxetine in conjunction with  living environment. She had neuropsychiatric testing as a child (see scanned documents 4/16/19). She is under guardianship (see above); all medication changes need to be approved by them  "(see Media section 10/17/19 for paperwork).      Psych critical item history includes suicide attempt [multiple], suicidal ideation, psychosis [sxs include paranoia, AH, ideas of reference], multiple psychotropic trials, psych hosp (>5), lives in a  and has a guardian.    INTERIM HISTORY      [4, 4]   The patient reports good treatment adherence.  History was provided by the patient who is a fair historian.  The last visit ended with the following med change:  moved second Metformin dose to lunch due to bedtime dose causing sleep disruption.   Since the last visit:   - She states she is \"really tired\" because she had 2 wisdom teeth removed on Monday - she states her pain is well controlled   - She states prior to having her teeth removed she was \"doing ok, I wasn't sleeping as well as I would like\" - she was both staying up late and watching TV/playing on the internet and waking up in the middle of the night at then watching TV - she does endorse feeling \"a little bit\" tired during the day and was occasionally taking naps   - She endorses increased anxiety recently and states it is related to \"wanting to do things right at work\" - she states all of their procedures have changed due to Covid - she continues to work 15-20 hours per week   - She states things have gotten \"a lot better\" with personal hygiene and cleanliness around her room and the house - she states she has been completing all her chores and has been showering more often   - She states are going \"good\" with her new therapist - she sees her weekly - she has an appointment with her today at 11am - they are working on her self esteem, anxieties about work, and family issues  - She started taking metformin in the afternoon in the last couple of days for better adherence due to her work schedule and because taking it after work late at night was affecting her sleep - she denies any issues with the afternoon dose and reports her sleep has since improved  - " "She denies any recent depression, although, feels sad \"from time to time\" - states this will last a few minutes to a few hours  - She reports AH and paranoia are at baseline  - She denies any recent HI/SI  - She reports her medications continue to work well for her and denies side effects  - She has not seen her family in person since her birthday in mid-March - this is hard on her, but she has been able to do video chatting  - Her long term goal is to live on her own with a cat     RECENT PSYCH ROS:   Depression:  insomnia and feeling worthless  Elevated:  none  Psychosis:  auditory hallucinations without commands [details in Interim History] and visual hallucinations  Anxiety:  excessive worry  Panic Attack:  none  Trauma Related:  none  Dysregulation:  none  Eating Disorder: none specifically; is a picky eater and does not eat standard meals     Pertinent Negative Symptoms:  NO self-injurious behavior/urges, suicidal and violent ideation, aggression, delusions and malia    RECENT SUBSTANCE USE:     Alcohol- 2-3x per year  Tobacco- none  Caffeine- none  Opioids- none           Narcan Kit- N/A   Cannabis- none     Other illicit drugs- none    CURRENT SOCIAL HISTORY:  Financial/ Work- SSI + works part time (16-20hrs) as a  at [employer].      Partner/ - none  Children- none      Living situation- Therese lives in [facility] through [employer].      Social/ Spiritual Support- Her mother, half-siblings, [facility] workers, ,  staff (Renu Brady).  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   FEELS SAFE AT HOME- yes     PSYCH and CD Critical Summary Points since July 2019 7/29/19- resident transition; scheduled hydroxyzine 25mg at bedtime (was previously PRN), started melatonin for sleep  9/17/19- no med changes  10/29/19- increased melatonin from 3mg to 5mg 1-2 hours before bedtime PRN (patient's request)  11/24-11/27: hospitalization on [address] for " worsening depression and psychosis; no med changes; referred to day treatment on discharge  1/22/20- no med changes   3/4/20- moved second Metformin dose to bedtime due to poor adherence at 5pm due to being at work  4/7/20- moved second Metformin dose to lunch due to bedtime dose causing sleep disruption  6/10/20- no med changes    PAST MED TRIALS          See last Diagnostic Assessment     MEDICAL / SURGICAL HISTORY          Pregnant or breastfeeding- no      Contraception- Nexplanon implant    H/o abscence epilepsy from age [age]    Patient Active Problem List   Diagnosis     Schizoaffective disorder, depressive type (H)     Hx of psychiatric care     Psychosis (H)     Major Surgery- none    MEDICAL REVIEW OF SYSTEMS    [2, 10]     Reports: none    Denies: fevers, chills, weight loss/gain, headaches, numbness, tingling, weakness, nausea, vomiting, diarrhea, constipation    ALLERGY       Cats and Seasonal allergies     MEDICATIONS        Current Outpatient Medications   Medication Sig Dispense Refill     etonogestrel (IMPLANON/NEXPLANON) 68 MG IMPL 1 each by Subdermal route once       FLUoxetine (PROZAC) 40 MG capsule TAKE 2 CAPSULES BY MOUTH EVERY NIGHT AT BEDTIME 60 capsule 2     fluticasone (FLONASE) 50 MCG/ACT nasal spray Spray 2 sprays into both nostrils daily       hydrOXYzine (ATARAX) 25 MG tablet TAKE 1 TABLET BY MOUTH AT BEDTIME 30 tablet 0     ibuprofen (ADVIL/MOTRIN) 200 MG tablet Take 400 mg by mouth every 4 hours as needed for mild pain       lamoTRIgine (LAMICTAL) 100 MG tablet TAKE 1 & 1/2 TABLETS BY MOUTH DAILY 45 tablet 0     melatonin 5 MG tablet Take 1 tablet (5 mg) by mouth At Bedtime 30 tablet 3     metFORMIN (GLUCOPHAGE) 500 MG tablet Take 2 tablets (1,000 mg) by mouth 2 times daily (with meals) .Second dose should be early afternoon with moderate snack or small meal. 120 tablet 1     paliperidone ER (INVEGA) 6 MG 24 hr tablet Take 2 tablets (12 mg) by mouth At Bedtime For more refills,schedule  an appointment at [phone] 60 tablet 0     VITALS      [3, 3]   There were no vitals taken for this visit.     MENTAL STATUS EXAM      [9, 14 cog gs]     Alertness: alert  and oriented  Appearance: adequately groomed  Behavior/Demeanor: cooperative, pleasant and calm, good eye contact  Speech: normal and regular rate and rhythm , non-pressured  Language: intact  Psychomotor: normal or unremarkable  Mood: description consistent with euthymia, some anxiety  Affect: full range; was congruent to mood; was congruent to content  Thought Process/Associations: unremarkable  Thought Content:  Reports negative self-talk driven by AH, some paranoia about other's intentions towards her;  Denies suicidal and violent ideation and paranoid ideation  Perception:  Reports auditory hallucinations without commands [details in Interim History] and visual hallucinations;  Denies depersonalization, derealization and none  Insight: adequate  Judgment: good  Cognition: does  appear grossly intact; formal cognitive testing was not done  Gait and Station: could not assess    LABS and DATA     AIMS:  last done 9/17/19 with score(s): 0    PHQ9 TODAY =  Did not complete today  PHQ-9 SCORE 1/14/2020 2/20/2020 3/4/2020   PHQ-9 Total Score - - -   PHQ-9 Total Score - - -   PHQ-9 Total Score 10 9 18     ANTIPSYCHOTIC LABS  [glu, A1C, lipids (jenny LDL), liver enzymes, WBC, ANEU, Hgb, plts]  q12 mo  Recent Labs   Lab Test 02/02/17 0916 09/16/14  0947 12/21/13  1016   GLC 87 69* 70  70   A1C 4.9 5.2  --      Recent Labs   Lab Test 12/10/18  0912 02/02/17  0916 12/15/15 09/16/14  0947   CHOL 205* 186 188 205*   TRIG 201* 195* 210* 265*   * 109* 109 115   HDL 45* 38* 37* 37*     Recent Labs   Lab Test 02/02/17  0916 12/21/13  1016   AST 24 33   ALT 23 18   ALKPHOS 102 107  107     Recent Labs   Lab Test 12/10/18  0912 02/02/17  0916 09/16/14  0947 12/21/13  1016   WBC 6.4 6.8 6.9 6.1  6.1   ANEU 3.3  --  3.9 3.9  3.9   HGB 11.7 12.3 12.8  12.5  12.5    274 317 310  310       Labs drawn 6/17/19 w/ PCP at [facility] (in Care Everywhere):  Fasting glucose 87 mg/dL  A1C 5.2%  TSH 1.32  Lipid panel Ch 200 (H),  (H), ,       PSYCHOTROPIC DRUG INTERACTIONS     fluoxetine + hydrozyzine + paliperidone: concurrent use may increase risk of QTc prolongation.   paliperidone + lamotrigine: concurrent use may increase risk of CNS depression    MANAGEMENT:  Monitoring for adverse effects, routine vitals, routine labs, periodic EKGs and using lowest therapeutic dose of [psychotropics]    RISK STATEMENT for SAFETY     Cesia Geffre did not appear to be an imminent safety risk to self or others.     PSYCHIATRIC DIAGNOSIS     Schizoaffective Disorder, depressed type  Unspecified Anxiety Disorder  Emotional Dysregulation    ASSESSMENT      [m2, h3]     TODAY Therese reports her symptoms are at baseline and she is doing well. She continues to work hard in therapy on emotional dysregulation, breaking ruminative thinking, and challenging paranoid thoughts. She also reports ongoing work on personal hygiene around the house as she is working towards a long term goal of living on her own with a cat. Her medication regimen has been stable for several months, and she tolerates it well. There is no indication for a med change today. She is due for AP monitoring labs and is agreeable to having these drawn. This was communicated to her  through fax and phone.      PLAN      [m2, h3]     1) PSYCHOTROPIC MEDICATIONS:  - Continue paliperidone 12 mg at bedtime   - Continue lamotrigine 150 mg daily   - Continue fluoxetine 80 mg QHS  - Continue metformin 1000 mg BID (pt may take second dose with a snack in the early afternoon before going to work)  - Continue hydroxyzine 25 mg at bedtime for anxiety  - Continue melatonin from 5 mg 1-2 hours before bedtime PRN     2) [place]  was not checked today:  not using controlled substances.    3) THERAPY:     - Continue weekly individual therapy  - Recommend family therapy; referral offered for Dr. Live, but patient declined    4) NEXT DUE:    Labs- AP labs due now (ordered & pt aware)  EKG- QTc 445 w/ T wave abnormality on 6/10/19; repeat EKG w/ med changes  Rating Scales- PHQ9 at every visit; AIMS next due Sep 2020    5) REFERRALS:    No Referrals needed    6) OTHER:  - Work on improving diet with more regular meals, decreased midnight snacking, and more lean proteins and vegetables  - Work on maintaining good personal hygiene as well as general house hygiene in room and shared space  - Work on increasing physical activity by adding a walk in the park 1x per week   - Decrease evening and late night screen time and work on earlier bedtime    6) RTC: in 4-6 weeks w/ new PGY3 resident    7) CRISIS NUMBERS:   Provided routinely in AVS   ONLY if a [facility] PT: [facility] [phone] (clinic), [phone] (after hours)     TREATMENT RISK STATEMENT:  The risks, benefits, alternatives and potential adverse effects have been discussed and are understood by the pt. The pt understands the risks of using street drugs or alcohol. There are no medical contraindications, the pt agrees to treatment with the ability to do so. The pt knows to call the clinic for any problems or to access emergency care if needed.  Medical and substance use concerns are documented above.  Psychotropic drug interaction check was done, including changes made today.    PROVIDER: Yudy Chery MD    Patient staffed in clinic with Dr. Rosales who will sign the note.  Supervisor is Dr. Rosales.  TELEHEALTH ATTENDING ATTESTATION  Following the ACGME guidelines on telemedicine and direct supervision due to COVID-19, I was concurrently participating in and/or monitoring the patient care through appropriate telecommunication technology.  I discussed the key portions of the service with the resident, including the mental status examination and  developing the plan of care. I reviewed key portions of the history with the resident. I agree with the findings and plan as documented in this note.   Oralia Rosales MD

## 2020-06-09 NOTE — PATIENT INSTRUCTIONS
Thank you for coming to the PSYCHIATRY CLINIC.    Lab Testing:  If you had lab testing today and your results are reassuring or normal they will be mailed to you or sent through [employer] within 7 days. If the lab tests need quick action we will call you with the results. The phone number we will call with results is # [phone] (home) . If this is not the best number please call our clinic and change the number.    Medication Refills:  If you need any refills please call your pharmacy and they will contact us. Our fax number for refills is [phone]. Please allow three business for refill processing. If you need to  your refill at a new pharmacy, please contact the new pharmacy directly. The new pharmacy will help you get your medications transferred.     Scheduling:  If you have any concerns about today's visit or wish to schedule another appointment please call our office during normal business hours [phone] (8-5:00 M-F)    Contact Us:  Please call [phone] during business hours (8-5:00 M-F).  If after clinic hours, or on the weekend, please call  [phone].    Financial Assistance [phone]  [facility] Billing [phone]  Central Billing Office, [facility]: [phone]  [facility] Billing [phone]  Medical Records [phone]      MENTAL HEALTH CRISIS NUMBERS:  For a medical emergency please call  911 or go to the nearest ER.     [facility]:   [facility] -[phone]   Crisis Residence [facility] -[phone]   Walk-In Counseling [facility] -[phone]   COPE 24/7 [place] Mobile Team -[phone] (adults)/[phone] (child)  CHILD: [facility] needs assessment team - [phone]      [facility]:   [facility] - [phone]   Walk-in counseling [facility] - [phone]   Walk-in counseling [facility] - [phone]   Crisis Residence [facility] - [phone]  Urgent  [facility] Adult [facility] Rcgmwv-[phone] mobile unit/ 24/7 crisis line    National Crisis Numbers:   National Suicide Prevention Lifeline: 0-182-463-TALK (542-929-1491)  Poison Control Center - 4-990-317-5943  [employer]/resources for a list of additional resources (SOS)  Trans Lifeline a hotline for transgender people 1-011-909-8871  The Dam[employer] Project a hotline for LGBT youth 1-318.443.5732  Crisis Text Line: For any crisis 24/7   To: 054172  see www.crisistextline.org  - IF MAKING A CALL FEELS TOO HARD, send a text!         Again thank you for choosing PSYCHIATRY CLINIC and please let us know how we can best partner with you to improve you and your family's health.    You may be receiving a survey regarding this appointment. We would love to have your feedback, both positive and negative. The survey is done by an external company, so your answers are anonymous.

## 2020-06-10 ENCOUNTER — MEDICAL CORRESPONDENCE (OUTPATIENT)
Dept: HEALTH INFORMATION MANAGEMENT | Facility: CLINIC | Age: 25
End: 2020-06-10

## 2020-06-10 ENCOUNTER — VIRTUAL VISIT (OUTPATIENT)
Dept: PSYCHIATRY | Facility: CLINIC | Age: 25
End: 2020-06-10
Attending: PSYCHIATRY & NEUROLOGY
Payer: COMMERCIAL

## 2020-06-10 ENCOUNTER — TELEPHONE (OUTPATIENT)
Dept: PSYCHIATRY | Facility: CLINIC | Age: 25
End: 2020-06-10

## 2020-06-10 DIAGNOSIS — F20.3 SCHIZOPHRENIA, UNDIFFERENTIATED (H): ICD-10-CM

## 2020-06-10 DIAGNOSIS — F41.9 ANXIETY: ICD-10-CM

## 2020-06-10 DIAGNOSIS — F25.1 SCHIZOAFFECTIVE DISORDER, DEPRESSIVE TYPE (H): ICD-10-CM

## 2020-06-10 DIAGNOSIS — Z79.899 ENCOUNTER FOR LONG-TERM (CURRENT) USE OF MEDICATIONS: Primary | ICD-10-CM

## 2020-06-10 DIAGNOSIS — R45.851 SUICIDAL IDEATION: ICD-10-CM

## 2020-06-10 RX ORDER — HYDROXYZINE HYDROCHLORIDE 25 MG/1
25 TABLET, FILM COATED ORAL AT BEDTIME
Qty: 30 TABLET | Refills: 1 | Status: SHIPPED | OUTPATIENT
Start: 2020-06-10 | End: 2020-08-19

## 2020-06-10 RX ORDER — PALIPERIDONE 6 MG/1
12 TABLET, EXTENDED RELEASE ORAL AT BEDTIME
Qty: 60 TABLET | Refills: 1 | Status: SHIPPED | OUTPATIENT
Start: 2020-06-10 | End: 2020-08-13

## 2020-06-10 RX ORDER — LAMOTRIGINE 100 MG/1
TABLET ORAL
Qty: 45 TABLET | Refills: 1 | Status: SHIPPED | OUTPATIENT
Start: 2020-06-10 | End: 2020-08-19

## 2020-06-10 ASSESSMENT — PAIN SCALES - GENERAL: PAINLEVEL: NO PAIN (0)

## 2020-06-10 NOTE — PROGRESS NOTES
"VIDEO VISIT  Cesia Nettles is a 25 year old patient who is being evaluated via a billable video visit.      The patient has been notified of following:   \"This video visit will be conducted via a call between you and your physician/provider. We have found that certain health care needs can be provided without the need for an in-person physical exam. This service lets us provide the care you need with a video conversation. If a prescription is necessary we can send it directly to your pharmacy. If lab work is needed we can place an order for that and you can then stop by our lab to have the test done at a later time. Insurers are generally covering virtual visits as they would in-office visits so billing should not be different than normal.  If for some reason you do get billed incorrectly, you should contact the billing office to correct it and that number is in the AVS .    Patient has given verbal consent for video visit?:  Yes     How would you like to obtain your AVS?:  Cobra Stylet    Video invitation for patient:  DOXY provider, invite not needed      AVS SmartPhrase [PsychAVS] has been placed in 'Patient Instructions':  Yes     "

## 2020-06-10 NOTE — TELEPHONE ENCOUNTER
On 6/10/2020 a medical referral form was faxed to [facility] at [phone], by Jane Ruggiero CMA per fax cover sheet.  I sent the original to scanning, and held a copy in psychiatry until scanning is complete and routed this to Dr. Chery and Mk Navarro, RNCC.   Genet Ruggiero CMA

## 2020-06-23 DIAGNOSIS — F20.3 SCHIZOPHRENIA, UNDIFFERENTIATED (H): ICD-10-CM

## 2020-06-26 RX ORDER — FLUOXETINE 40 MG/1
CAPSULE ORAL
Qty: 60 CAPSULE | Refills: 2 | OUTPATIENT
Start: 2020-06-26

## 2020-07-06 ENCOUNTER — TELEPHONE (OUTPATIENT)
Dept: PSYCHIATRY | Facility: CLINIC | Age: 25
End: 2020-07-06

## 2020-08-11 ENCOUNTER — TELEPHONE (OUTPATIENT)
Dept: PSYCHIATRY | Facility: CLINIC | Age: 25
End: 2020-08-11

## 2020-08-11 DIAGNOSIS — F20.3 SCHIZOPHRENIA, UNDIFFERENTIATED (H): ICD-10-CM

## 2020-08-11 NOTE — TELEPHONE ENCOUNTER
[facility] Call Center    Phone Message    May a detailed message be left on voicemail: yes     Reason for Call: Other:   Incoming call from patient's , Ely Guardado, at [facility]. Ely said they have closed case management so they are no longer a contact for the patient. Ely does not need a call back about this unless there are questions or concerns. In that case, she can be reached at [phone].        Action Taken: Message routed to:  Other: Nursing pool    Travel Screening: Not Applicable

## 2020-08-13 RX ORDER — PALIPERIDONE 6 MG/1
12 TABLET, EXTENDED RELEASE ORAL AT BEDTIME
Qty: 60 TABLET | Refills: 0 | Status: SHIPPED | OUTPATIENT
Start: 2020-08-13 | End: 2020-09-01

## 2020-08-13 NOTE — TELEPHONE ENCOUNTER
Medication requested: PALIPERIDONE ER 6MG TAB   Last refilled: 7/16/20  Qty: 60      Last seen: 6/10/20  RTC: 4-6 weeks  Cancel: 0  No-show: 0  Next appt: 9/1/20    Refill decision:   Refilled for 30 days per protocol.

## 2020-08-13 NOTE — TELEPHONE ENCOUNTER
Writer called Ms. Guardado and was informed that the pt was closed to case management due low need:  > No established goals or needs.  > Has numerous providers.  > Has completed day treatment.  > Has an ARM worker.  > Lives in an [facility].  Pt's guardian is aware of this closing and OK with it.

## 2020-08-17 DIAGNOSIS — R45.851 SUICIDAL IDEATION: ICD-10-CM

## 2020-08-17 DIAGNOSIS — F41.9 ANXIETY: ICD-10-CM

## 2020-08-17 DIAGNOSIS — F20.3 SCHIZOPHRENIA, UNDIFFERENTIATED (H): ICD-10-CM

## 2020-08-19 RX ORDER — HYDROXYZINE HYDROCHLORIDE 25 MG/1
TABLET, FILM COATED ORAL
Qty: 30 TABLET | Refills: 0 | Status: SHIPPED | OUTPATIENT
Start: 2020-08-19 | End: 2020-09-01

## 2020-08-19 RX ORDER — LAMOTRIGINE 100 MG/1
TABLET ORAL
Qty: 45 TABLET | Refills: 0 | Status: SHIPPED | OUTPATIENT
Start: 2020-08-19 | End: 2020-09-01

## 2020-08-19 RX ORDER — PALIPERIDONE 6 MG/1
TABLET, EXTENDED RELEASE ORAL
Qty: 60 TABLET | Refills: 0 | OUTPATIENT
Start: 2020-08-19

## 2020-08-19 NOTE — TELEPHONE ENCOUNTER
hydrOXYzine (ATARAX) 25 MG  Last refilled: 6/10/20  Qty: 30  : 1    metFORMIN (GLUCOPHAGE) 500 MG  Last refilled: 6/2/20  Qty: 120 : 1    lamoTRIgine (LAMICTAL) 100 MG  Last refilled: 6/10/20  Qty: 45 : 1  Last seen: 6/10/20  RTC: 4- 6 WEEKS  Cancel: 0  No-show: 0  Next appt: 9/1/20  Refill decision: Refilled for 30 days per protocol.

## 2020-09-01 ENCOUNTER — VIRTUAL VISIT (OUTPATIENT)
Dept: PSYCHIATRY | Facility: CLINIC | Age: 25
End: 2020-09-01
Attending: STUDENT IN AN ORGANIZED HEALTH CARE EDUCATION/TRAINING PROGRAM
Payer: COMMERCIAL

## 2020-09-01 DIAGNOSIS — F41.9 ANXIETY: ICD-10-CM

## 2020-09-01 DIAGNOSIS — F20.3 SCHIZOPHRENIA, UNDIFFERENTIATED (H): ICD-10-CM

## 2020-09-01 DIAGNOSIS — R45.851 SUICIDAL IDEATION: ICD-10-CM

## 2020-09-01 DIAGNOSIS — F25.1 SCHIZOAFFECTIVE DISORDER, DEPRESSIVE TYPE (H): ICD-10-CM

## 2020-09-01 RX ORDER — HYDROXYZINE HYDROCHLORIDE 25 MG/1
25 TABLET, FILM COATED ORAL AT BEDTIME
Qty: 30 TABLET | Refills: 1 | Status: SHIPPED | OUTPATIENT
Start: 2020-09-01 | End: 2020-11-05

## 2020-09-01 RX ORDER — PALIPERIDONE 6 MG/1
12 TABLET, EXTENDED RELEASE ORAL AT BEDTIME
Qty: 60 TABLET | Refills: 1 | Status: SHIPPED | OUTPATIENT
Start: 2020-09-01 | End: 2020-10-26

## 2020-09-01 RX ORDER — LAMOTRIGINE 100 MG/1
150 TABLET ORAL DAILY
Qty: 45 TABLET | Refills: 1 | Status: SHIPPED | OUTPATIENT
Start: 2020-09-01 | End: 2020-11-05

## 2020-09-01 RX ORDER — FLUOXETINE 40 MG/1
80 CAPSULE ORAL DAILY
Qty: 60 CAPSULE | Refills: 1 | Status: SHIPPED | OUTPATIENT
Start: 2020-09-01 | End: 2020-11-05

## 2020-09-01 ASSESSMENT — PAIN SCALES - GENERAL: PAINLEVEL: NO PAIN (0)

## 2020-09-01 NOTE — PROGRESS NOTES
Video- Visit Details  Type of service:  video visit for diagnostic assessment  Time of service:    Date:  09/01/2020    Video Start Time:  8:22 AM      Video End Time:  9:15 AM     Reason for video visit:  Patient unable to travel due to Covid-19  Originating Site (patient location):  [facility]   Location- Patient's home  Distant Site (provider location):  Remote location  Mode of Communication:  Video Conference via AmWell  Consent:  Patient has given verbal consent for video visit?: Yes         [facility]  Psychiatry Clinic  TRANSFER of CARE DIAGNOSTIC ASSESSMENT     Date of initial Diagnostic Assessment (DA) is 7/8/2016 and most recent Transfer of Care DA is 09/01/20.    CARE TEAM:  PCP- Becki Valencia  Specialty Providers- none    Therapist- Cynthia CALLJEAS at [facility] [phone] (Verbal BRIONNA on file) (sees weekly)   [facility] Team- [facility] Erica Sparks [phone],   (ProAct) Margie Velazquez [phone]    Guardian: Shahnaz Bernal through [facility] [phone]     Living Situation: [facility] [phone] - Shahnaz MARQUEZ (manager) [phone]    Cesia Nettles is a 25 year old patient who uses the name Therese and pronouns she, her.     PERTINENT BACKGROUND                [last transfer eval 09/01/20]      This patient first experienced mental health issues in childhood and has received treatment for psychosis and suicidality.  Notably, Therese has a history of chronic AH/VH dating back to age 9 yo, and has good insight into her symptoms. Per chart review, she has a h/o some Cluster B traits and frequent hospitalizations which tapered off after starting  residence and supportive employment a few years ago. She has been very stable on paliperidone and fluoxetine in conjunction with  living environment. She had neuropsychiatric testing as a child (see scanned documents 4/16/19). She is under guardianship (see above); all  "medication changes need to be approved by them (see Media section 10/17/19 for paperwork).     Psych critical item history includes suicide attempt [multiple], suicidal ideation, psychosis [sxs include paranoia, AH, ideas of reference], multiple psychotropic trials, psych hosp (>5), lives in a  and has a guardian.    INTERIM HISTORY                                 [4, 4]   History was provided by the patient who was a fair historian. Treatment adherence is good.  The last visit ended with the following medication changes: none  Since the last visit:   - states she takes her meds 3 times per day - staff set them up for her and give them to her in a cup   - working a lot \"I've been very exhausted\"  - \"I've been trying to do more of my chores\" - cleaning room, upstairs area, entryway, bathroom and dinner     - states that her mood has been \"up and down. Sometimes I'm really happy and sometimes I'm really sad.\" - great uncle recently passed away - \"a little more emotional because of that\"   - endorses mood swings \"a few times per week\"   - typically sleeping 8-9 hours per night    - \"a little bit\" of depressive symptoms - notices \"when I'm tired I get very emotional and more anxious\"  - denies any recent SI thoughts lately - doesn't remember how long ago - \"maybe a few weeks ago\" - passing, passive thoughts - no intent to act   - states paranoia has been \"average\" - describes it as \"racing thoughts. Like what are they thinking about you. What if this happens, what if this happens.\" Pretty much always in response to social situations      - states things with meds have been \"going well. I think they're at a good level\"  - agreeable to getting labs drawn those says \"I've been working a lot lately so it's hard to find a time.\"     RECENT PSYCH ROS:     Depression:  see above   Elevated:  none  Psychosis:  reports no AH lately \"I've been so busy I haven't been noticing voices lately\" - they'll say \"oh, they don't like " "you\" or \"they think you're bad at your job\" - reports CAH in the past but \"rarely now\" stuff like \"just go hurt myself, quit my job.\" \"Lately they've been telling me not to do stuff. Like don't say that, it's socially awkward. Don't say that Therese.\" Reports eyes will occasionally \"think I saw something but it's not there.\" No delusions or paranoia.   Anxiety:  excessive worry, feeling fearful and nervous/overwhelmed \"I'm worried that things in my past will happen again. Social situations and stuff that happened in my last job.\" \"Social situations and things with coworkers - had a lot of issues with coworkers. \"People were mean to me and I was socially awkward.\" kofi with anxiety by \"Drinking and playing on my phone\"   Panic Attack:    \"I used to have them more often but not as often now.\" Last one was on Sunday (2 days ago) - \"I was really missing my great uncle. I was really shaky, I didn't even know I had one until I told my friend and he said I was probably having a panic attack.\" - no symptoms aside from shakiness. In the past \"racing heart, shaky, anxious\"   Trauma Related:  nightmares\"from time to time\".  Dysregulation:  mood dysregulation  Eating Disorder: no tends to have a higher appetite, has been going to gym     Adverse Effects:  Denies   Pertinent Negatives:  No self-injurious behavior/urges, suicidal and violent ideation, aggression, delusions and malia    Recent Substance Use:    Alcohol- 2-3x per year - last use was prior to 2020   Tobacco- none  Caffeine- none  Opioids- none           Narcan Kit- N/A   Cannabis- none     Other illicit drugs- none    FAMILY and SOCIAL HISTORY             [1ea, 1ea]       patient reported                    FAMILY HX-   Dad- MDD  Mom- BPD and MDD  Mat GM- MDD  Pat GF- MDD and EtOH  Mat great GF- Schizophrenia    Financial/ Work- SSI + works part time (16-20hrs) as a  at [employer].      Partner/ - none - not in a relationship, identifies as " "bisexual   Children- none      Living situation- Therese lives in [facility] through [facility]. Has been there since around 2018.       Social/ Spiritual Support- Her mother, half-siblings, [facility] workers, ,  staff (Renu Brady).  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 \"really close with my grandpa\"   Legal- none - has a guardian since the age of 19.  FEELS SAFE AT HOME- yes     Trauma History (self-report)- yes verbal bullying by peers. Denies sexual, physical or emotional abuse. States when she first lived in a group home one of the other housemates was \"rude to me and it made me afraid to go home.\"     Early History/Education-Sudha was born in [place] to both her parents who were  when she was about 3 mo old. They later  when she was 4 yo \"but they got along.\" Both parents moved to [place] when she was 5 yo, and from then on Sudha spent more time with mom, but continued to be very close with dad as well.  Both parents have since remarried and Sudha now has 2 half siblings on each side of the family.  Notably, Therese has 4 half sibs with 2 siblings from each her mom and her dad (2 are 10 yo and 2 are 7 yo). Was in MH treatment for a year and a half (part of the time was in residential.) Sudha has a HS diploma - graduated on time. Started HS and was getting good grades, things went downhill after her MH symptoms started getting worse and starting on meds. Had an IEP starting in 10th grade. Believes she met her developmental milestones on time. Feels she has been socially awakard her whole life - became more noticeable in 7th grade    Other- Enjoys spending time with her family , \"huge Dimitris Potter fan\". Mom has 4 cats and 3 dogs  PSYCHIATRIC HISTORY                                                            Per Therese's report \"When I was in 2nd grade I started seeing a therapist (for \"I think anxiety\"), in 3rd grade I started on medications for petit mal " "seizures, once I got off those, a lot of the MH stuff started showing up. Then I was diagnosed with depression. Around 7th or 8th grade \"the voices got worse\". I moved schools in 8th grade and things got worse MH wise. I started on Adderall and that made the voices worse. So I switched schools again then freshman year around Christmastime I was diagnosed with schizophrenia. I was on the wrong meds for a while and that made it worse. Then there was a lot of social situations. I went to [employer] (school) then [facility] (Day treatment) but was struggling to keep myself safe so I went to [facility] then [facility] again then got out and went to [facility].\"   States her current diagnoses is \"schizoaffective disorder with schizophrenia, depression, anxiety\"  SIB- prior cutting - started in 8th grade \"it wasn't a huge thing for me. It wasn't a long time from what I remember. I think I mostly did it to scare my mom.\" Does not remember why she was trying to scare her mom. Last time she cut was \"at least 10 years ago.\"  Suicidal Ideation Hx-  h/o active SI w plan and intent; more recently has been occasional passive SI   Suicide Attempt- #- intentional excessive ingestion of Zyrtec 2012 (prompt was feeling rejected by parents), put belt around neck in sophomore year of high school, and impulsive attempt to hang self with scarf 2014 (unable to identify prompt), most recent- 2014    Violence/Aggression Hx- has in the past engaged in hair pulling with siblings  Psychosis Hx- Yes, since age 7 yo, AH w/ negative self talk especially surrounding social situations, some command AH for SI, VH, paranoia and ideas of reference  Eating Disorder Hx- none  Wilma - has periods of time that she does not sleep as well but is usually groggy and tired as a result. Some increased irritability.      Psych Hosp- #- ~8-9x (at [facility], [facility], and [facility])- 1st hospitalization was sophomore year of HS \"I put a belt around my neck and my dad walked in and found " "me.\"   most recent- November 2019 [facility], for worsening depression and psychosis; no med changes; referred to day treatment on discharge  Commitment- none  ECT- none  Outpatient Programs - IOP in 2014 & 2016 at [facility], Day treatment [facility] Winter 2020   Residential: [facility] for 9 months Summer before and most of ford year of  (July 2012-2013).      Past Psychotropic Med Trials- see next section    PAST MED TRIALS                                                          This list may be incomplete     Medication  Max Daily Dose (mg) Effect  Dates of Use   risperidone 3 'flattened her out'     aripiprazole 30 ineffective     quetiapine 800 ineffective     Depakote   enuresis     topiramate         ethosuximide         perphenazine 16       paliperidone 12 effective, well tolerated current   hydroxyzine 25 effective for anxiety current   benztropine 1-2       fluoxetine 80   current   melatonin 5  7/2019-present                 SUBSTANCE USE HISTORY     Past Use- Alcohol- 2-3x per year - \"I've never been drunk\"   Treatment- #0, most recent- none  Medical Consequences- none  HIV/Hepatitis- none  Legal Consequences- none    MEDICAL HISTORY and ALLERGY     Neurological: H/o abscence epilepsy from age [age]    Major Surgery- none    ALLERGIES: Cats and Seasonal allergies     Patient Active Problem List   Diagnosis     Schizoaffective disorder, depressive type (H)     Hx of psychiatric care     Psychosis (H)         MEDICAL REVIEW OF SYSTEMS         [2, 10]   Pregnant or breastfeeding- no      Contraception- Nexplanon implant      A comprehensive review of systems was performed and is negative other than noted in the HPI.    MEDICATIONS        Current Outpatient Medications   Medication Sig Dispense Refill     etonogestrel (IMPLANON/NEXPLANON) 68 MG IMPL 1 each by Subdermal route once       FLUoxetine (PROZAC) 40 MG capsule TAKE 2 CAPSULES BY MOUTH EVERY NIGHT AT BEDTIME 60 capsule 2     fluticasone (FLONASE) 50 MCG/ACT " "nasal spray Spray 2 sprays into both nostrils daily       hydrOXYzine (ATARAX) 25 MG tablet TAKE 1 TABLET BY MOUTH AT BEDTIME 30 tablet 0     ibuprofen (ADVIL/MOTRIN) 200 MG tablet Take 400 mg by mouth every 4 hours as needed for mild pain       lamoTRIgine (LAMICTAL) 100 MG tablet TAKE 1 & 1/2 TABLETS BY MOUTH DAILY 45 tablet 0     melatonin 5 MG tablet Take 1 tablet (5 mg) by mouth At Bedtime 30 tablet 3     metFORMIN (GLUCOPHAGE) 500 MG tablet TAKE 2 TABLETS BY MOUTH TWICE A DAY WITH MEALS 120 tablet 0     paliperidone ER (INVEGA) 6 MG 24 hr tablet Take 2 tablets (12 mg) by mouth At Bedtime 60 tablet 0     VITALS                                                                [3, 3]   There were no vitals taken for this visit.   MENTAL STATUS EXAM                       [9, 14 cog gs]     Alertness: alert  and oriented  Appearance: casually groomed  Behavior/Demeanor: cooperative and pleasant, with good  eye contact   Speech: normal and regular rate and rhythm  Language: intact and no problems  Psychomotor: normal or unremarkable  Mood: \"up and down. Sometimes I'm happy, sometimes I'm sad\"  Affect: full range; congruent to: mood- yes, content- yes  Thought Process/Associations: unremarkable  Thought Content:  Reports none;  Denies suicidal & violent ideation and delusions  Perception:  Reports auditory hallucinations with commands [details in history];  Denies visual distortion seen as shadows   Insight: adequate  Judgment: good  Cognition: (6) oriented: time, person, and place  attention span: intact  concentration: intact  recent memory: intact  remote memory: intact  fund of knowledge: appropriate  Gait and Station: N/A (telehealth)    LABS and DATA     PHQ9 TODAY = N/A  PHQ 1/14/2020 2/20/2020 3/4/2020   PHQ-9 Total Score 10 9 18   Q9: Thoughts of better off dead/self-harm past 2 weeks Not at all Several days Several days       Recent Labs   Lab Test 02/02/17  0916 06/12/14  0723 12/21/13  1016   CR 0.78 0.70 " 0.7  0.7   GFRESTIMATED >90  Non  GFR Calc   >90  --      Recent Labs   Lab Test 02/02/17  0916 12/21/13  1016   AST 24 33   ALT 23 18   ALKPHOS 102 107  107       PSYCHOTROPIC DRUG INTERACTIONS      fluoxetine + hydrozyzine + paliperidone: concurrent use may increase risk of QTc prolongation.     fluoxetine + paliperidone: concurrent use may be associated with increased risk of  Neuroleptic malignant syndrome and serotonin syndrome.     paliperidone + lamotrigine + hydroxyzine: concurrent use may increase risk of CNS depression    LamoTRIgine may increase the serum concentration of MetFORMIN.   Fluoxetine may enhance the hypoglycemic effect of METFORMIN.       MANAGEMENT:  Monitoring for adverse effects, routine vitals, routine labs, periodic EKGs and using lowest therapeutic dose of [psychotropics]    RISK STATEMENT for SAFETY     Cesia Geffre did not appear to be an imminent safety risk to self or others.    DIAGNOSES                                           [m2, h3]      Schizoaffective Disorder, depressed type  Unspecified Anxiety Disorder  Emotional Dysregulation    ASSESSMENT                                        [m2, h3]          TODAY Therese reports her symptoms are at baseline and she is doing well. She continues to work in therapy on emotional dysregulation, breaking ruminative thinking, and challenging paranoid thoughts. She also reports ongoing work on personal hygiene around the house as she is working towards a long term goal of living on her own with a cat and has continued to maintain employment. Her medication regimen has been stable for several months, and she tolerates it well. There is no indication for a med change today. She is due for AP monitoring labs and is agreeable to having these drawn. This was communicated to her  through fax and phone.     PLAN                                                            [m2, h3]                                                Spoke with Dain from her Group Home to discuss her next appointment time, information regarding the lab draw and that no medication changes were made.     1) Meds  - Continue paliperidone 12 mg at bedtime   - Continue lamotrigine 150 mg daily   - Continue fluoxetine 80 mg QHS  - Continue metformin 1000 mg BID (pt may take second dose with a snack in the early afternoon before going to work)  - Continue hydroxyzine 25 mg at bedtime for anxiety  - Continue melatonin from 5 mg 1-2 hours before bedtime PRN     MNPMP review was not needed today.    2) Therapy-    - Continue weekly individual therapy  - Previously recommend family therapy; referral offered for Dr. Live, but patient declined    3) Next Due   Labs- AP labs ordered last visit - Not completed - discussed with patient and GH   EKG- QTc 445 w/ T wave abnormality on 6/10/19; repeat EKG w/ med changes  Rating scales- PHQ9 and AIMS next due Sept 2020 - deferred due to Covid 19/telehealth visits    4) Referrals  No Referrals needed     5) RTC:  2 months - follow up scheduled on 11/2/20     6) Crisis Numbers:   Provided routinely in AVS     After hours:  [phone]      TREATMENT RISK STATEMENT:  The risks, benefits, alternatives and potential adverse effects have been discussed and are understood by the pt. The pt understands the risks of using street drugs or alcohol. There are no medical contraindications, the pt agrees to treatment with the ability to do so. The pt knows to call the clinic for any problems or to access emergency care if needed.  Medical and substance use concerns are documented above.  Psychotropic drug interaction check was done, including changes made today.    PROVIDER: Anya Contreras MD    Patient staffed in clinic with Dr. Yulia Tiwari who will sign the note.  Supervisor is Dr. Oralia Rosales.    TELEHEALTH ATTENDING ATTESTATION  Following the ACGME guidelines on telemedicine and direct supervision due to COVID-19, I was concurrently  participating in and/or monitoring the patient care through appropriate telecommunication technology.  I discussed the key portions of the service with the resident, including the mental status examination and developing the plan of care. I reviewed key portions of the history with the resident. I agree with the findings and plan as documented in this note.   Yulia Tiwari MD

## 2020-09-01 NOTE — PROGRESS NOTES
"VIDEO VISIT  Cesia Nettles is a 25 year old patient who is being evaluated via a billable video visit.      The patient has been notified of following:   \"This video visit will be conducted via a call between you and your physician/provider. We have found that certain health care needs can be provided without the need for an in-person physical exam. This service lets us provide the care you need with a video conversation. If a prescription is necessary we can send it directly to your pharmacy. If lab work is needed we can place an order for that and you can then stop by our lab to have the test done at a later time. Insurers are generally covering virtual visits as they would in-office visits so billing should not be different than normal.  If for some reason you do get billed incorrectly, you should contact the billing office to correct it and that number is in the AVS .    Video Conference to be completed via:  Katy    Patient has given verbal consent for video visit?:  Yes    Patient would prefer that any video invitations be sent by: Send to e-mail at: [email or web address]      How would patient like to obtain AVS?:  [employer]    AVS SmartPhrase [PsychAVS] has been placed in 'Patient Instructions':  Yes    "

## 2020-09-01 NOTE — PATIENT INSTRUCTIONS
Thank you for coming to the PSYCHIATRY CLINIC.    Lab Testing:  If you had lab testing today and your results are reassuring or normal they will be mailed to you or sent through [employer] within 7 days. If the lab tests need quick action we will call you with the results. The phone number we will call with results is # [phone] (home) . If this is not the best number please call our clinic and change the number.    Medication Refills:  If you need any refills please call your pharmacy and they will contact us. Our fax number for refills is [phone]. Please allow three business for refill processing. If you need to  your refill at a new pharmacy, please contact the new pharmacy directly. The new pharmacy will help you get your medications transferred.     Scheduling:  If you have any concerns about today's visit or wish to schedule another appointment please call our office during normal business hours [phone] (8-5:00 M-F)    Contact Us:  Please call [phone] during business hours (8-5:00 M-F).  If after clinic hours, or on the weekend, please call  [phone].    Financial Assistance [phone]  [facility] Billing [phone]  Central Billing Office, [facility]: [phone]  [facility] Billing [phone]  Medical Records [phone]      MENTAL HEALTH CRISIS NUMBERS:  For a medical emergency please call  911 or go to the nearest ER.     [facility]:   [facility] -[phone]   Crisis Residence [facility] Residence -[phone]   Walk-In Counseling [facility] -[phone]   COPE 24/7 [facility] Mobile Team -[phone] (adults)/[phone] (child)  CHILD: [facility] needs assessment team - [phone]      [facility]:   [facility] - [phone]   Walk-in counseling [facility] - [phone]   Walk-in counseling [facility] - [phone]   Crisis Residence [facility] - [phone]  Urgent  [facility]-[phone] mobile unit/ 24/7 crisis line    National Crisis Numbers:   National Suicide Prevention Lifeline: 5-489-716-TALK (005-936-9358)  Poison Control Center - 0-785-505-6325  [employer]/resources for a list of additional resources (SOS)  Trans Lifeline a hotline for transgender people 2-528-019-1713  The Damien Project a hotline for LGBT youth 1-763.433.5354  Crisis Text Line: For any crisis 24/7   To: 643623  see www.crisistextline.org  - IF MAKING A CALL FEELS TOO HARD, send a text!         Again thank you for choosing PSYCHIATRY CLINIC and please let us know how we can best partner with you to improve you and your family's health.    You may be receiving a survey regarding this appointment. We would love to have your feedback, both positive and negative. The survey is done by an external company, so your answers are anonymous.

## 2020-10-22 DIAGNOSIS — F20.3 SCHIZOPHRENIA, UNDIFFERENTIATED (H): ICD-10-CM

## 2020-10-26 RX ORDER — PALIPERIDONE 6 MG/1
12 TABLET, EXTENDED RELEASE ORAL AT BEDTIME
Qty: 60 TABLET | Refills: 0 | Status: SHIPPED | OUTPATIENT
Start: 2020-10-26 | End: 2020-11-05

## 2020-10-26 NOTE — TELEPHONE ENCOUNTER
Medication requested: paliperidone ER (INVEGA) 6 MG 24 hr tablet  Last refilled: 9/1/20  Qty: 60/1      Last seen: 9/1/20  RTC: 2 months  Cancel: 0  No-show: 0  Next appt: 11/2/20    Refill decision:   Refilled for 30 days per protocol.

## 2020-11-05 DIAGNOSIS — F25.1 SCHIZOAFFECTIVE DISORDER, DEPRESSIVE TYPE (H): ICD-10-CM

## 2020-11-05 DIAGNOSIS — F20.3 SCHIZOPHRENIA, UNDIFFERENTIATED (H): ICD-10-CM

## 2020-11-05 DIAGNOSIS — R45.851 SUICIDAL IDEATION: ICD-10-CM

## 2020-11-05 DIAGNOSIS — F41.9 ANXIETY: ICD-10-CM

## 2020-11-05 RX ORDER — FLUOXETINE 40 MG/1
80 CAPSULE ORAL DAILY
Qty: 60 CAPSULE | Refills: 0 | Status: SHIPPED | OUTPATIENT
Start: 2020-11-05 | End: 2020-12-01

## 2020-11-05 RX ORDER — PALIPERIDONE 6 MG/1
12 TABLET, EXTENDED RELEASE ORAL AT BEDTIME
Qty: 60 TABLET | Refills: 0 | Status: SHIPPED | OUTPATIENT
Start: 2020-11-25 | End: 2020-12-01

## 2020-11-05 RX ORDER — HYDROXYZINE HYDROCHLORIDE 25 MG/1
25 TABLET, FILM COATED ORAL AT BEDTIME
Qty: 30 TABLET | Refills: 0 | Status: SHIPPED | OUTPATIENT
Start: 2020-11-05 | End: 2020-12-01

## 2020-11-05 RX ORDER — LAMOTRIGINE 100 MG/1
150 TABLET ORAL DAILY
Qty: 45 TABLET | Refills: 0 | Status: SHIPPED | OUTPATIENT
Start: 2020-11-05 | End: 2020-12-01

## 2020-11-05 NOTE — PROGRESS NOTES
Ordered 1 additional month of refills given clinic initiated appointment cancellation on 11/2. Rescheduled visit for 12/01/20.     Anya Contreras MD  PGY-3 Psychiatry Resident

## 2020-12-01 ENCOUNTER — VIRTUAL VISIT (OUTPATIENT)
Dept: PSYCHIATRY | Facility: CLINIC | Age: 25
End: 2020-12-01
Attending: PSYCHIATRY & NEUROLOGY
Payer: COMMERCIAL

## 2020-12-01 DIAGNOSIS — R45.851 SUICIDAL IDEATION: ICD-10-CM

## 2020-12-01 DIAGNOSIS — F41.9 ANXIETY: ICD-10-CM

## 2020-12-01 DIAGNOSIS — F20.3 SCHIZOPHRENIA, UNDIFFERENTIATED (H): ICD-10-CM

## 2020-12-01 DIAGNOSIS — F25.1 SCHIZOAFFECTIVE DISORDER, DEPRESSIVE TYPE (H): ICD-10-CM

## 2020-12-01 PROCEDURE — 99214 OFFICE O/P EST MOD 30 MIN: CPT | Mod: GC | Performed by: STUDENT IN AN ORGANIZED HEALTH CARE EDUCATION/TRAINING PROGRAM

## 2020-12-01 RX ORDER — PALIPERIDONE 6 MG/1
12 TABLET, EXTENDED RELEASE ORAL AT BEDTIME
Qty: 60 TABLET | Refills: 1 | Status: SHIPPED | OUTPATIENT
Start: 2020-12-01 | End: 2021-01-26

## 2020-12-01 RX ORDER — HYDROXYZINE HYDROCHLORIDE 25 MG/1
25 TABLET, FILM COATED ORAL AT BEDTIME
Qty: 30 TABLET | Refills: 1 | Status: SHIPPED | OUTPATIENT
Start: 2020-12-01 | End: 2021-01-26

## 2020-12-01 RX ORDER — FLUOXETINE 40 MG/1
80 CAPSULE ORAL DAILY
Qty: 60 CAPSULE | Refills: 1 | Status: SHIPPED | OUTPATIENT
Start: 2020-12-01 | End: 2021-01-26

## 2020-12-01 RX ORDER — LAMOTRIGINE 100 MG/1
150 TABLET ORAL DAILY
Qty: 45 TABLET | Refills: 1 | Status: SHIPPED | OUTPATIENT
Start: 2020-12-01 | End: 2021-01-26

## 2020-12-01 ASSESSMENT — PAIN SCALES - GENERAL: PAINLEVEL: NO PAIN (0)

## 2020-12-01 NOTE — PATIENT INSTRUCTIONS
Thank you for coming to the [facility]ALTH & [facility] [place] [facility].    - please go to any [facility] to get your labs drawn, can call ahead of time to schedule appointment to reduce your visit time. There is a [facility] in [place]    - awaiting a return call from your guardian regarding if she will approve adding a dose of hydroxyzine 25 mg as needed at bedtime for sleep. I sent a refill for the current scheduled dose of hydroxyzine and we can send an additional prescription for the as needed dose if/when we hear back from her    Lab Testing:  If you had lab testing today and your results are reassuring or normal they will be mailed to you or sent through Itugo within 7 days. If the lab tests need quick action we will call you with the results. The phone number we will call with results is # [phone] (home) . If this is not the best number please call our clinic and change the number.    Medication Refills:  If you need any refills please call your pharmacy and they will contact us. Our fax number for refills is [phone]. Please allow three business for refill processing. If you need to  your refill at a new pharmacy, please contact the new pharmacy directly. The new pharmacy will help you get your medications transferred.     Scheduling:  If you have any concerns about today's visit or wish to schedule another appointment please call our office during normal business hours [phone] (8-5:00 M-F)    Contact Us:  Please call [phone] during business hours (8-5:00 M-F).  If after clinic hours, or on the weekend, please call  [phone].    Financial Assistance [phone]  [facility] Billing [phone]  Central Billing Office, [facility]: [phone]  [facility]illing [phone]  Medical Records [phone]  [facility] Patient Bill of Rights [email or web address]       MENTAL HEALTH CRISIS  NUMBERS:  For a medical emergency please call  911 or go to the nearest ER.     [facility]:   [facility] -[phone]   Crisis Residence [facility] -[phone]   Walk-In Counseling Center [facility] -[phone]   COPE 24/7 [facility] Mobile Team -[phone] (adults)/[phone] (child)  CHILD: [facility] needs assessment team - [phone]      [facility]:   [facility] - [phone]   Walk-in counseling [facility] - [phone]   Walk-in counseling [facility] - [phone]   Crisis Residence [facility] Residence - [phone]  Urgent Care Adult Mental [facility]-[phone] mobile unit/ 24/7 crisis line    National Crisis Numbers:   National Suicide Prevention Lifeline: 4-901-100-TALK (004-775-5271)  Poison Control Center - 1-409.380.6198  [facility]/resources for a list of additional resources (SOS)  Trans Lifeline a hotline for transgender people 1-636.497.1921  The Damien Project a hotline for LGBT youth 1-638.410.9258  Crisis Text Line: For any crisis 24/7   To: 449142  see www.crisistextline.org  - IF MAKING A CALL FEELS TOO HARD, send a text!         Again thank you for choosing [facility] MENTAL HEALTH & ADDICTION [facility] and please let us know how we can best partner with you to improve you and your family's health.    You may be receiving a survey regarding this appointment. We would love to have your feedback, both positive and negative. The survey is done by an external company, so your answers are anonymous.

## 2020-12-01 NOTE — PROGRESS NOTES
"Video- Visit Details  Type of service:  video visit for medication management  Time of service:    Date:  12/01/2020    Video Start Time:  10:22 AM      Video End Time:  10: 55 AM     Reason for video visit:  Patient unable to travel due to Covid-19  Originating Site (patient location):  [facility] [place]   Location- Patient's home  Distant Site (provider location):  Remote location  Mode of Communication:  Video Conference via AmWell  Consent:  Patient has given verbal consent for video visit?: Yes         [facility]  Psychiatry Clinic  PSYCHIATRY PROGRESS NOTE     Date of initial Diagnostic Assessment (DA) is 7/8/2016 and most recent Transfer of Care DA is 09/01/20.    CARE TEAM:  PCP- Becki Valencia  Specialty Providers- none    Therapist- Cynthia CALLEJAS at [facility] [phone] (Verbal BRIONNA on file) (sees weekly)   [facility] Team- [facility] Erica Sparks [phone],   (ProAct) Margie Velazquez [phone]    Guardian: Shahnaz Bernal through [facility] [phone]     Living Situation: [facility] [phone] - Shahnaz MARQUEZ (manager) [phone]    Cesia Nettles is a 25 year old patient who uses the name Therese and pronouns she, her.     PERTINENT BACKGROUND                [last transfer eval 09/01/20]      Psych pertinent item history includes suicide attempt [multiple], suicidal ideation, psychosis [sxs include paranoia, AH, ideas of reference], multiple psychotropic trials, psych hosp (>5), lives in a  and has a guardian.    INTERIM HISTORY                                    History was provided by the patient who was a fair historian. Treatment adherence is good.  The last visit ended with the following medication changes: none    Since the last visit:   - reports that things have been \"going okay\"   - now has a boyfriend, met online, dating for about 2 months   - things from a MH standpoint have been \"fine\"  - next Friday is 2 " "year anniversary of her cat passing, working that day  - talked about possibly doing something to remember him by   - has not been doing as many chores at home  - self care has improved, showering more often, washing clothes - thinks related to having a boyfriend  - feels safe in the relationship   - sleeping a lot, will sometimes wake up tired, at other times energetic, some difficulty falling asleep   - sleeps later, still will feel tired - feels it's related to stress at work, anxiety is worse at night   - takes a melatonin at night  - will wake up at 2 AM with food cravings, talked about adding a snack in the evening, feels like she is always hungry - has been going on for years   - denies any SI thoughts or SIB   - reports her main concern is the sleep issues   - baseline social anxiety, concerns about what others are thinking about her   - did not complete labs due to issues with rides and transportation plans to call and make a lab appointment in [place]   - denies any medication side effects, does not drive or operate heavy machinery   - we discussed adding a second dose of hydroxyzine 25 mg PRN at bedtime for sleep if approved by her guardian     - Writer attempted to reach Therese's guardian via phone x2 - though was not able to reach her -M requesting for her to call the clinic back though did not hear from her     RECENT PSYCH ROS:     Depression:  see above   Elevated:  none  Psychosis:  reports no AH lately  they'll say \"oh, they don't like you\" or \"they think you're bad at your job\" - reports CAH in the past but \"rarely now\" stuff like \"just go hurt myself, quit my job.\" \"Lately they've been telling me not to do stuff. Like don't say that, it's socially awkward. Don't say that Therese.\" Reports eyes will occasionally \"think I saw something but it's not there.\" No delusions or paranoia.   Anxiety:  excessive worry, feeling fearful and nervous/overwhelmed see above   Panic Attack:   See above   Trauma " "Related:  nightmares\"from time to time\".  Dysregulation:  mood dysregulation  Eating Disorder: no tends to have a higher appetite, has been going to gym     Adverse Effects:  Denies   Pertinent Negatives:  No self-injurious behavior/urges, suicidal and violent ideation, aggression, delusions and malia    Recent Substance Use:    Alcohol- 2-3x per year - last use was prior to 2020   Tobacco- none  Caffeine- none  Opioids- none           Narcan Kit- N/A   Cannabis- none     Other illicit drugs- none    FAMILY and SOCIAL HISTORY                               patient reported                    FAMILY HX-   Dad- MDD  Mom- BPD and MDD  Mat GM- MDD  Pat GF- MDD and EtOH  Mat great GF- Schizophrenia    Financial/ Work- SSI + works part time (16-20hrs) as a  at [employer].      Partner/ - none - started dating her boyfriend in Fall 2020, identifies as bisexual   Children- none      Living situation- Therese lives in [facility] through [facility]. Has been there since around 2018.       Social/ Spiritual Support- Her mother, half-siblings, [facility] workers, ,  staff (Renu Brady).  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 \"really close with my grandpa\"     Other- Enjoys spending time with her family , \"huge Dimitris Potter fan\". Mom has 4 cats and 3 dogs    PSYCH and SUBSTANCE USE Critical Summary Points since July 2020 9/1/20 - transfer DA, no med changes, labs not completed prior visit  12/1/20 - add additional 25 mg PRN hydroxyzine at bedtime for sleep   MEDICAL HISTORY and ALLERGY     Neurological: H/o abscence epilepsy from age [age]    Major Surgery- none    ALLERGIES: Cats and Seasonal allergies     Patient Active Problem List   Diagnosis     Schizoaffective disorder, depressive type (H)     Hx of psychiatric care     Psychosis (H)         MEDICAL REVIEW OF SYSTEMS           Pregnant or breastfeeding- no      Contraception- Nexplanon " "implant      A comprehensive review of systems was performed and is negative other than noted in the HPI.    MEDICATIONS        Current Outpatient Medications   Medication Sig Dispense Refill     etonogestrel (IMPLANON/NEXPLANON) 68 MG IMPL 1 each by Subdermal route once       FLUoxetine (PROZAC) 40 MG capsule Take 2 capsules (80 mg) by mouth daily 60 capsule 0     fluticasone (FLONASE) 50 MCG/ACT nasal spray Spray 2 sprays into both nostrils daily       hydrOXYzine (ATARAX) 25 MG tablet Take 1 tablet (25 mg) by mouth At Bedtime 30 tablet 0     ibuprofen (ADVIL/MOTRIN) 200 MG tablet Take 400 mg by mouth every 4 hours as needed for mild pain       lamoTRIgine (LAMICTAL) 100 MG tablet Take 1.5 tablets (150 mg) by mouth daily 45 tablet 0     melatonin 5 MG tablet Take 1 tablet (5 mg) by mouth At Bedtime 30 tablet 0     metFORMIN (GLUCOPHAGE) 500 MG tablet Take 2 tablets (1,000 mg) by mouth 2 times daily (with meals) 120 tablet 0     paliperidone ER (INVEGA) 6 MG 24 hr tablet Take 2 tablets (12 mg) by mouth At Bedtime 60 tablet 0     VITALS                                                                There were no vitals taken for this visit.   MENTAL STATUS EXAM                          Alertness: alert  and oriented  Appearance: casually groomed  Behavior/Demeanor: cooperative and pleasant, with good  eye contact   Speech: normal and regular rate and rhythm  Language: intact and no problems  Psychomotor: normal or unremarkable  Mood: \"up and down. Sometimes I'm happy, sometimes I'm sad\"  Affect: full range; congruent to: mood- yes, content- yes  Thought Process/Associations: unremarkable  Thought Content:  Reports none;  Denies suicidal & violent ideation and delusions  Perception:  Reports auditory hallucinations with commands [details in history];  Denies visual distortion seen as shadows   Insight: adequate  Judgment: good  Cognition: (6) oriented: time, person, and place  attention span: intact  concentration: " intact  recent memory: intact  remote memory: intact  fund of knowledge: appropriate  Gait and Station: N/A (telehealth)    LABS and DATA     PHQ9 TODAY = N/A  PHQ 1/14/2020 2/20/2020 3/4/2020   PHQ-9 Total Score 10 9 18   Q9: Thoughts of better off dead/self-harm past 2 weeks Not at all Several days Several days       Recent Labs   Lab Test 02/02/17  0916 06/12/14  0723 12/21/13  1016   CR 0.78 0.70 0.7  0.7   GFRESTIMATED >90  Non  GFR Calc   >90  --      Recent Labs   Lab Test 02/02/17  0916 12/21/13  1016   AST 24 33   ALT 23 18   ALKPHOS 102 107  107       PSYCHOTROPIC DRUG INTERACTIONS      fluoxetine + hydrozyzine + paliperidone: concurrent use may increase risk of QTc prolongation.     fluoxetine + paliperidone: concurrent use may be associated with increased risk of  Neuroleptic malignant syndrome and serotonin syndrome.     paliperidone + lamotrigine + hydroxyzine: concurrent use may increase risk of CNS depression    LamoTRIgine may increase the serum concentration of MetFORMIN.   Fluoxetine may enhance the hypoglycemic effect of METFORMIN.       MANAGEMENT:  Monitoring for adverse effects, routine vitals, routine labs, periodic EKGs and using lowest therapeutic dose of [psychotropics]    RISK STATEMENT for SAFETY     Cesia Geffre did not appear to be an imminent safety risk to self or others.    DIAGNOSES                                           [m2, h3]      Schizoaffective Disorder, depressed type  Unspecified Anxiety Disorder  Emotional Dysregulation    ASSESSMENT                                        [m2, h3]          TODAY Therese reports her symptoms are at baseline and she is doing well. She continues to work in therapy on emotional dysregulation, breaking ruminative thinking, and challenging paranoid thoughts. She has been struggling with sleep and we discussed adding a second dose of PRN hydroxyzine though were not able to make this change today as her guardian could not be  farideh Saleh continues to work on personal hygiene around the house as she is working towards a long term goal of living on her own with a cat and has continued to maintain employment. Her medication regimen has been stable for several months, and she tolerates it well. There is no indication for a med change today. She is due for AP monitoring labs and is agreeable to having these drawn. This was communicated to her GH through fax and phone.     PLAN                                                            [m2, h3]                                               1) Meds  - Continue paliperidone 12 mg at bedtime   - Continue lamotrigine 150 mg daily   - Continue fluoxetine 80 mg QHS  - Continue metformin 1000 mg BID (pt may take second dose with a snack in the early afternoon before going to work)  - Continue hydroxyzine 25 mg at bedtime for anxiety - add second, 25 mg PRN dose at bedtime for sleep if approved by guardian (not able to reach guardian but if she calls the clinic, please discuss with her & okay to order)   - Continue melatonin from 5 mg 1-2 hours before bedtime PRN     [place] review was not needed today.    2) Therapy-    - Continue weekly individual therapy  - Previously recommend family therapy; referral offered for Dr. Live, but patient declined    3) Next Due   Labs- AP labs ordered last visit - Not completed - discussed with patient, plans to complete at [facility] in [place]   EKG- QTc 445 w/ T wave abnormality on 6/10/19; repeat EKG w/ med changes  Rating scales- PHQ9 and AIMS next due Sept 2020 - deferred due to Covid 19/telehealth visits    4) Referrals  No Referrals needed     5) RTC:  2 months - follow up scheduled on 1/26/21    6) Crisis Numbers:   Provided routinely in AVS     After hours:  [phone]      TREATMENT RISK STATEMENT:  The risks, benefits, alternatives and potential adverse effects have been discussed and are understood by the pt. The pt understands the risks of using  street drugs or alcohol. There are no medical contraindications, the pt agrees to treatment with the ability to do so. The pt knows to call the clinic for any problems or to access emergency care if needed.  Medical and substance use concerns are documented above.  Psychotropic drug interaction check was done, including changes made today.    PROVIDER: Anya Contreras MD    Patient staffed in clinic with Dr. Angie Lanier who will sign the note.  Supervisor is Dr. Oralia Rosales.

## 2020-12-01 NOTE — PROGRESS NOTES
"VIDEO VISIT  Cesia Nettles is a 25 year old patient who is being evaluated via a billable video visit.      The patient has been notified of following:   \"This video visit will be conducted via a call between you and your physician/provider. We have found that certain health care needs can be provided without the need for an in-person physical exam. This service lets us provide the care you need with a video conversation. If a prescription is necessary we can send it directly to your pharmacy. If lab work is needed we can place an order for that and you can then stop by our lab to have the test done at a later time. Insurers are generally covering virtual visits as they would in-office visits so billing should not be different than normal.  If for some reason you do get billed incorrectly, you should contact the billing office to correct it and that number is in the AVS .    Video Conference to be completed via:  Katy    Patient has given verbal consent for video visit?:  Yes    Patient would prefer that any video invitations be sent by: Send to e-mail at: [email or web address]      How would patient like to obtain AVS?:  [employer]and[employer]ishmaref    AVS SmartPhrase [PsychAVS] has been placed in 'Patient Instructions':  Yes    "

## 2020-12-01 NOTE — Clinical Note
Vicente Ritchie,     I have been unsuccessful in reaching Therese's guardian to discuss a medication change. I've called her twice today at different times and left voicemails. Unfortunately I am not able to leave a call back number since I'm working remotely but I did ask her to call the clinic. When you have some time, can you also please try to touch base with her in the next day or so if she does not call back and see if she approves of us adding an additional dose of hydroxyzine 25 mg PRN at bedtime to help with sleep? She currently takes 25 mg scheduled. I'll order her medications without the additional PRN for the time being and if she approves it, we can send a different Rx for the PRN dose. Please, let me know if there is a more straightforward way to coordinate with a guardian with the current setting.     Thanks! Anya

## 2020-12-14 ENCOUNTER — HEALTH MAINTENANCE LETTER (OUTPATIENT)
Age: 25
End: 2020-12-14

## 2021-01-26 ENCOUNTER — VIRTUAL VISIT (OUTPATIENT)
Dept: PSYCHIATRY | Facility: CLINIC | Age: 26
End: 2021-01-26
Attending: PSYCHIATRY & NEUROLOGY
Payer: COMMERCIAL

## 2021-01-26 DIAGNOSIS — F25.1 SCHIZOAFFECTIVE DISORDER, DEPRESSIVE TYPE (H): ICD-10-CM

## 2021-01-26 DIAGNOSIS — F41.9 ANXIETY: ICD-10-CM

## 2021-01-26 DIAGNOSIS — R45.851 SUICIDAL IDEATION: ICD-10-CM

## 2021-01-26 DIAGNOSIS — F20.3 SCHIZOPHRENIA, UNDIFFERENTIATED (H): ICD-10-CM

## 2021-01-26 PROCEDURE — 99214 OFFICE O/P EST MOD 30 MIN: CPT | Mod: GC | Performed by: STUDENT IN AN ORGANIZED HEALTH CARE EDUCATION/TRAINING PROGRAM

## 2021-01-26 RX ORDER — PALIPERIDONE 6 MG/1
12 TABLET, EXTENDED RELEASE ORAL AT BEDTIME
Qty: 60 TABLET | Refills: 1 | Status: SHIPPED | OUTPATIENT
Start: 2021-01-26 | End: 2021-03-16

## 2021-01-26 RX ORDER — HYDROXYZINE HYDROCHLORIDE 25 MG/1
25 TABLET, FILM COATED ORAL AT BEDTIME
Qty: 30 TABLET | Refills: 1 | Status: SHIPPED | OUTPATIENT
Start: 2021-01-26 | End: 2021-03-16

## 2021-01-26 RX ORDER — FLUOXETINE 40 MG/1
80 CAPSULE ORAL DAILY
Qty: 60 CAPSULE | Refills: 1 | Status: SHIPPED | OUTPATIENT
Start: 2021-01-26 | End: 2021-03-16

## 2021-01-26 RX ORDER — LAMOTRIGINE 100 MG/1
150 TABLET ORAL DAILY
Qty: 45 TABLET | Refills: 1 | Status: SHIPPED | OUTPATIENT
Start: 2021-01-26 | End: 2021-03-16

## 2021-01-26 ASSESSMENT — PATIENT HEALTH QUESTIONNAIRE - PHQ9
SUM OF ALL RESPONSES TO PHQ QUESTIONS 1-9: 13
SUM OF ALL RESPONSES TO PHQ QUESTIONS 1-9: 13
10. IF YOU CHECKED OFF ANY PROBLEMS, HOW DIFFICULT HAVE THESE PROBLEMS MADE IT FOR YOU TO DO YOUR WORK, TAKE CARE OF THINGS AT HOME, OR GET ALONG WITH OTHER PEOPLE: SOMEWHAT DIFFICULT

## 2021-01-26 ASSESSMENT — PAIN SCALES - GENERAL: PAINLEVEL: NO PAIN (0)

## 2021-01-26 NOTE — PROGRESS NOTES
"Video- Visit Details  Type of service:  video visit for medication management  Time of service:    Date:  01/26/2021    Video Start Time:  11:03 AM      Video End Time:  11:35 AM     Reason for video visit:  Patient unable to travel due to Covid-19  Originating Site (patient location):  [facility] [place]   Location- Patient's home  Distant Site (provider location):  Remote location  Mode of Communication:  Video Conference via AmWell  Consent:  Patient has given verbal consent for video visit?: Yes         [facility]  Psychiatry Clinic  PSYCHIATRY PROGRESS NOTE     Date of initial Diagnostic Assessment (DA) is 7/8/2016 and most recent Transfer of Care DA is 09/01/20.    CARE TEAM:  PCP- Becki Valencia  Specialty Providers- none    Therapist- Cynthia CALLEJAS at [facility] [phone] (Verbal BRIONAN on file) (sees weekly)   Formerly [facility] Team- [facility] Erica Sparks [phone],   (ProAct) Margie Velazquez [phone]    Guardian: Shahnaz Bernal through [facility] [phone]     Living Situation: [facility] [phone] - Shahnaz MARQUEZ (manager) [phone]    Cesia Nettles is a 25 year old patient who uses the name Therese and pronouns she, her.     PERTINENT BACKGROUND                [last transfer eval 09/01/20]      Psych pertinent item history includes suicide attempt [multiple], suicidal ideation, psychosis [sxs include paranoia, AH, ideas of reference], multiple psychotropic trials, psych hosp (>5), lives in a  and has a guardian.    INTERIM HISTORY                                    History was provided by the patient who was a fair historian. Treatment adherence is good.  Since the last visit:   - got her 1st covid vaccine last week  - hoping to see family for her birthday - grandpa   - dating her boyfriend for 4 months, he is supportive \"this is the first healthy relationship I've had in a long time.\"  He accepts me for who I am, where I " "live, what my diagnosis is, I can be who I am\"   - regarding higher PHQ-9 scores -symptoms are a couple times per week, not several days - no in between option   - mood has been \"okay\" - generally content, positive   - some lower energy, feels related to picking up extra work shifts   - initial insomnia, feels related to being on her phone, improved from before, manageable   - occasional frustration, higher anxiety about roommates, staff at home, work  - occasionally  \"panicky\", mostly about work - management communicating poorly  - considering applying to the same job at a different location, 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  - has occasional fleeting SI thoughts, \"just wondering what it would be like\" - never sticks around, no intent or plans, easily shifts focus   - no AH   - skin has been a little itchy, thinks from winter and not med side effect  - high appetite ongoing   - has not gotten labs done - took address for [facility] near her work -plans to walk from there to work  -she called her guardian last week and she hasn't called Therese back yet  - Therese prefers to stay on her current meds, deferred changes, \"anxious that things would get bad again\" if we tried to decrease them    Answers for HPI/ROS submitted by the patient on 1/26/2021   If you checked off any problems, how difficult have these problems made it for you to do your work, take care of things at home, or get along with other people?: Somewhat difficult  PHQ9 TOTAL SCORE: 13    RECENT PSYCH ROS:     Depression:  see above   Elevated:  none  Psychosis:  reports no AH lately, no delusions or paranoia.   Anxiety:  excessive worry, feeling fearful and nervous/overwhelmed see above   Panic Attack:   See above   Trauma Related:  nightmares\"from time to time\".  Dysregulation:  mood dysregulation   Eating Disorder: no tends to have a higher appetite, has been going to gym     Adverse Effects:  Denies   Pertinent Negatives:  No self-injurious " "behavior/urges, suicidal and violent ideation, aggression, delusions and malia    Recent Substance Use:    Alcohol- 2-3x per year - last use was prior to 2020   Tobacco- none  Caffeine- none  Opioids- none           Narcan Kit- N/A   Cannabis- none     Other illicit drugs- none    SOCIAL HISTORY                               patient reported                    Financial/ Work- SSI + works part time (16-20hrs) as a  at [employer].      Partner/ - none - started dating her boyfriend in Fall 2020, identifies as bisexual   Children- none      Living situation- Therese lives in [facility] through [employer]. Has been there since around 2018.       Social/ Spiritual Support- Her mother, half-siblings, [facility] workers, ,  staff ([employer]).  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 \"really close with my grandpa\"     Other- Enjoys spending time with her family , \"huge Dimitris Potter fan\". Mom has 4 cats and 3 dogs    PSYCH and SUBSTANCE USE Critical Summary Points since July 2020 9/1/20 - transfer DA, no med changes, labs not completed prior visit  12/1/20 - add additional 25 mg PRN hydroxyzine at bedtime for sleep if approved by guardian   1/26/21 - no med changes, provided [facility] Address for labs    MEDICAL HISTORY and ALLERGY     Neurological: H/o abscence epilepsy from age [age]    Major Surgery- none    ALLERGIES: Cats and Seasonal allergies     Patient Active Problem List   Diagnosis     Schizoaffective disorder, depressive type (H)     Hx of psychiatric care     Psychosis (H)         MEDICAL REVIEW OF SYSTEMS           Pregnant or breastfeeding- no      Contraception- Nexplanon implant      A comprehensive review of systems was performed and is negative other than noted in the HPI.    MEDICATIONS        Current Outpatient Medications   Medication Sig Dispense Refill     etonogestrel (IMPLANON/NEXPLANON) 68 MG IMPL 1 each by Subdermal " "route once       FLUoxetine (PROZAC) 40 MG capsule Take 2 capsules (80 mg) by mouth daily 60 capsule 1     fluticasone (FLONASE) 50 MCG/ACT nasal spray Spray 2 sprays into both nostrils daily       hydrOXYzine (ATARAX) 25 MG tablet Take 1 tablet (25 mg) by mouth At Bedtime 30 tablet 1     ibuprofen (ADVIL/MOTRIN) 200 MG tablet Take 400 mg by mouth every 4 hours as needed for mild pain       lamoTRIgine (LAMICTAL) 100 MG tablet Take 1.5 tablets (150 mg) by mouth daily 45 tablet 1     melatonin 5 MG tablet Take 1 tablet (5 mg) by mouth At Bedtime 30 tablet 1     metFORMIN (GLUCOPHAGE) 500 MG tablet Take 2 tablets (1,000 mg) by mouth 2 times daily (with meals) 120 tablet 1     paliperidone ER (INVEGA) 6 MG 24 hr tablet Take 2 tablets (12 mg) by mouth At Bedtime 60 tablet 1     VITALS                                                                There were no vitals taken for this visit.   MENTAL STATUS EXAM                          Alertness: alert  and oriented  Appearance: casually groomed  Behavior/Demeanor: cooperative and pleasant, with good  eye contact   Speech: normal and regular rate and rhythm  Language: intact and no problems  Psychomotor: normal or unremarkable  Mood: description consistent with euthymia \"okay\"  Affect: full range; congruent to: mood- yes, content- yes  Thought Process/Associations: unremarkable  Thought Content:  Reports none;  Denies suicidal & violent ideation and delusions  Perception:  Reports none;  Denies auditory hallucinations and visual distortion seen as shadows   Insight: adequate  Judgment: good  Cognition: (6) oriented: time, person, and place  attention span: intact  concentration: intact  recent memory: intact  remote memory: intact  fund of knowledge: appropriate  Gait and Station: N/A (teleViverae)    LABS and DATA     PHQ9 TODAY = N/A  PHQ 2/20/2020 3/4/2020 1/26/2021   PHQ-9 Total Score 9 18 13   Q9: Thoughts of better off dead/self-harm past 2 weeks Several days Several " days Several days   F/U: Thoughts of suicide or self-harm - - No   F/U: Safety concerns - - No       Recent Labs   Lab Test 02/02/17  0916 06/12/14  0723 12/21/13  1016   CR 0.78 0.70 0.7  0.7   GFRESTIMATED >90  Non  GFR Calc   >90  --      Recent Labs   Lab Test 02/02/17  0916 12/21/13  1016   AST 24 33   ALT 23 18   ALKPHOS 102 107  107       PSYCHOTROPIC DRUG INTERACTIONS      fluoxetine + hydrozyzine + paliperidone: concurrent use may increase risk of QTc prolongation.     fluoxetine + paliperidone: concurrent use may be associated with increased risk of  Neuroleptic malignant syndrome and serotonin syndrome.     paliperidone + lamotrigine + hydroxyzine: concurrent use may increase risk of CNS depression    LamoTRIgine may increase the serum concentration of MetFORMIN.   Fluoxetine may enhance the hypoglycemic effect of METFORMIN.       MANAGEMENT:  Monitoring for adverse effects, routine vitals, routine labs, periodic EKGs and using lowest therapeutic dose of [psychotropics]    RISK STATEMENT for SAFETY     Cesia Geffre did not appear to be an imminent safety risk to self or others.    DIAGNOSES                                           [m2, h3]      Schizoaffective Disorder, depressed type  Unspecified Anxiety Disorder  Emotional Dysregulation    ASSESSMENT                                        [m2, h3]          TODAY Therese reports her symptoms remain at baseline with situational difficulties with anxiety, mood and sleep as discussed above that she does not feel are impairing and feels she is doing well.  She has noticed some improvement in sleep with no med changes. She continues to work in therapy on emotional dysregulation, breaking ruminative thinking, and challenging paranoid thoughts. Therese continues to work on personal hygiene around the house as she is working towards a long term goal of living on her own with a cat and has continued to maintain employment. Her medication regimen  has been stable for several months, and she tolerates it well. There is no indication for a med change today. She is overdue for AP monitoring labs and these have been discussed at prior visits, she agrees  to having these drawn and a plan was made today. She continues to have the same guardian who historically has been difficult to reach. There are no safety concerns, she identifies a safety plan and agrees to reach out to the clinic if any concerns arise prior to her next visit.     PLAN                                                            [m2, h3]                                               1) Meds  - Continue paliperidone 12 mg at bedtime   - Continue lamotrigine 150 mg daily   - Continue fluoxetine 80 mg QHS  - Continue metformin 1000 mg BID (pt may take second dose with a snack in the early afternoon before going to work)  - Continue hydroxyzine 25 mg at bedtime for anxiety, sleep (dose was not increased due to not hearing back from her guardian)   - Continue melatonin from 5 mg 1-2 hours before bedtime PRN     MNPMP review was not needed today.    2) Therapy-    - Continue weekly individual therapy  - Previously recommend family therapy; referral offered for Dr. Live, but patient declined    3) Next Due   Labs- AP labs overdue - Not completed - discussed with patient each visit, provided [facility] address in [place]   EKG- QTc 445 w/ T wave abnormality on 6/10/19; repeat EKG w/ med changes  Rating scales- PHQ9 and AIMS next due Sept 2020 - deferred due to Covid 19/telehealth visits    4) Referrals  No Referrals needed     5) RTC:  6-8 weeks - follow up scheduled on 3/16/21    6) Crisis Numbers:   Provided routinely in AVS     After hours:  [phone]      TREATMENT RISK STATEMENT:  The risks, benefits, alternatives and potential adverse effects have been discussed and are understood by the pt. The pt understands the risks of using street drugs or alcohol. There are no medical contraindications,  the pt agrees to treatment with the ability to do so. The pt knows to call the clinic for any problems or to access emergency care if needed.  Medical and substance use concerns are documented above.  Psychotropic drug interaction check was done, including changes made today.    PROVIDER: Anya Contreras MD    Patient staffed in clinic with Dr. Robert Whiteside who will sign the note.  Supervisor is Dr. Oralia Rosales.    TELEHEALTH ATTENDING ATTESTATION  Following the ACGME guidelines on telemedicine and direct supervision due to COVID-19, I was concurrently participating in and/or monitoring the patient care through appropriate telecommunication technology.  I discussed the key portions of the service with the resident, including the mental status examination and developing the plan of care. I reviewed key portions of the history with the resident. I agree with the findings and plan as documented in this note.   Robert Whiteside MD

## 2021-01-26 NOTE — PROGRESS NOTES
"VIDEO VISIT  Cesia Nettles is a 25 year old patient who is being evaluated via a billable video visit.      The patient has been notified of following:   \"This video visit will be conducted via a call between you and your physician/provider. We have found that certain health care needs can be provided without the need for an in-person physical exam. This service lets us provide the care you need with a video conversation. If a prescription is necessary we can send it directly to your pharmacy. If lab work is needed we can place an order for that and you can then stop by our lab to have the test done at a later time. Insurers are generally covering virtual visits as they would in-office visits so billing should not be different than normal.  If for some reason you do get billed incorrectly, you should contact the billing office to correct it and that number is in the AVS .    Video Conference to be completed via:  Katy    Patient has given verbal consent for video visit?:  Yes    Patient would prefer that any video invitations be sent by: Text to cell phone: [phone]      How would patient like to obtain AVS?:  ANTERIOS    AVS SmartPhrase [PsychAVS] has been placed in 'Patient Instructions':  Yes  "

## 2021-01-26 NOTE — PATIENT INSTRUCTIONS
**For crisis resources, please see the information at the end of this document**     Patient Education      Thank you for coming to the [facility] MENTAL HEALTH & ADDI[facility] [place] [facility].    Lab Testing:  If you had lab testing today and your results are reassuring or normal they will be mailed to you or sent through [employer] within 7 days. If the lab tests need quick action we will call you with the results. The phone number we will call with results is # [phone] (home) . If this is not the best number please call our clinic and change the number.    Medication Refills:  If you need any refills please call your pharmacy and they will contact us. Our fax number for refills is [phone]. Please allow three business for refill processing. If you need to  your refill at a new pharmacy, please contact the new pharmacy directly. The new pharmacy will help you get your medications transferred.     Scheduling:  If you have any concerns about today's visit or wish to schedule another appointment please call our office during normal business hours [phone] (8-5:00 M-F)    Contact Us:  Please call [phone] during business hours (8-5:00 M-F).  If after clinic hours, or on the weekend, please call  [phone].    Financial Assistance [phone]  [employer] Billing [phone]  Central Billing Office, [facility]: [phone]  [facility] Billing [phone]  Medical Records [phone]  [facility] Patient Bill of Rights [email or web address]       MENTAL HEALTH CRISIS NUMBERS:  For a medical emergency please call  911 or go to the nearest ER.     [facility]:   [facility] -[phone]   Crisis Residence [facility]idence -[phone]   Walk-In [facility] Center hospitals -[phone]   COPE 24/7 [place] Mobile Team -[phone] (adults)/[phone] (child)  CHILD: [facility] needs assessment team -  [phone]      [facility]:   [facility] - [phone]   Walk-in counseling [facility] - [phone]   Walk-in counseling [facility] - [phone]   Crisis Residence [facility] Residence - [phone]  Urgent Care Adult Mental [place]-[phone] mobile unit/ 24/7 crisis line    National Crisis Numbers:   National Suicide Prevention Lifeline: 6-961-157-TALK (119-584-4357)  Poison Control Center - 8-717-474-6320  [employer]/resources for a list of additional resources (SOS)  Trans Lifeline a hotline for transgender people 7-433-538-2216  The Damien Project a hotline for LGBT youth 1-788-657-3392  Crisis Text Line: For any crisis 24/7   To: 119408  see www.crisistextline.org  - IF MAKING A CALL FEELS TOO HARD, send a text!         Again thank you for choosing [facility] MENTAL HEALTH & ADDICTION [facility] and please let us know how we can best partner with you to improve you and your family's health.    You may be receiving a survey regarding this appointment. We would love to have your feedback, both positive and negative. The survey is done by an external company, so your answers are anonymous.

## 2021-01-27 ASSESSMENT — PATIENT HEALTH QUESTIONNAIRE - PHQ9: SUM OF ALL RESPONSES TO PHQ QUESTIONS 1-9: 13

## 2021-01-29 ENCOUNTER — TELEPHONE (OUTPATIENT)
Dept: PSYCHIATRY | Facility: CLINIC | Age: 26
End: 2021-01-29

## 2021-01-29 NOTE — TELEPHONE ENCOUNTER
Patient's legal Guardian, Shahnaz is calling to get in touch with provider.  Per patient, provider stated that she would reach out to legal guardian about medication changes discussed at their last appointment on 1/26    Shahnaz is available at any time of day at [phone].    Routed to provider for follow-up.

## 2021-02-03 NOTE — TELEPHONE ENCOUNTER
[facility] Medicine  History & Physical    Patient Name: Bruce Segovia  MRN: [id number]  Admission Date: 8/23/2018  Attending Physician: Hiram Varma MD  Primary Care Provider: Sam Smith MD         Patient information was obtained from patient and ER records.     Subjective:     Principal Problem:<principal problem not specified>    Chief Complaint:   Chief Complaint   Patient presents with    Hematuria     transfer from [place] for urology         HPI: Mr. Segovia jyotsna 54 yo with hx of Renal calculi 4 years ago who presented to by transfer from [facility] in [place] for hematuria.  He started having hematuria with passage of large blood clots 3-4 days ago.  He had mild associated suprapubic pain, otherwise no wt loss, diarrhea, abdominal pain, nausea, vomiting, fever, chills, melena, hematochezia.  He had similar less severe problem 4 years ago at which time he had stones without intervention.  Father had prostate cancer, otherwise no malignancy hx in family. Denies tob, etoh, or illicit drugs.  At [place] CT scan showed 203 mm non-obstructing stone R midpole.  1.5 cm Left renal lesion possible representing calyceal diverticulum with milk of calcium, and large blood clot in bladder. He had 3 way diaz placed and bladder irrigation started, now with lan colored urine. WBC 13.4, HGB 13.9, plt 260. UA pending here at time of interview.     No past medical history on file.    No past surgical history on file.    Review of patient's allergies indicates:  No Known Allergies    No current facility-administered medications on file prior to encounter.      No current outpatient medications on file prior to encounter.     Family History     None        Tobacco Use    Smoking status: Not on file   Substance and Sexual Activity    Alcohol use: Not on file    Drug use: Not on file    Sexual activity: Not on file     Review of Systems   Constitutional: Negative for activity  Writer attempted to reach Therese's guardian, Shahnaz Bernal. The phone rang twice prior to going to an identified voicemail. Left detailed message, requested she call back to speak with my nurse partner, Vera.     Anya Contreras MD  Psychiatry PGY-3 Resident   change, appetite change, chills, diaphoresis, fatigue, fever and unexpected weight change.   HENT: Negative for congestion, hearing loss, mouth sores, nosebleeds, sinus pressure, sore throat and trouble swallowing.    Eyes: Negative for photophobia, redness and visual disturbance.   Respiratory: Negative for apnea, cough, chest tightness, shortness of breath and wheezing.    Cardiovascular: Negative for chest pain, palpitations and leg swelling.   Gastrointestinal: Negative for abdominal distention, abdominal pain, blood in stool, constipation, diarrhea, nausea and vomiting.   Endocrine: Negative for polydipsia, polyphagia and polyuria.   Genitourinary: Positive for hematuria. Negative for decreased urine volume, difficulty urinating, dysuria, frequency and urgency.   Musculoskeletal: Negative for arthralgias, back pain, gait problem, myalgias and neck stiffness.   Neurological: Negative for dizziness, tremors, seizures, syncope, weakness, numbness and headaches.   Hematological: Negative for adenopathy. Does not bruise/bleed easily.   Psychiatric/Behavioral: Negative for agitation, behavioral problems, confusion and decreased concentration.     Objective:     Vital Signs (Most Recent):  Temp: 98.5 °F (36.9 °C) (08/23/18 2153)  Pulse: 99 (08/23/18 2153)  Resp: 18 (08/23/18 2153)  BP: (!) 184/107 (08/23/18 2153)  SpO2: 97 % (08/23/18 2153) Vital Signs (24h Range):  Temp:  [98.5 °F (36.9 °C)] 98.5 °F (36.9 °C)  Pulse:  [99] 99  Resp:  [18] 18  SpO2:  [97 %] 97 %  BP: (184)/(107) 184/107     Weight: 74.8 kg (165 lb)  Body mass index is 26.63 kg/m².    Physical Exam   Constitutional: He is oriented to person, place, and time. He appears well-developed and well-nourished. No distress.   HENT:   Head: Normocephalic and atraumatic.   Nose: Nose normal.   Mouth/Throat: No oropharyngeal exudate.   Eyes: EOM are normal. Pupils are equal, round, and reactive to light. Right eye exhibits no discharge. Left eye exhibits no  discharge. No scleral icterus.   Neck: Normal range of motion. Neck supple. No JVD present. No tracheal deviation present. No thyromegaly present.   Cardiovascular: Normal rate, regular rhythm, normal heart sounds and intact distal pulses. Exam reveals no gallop and no friction rub.   No murmur heard.  Pulmonary/Chest: Effort normal and breath sounds normal. No stridor. No respiratory distress. He has no wheezes. He has no rales.   Abdominal: Soft. Bowel sounds are normal. He exhibits no distension and no mass. There is no tenderness. There is no guarding.   Genitourinary:   Genitourinary Comments: León catheter in place   Musculoskeletal: Normal range of motion. He exhibits no edema, tenderness or deformity.   Neurological: He is alert and oriented to person, place, and time. No cranial nerve deficit. He exhibits normal muscle tone. Coordination normal.   Skin: Skin is warm and dry. Capillary refill takes less than 2 seconds. Rash noted. He is not diaphoretic. No erythema. No pallor.   Psychiatric: He has a normal mood and affect. His behavior is normal. Judgment and thought content normal.        Significant Labs: All pertinent labs within the past 24 hours have been reviewed.    Significant Imaging: I have reviewed and interpreted all pertinent imaging results/findings within the past 24 hours.    Assessment/Plan:     Leukocytosis    See plan above          Hematuria    As seen on bladder irrigation  Unknown etiology, may be related to nephrolithiasis vs malignancy  Will consult urology for AM  Will obtain renal ultrasound for further characterization of renal lesion  UA pending although may not be much help. Considering leukocytosis will start antibiotic empirically            VTE Risk Mitigation (From admission, onward)    None             Hiram Varma MD  Department of Hospital Medicine   [facility]

## 2021-03-16 ENCOUNTER — VIRTUAL VISIT (OUTPATIENT)
Dept: PSYCHIATRY | Facility: CLINIC | Age: 26
End: 2021-03-16
Attending: PSYCHIATRY & NEUROLOGY
Payer: COMMERCIAL

## 2021-03-16 ENCOUNTER — TELEPHONE (OUTPATIENT)
Dept: PSYCHIATRY | Facility: CLINIC | Age: 26
End: 2021-03-16

## 2021-03-16 DIAGNOSIS — R45.851 SUICIDAL IDEATION: ICD-10-CM

## 2021-03-16 DIAGNOSIS — F41.9 ANXIETY: ICD-10-CM

## 2021-03-16 DIAGNOSIS — F20.3 SCHIZOPHRENIA, UNDIFFERENTIATED (H): ICD-10-CM

## 2021-03-16 DIAGNOSIS — F25.1 SCHIZOAFFECTIVE DISORDER, DEPRESSIVE TYPE (H): ICD-10-CM

## 2021-03-16 PROCEDURE — 99215 OFFICE O/P EST HI 40 MIN: CPT | Mod: GT | Performed by: STUDENT IN AN ORGANIZED HEALTH CARE EDUCATION/TRAINING PROGRAM

## 2021-03-16 RX ORDER — HYDROXYZINE HYDROCHLORIDE 25 MG/1
TABLET, FILM COATED ORAL
Qty: 30 TABLET | Refills: 1 | Status: SHIPPED | OUTPATIENT
Start: 2021-03-16 | End: 2021-03-23

## 2021-03-16 RX ORDER — LAMOTRIGINE 100 MG/1
150 TABLET ORAL DAILY
Qty: 45 TABLET | Refills: 1 | Status: SHIPPED | OUTPATIENT
Start: 2021-03-16 | End: 2021-05-17

## 2021-03-16 RX ORDER — FLUOXETINE 40 MG/1
80 CAPSULE ORAL DAILY
Qty: 60 CAPSULE | Refills: 1 | Status: SHIPPED | OUTPATIENT
Start: 2021-03-16 | End: 2021-05-17

## 2021-03-16 RX ORDER — PALIPERIDONE 6 MG/1
12 TABLET, EXTENDED RELEASE ORAL AT BEDTIME
Qty: 60 TABLET | Refills: 1 | Status: SHIPPED | OUTPATIENT
Start: 2021-03-16 | End: 2021-05-17

## 2021-03-16 ASSESSMENT — PAIN SCALES - GENERAL: PAINLEVEL: NO PAIN (0)

## 2021-03-16 NOTE — PROGRESS NOTES
"VIDEO VISIT  Cesia Nettles is a 26 year old patient who is being evaluated via a billable video visit.      The patient has been notified of following:   \"This video visit will be conducted via a call between you and your physician/provider. We have found that certain health care needs can be provided without the need for an in-person physical exam. This service lets us provide the care you need with a video conversation. If a prescription is necessary we can send it directly to your pharmacy. If lab work is needed we can place an order for that and you can then stop by our lab to have the test done at a later time. Insurers are generally covering virtual visits as they would in-office visits so billing should not be different than normal.  If for some reason you do get billed incorrectly, you should contact the billing office to correct it and that number is in the AVS .    Video Conference to be completed via:  Katy    Patient has given verbal consent for video visit?:  Yes    Patient would prefer that any video invitations be sent by: Send to e-mail at: [email or web address]      How would patient like to obtain AVS?:  [facility]    AVS SmartPhrase [PsychAVS] has been placed in 'Patient Instructions':  Yes    "

## 2021-03-16 NOTE — PROGRESS NOTES
Video- Visit Details  Type of service:  video visit for medication management  Time of service:    Date:  03/16/2021    Video Start Time:  9:49 AM      Video End Time:  10:15  AM     Reason for video visit:  Patient unable to travel due to Covid-19  Originating Site (patient location):  [facility]- [place]   Location- Patient's home  Distant Site (provider location):  Remote location  Mode of Communication:  Video Conference via AmWell  Consent:  Patient has given verbal consent for video visit?: Yes         [facility]  Psychiatry Clinic  PSYCHIATRY PROGRESS NOTE     Date of initial Diagnostic Assessment (DA) is 7/8/2016 and most recent Transfer of Care DA is 09/01/20.    CARE TEAM:  PCP- Becki Valencia  Specialty Providers- none    Therapist- Cynthia CALLEJAS at [facility] [phone] (Verbal BRIONNA on file) (sees weekly)   [facility] Team- [facility] Erica Sparks [phone],   (ProAct) Margie Velazquez [phone]    Guardian: Shahnaz Bernal through [facility] [phone]     Living Situation: [facility] [phone] - Shahnaz MARQUEZ (manager) [phone]    Cesia Nettles is a 26 year old patient who uses the name Therese and pronouns she, her.     PERTINENT BACKGROUND                [last transfer eval 09/01/20]      Psych pertinent item history includes suicide attempt [multiple], suicidal ideation, psychosis [sxs include paranoia, AH, ideas of reference], multiple psychotropic trials, psych hosp (>5), lives in a  and has a guardian.    INTERIM HISTORY                                    History was provided by the patient who was a fair historian. Treatment adherence is good.  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   Anxiety:  see above   Panic Attack:   See above   Trauma Related:  nightmares\"from time to time\".  Dysregulation:  mood dysregulation   Eating Disorder: no tends to have a higher appetite, has been going to gym     Adverse Effects:  Denies   Pertinent Negatives:  No self-injurious behavior/urges, suicidal and violent ideation, aggression, delusions and malia    Recent Substance Use:    Alcohol- 2-3x per year - last use was prior to 2020   Tobacco- none  Caffeine- none  Opioids- none           Narcan Kit- N/A   Cannabis- none     Other illicit drugs- none    SOCIAL HISTORY                               patient reported                    Financial/ Work- SSI + works part time (16-20hrs) as a  at [employer].      Partner/ - none - started dating her boyfriend in Fall 2020, identifies as bisexual   Children- none      Living situation- Therese lives in [facility] through [facility] [employer] [facility]. Has been there since around 2018.       Social/ Spiritual Support- Her mother, half-siblings, [facility] workers, ,  staff ([employer]).  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 \"really close with my grandpa\"     Other- Enjoys spending time with her family , \"huge Dimitris Potter fan\". Mom has 4 cats and 3 dogs    PSYCH and SUBSTANCE USE Critical Summary Points since July 2020 9/1/20 - transfer DA, " no med changes, labs not completed prior visit  12/1/20 - add additional 25 mg PRN hydroxyzine at bedtime for sleep if approved by guardian   1/26/21 - no med changes, provided [facility] Address for labs  3/16/21 - Switch to 12.5 mg every afternoon and 12.5 mg at bedtime     MEDICAL HISTORY and ALLERGY     Neurological: H/o abscence epilepsy from age [age]    Major Surgery- none    ALLERGIES: Cats and Seasonal allergies     Patient Active Problem List   Diagnosis     Schizoaffective disorder, depressive type (H)     Hx of psychiatric care     Psychosis (H)         MEDICAL REVIEW OF SYSTEMS           Pregnant or breastfeeding- no      Contraception- Nexplanon implant      A comprehensive review of systems was performed and is negative other than noted in the HPI.    MEDICATIONS        Current Outpatient Medications   Medication Sig Dispense Refill     etonogestrel (IMPLANON/NEXPLANON) 68 MG IMPL 1 each by Subdermal route once       FLUoxetine (PROZAC) 40 MG capsule Take 2 capsules (80 mg) by mouth daily 60 capsule 1     fluticasone (FLONASE) 50 MCG/ACT nasal spray Spray 2 sprays into both nostrils daily       hydrOXYzine (ATARAX) 25 MG tablet Take 1 tablet (25 mg) by mouth At Bedtime 30 tablet 1     ibuprofen (ADVIL/MOTRIN) 200 MG tablet Take 400 mg by mouth every 4 hours as needed for mild pain       lamoTRIgine (LAMICTAL) 100 MG tablet Take 1.5 tablets (150 mg) by mouth daily 45 tablet 1     melatonin 5 MG tablet Take 1 tablet (5 mg) by mouth At Bedtime 30 tablet 1     metFORMIN (GLUCOPHAGE) 500 MG tablet Take 2 tablets (1,000 mg) by mouth 2 times daily (with meals) 120 tablet 1     paliperidone ER (INVEGA) 6 MG 24 hr tablet Take 2 tablets (12 mg) by mouth At Bedtime 60 tablet 1     VITALS                                                                There were no vitals taken for this visit.   MENTAL STATUS EXAM                          Alertness: alert  and oriented  Appearance: casually  "groomed  Behavior/Demeanor: cooperative and pleasant, with good  eye contact   Speech: normal and regular rate and rhythm  Language: intact and no problems  Psychomotor: normal or unremarkable  Mood: description consistent with euthymia \"pretty good\"   Affect: full range; congruent to: mood- yes, content- yes  Thought Process/Associations: unremarkable  Thought Content:  Reports none;  Denies suicidal & violent ideation and delusions  Perception:  Reports none;  Denies auditory hallucinations and visual distortion seen as shadows   Insight: adequate  Judgment: good  Cognition: (6) oriented: time, person, and place  attention span: intact  concentration: intact  recent memory: intact  remote memory: intact  fund of knowledge: appropriate  Gait and Station: N/A (telehealth)    LABS and DATA     PHQ9 TODAY = N/A  PHQ 2/20/2020 3/4/2020 1/26/2021   PHQ-9 Total Score 9 18 13   Q9: Thoughts of better off dead/self-harm past 2 weeks Several days Several days Several days   F/U: Thoughts of suicide or self-harm - - No   F/U: Safety concerns - - No       Recent Labs   Lab Test 02/02/17  0916 06/12/14  0723 12/21/13  1016   CR 0.78 0.70 0.7  0.7   GFRESTIMATED >90  Non  GFR Calc   >90  --      Recent Labs   Lab Test 02/02/17  0916 12/21/13  1016   AST 24 33   ALT 23 18   ALKPHOS 102 107  107       PSYCHOTROPIC DRUG INTERACTIONS      fluoxetine + hydrozyzine + paliperidone: concurrent use may increase risk of QTc prolongation.     fluoxetine + paliperidone: concurrent use may be associated with increased risk of  Neuroleptic malignant syndrome and serotonin syndrome.     paliperidone + lamotrigine + hydroxyzine: concurrent use may increase risk of CNS depression    LamoTRIgine may increase the serum concentration of MetFORMIN.   Fluoxetine may enhance the hypoglycemic effect of METFORMIN.       MANAGEMENT:  Monitoring for adverse effects, routine vitals, routine labs, periodic EKGs and using lowest " therapeutic dose of [psychotropics]    RISK STATEMENT for SAFETY     Cesia Nettles did not appear to be an imminent safety risk to self or others.    DIAGNOSES                                           [m2, h3]      Schizoaffective Disorder, depressed type  Unspecified Anxiety Disorder  Emotional Dysregulation    ASSESSMENT                                        [m2, h3]          TODAY Therese reports her symptoms overall good control of her symptoms as discused above though she has been experiencing some anxiety in the afternoons. We discussed   Splitting her current bedtime hydroxyzine dose as outlined below to target this given good sleep and she was agreeable to this plan. She continues to work in therapy on emotional dysregulation, breaking ruminative thinking, and challenging paranoid thoughts. She is overdue for AP monitoring labs and these have been discussed at prior visits, she agrees  to having these drawn and a plan was made today. She continues to have the same guardian who historically has been difficult to reach. There are no safety concerns, she identifies a safety plan and agrees to reach out to the clinic if any concerns arise prior to her next visit.     PLAN                                                            [m2, h3]                                               1) Meds  - Continue paliperidone 12 mg at bedtime   - Continue lamotrigine 150 mg daily   - Continue fluoxetine 80 mg QHS  - Continue metformin 1000 mg BID (pt may take second dose with a snack in the early afternoon before going to work)  - Switch hydroxyzine from 25 mg at bedtime to 12.5 mg every afternoon and 12.5 mg at bedtime   - Continue melatonin from 5 mg 1-2 hours before bedtime PRN     MNPMP review was not needed today.    2) Therapy-    - Continue weekly individual therapy  - Previously recommend family therapy; referral offered for Dr. Live, but patient declined    3) Next Due   Labs- AP labs overdue - Not completed -  discussed with patient each visit, provided [facility] Hidalgo address in [place]   EKG- QTc 445 w/ T wave abnormality on 6/10/19; repeat EKG w/ med changes  Rating scales- PHQ9 and AIMS next due Sept 2020 - deferred due to Covid 19/telehealth visits    4) Referrals  No Referrals needed   - fax info for Eduar Hidalgo to her group home for her to completed labs - [phone] fax number     5) RTC:  6-8 weeks - follow up scheduled on 5/17/21    6) Crisis Numbers:   Provided routinely in AVS     After hours:  [phone]      TREATMENT RISK STATEMENT:  The risks, benefits, alternatives and potential adverse effects have been discussed and are understood by the pt. The pt understands the risks of using street drugs or alcohol. There are no medical contraindications, the pt agrees to treatment with the ability to do so. The pt knows to call the clinic for any problems or to access emergency care if needed.  Medical and substance use concerns are documented above.  Psychotropic drug interaction check was done, including changes made today.    PROVIDER: Anya Contreras MD    Patient staffed in clinic with Dr. Robert Garces who will sign the note.  Supervisor is Dr. Oralia Rosales.    TELEHEALTH ATTENDING ATTESTATION  Following the ACGME guidelines on telemedicine and direct supervision due to COVID-19, I was concurrently participating in and/or monitoring the patient care through appropriate telecommunication technology.  I discussed the key portions of the service with the resident, including the mental status examination and developing the plan of care. I reviewed key portions of the history with the resident. I agree with the findings and plan as documented in this note.   Robert Whiteside MD

## 2021-03-16 NOTE — PATIENT INSTRUCTIONS
Labs - are overdue - please go to [facility] located at [address], [place] [address] to have these completed as soon as possible    Medications:   - switch hydroxyzine to 12.5 mg every afternoon at 1 PM and 12.5 mg at bedtime   - continue other medications without changes         **For crisis resources, please see the information at the end of this document**     Patient Education      Thank you for coming to the [facility] [place] [facility].    Lab Testing:  If you had lab testing today and your results are reassuring or normal they will be mailed to you or sent through [employer] within 7 days. If the lab tests need quick action we will call you with the results. The phone number we will call with results is # [phone] (home) . If this is not the best number please call our clinic and change the number.    Medication Refills:  If you need any refills please call your pharmacy and they will contact us. Our fax number for refills is [phone]. Please allow three business for refill processing. If you need to  your refill at a new pharmacy, please contact the new pharmacy directly. The new pharmacy will help you get your medications transferred.     Scheduling:  If you have any concerns about today's visit or wish to schedule another appointment please call our office during normal business hours [phone] (8-5:00 M-F)    Contact Us:  Please call [phone] during business hours (8-5:00 M-F).  If after clinic hours, or on the weekend, please call  [phone].    Financial Assistance [phone]  [place] Billing [phone]  Central Billing Office, [place]: [phone]  [place] Billing [phone]  Medical Records [phone]  [place] Patient Bill of Rights [email or web address]       MENTAL HEALTH CRISIS NUMBERS:  For a medical emergency please call  911 or go to the nearest  ROBIN.     [facility]:   [facility] -[phone]   Crisis Residence [facility] -[phone]   Walk-In Counseling Center [facility] -[phone]   COPE 24/7 [facility] Mobile Team -[phone] (adults)/[phone] (child)  CHILD: [facility] needs assessment team - [phone]      [facility]:   [facility] - [phone]   Walk-in counseling [facility] - [phone]   Walk-in counseling [facility] - [phone]   Crisis Residence [facility] - [phone]  Urgent Care Adult Mental [facility]-[phone] mobile unit/ 24/7 crisis line    National Crisis Numbers:   National Suicide Prevention Lifeline: 5-175-038-TALK (575-211-9903)  Poison Control Center - 1-394.854.7577  [employer]/resources for a list of additional resources (SOS)  Trans Lifeline a hotline for transgender people 9-473-381-8685  The Damien Project a hotline for LGBT youth 1-385.855.1655  Crisis Text Line: For any crisis 24/7   To: 023666  see www.crisistextline.org  - IF MAKING A CALL FEELS TOO HARD, send a text!         Again thank you for choosing [facility] M[facility]AL HEALTH & ADDICTION [facility] and please let us know how we can best partner with you to improve you and your family's health.    You may be receiving a survey regarding this appointment. We would love to have your feedback, both positive and negative. The survey is done by an external company, so your answers are anonymous.

## 2021-03-16 NOTE — TELEPHONE ENCOUNTER
On March 16, 2021, at 8:26 AM, writer called patient at [phone] to confirm Virtual Visit. Writer unable to make contact with patient. Writer left detailed voice message for call back. [phone] left as call back number. Debi Sy, [facility]

## 2021-03-16 NOTE — Clinical Note
Hi  team,     Can you please assist me in faxing Therese's AVS to her group home? The fax number is [phone]    Thank you! Anya

## 2021-03-23 ENCOUNTER — TELEPHONE (OUTPATIENT)
Dept: PSYCHIATRY | Facility: CLINIC | Age: 26
End: 2021-03-23

## 2021-03-23 DIAGNOSIS — F41.9 ANXIETY: ICD-10-CM

## 2021-03-23 RX ORDER — HYDROXYZINE HYDROCHLORIDE 25 MG/1
25 TABLET, FILM COATED ORAL AT BEDTIME
Qty: 30 TABLET | Refills: 1 | Status: SHIPPED | OUTPATIENT
Start: 2021-03-23 | End: 2021-05-17

## 2021-03-23 NOTE — TELEPHONE ENCOUNTER
Anya Contreras MD Labossiere, Laura RN   Caller: Unspecified (Today, 11:15 AM)             That is fine with me. She can take it all at bedtime. Thanks! Anya        Called pt and relayed the above. She voiced understanding and asked that writer send a letter with this medication order to her , [employer] at [phone].    Letter printed and electronically signed by Dr. Contreras. Sent to the above fax number.     Updated order sent to pharmacy as well.

## 2021-03-23 NOTE — TELEPHONE ENCOUNTER
Jena Bennett, RN  Jena Bennett, RN   Phone Number: [phone]          Previous Messages    ----- Message -----   From: Madina Maharaj   Sent: 3/23/2021   9:32 AM CDT   To: [facility] [place]   Subject: Med Concern/Question - [facility]     Phone Message     May a detailed message be left on voicemail: yes     Reason for Call: Medication Question or concern regarding medication   Prescription Clarification   Name of Medication: hydroxyzine   Prescribing Provider: Diane   What on the order needs clarification?   Pt reports that her anxiety had been increasing in the afternoon which led to instructions of taking a half tab hydroxyzine in the afternoon and the 2nd half at night. Pt reports stomach issues since this change and was hoping to go back to taking a full tab at night.       Action Taken: Message routed to:  Other: nursing     Travel Screening: Not Applicable            Called pt to gather more information. No answer. Agreed to forward her request to the provider. Asked for a c/b if there is any further information she'd like to pass along to Dr. Contreras or she feels like her daytime anxiety will not be manageable without the hydroxyzine.

## 2021-03-23 NOTE — LETTER
2021      RE: Cesia Nettles  [address] [facility] [address]  : 1995         To Whom it May Concern,    Please make the following medication changes for Cesia Nettles    1. Discontinue hydrOXYzine (ATARAX) 25 MG tablet: Take 12.5 mg daily at 1 pm and 12.5 mg daily at bedtime    2. Start hydrOXYzine (ATARAX) 25 MG tablet: Take 1 tablet (25 mg) by mouth At Bedtime    If you have questions regarding these orders, please call the clinic at the number listed above.      Sincerely,    *ELECTRONICALLY SIGNED BY ANYA MEANS MD*    Anya Means MD

## 2021-05-14 DIAGNOSIS — R45.851 SUICIDAL IDEATION: ICD-10-CM

## 2021-05-14 DIAGNOSIS — F25.1 SCHIZOAFFECTIVE DISORDER, DEPRESSIVE TYPE (H): ICD-10-CM

## 2021-05-14 DIAGNOSIS — F41.9 ANXIETY: ICD-10-CM

## 2021-05-14 DIAGNOSIS — F20.3 SCHIZOPHRENIA, UNDIFFERENTIATED (H): ICD-10-CM

## 2021-05-17 RX ORDER — FLUOXETINE 40 MG/1
CAPSULE ORAL
Qty: 60 CAPSULE | Refills: 0 | Status: SHIPPED | OUTPATIENT
Start: 2021-05-17 | End: 2021-05-21 | Stop reason: ALTCHOICE

## 2021-05-17 RX ORDER — HYDROXYZINE HYDROCHLORIDE 25 MG/1
TABLET, FILM COATED ORAL
Qty: 30 TABLET | Refills: 0 | Status: SHIPPED | OUTPATIENT
Start: 2021-05-17 | End: 2021-06-07

## 2021-05-17 RX ORDER — LAMOTRIGINE 100 MG/1
TABLET ORAL
Qty: 45 TABLET | Refills: 0 | Status: SHIPPED | OUTPATIENT
Start: 2021-05-17 | End: 2021-06-07

## 2021-05-17 RX ORDER — PALIPERIDONE 6 MG/1
TABLET, EXTENDED RELEASE ORAL
Qty: 60 TABLET | Refills: 0 | Status: SHIPPED | OUTPATIENT
Start: 2021-05-17 | End: 2021-06-07

## 2021-05-17 NOTE — TELEPHONE ENCOUNTER
FLUoxetine (PROZAC) 40 MG  paliperidone (INVEGA) 6 MG   Last refilled:3/16/21  Qty: 60 : 1  metFORMIN (GLUCOPHAGE) 500 MG   Last refilled: 3/16/21  Qty: 120:1  lamoTRIgine (LAMICTAL) 100 MG   Last refilled: 3/16/21  Qty: 45:1  melatonin 5 MG  Last refilled: 3/16/21  Qty: 30:1  hydrOXYzine (ATARAX) 25 MG  Last refilled: 3/23/21  Qty: 30:1     Last seen: 3/16/21  RTC: 6-8 WEEKS  Cancel:   5/17  MD  No-show: 0  Next appt: 6/7/21  Refill decision: Refilled for 30 days per protocol.

## 2021-05-18 ENCOUNTER — TELEPHONE (OUTPATIENT)
Dept: PSYCHIATRY | Facility: CLINIC | Age: 26
End: 2021-05-18

## 2021-05-19 ENCOUNTER — TELEPHONE (OUTPATIENT)
Dept: PSYCHIATRY | Facility: CLINIC | Age: 26
End: 2021-05-19

## 2021-05-19 NOTE — TELEPHONE ENCOUNTER
"Called pt on cell phone 2x this AM, but went straight to .    Called pt's mother Savanna Hastings ([phone]) to obtain more information regarding call to on-call resident last night. No details about pt's care was disclosed as no BRIONNA was found on file.    Savanna states that for the past 1.5 days, she believes Therese has been in a \"manic episode\". A friend of Therese's had reported that she was having rapid speech. Therese was reportedly up for 22+ hours yesterday. A    Has been having increased paranoid delusions. 1 month ago, Therese called [employer] [employer] talking about her roommate stealing things and to \"look at the tapes. 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 Per Savanna, this is odd behavior for Therese.    Savanna reports impulsivity has increased recently as well, increased spending.    Was diagnosed with a UTI last week, didn't understand that a UTI is not an STI. She accused her boyfriend of cheating on her, was very stressed from this, but they have since made up.    Therese had a therapy appointment this morning, which Savanna was present for. Therese was apparently tired, no longer hyperverbal or pressured in speech. Stated that yesterday, she was not concerned from a safety standpoint of SI/HI or self-injury, but was concerned enough that Therese could still benefit from psychiatric hospitalization.     ----    Called group home staff and spoke with Yancy, who reported having some familiarity with Therese. She did not report any significant concerns over Therese having any drastic behavioral changes or psychiatric decompensation. It was noted that Therese was having staff address her own confrontations/concerns with other residents and not addressing things herself as she previously would have done. No overt paranoia or psychosis could be appreciated. Yancy also mentioned incidents of pt calling [facility] and APS, as Savanna mentioned above.    When asked about " medication adherence, Therese has been responsible for her own medication administration since last week. Has recently been transitioning to being responsible for her own medications. Medpak is filled at the start of the week, and meds are taken on an honor system.    When asked about safety, staff would have a means to call EMS and bring a resident to the hospital should any safety concerns arise. She had no safety concerns for Therese at this time.    ----    Will continue to reach out to pt to ensure stability and safety and to schedule sooner follow-up due to concerns of potential decompensation.     Ayan Johnson MD  PGY-3 Psychiatry Resident

## 2021-05-19 NOTE — CONFIDENTIAL NOTE
Cross Cover Note    Call from patient's mother Savanna concerned about daughter has been 'manic' in the last couple of days. Impulsive, talking a lot.   Last night did not sleep, she said that another client at the  was playing music all night (not confirmed by  staff).  Has recently change her job, also on Keflex 2 days ago for a recent UTI.  Nil acute safety concerns, but mom is worried she is having a relapse of her illness.  Usually she is depressed, was manic last about 10 years ago.    I did a drug interaction analyzer,  nil concern for Keflex interacting with any of her prescribed medications.    A: Appears to be a relapse of her Schizoaffective illness, manic episode, likely 2/2 recent stressors- job change and infection      P: Advised to hold off on Fluoxetine 80 mg tonight, given likely manic presentation  - Continue other medication  - If concern for safety to go to ED  - Call Outpatient team in the morning to review status.

## 2021-05-20 ENCOUNTER — TELEPHONE (OUTPATIENT)
Dept: PSYCHIATRY | Facility: CLINIC | Age: 26
End: 2021-05-20

## 2021-05-20 NOTE — TELEPHONE ENCOUNTER
Gage, MD Sabrina Duarte Laura, RN; Lila Urbano MD   Caller: Unspecified (Today, 10:28 AM)             Yes, that will be fine. Okay to use any 30 minute green space I have free.     Lila - got it covered, so no worries.     Bassem EUBANKS        Writer called pt to officially schedule the 10 am appt with Dr. Almonte. No answer. LVM requesting a c/b to schedule this. Will send a note to scheduling and ask that they send pt the video instructions.

## 2021-05-20 NOTE — TELEPHONE ENCOUNTER
Writer received return phone call from the pt. Pt is willing to meet with Dr. Almonte tomorrow, but she starts work at 4 pm, so she cannot meet at 3:30 pm for 60 mins. Will discuss with Dr. Almonte and call pt back.

## 2021-05-20 NOTE — TELEPHONE ENCOUNTER
Writer placed appt on hold for tomorrow with Dr. Almonte.    Called pt. No answer. LVM with reason for phone call and requested a c/b to schedule a visit with Dr. Almonte.    Called Yancy with pt's [facility] at [phone]. She informed this writer that pt is in a therapy session and should be done around 11 am. She asked that writer try calling the pt after her session. Yancy also informed this writer that they will begin administering pt's medications again, due to pt's relapse in symptoms and concerns about med adherence.

## 2021-05-20 NOTE — TELEPHONE ENCOUNTER
Bassem Almonte MD Garcia, Justin Quilop, MD; Angie Lanier MD; Jena Bennett, RN             Hi Jena - this is a patient of Dr. Contreras's who is having likely emerging malia. I would like to see her tomorrow (Friday, 5/21) for an urgent appointment at 3;30 pm. 60 minute visit via video. Could you call her and offer the appointment?     Thanks,     Bassem KARIMI.    Previous Messages    ----- Message -----   From: Ayan Johnson MD   Sent: 5/20/2021   9:51 AM CDT   To: Angie Lanier MD, Bassem Almonte MD, *   Subject: RE: Urgent follow-up                             I think this would be best done with the help of a nurse partner in case Therese has questions about the situation.     Thanks,     - Ayan   ----- Message -----   From: Bassem Almonte MD   Sent: 5/20/2021   8:03 AM CDT   To: Angie Lanier MD, Lila Urbano MD, *   Subject: RE: Urgent follow-up                             Hi everyone - thanks for your input. Yes this would be okay with me. I can see her tomorrow (Friday) at 3:30 pm for a 60 minute video visit.     Ayan - should we have the  reach out to schedule, or would it be better to have one of our RN partners to this so they can explain the situation?     Bassem KARIMI   ----- Message -----   From: Angie Lanier MD   Sent: 5/19/2021   6:38 PM CDT   To: Lila Urbano MD, Ayan Johnson MD, *   Subject: RE: Urgent follow-up                             You are right about that Bassem, generally speaking.   But if this is a video visit and hospitalization is needed, then we just need to text [facility] to get a bed.   Then the group home can bring her in to the ER, a bed will be reserved and no BEC assessment is needed.   Can this be done on video?   Would you feel the assessment to be adequate Bassem?   I think it can be, we have been doing this for a year.   What do you think?     ----- Message -----   From: Bassem Almonte MD   Sent: 5/19/2021   4:58 PM  CDT   To: Angie Lanier MD, Lila Urbano MD, *   Subject: RE: Urgent follow-up                             Hi Ayan,     I could potentially see her Friday at 3:30 pm for a 60 minute visit. The only thing that gives me pause is that seeing a destabilized patient on late Friday PM is tricky, especially is hospitalization or some other intervention is needed. I believe Dr. Lanier wanted to clear these types of requests, so I will defer to her before moving forward.     Thanks,     Bassem KARIMI   ----- Message -----   From: Ayan Johnson MD   Sent: 5/19/2021  11:49 AM CDT   To: Angie Lanier MD, Lila Urbano MD, *   Subject: Urgent follow-up                                 Hi all,     This is one of Anya's patients, and I'm covering her inbox this week.     26F with schizoaffective disorder. This pt's mom had called last night with concerns that pt is experiencing a manic episode for the first time in 10+ years. Sounds like there may be some med adherence problems since the pt was recently given increased responsibility for taking her own meds.     I wasn't able to get a hold of the pt this morning but spoke with pt's mom and group home staff. Sounds like there aren't concerns for imminent danger or needing hospitalization emergently, but it'd be good to have her seen before the weekend.     Lila or Bassem, would either of you be able to see this pt during one of your open time slots on Friday?     Thanks,     Ayan

## 2021-05-21 ENCOUNTER — VIRTUAL VISIT (OUTPATIENT)
Dept: PSYCHIATRY | Facility: CLINIC | Age: 26
End: 2021-05-21
Attending: PSYCHIATRY & NEUROLOGY
Payer: COMMERCIAL

## 2021-05-21 DIAGNOSIS — F25.0 SCHIZOAFFECTIVE DISORDER, BIPOLAR TYPE (H): Primary | ICD-10-CM

## 2021-05-21 PROCEDURE — 99214 OFFICE O/P EST MOD 30 MIN: CPT | Mod: GT | Performed by: STUDENT IN AN ORGANIZED HEALTH CARE EDUCATION/TRAINING PROGRAM

## 2021-05-21 ASSESSMENT — PAIN SCALES - GENERAL: PAINLEVEL: NO PAIN (0)

## 2021-05-21 NOTE — PROGRESS NOTES
"VIDEO VISIT  Cesia Nettles is a 26 year old patient who is being evaluated via a billable video visit.      The patient has been notified of following:   \"This video visit will be conducted via a call between you and your physician/provider. We have found that certain health care needs can be provided without the need for an in-person physical exam. This service lets us provide the care you need with a video conversation. If a prescription is necessary we can send it directly to your pharmacy. If lab work is needed we can place an order for that and you can then stop by our lab to have the test done at a later time. Insurers are generally covering virtual visits as they would in-office visits so billing should not be different than normal.  If for some reason you do get billed incorrectly, you should contact the billing office to correct it and that number is in the AVS .    Video Conference to be completed via:  Katy    Patient has given verbal consent for video visit?:  Yes    Patient would prefer that any video invitations be sent by: Send to e-mail at: [email or web address]      How would patient like to obtain AVS?:  [employer]    AVS SmartPhrase [PsychAVS] has been placed in 'Patient Instructions':  Yes    "

## 2021-05-21 NOTE — PATIENT INSTRUCTIONS
It was nice to visit with you today Therese    Here is the plan that we partnered on:    - Do not take your Prozac for now. After you are feeling all the way better, you can talk with Dr. Contreras about restarting this    - We will not make any other changes to your medications today    - I will have my nurse partner call you in 1 week to check in    - You will see Dr. Contreras in about 2 weeks          **For crisis resources, please see the information at the end of this document**     Patient Education      Thank you for coming to the [facility].    Lab Testing:  If you had lab testing today and your results are reassuring or normal they will be mailed to you or sent through [email or web address] within 7 days. If the lab tests need quick action we will call you with the results. The phone number we will call with results is # [phone] (home) . If this is not the best number please call our clinic and change the number.    Medication Refills:  If you need any refills please call your pharmacy and they will contact us. Our fax number for refills is [phone]. Please allow three business for refill processing. If you need to  your refill at a new pharmacy, please contact the new pharmacy directly. The new pharmacy will help you get your medications transferred.     Scheduling:  If you have any concerns about today's visit or wish to schedule another appointment please call our office during normal business hours [phone] (8-5:00 M-F)    Contact Us:  Please call [phone] during business hours (8-5:00 M-F).  If after clinic hours, or on the weekend, please call  [phone].    Financial Assistance [phone]  [facility] Billing [phone]  Central Billing Office, [facility]: [phone]  [facility] Billing [phone]  Medical Records [phone]  [facility] Patient Bill of Rights [email or web address]        MENTAL HEALTH CRISIS NUMBERS:  For a medical emergency please call  911 or go to the nearest ER.     [facility]:   [facility] -[phone]   Crisis Residence [facility] -[phone]   Walk-In Counseling Center [facility] -[phone]   COPE 24/7 [facility] Mobile Team -[phone] (adults)/[phone] (child)  CHILD: [facility] needs assessment team - [phone]      [facility]:   [facility] - [phone]   Walk-in counseling [facility] - [phone]   Walk-in counseling [facility] - [phone]   Crisis Residence [facility]idence - [phone]  Urgent Care Adult Mental G[facility]-[phone] mobile unit/ 24/7 crisis line    National Crisis Numbers:   National Suicide Prevention Lifeline: 4-856-395-TALK (661-626-8473)  Poison Control Center - 1-994-454-2502  Judobaby/resources for a list of additional resources (SOS)  Trans Lifeline a hotline for transgender people 1-539.232.7048  The Damien Project a hotline for LGBT youth 1-662.423.2057  Crisis Text Line: For any crisis 24/7   To: 314670  see www.crisistextline.org  - IF MAKING A CALL FEELS TOO HARD, send a text!         Again thank you for choosing [facility] MENTAL HEALTH & ADDICTION [facility] and please let us know how we can best partner with you to improve you and your family's health.    You may be receiving a survey regarding this appointment. We would love to have your feedback, both positive and negative. The survey is done by an external company, so your answers are anonymous.

## 2021-05-21 NOTE — PROGRESS NOTES
Video- Visit Details  Type of service:  video visit for medication management  Time of service:    Date:  05/21/2021    Video Start Time:  10:03 AM        Video End Time:  10:30 am    Reason for video visit:  Patient unable to travel due to Covid-19  Originating Site (patient location):  [facility] [place]   Location- Patient's home  Distant Site (provider location):  [facility]  Mode of Communication:  Video Conference via AmWell  Consent:  Patient has given verbal consent for video visit?: Yes        [facility]  Psychiatry Clinic  PSYCHIATRY PROGRESS NOTE     Date of initial Diagnostic Assessment (DA) is 7/8/2016 and most recent Transfer of Care DA is 09/01/20.    CARE TEAM:  PCP- Becki Valencia  Specialty Providers- none    Therapist- Cynthia CALLEJAS at [facility] [phone] (Verbal BRIONNA on file) (sees weekly)   [facility] Team- [facility] Erica Sparks [phone],   (ProAct) Margie Velazquez [phone]    Guardian: Shahnaz Bernal through [facility] [phone]     Living Situation: [facility] [phone] - Shahnaz MARQUEZ (manager) [phone]    Cesia Nettles is a 26 year old patient who uses the name Therese and pronouns she, her.     PERTINENT BACKGROUND                [last transfer eval 09/01/20]      Psych pertinent item history includes suicide attempt [multiple], suicidal ideation, psychosis [sxs include paranoia, AH, ideas of reference], multiple psychotropic trials, psych hosp (>5), lives in a  and has a guardian.    INTERIM HISTORY                                    History was provided by the patient who was a fair historian. Treatment adherence is good.  Since the last visit:   - Patient's mother, Savanna, contacted our clinic on 5/18/2021 reporting concerns that Therese was becoming manic. She was more impulsive, talking a lot, not sleeping (up for 22+hours), and spending more money. She was also having increased  "paranoia. She called [facility] [employer] [facility] about her roommate stealing things, and to \"look at the tapes\". Her fluoxetine was discontinued. Therese had recently been giving responsibility to administer her own medications.     - Today, Therese states that she felt like \"kind of a wreck\" recently, but she is now doing better. She recognizes that she was forgetting to take medications. He group home is now administering them again. She noticed increased anxiety, paranoia, and some symptoms of malia. She specifically recalls believing that her boyfriend was cheating on her, which she now feels is not true.  - Regarding symptoms of malia, she noticed she was talking more, feeling more intense and animated, and getting angry easier. Sleep was erratic, either way too much or too little. The last time she felt like this was 10 years ago  - Things are normalizing now that she is taking her medications consistently and not on fluoxetine. Mood feels more \"in the middle\". Not talking as much. Slept well last night  - Discussed transitioning to Invega Sustenna. She dislikes shots, so this would not be a good fit for her    RECENT PSYCH ROS:     Depression:  None   Elevated:  see HPI. Recent decreased sleep, irritability, increased talkativeness, improving  Psychosis:  Recent paranoia, resolved  Anxiety:  excessive worry   Panic Attack:  NA  Trauma Related:  not assessed.  Dysregulation:  mood dysregulation   Eating Disorder: no    Adverse Effects:  Denies   Pertinent Negatives:  No self-injurious behavior/urges, suicidal and violent ideation    Recent Substance Use:    None reported    SOCIAL HISTORY                               patient reported                    Financial/ Work- SSI + works part time as a   Partner/ - none - started dating her boyfriend in Fall 2020, identifies as bisexual   Children- none      Living situation- Therese lives in [facility] through [employer]. Has been there " "since around 2018.       Social/ Spiritual Support- Her mother, half-siblings, [facility] workers, ,  staff ([employer]enu Brady).  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 \"really close with my grandpa\"     Other- Enjoys spending time with her family , \"huge Dimitris Potter fan\". Mom has 4 cats and 3 dogs    PSYCH and SUBSTANCE USE Critical Summary Points since July 2020 9/1/20 - transfer DA, no med changes, labs not completed prior visit  12/1/20 - add additional 25 mg PRN hydroxyzine at bedtime for sleep if approved by guardian   1/26/21 - no med changes, provided [facility] Address for labs  3/16/21 - Switch to 12.5 mg every afternoon and 12.5 mg at bedtime   5/21/2021 - malia in the context of med non-adherence (starting administering her own meds), stopped fluoxetine, group home starting administering medications again    MEDICAL HISTORY and ALLERGY     Neurological: H/o abscence epilepsy from age [age]    Major Surgery- none    ALLERGIES: Cats and Seasonal allergies     Patient Active Problem List   Diagnosis     Schizoaffective disorder, depressive type (H)     Hx of psychiatric care     Psychosis (H)         MEDICAL REVIEW OF SYSTEMS           Pregnant or breastfeeding- no      Contraception- Nexplanon implant      A comprehensive review of systems was not performed today    MEDICATIONS        Current Outpatient Medications   Medication Sig Dispense Refill     etonogestrel (IMPLANON/NEXPLANON) 68 MG IMPL 1 each by Subdermal route once       FLUoxetine (PROZAC) 40 MG capsule TAKE 2 CAPSULES BY MOUTH DAILY 60 capsule 0     fluticasone (FLONASE) 50 MCG/ACT nasal spray Spray 2 sprays into both nostrils daily       hydrOXYzine (ATARAX) 25 MG tablet TAKE 1 TABLET BY MOUTH AT BEDTIME 30 tablet 0     ibuprofen (ADVIL/MOTRIN) 200 MG tablet Take 400 mg by mouth every 4 hours as needed for mild pain       lamoTRIgine (LAMICTAL) 100 MG tablet TAKE 1 & 1/2 TABLETS BY MOUTH DAILY 45 tablet 0     " "melatonin 5 MG tablet TAKE 1 TABLET (5MG) BY MOUTH EVERY NIGHT AT BEDTIME FOR SLEEP 30 tablet 0     metFORMIN (GLUCOPHAGE) 500 MG tablet TAKE 2 TABLETS BY MOUTH TWICE A DAY WITH MEALS 120 tablet 0     paliperidone ER (INVEGA) 6 MG 24 hr tablet TAKE 2 TABLETS BY MOUTH AT BEDTIME 60 tablet 0     VITALS                                                                There were no vitals taken for this visit.   MENTAL STATUS EXAM                          Alertness: alert  and oriented  Appearance: casually groomed  Behavior/Demeanor: cooperative and pleasant, with good  eye contact   Speech: normal and regular rate and rhythm  Language: intact and no problems  Psychomotor: normal or unremarkable  Mood: \"in the middle\"   Affect: full range; congruent to: mood- yes, content- yes  Thought Process/Associations: unremarkable  Thought Content:  Reports none;  Denies suicidal & violent ideation and delusions  Perception:  Reports none;  Denies auditory hallucinations  Insight: adequate  Judgment: good  Cognition: (6) oriented: time, person, and place  attention span: intact  concentration: intact  recent memory: intact  remote memory: intact  fund of knowledge: appropriate  Gait and Station: N/A (telehealth)    LABS and DATA     PHQ9 TODAY = N/A  PHQ 2/20/2020 3/4/2020 1/26/2021   PHQ-9 Total Score 9 18 13   Q9: Thoughts of better off dead/self-harm past 2 weeks Several days Several days Several days   F/U: Thoughts of suicide or self-harm - - No   F/U: Safety concerns - - No       Recent Labs   Lab Test 02/02/17  0916 06/12/14  0723 12/21/13  1016   CR 0.78 0.70 0.7  0.7   GFRESTIMATED >90  Non  GFR Calc   >90  --      Recent Labs   Lab Test 02/02/17  0916 12/21/13  1016   AST 24 33   ALT 23 18   ALKPHOS 102 107  107       PSYCHOTROPIC DRUG INTERACTIONS      fluoxetine + hydrozyzine + paliperidone: concurrent use may increase risk of QTc prolongation.     fluoxetine + paliperidone: concurrent use may be " associated with increased risk of  Neuroleptic malignant syndrome and serotonin syndrome.     paliperidone + lamotrigine + hydroxyzine: concurrent use may increase risk of CNS depression    LamoTRIgine may increase the serum concentration of MetFORMIN.   Fluoxetine may enhance the hypoglycemic effect of METFORMIN.       MANAGEMENT:  Monitoring for adverse effects, routine vitals, routine labs, periodic EKGs and using lowest therapeutic dose of [psychotropics]    RISK STATEMENT for SAFETY     Cesia Geffre did not appear to be an imminent safety risk to self or others.    DIAGNOSES                                           [m2, h3]      Schizoaffective Disorder, bipolar type, mre manic, mild  Unspecified Anxiety Disorder  Emotional Dysregulation    ASSESSMENT                                        [m2, h3]          TODAY Therese recently experienced some emerging symptoms of malia in the context of forgetting doses of her medications. These symptoms are now resolving after resuming her previous anti-manic medications and holding her fluoxetine. Given her improvement, we agreed to not make any further medication adjustments today. She will continue to hold the fluoxetine. She has been on this for many years, so it may need to be restarted in the near future. I will have my nurse partner reach out to check in in 1 week, and she will see Dr. Contreras in about 2 weeks. Her AVS was faxed to her group home ([phone]). [facility] I called her guardian and left a voicemail.       PLAN                                                            [m2, h3]                                               1) Meds  - Continue paliperidone 12 mg at bedtime   - Continue lamotrigine 150 mg daily   - Stop fluoxetine 80 mg at bedtime (discontinued on 5/18/2021)  - Continue metformin 1000 mg BID (pt may take second dose with a snack in the early afternoon before going to work)  - hydroxyzine 25 mg HS  - Continue melatonin  from 5 mg 1-2 hours before bedtime PRN     MNPMP review was not needed today.    2) Therapy-    - Continue weekly individual therapy  - Previously recommend family therapy; referral offered for Dr. Live, but patient declined    3) Next Due   Labs- AP labs overdue - Not completed - discussed with patient each visit, provided [facility]A Hidalgo address in [place]   EKG- QTc 445 w/ T wave abnormality on 6/10/19; repeat EKG w/ med changes  Rating scales- PHQ9 and AIMS next due Sept 2020 - deferred due to Covid 19/telehealth visits    4) Referrals  No Referrals needed   - fax info for Eduar Hidalgo to her group home for her to completed labs - [phone] fax number     5) RTC:  2 weeks w/Dr. Contreras. My nurse partner will call in 1 week to check in    6) Crisis Numbers:   Provided routinely in AVS     After hours:  [phone]      TREATMENT RISK STATEMENT:  The risks, benefits, alternatives and potential adverse effects have been discussed and are understood by the pt. The pt understands the risks of using street drugs or alcohol. There are no medical contraindications, the pt agrees to treatment with the ability to do so. The pt knows to call the clinic for any problems or to access emergency care if needed.  Medical and substance use concerns are documented above.  Psychotropic drug interaction check was done, including changes made today.    PROVIDER:  Bassem Almonte MD    Patient staffed with Dr. Whiteside, who will sign the note.  Supervisor is Dr. Lanier    TELEHEALTH ATTENDING ATTESTATION  Following the ACGME guidelines on telemedicine and direct supervision due to COVID-19, I was concurrently participating in and/or monitoring the patient care through appropriate telecommunication technology.  I discussed the key portions of the service with the resident, including the mental status examination and developing the plan of care. I reviewed key portions of the history with the resident. I agree with the findings and plan  as documented in this note.   Robert Whiteside MD

## 2021-05-28 ENCOUNTER — TELEPHONE (OUTPATIENT)
Dept: PSYCHIATRY | Facility: CLINIC | Age: 26
End: 2021-05-28

## 2021-05-28 NOTE — TELEPHONE ENCOUNTER
Bassem Almonte MD Labossiere, Laura, JOVANI Bella,     I met with Therese today. She is one of Dr. Contreras's patients. She had some breakthrough malia and psychosis after missing some of her medications. Her group home is now managing her medications, and she is feeling better. She will see Dr. Contreras in about 2 weeks. Could you also call her in about 1 week to check in? I want to make sure things are still improving for her.     Thanks!     Bassem Almonte       Writer called the pt. No answer. LVM with reason for phone call. Requested a call back if she has concerns.

## 2021-06-07 ENCOUNTER — TELEPHONE (OUTPATIENT)
Dept: PSYCHIATRY | Facility: CLINIC | Age: 26
End: 2021-06-07

## 2021-06-07 ENCOUNTER — VIRTUAL VISIT (OUTPATIENT)
Dept: PSYCHIATRY | Facility: CLINIC | Age: 26
End: 2021-06-07
Attending: PSYCHIATRY & NEUROLOGY
Payer: COMMERCIAL

## 2021-06-07 DIAGNOSIS — F20.3 SCHIZOPHRENIA, UNDIFFERENTIATED (H): ICD-10-CM

## 2021-06-07 DIAGNOSIS — F41.9 ANXIETY: ICD-10-CM

## 2021-06-07 DIAGNOSIS — F25.1 SCHIZOAFFECTIVE DISORDER, DEPRESSIVE TYPE (H): ICD-10-CM

## 2021-06-07 DIAGNOSIS — R45.851 SUICIDAL IDEATION: ICD-10-CM

## 2021-06-07 DIAGNOSIS — Z13.9 SCREENING FOR CONDITION: Primary | ICD-10-CM

## 2021-06-07 PROCEDURE — 99215 OFFICE O/P EST HI 40 MIN: CPT | Mod: GT | Performed by: STUDENT IN AN ORGANIZED HEALTH CARE EDUCATION/TRAINING PROGRAM

## 2021-06-07 RX ORDER — PALIPERIDONE 6 MG/1
12 TABLET, EXTENDED RELEASE ORAL AT BEDTIME
Qty: 60 TABLET | Refills: 0 | Status: SHIPPED | OUTPATIENT
Start: 2021-06-07 | End: 2021-07-07

## 2021-06-07 RX ORDER — HYDROXYZINE HYDROCHLORIDE 25 MG/1
25 TABLET, FILM COATED ORAL AT BEDTIME
Qty: 30 TABLET | Refills: 0 | Status: SHIPPED | OUTPATIENT
Start: 2021-06-07 | End: 2021-07-07

## 2021-06-07 RX ORDER — LAMOTRIGINE 100 MG/1
150 TABLET ORAL DAILY
Qty: 45 TABLET | Refills: 0 | Status: SHIPPED | OUTPATIENT
Start: 2021-06-07 | End: 2021-07-07

## 2021-06-07 ASSESSMENT — PAIN SCALES - GENERAL: PAINLEVEL: NO PAIN (0)

## 2021-06-07 NOTE — TELEPHONE ENCOUNTER
On 6/7/2021, at 8:41, writer called patient at [phone] to confirm Virtual Visit. Writer unable to make contact with patient. Writer left detailed voice message for call back. [phone] left as call back number. Jane Ruggiero, [facility]

## 2021-06-07 NOTE — PATIENT INSTRUCTIONS
**For crisis resources, please see the information at the end of this document**     Patient Education      Thank you for coming to the [facility] [place] [facility].    Lab Testing:  If you had lab testing today and your results are reassuring or normal they will be mailed to you or sent through [employer] within 7 days. If the lab tests need quick action we will call you with the results. The phone number we will call with results is # [phone] (home) . If this is not the best number please call our clinic and change the number.    Medication Refills:  If you need any refills please call your pharmacy and they will contact us. Our fax number for refills is [phone]. Please allow three business for refill processing. If you need to  your refill at a new pharmacy, please contact the new pharmacy directly. The new pharmacy will help you get your medications transferred.     Scheduling:  If you have any concerns about today's visit or wish to schedule another appointment please call our office during normal business hours [phone] (8-5:00 M-F)    Contact Us:  Please call [phone] during business hours (8-5:00 M-F).  If after clinic hours, or on the weekend, please call  [phone].    Financial Assistance [phone]  [place] Billing [phone]  Central Billing Office, [facility]: [phone]  [place] Billing [phone]  Medical Records [phone]  [place] Patient Bill of Rights [email or web address]       MENTAL HEALTH CRISIS NUMBERS:  For a medical emergency please call  911 or go to the nearest ER.     [facility]:   [facility] -[phone]   Crisis Residence [facility] -[phone]   Walk-In Counseling Center [facility] -[phone]   COPE 24/7 [place] Mobile Team -[phone] (adults)/[phone] (child)  CHILD: [facility] needs assessment team  - [phone]      [facility]:   [facility] - [phone]   Walk-in counseling [facility] - [phone]   Walk-in counseling [facility] - [phone]   Crisis Residence [facility] - [phone]  Urgent Care Adult Mental [place]-[phone] mobile unit/ 24/7 crisis line    National Crisis Numbers:   National Suicide Prevention Lifeline: 5-617-312-TALK (600-360-8471)  Poison Control Center - 7-286-320-3043  [place]/resources for a list of additional resources (SOS)  Trans Lifeline a hotline for transgender people 1-980.952.7582  The [place] Project a hotline for LGBT youth 6-255-060-9318  Crisis Text Line: For any crisis 24/7   To: 247366  see www.crisistextline.org  - IF MAKING A CALL FEELS TOO HARD, send a text!         Again thank you for choosing [facility] and please let us know how we can best partner with you to improve you and your family's health.    You may be receiving a survey regarding this appointment. We would love to have your feedback, both positive and negative. The survey is done by an external company, so your answers are anonymous.

## 2021-06-07 NOTE — PROGRESS NOTES
"VIDEO VISIT  Cesia Nettles is a 26 year old patient who is being evaluated via a billable video visit.      The patient has been notified of following:   \"This video visit will be conducted via a call between you and your physician/provider. We have found that certain health care needs can be provided without the need for an in-person physical exam. This service lets us provide the care you need with a video conversation. If a prescription is necessary we can send it directly to your pharmacy. If lab work is needed we can place an order for that and you can then stop by our lab to have the test done at a later time. Insurers are generally covering virtual visits as they would in-office visits so billing should not be different than normal.  If for some reason you do get billed incorrectly, you should contact the billing office to correct it and that number is in the AVS .    Video Conference to be completed via:  Katy    Patient has given verbal consent for video visit?:  Yes    Patient would prefer that any video invitations be sent by: Send to e-mail at: [email or web address]      How would patient like to obtain AVS?:  [employer]    AVS SmartPhrase [PsychAVS] has been placed in 'Patient Instructions':  Yes  "
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
## 2021-06-08 ENCOUNTER — HOSPITAL ENCOUNTER (OUTPATIENT)
Dept: LAB | Facility: CLINIC | Age: 26
End: 2021-06-08
Attending: STUDENT IN AN ORGANIZED HEALTH CARE EDUCATION/TRAINING PROGRAM
Payer: COMMERCIAL

## 2021-06-08 ENCOUNTER — TELEPHONE (OUTPATIENT)
Dept: PSYCHIATRY | Facility: CLINIC | Age: 26
End: 2021-06-08
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
## 2021-06-08 NOTE — TELEPHONE ENCOUNTER
[facility] Call Center    Phone Message    May a detailed message be left on voicemail: yes     Reason for Call: Other: pt update     Pt called to report that she could not get her bloodwork done today before work, because it's a new job. Pt is planning to go complete labs when her job ends at 3:30, on Friday.      Action Taken: Message routed to:  Other: nursing pool    Travel Screening: Not Applicable

## 2021-06-08 NOTE — TELEPHONE ENCOUNTER
LVm for patient letting her know that she need to get her labs drawn while fasting in the morning.  Requested that patient call back if she is having trouble figuring out where to get labs drawn early in the morning.
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
## 2021-06-09 ENCOUNTER — HOSPITAL ENCOUNTER (OUTPATIENT)
Dept: LAB | Facility: CLINIC | Age: 26
Discharge: HOME | End: 2021-06-09
Attending: STUDENT IN AN ORGANIZED HEALTH CARE EDUCATION/TRAINING PROGRAM | Admitting: STUDENT IN AN ORGANIZED HEALTH CARE EDUCATION/TRAINING PROGRAM
Payer: COMMERCIAL

## 2021-06-09 DIAGNOSIS — Z13.9 SCREENING FOR CONDITION: ICD-10-CM

## 2021-06-09 DIAGNOSIS — Z79.899 ENCOUNTER FOR LONG-TERM (CURRENT) USE OF MEDICATIONS: ICD-10-CM

## 2021-06-09 LAB
ALBUMIN SERPL-MCNC: 3.6 G/DL (ref 3.4–5)
ALP SERPL-CCNC: 95 U/L (ref 40–150)
ALT SERPL W P-5'-P-CCNC: 17 U/L (ref 0–50)
ANION GAP SERPL CALCULATED.3IONS-SCNC: 3 MMOL/L (ref 3–14)
AST SERPL W P-5'-P-CCNC: 27 U/L (ref 0–45)
BASOPHILS # BLD AUTO: 0 10E9/L (ref 0–0.2)
BASOPHILS NFR BLD AUTO: 0.2 %
BILIRUB SERPL-MCNC: 0.4 MG/DL (ref 0.2–1.3)
BUN SERPL-MCNC: 8 MG/DL (ref 7–30)
CALCIUM SERPL-MCNC: 9.3 MG/DL (ref 8.5–10.1)
CHLORIDE SERPL-SCNC: 109 MMOL/L (ref 94–109)
CHOLEST SERPL-MCNC: 178 MG/DL
CO2 SERPL-SCNC: 28 MMOL/L (ref 20–32)
CREAT SERPL-MCNC: 0.73 MG/DL (ref 0.52–1.04)
DIFFERENTIAL METHOD BLD: NORMAL
EOSINOPHIL # BLD AUTO: 0.3 10E9/L (ref 0–0.7)
EOSINOPHIL NFR BLD AUTO: 5.4 %
ERYTHROCYTE [DISTWIDTH] IN BLOOD BY AUTOMATED COUNT: 14.1 % (ref 10–15)
GFR SERPL CREATININE-BSD FRML MDRD: >90 ML/MIN/{1.73_M2}
GLUCOSE SERPL-MCNC: 93 MG/DL (ref 70–99)
HBA1C MFR BLD: 5.1 % (ref 0–5.6)
HCT VFR BLD AUTO: 38.5 % (ref 35–47)
HDLC SERPL-MCNC: 52 MG/DL
HGB BLD-MCNC: 12.4 G/DL (ref 11.7–15.7)
IMM GRANULOCYTES # BLD: 0 10E9/L (ref 0–0.4)
IMM GRANULOCYTES NFR BLD: 0.2 %
LDLC SERPL CALC-MCNC: 108 MG/DL
LYMPHOCYTES # BLD AUTO: 2.1 10E9/L (ref 0.8–5.3)
LYMPHOCYTES NFR BLD AUTO: 39.7 %
MCH RBC QN AUTO: 26.6 PG (ref 26.5–33)
MCHC RBC AUTO-ENTMCNC: 32.2 G/DL (ref 31.5–36.5)
MCV RBC AUTO: 82 FL (ref 78–100)
MONOCYTES # BLD AUTO: 0.4 10E9/L (ref 0–1.3)
MONOCYTES NFR BLD AUTO: 7.8 %
NEUTROPHILS # BLD AUTO: 2.5 10E9/L (ref 1.6–8.3)
NEUTROPHILS NFR BLD AUTO: 46.7 %
NONHDLC SERPL-MCNC: 126 MG/DL
NRBC # BLD AUTO: 0 10*3/UL
NRBC BLD AUTO-RTO: 0 /100
PLATELET # BLD AUTO: 280 10E9/L (ref 150–450)
POTASSIUM SERPL-SCNC: 4 MMOL/L (ref 3.4–5.3)
PROT SERPL-MCNC: 7.3 G/DL (ref 6.8–8.8)
RBC # BLD AUTO: 4.67 10E12/L (ref 3.8–5.2)
SODIUM SERPL-SCNC: 140 MMOL/L (ref 133–144)
TRIGL SERPL-MCNC: 91 MG/DL
WBC # BLD AUTO: 5.4 10E9/L (ref 4–11)

## 2021-06-09 PROCEDURE — 83036 HEMOGLOBIN GLYCOSYLATED A1C: CPT | Performed by: STUDENT IN AN ORGANIZED HEALTH CARE EDUCATION/TRAINING PROGRAM

## 2021-06-09 PROCEDURE — 85027 COMPLETE CBC AUTOMATED: CPT | Performed by: STUDENT IN AN ORGANIZED HEALTH CARE EDUCATION/TRAINING PROGRAM

## 2021-06-09 PROCEDURE — 80061 LIPID PANEL: CPT | Performed by: STUDENT IN AN ORGANIZED HEALTH CARE EDUCATION/TRAINING PROGRAM

## 2021-06-09 PROCEDURE — 85025 COMPLETE CBC W/AUTO DIFF WBC: CPT | Performed by: STUDENT IN AN ORGANIZED HEALTH CARE EDUCATION/TRAINING PROGRAM

## 2021-06-09 PROCEDURE — 80053 COMPREHEN METABOLIC PANEL: CPT | Performed by: STUDENT IN AN ORGANIZED HEALTH CARE EDUCATION/TRAINING PROGRAM

## 2021-06-09 PROCEDURE — 36415 COLL VENOUS BLD VENIPUNCTURE: CPT | Performed by: STUDENT IN AN ORGANIZED HEALTH CARE EDUCATION/TRAINING PROGRAM

## 2021-06-30 DIAGNOSIS — F41.9 ANXIETY: ICD-10-CM

## 2021-06-30 DIAGNOSIS — F20.3 SCHIZOPHRENIA, UNDIFFERENTIATED (H): ICD-10-CM

## 2021-06-30 DIAGNOSIS — F25.1 SCHIZOAFFECTIVE DISORDER, DEPRESSIVE TYPE (H): ICD-10-CM

## 2021-06-30 DIAGNOSIS — R45.851 SUICIDAL IDEATION: ICD-10-CM

## 2021-07-02 NOTE — TELEPHONE ENCOUNTER
metFORMIN (GLUCOPHAGE) 500 MG  Last refilled: 6/7/21  Qty: 120    Last seen: 6/7/21  RTC:    Cancel: 0  No-show: 0  Next appt: NONE  Refill pended and routed to the provider for review/determination due to   Per last note  / LABS  *Last note unsigned

## 2021-07-07 RX ORDER — HYDROXYZINE HYDROCHLORIDE 25 MG/1
TABLET, FILM COATED ORAL
Qty: 30 TABLET | Refills: 0 | Status: SHIPPED | OUTPATIENT
Start: 2021-07-07 | End: 2021-07-30

## 2021-07-07 RX ORDER — PALIPERIDONE 6 MG/1
TABLET, EXTENDED RELEASE ORAL
Qty: 60 TABLET | Refills: 0 | Status: SHIPPED | OUTPATIENT
Start: 2021-07-07 | End: 2021-07-30

## 2021-07-07 RX ORDER — LAMOTRIGINE 100 MG/1
TABLET ORAL
Qty: 45 TABLET | Refills: 0 | Status: SHIPPED | OUTPATIENT
Start: 2021-07-07 | End: 2021-07-30

## 2021-07-28 DIAGNOSIS — F25.1 SCHIZOAFFECTIVE DISORDER, DEPRESSIVE TYPE (H): ICD-10-CM

## 2021-07-28 DIAGNOSIS — F41.9 ANXIETY: ICD-10-CM

## 2021-07-28 DIAGNOSIS — F20.3 SCHIZOPHRENIA, UNDIFFERENTIATED (H): ICD-10-CM

## 2021-07-28 DIAGNOSIS — R45.851 SUICIDAL IDEATION: ICD-10-CM

## 2021-07-30 RX ORDER — LAMOTRIGINE 100 MG/1
150 TABLET ORAL DAILY
Qty: 45 TABLET | Refills: 0 | Status: SHIPPED | OUTPATIENT
Start: 2021-07-30 | End: 2021-08-06

## 2021-07-30 RX ORDER — HYDROXYZINE HYDROCHLORIDE 25 MG/1
25 TABLET, FILM COATED ORAL AT BEDTIME
Qty: 30 TABLET | Refills: 0 | Status: SHIPPED | OUTPATIENT
Start: 2021-07-30 | End: 2021-08-16

## 2021-07-30 RX ORDER — PALIPERIDONE 6 MG/1
12 TABLET, EXTENDED RELEASE ORAL AT BEDTIME
Qty: 60 TABLET | Refills: 0 | Status: SHIPPED | OUTPATIENT
Start: 2021-07-30 | End: 2021-08-16

## 2021-07-30 NOTE — TELEPHONE ENCOUNTER
melatonin 5 MG  hydrOXYzine (ATARAX) 25 MG  Last refilled: 7/7/21  Qty: 30   paliperidone ER (INVEGA) 6MG  Last refilled: 7/7/21  Qty: 60  metFORMIN (GLUCOPHAGE) 500 MG  Last refilled: 7/7/21  Qty: 120  lamoTRIgine (LAMICTAL) 100 MG   Last refilled: 7/7/21  Qty: 45    Last seen: 6/7/21  RTC: 6-8 WEEKS  Cancel: 0  No-show: 0  Next appt: NONE  Scheduling has been notified to contact the pt for appointment.  Refill decision: 30 day shabbir refill sent to the pharmacy - including instructions for patient to call the clinic and schedule an appointment.

## 2021-08-02 ENCOUNTER — DOCUMENTATION ONLY (OUTPATIENT)
Dept: OTHER | Facility: CLINIC | Age: 26
End: 2021-08-02

## 2021-08-04 ENCOUNTER — TELEPHONE (OUTPATIENT)
Dept: PSYCHIATRY | Facility: CLINIC | Age: 26
End: 2021-08-04

## 2021-08-05 NOTE — PROGRESS NOTES
"Video- Visit Details  Type of service:  video visit for medication management  Time of service:    Date:  08/06/2021    Video Start Time:  9:00 AM        Video End Time:  09:40 am    Reason for video visit:  Patient unable to travel due to Covid-19  Originating Site (patient location):  [facility]- [place]   Location- Patient's home  Distant Site (provider location):  [facility]  Mode of Communication:  Video Conference via AmWell  Consent:  Patient has given verbal consent for video visit?: Yes        [facility]  Psychiatry Clinic  PSYCHIATRY PROGRESS NOTE     Date of initial Diagnostic Assessment (DA) is 7/8/2016 and most recent Transfer of Care DA is 09/01/20.    CARE TEAM:  PCP- Becki Valencia  Specialty Providers- none    Therapist- Cynthia CALLEJAS at [facility] [phone] (Verbal BRIONNA on file) (sees weekly)   [facility] Team- [facility] Erica Sparks [phone],   (ProAct) Margie Velazquez [phone]    Guardian: Shahnaz Bernal through [facility] [phone]     Living Situation: [facility] [phone] - Shahnaz MARQUEZ (manager) [phone]    Cesia Nettles is a 26 year old patient who uses the name Therese and pronouns she, her.     PERTINENT BACKGROUND                [last transfer eval 09/01/20]      Psych pertinent item history includes suicide attempt [multiple], suicidal ideation, psychosis [sxs include paranoia, AH, ideas of reference], multiple psychotropic trials, psych hosp (>5), lives in a  and has a guardian.    INTERIM HISTORY                                    History was provided by the patient who was a fair historian. Treatment adherence is good.  Since the last visit:   - Patient called the after hours clinic line 2 days ago to report that her mood and emotions were \"unregulated and unstable\" and requested to be seen soon.  - Today, she reports that she's having a lot of stress in her group home-feeling " unsafe with a house mate but things have gotten better so she's no longer feeling concerned about it.  -She's has been experiencing a lot of stress at work because a co-worker has been mean to her.  -Due to these above stressors, she reports that her mood has been very unstable-crying excessively, irritable,anxious, and numb. Sleep has been varied: sometimes she would sleep for 6 hours and sometimes less than that.  -Reports racing thoughts during work. Patient expressed that she's open to having some time off work but nervous about how it would be received by her manager because she's never asked for time off due to her mental needs.  -States that anxiety has been intense. She described it as hyperventilating so much that she feels nauseated and throws up. Last time this happened was last Wednesday and it's usually triggered by the stressors noted above. She reported that it doesn't feel like a panic attack.    RECENT PSYCH ROS:     Depression:  Low mood, irritability  Elevated:    Easily distractible  Psychosis:  Recent paranoia about co-worker  Anxiety:  excessive worry with episodes of hyperventilation and nausea   Panic Attack:  none  Dysregulation:  mood dysregulation   Eating Disorder: no    Adverse Effects:  Denies   Pertinent Negatives:  No self-injurious behavior/urges, suicidal and violent ideation    Recent Substance Use:  Alcohol: a sip or two of radha once/month    SOCIAL HISTORY                               patient reported                    Financial/ Work- SSI + works part time - 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 Has been there since around 2018.       Social/ Spiritual Support- Her mother, half-siblings, [facility] workers, ,  staff (Renu Brady).  She does  "an OT group for professional rehabilitation. \"really close with my grandpa\"     Other- Enjoys spending time with her family , \"huge Dimitrisgracia Vazquez fan\". Mom has 4 cats and 3 dogs    PSYCH and SUBSTANCE USE Critical Summary Points since July 2020 9/1/20 - transfer DA, no med changes, labs not completed prior visit  12/1/20 - add additional 25 mg PRN hydroxyzine at bedtime for sleep if approved by guardian   1/26/21 - no med changes, provided [facility] Address for labs  3/16/21 - Switch to 12.5 mg every afternoon and 12.5 mg at bedtime   5/21/2021 - malia in the context of med non-adherence (starting administering her own meds), stopped fluoxetine, group home starting administering medications again  6/7/21 - no med changes, discussed labs again   8/6/21- increased Lamictal to 200 mg    MEDICAL HISTORY and ALLERGY     Neurological: H/o abscence epilepsy from age [age]    Major Surgery- none    ALLERGIES: Cats and Seasonal allergies     Patient Active Problem List   Diagnosis     Schizoaffective disorder, depressive type (H)     Hx of psychiatric care     Psychosis (H)         MEDICAL REVIEW OF SYSTEMS           Pregnant or breastfeeding- no      Contraception- Nexplanon implant      A comprehensive review of systems was not performed today    MEDICATIONS        Current Outpatient Medications   Medication Sig Dispense Refill     etonogestrel (IMPLANON/NEXPLANON) 68 MG IMPL 1 each by Subdermal route once       fluticasone (FLONASE) 50 MCG/ACT nasal spray Spray 2 sprays into both nostrils daily       hydrOXYzine (ATARAX) 25 MG tablet Take 1 tablet (25 mg) by mouth At Bedtime *PLEASE SCHEDULE APPT. FOR REFILLS [phone]* 30 tablet 0     ibuprofen (ADVIL/MOTRIN) 200 MG tablet Take 400 mg by mouth every 4 hours as needed for mild pain       lamoTRIgine (LAMICTAL) 100 MG tablet Take 1.5 tablets (150 mg) by mouth daily *PLEASE SCHEDULE APPT. FOR REFILLS [phone]* 45 tablet 0     melatonin 5 MG tablet Take 1 tablet " "(5 mg) by mouth nightly as needed for sleep *PLEASE SCHEDULE APPT. FOR REFILLS [phone]* 30 tablet 0     metFORMIN (GLUCOPHAGE) 500 MG tablet TAKE 2 TABLETS BY MOUTH TWICE A DAY WITH MEALS 120 tablet 0     paliperidone ER (INVEGA) 6 MG 24 hr tablet Take 2 tablets (12 mg) by mouth At Bedtime *PLEASE SCHEDULE APPT. FOR REFILLS [phone]* 60 tablet 0     VITALS                                                                There were no vitals taken for this visit.   MENTAL STATUS EXAM                          Alertness: alert  and oriented  Appearance: casually groomed  Behavior/Demeanor: cooperative and pleasant, with good  eye contact   Speech: normal and regular rate and rhythm  Language: intact and no problems  Psychomotor: normal or unremarkable  Mood: \"everywhere\"  Affect: full range; congruent to: mood- yes, content- yes  Thought Process/Associations: unremarkable  Thought Content:  Reports none;  Denies suicidal & violent ideation and delusions  Perception:  Reports none;  Denies auditory hallucinations  Insight: adequate  Judgment: good  Cognition: (6) oriented: time, person, and place  attention span: intact  concentration: intact  recent memory: intact  remote memory: intact  fund of knowledge: appropriate  Gait and Station: N/A (telehealth)    LABS and DATA     PHQ9 TODAY = N/A  PHQ 2/20/2020 3/4/2020 1/26/2021   PHQ-9 Total Score 9 18 13   Q9: Thoughts of better off dead/self-harm past 2 weeks Several days Several days Several days   F/U: Thoughts of suicide or self-harm - - No   F/U: Safety concerns - - No       Recent Labs   Lab Test 06/09/21  0840 02/02/17  0916 06/12/14  0723   CR 0.73 0.78 0.70   GFRESTIMATED >90 >90  Non  GFR Calc   >90     Recent Labs   Lab Test 06/09/21  0840 02/02/17  0916 12/21/13  1016   AST 27 24 33   ALT 17 23 18   ALKPHOS 95 102 107  107     Recent Labs   Lab Test 06/09/21  0840 02/02/17  0916   GLC 93 87   A1C 5.1 4.9     Recent Labs   Lab Test " 06/09/21  0840 12/10/18  0912   CHOL 178 205*   TRIG 91 201*   * 120*   HDL 52 45*     Recent Labs   Lab Test 06/09/21  0840 02/02/17  0916   AST 27 24   ALT 17 23   ALKPHOS 95 102     Recent Labs   Lab Test 06/09/21  0840 12/10/18  0912   WBC 5.4 6.4   ANEU 2.5 3.3   HGB 12.4 11.7    292       PSYCHOTROPIC DRUG INTERACTIONS      hydrozyzine + paliperidone: concurrent use may increase risk of QTc prolongation.     paliperidone + lamotrigine + hydroxyzine: concurrent use may increase risk of CNS depression    LamoTRIgine may increase the serum concentration of MetFORMIN.       MANAGEMENT:  Monitoring for adverse effects, routine vitals, routine labs, periodic EKGs and using lowest therapeutic dose of [psychotropics]    RISK STATEMENT for SAFETY     Cesia Geffre did not appear to be an imminent safety risk to self or others.    DIAGNOSES                                           [m2, h3]      Schizoaffective Disorder, bipolar type, most recent episode depressed  Unspecified Anxiety Disorder  Emotional Dysregulation    ASSESSMENT                                        [m2, h3]          Therese reports mood lability ranging from low mood with crying, irritability, and feeling happy in the last week. She attributes these changes to stressors at work and at her group home. She doesn't report major sleep disruptions, her average this week has been ~6hours. Today, she reports feeling euthymic and better. She doesn't endorse psychotic symptoms except for paranoia at a group home flatmate who's the cause of stress for her. We talked about increasing Lamictal to 200 mg to better target mood and the possibility of taking some time off work. Therese is agreeable to the Lamictal increase but reluctant to take sometime off work. She feels nervous that her manager won't take it well and asked for some time to talk with people in her life about it. She reported no medication side effects and good adherence to her  medications. No SI, HI or acute concerns.      Future Considerations(by [by previous provider)-  - consider resuming Prozac if re-emerging depressive symptoms, not the expected cause of last manic symptoms . Likely due to med non-adherence) .    PLAN                                                            [m2, h3]                                               1) Meds  - Continue paliperidone 12 mg at bedtime   - Increase lamotrigine to 200 mg daily (with approval from guardian)  - Continue metformin 1000 mg BID (pt may take second dose with a snack in the early afternoon before going to work)  - continue hydroxyzine 25 mg HS  - Continue melatonin from 5 mg 1-2 hours before bedtime PRN     [place]NPMP review was not needed today. She's not taking any controlled substances    2) Therapy-    - Continue weekly individual therapy  - Previously recommend family therapy; referral offered for Dr. Live, but patient declined    3) Next Due   Labs- AP labs 12/2021  EKG- PRN  Rating scales- PHQ9 and AIMS due at next in-patient appt.    4) Referrals: None      5) RTC:  6-8 weeks    6) Crisis Numbers:   Provided routinely in AVS     After hours:  [phone]      TREATMENT RISK STATEMENT:  The risks, benefits, alternatives and potential adverse effects have been discussed and are understood by the pt. The pt understands the risks of using street drugs or alcohol. There are no medical contraindications, the pt agrees to treatment with the ability to do so. The pt knows to call the clinic for any problems or to access emergency care if needed.  Medical and substance use concerns are documented above.  Psychotropic drug interaction check was done, including changes made today.    PROVIDER:  Anabel Taylor MD,MPH    Patient staffed with Dr. Lanier who will sign the note.  Supervisor is Dr. Schofield.

## 2021-08-05 NOTE — TELEPHONE ENCOUNTER
"Therese called to report concern that her \"mood and emotions are unregulated and unstable\" and to requested an outpatient clinic appointment tomorrow (8/5/21). She denied SI/SIB or concern for safety. She agreed that she is able to maintain safety overnight and is able to call the clinic tomorrow to schedule an appointment. She voiced understanding that she could call back if she had additional concerns overnight. She agreed to go to the emergency department if concern for safety arises.    Abi Kate MD  PGY-2 Psychiatry    "

## 2021-08-06 ENCOUNTER — TELEPHONE (OUTPATIENT)
Dept: BEHAVIORAL HEALTH | Facility: CLINIC | Age: 26
End: 2021-08-06

## 2021-08-06 ENCOUNTER — TELEPHONE (OUTPATIENT)
Dept: PSYCHIATRY | Facility: CLINIC | Age: 26
End: 2021-08-06

## 2021-08-06 ENCOUNTER — VIRTUAL VISIT (OUTPATIENT)
Dept: PSYCHIATRY | Facility: CLINIC | Age: 26
End: 2021-08-06
Attending: PSYCHIATRY & NEUROLOGY
Payer: COMMERCIAL

## 2021-08-06 DIAGNOSIS — F25.1 SCHIZOAFFECTIVE DISORDER, DEPRESSIVE TYPE (H): ICD-10-CM

## 2021-08-06 DIAGNOSIS — F20.3 SCHIZOPHRENIA, UNDIFFERENTIATED (H): ICD-10-CM

## 2021-08-06 DIAGNOSIS — R45.851 SUICIDAL IDEATION: ICD-10-CM

## 2021-08-06 DIAGNOSIS — F41.9 ANXIETY: ICD-10-CM

## 2021-08-06 PROCEDURE — 99214 OFFICE O/P EST MOD 30 MIN: CPT | Mod: GT | Performed by: STUDENT IN AN ORGANIZED HEALTH CARE EDUCATION/TRAINING PROGRAM

## 2021-08-06 RX ORDER — LAMOTRIGINE 100 MG/1
200 TABLET ORAL DAILY
Qty: 60 TABLET | Refills: 0
Start: 2021-08-06 | End: 2021-08-06

## 2021-08-06 RX ORDER — LAMOTRIGINE 100 MG/1
200 TABLET ORAL DAILY
Qty: 60 TABLET | Refills: 0
Start: 2021-08-06 | End: 2021-09-20

## 2021-08-06 ASSESSMENT — PAIN SCALES - GENERAL: PAINLEVEL: NO PAIN (0)

## 2021-08-06 NOTE — TELEPHONE ENCOUNTER
Received request to call group home staff (Kelly) regarding medication change for Therese.  Staff expressed concern that patient should have a new medication prescription, but the group home has not received it yet.  They are unable to provide patient with a new prescription until both they and the pharmacy receive this order. Staff also noted that the patient's pharmacy is closed for the weekend.    Per chart review, patient was seen in clinic today and a medication change was implemented (Lamictal 150 mg increased to 200 mg).  According to telephone note by Mk Navarro, Dr. Taylor plans to update ordered patient's chart, coordinate with [facility] for implementing updated dose, and will send a new order to the pharmacy on Monday.  This information was shared with group [facility] staff.  They expressed understanding with plan to initiate dose adjustment on Monday.    Routed to providers.     Abi Kate MD  PGY-2 Psychiatry

## 2021-08-06 NOTE — TELEPHONE ENCOUNTER
S: 5:27PM- Group home staff, Kelly called to request phone call w/  On-call resident.    B: Pt had an appt today at the [facility] w/ her psychiatrist.  Pt is safe.  Pt has a medication prescription but the group home has not received it yet.      A: Call back #[phone].    R: Pt on- call resident at 5:45PM.

## 2021-08-06 NOTE — TELEPHONE ENCOUNTER
"Writer called pt to explore her concerns.    Pt identified that Max Taylor & Yannick advised her to work shorter shifts or take next week off from work due to med changes made today.    Pt stated that she is \"kind of on the fence\" about which choice she should make.  Pt identified that taking the week off might be too much free time and lead her to stay in her room leading to rumination and increased anxiety. Pt reported that she knows that her tendency would be to stay in her room and watch Netflix, but knows that this would not be the best thing for her.  Pt also identified concerns with disappointing her mother by taking the week off.    Writer reviewed with pt pros and cons of either choice and pt ultimately developed a plan to:  > Work this weekend, but leave work a bit earlier.  > Contact her therapist for extra sessions during the coming week.  > She would be able to go to the gym.  > Work with the  staff to develop a plan/schedule for the coming week.    The pt identified that she had just received a text message from her mother that she was OK with the plan of the pt taking the week off and making plans with  staff.    Pt requested that we contact her  to give an overview of the plan (he is aware she lives in a ). Writer identified plan to contact pt afterwards via Issuu and prompted pt to work out the details with her manager.    Routed to providers.            =========================================      Odebunmi, Tolulope Oluwadamilola, MD Snyder, David J, RN  Cc: Angie Lanier MD  Caller: Unspecified (Today, 11:46 AM)  Med Chau,   Thank you for talking with her. The plan sounds good.     [place]           =========================================      Writer attempted to contact pt's manager at number provided in 8/6/21 Issuu message but was directed to a voice mail box with no identification.  Routed message to pt via Issuu.              "

## 2021-08-06 NOTE — PROGRESS NOTES
"VIDEO VISIT  Cesia Nettles is a 26 year old patient who is being evaluated via a billable video visit.      The patient has been notified of following:   \"This video visit will be conducted via a call between you and your physician/provider. We have found that certain health care needs can be provided without the need for an in-person physical exam. This service lets us provide the care you need with a video conversation. If a prescription is necessary we can send it directly to your pharmacy. If lab work is needed we can place an order for that and you can then stop by our lab to have the test done at a later time. Insurers are generally covering virtual visits as they would in-office visits so billing should not be different than normal.  If for some reason you do get billed incorrectly, you should contact the billing office to correct it and that number is in the AVS .    Video Conference to be completed via:  Katy    Patient has given verbal consent for video visit?:  Yes    Patient would prefer that any video invitations be sent by: Send to e-mail at: [email or web address]      How would patient like to obtain AVS?:  Hot PotatoPowderly    AVS SmartPhrase [PsychAVS] has been placed in 'Patient Instructions':  Yes    "

## 2021-08-06 NOTE — TELEPHONE ENCOUNTER
[facility] Call Center    Phone Message    May a detailed message be left on voicemail: yes     Reason for Call: Other:   Patient called regarding a question from this morning's appointment. She would like a call back at [phone].      Action Taken: Message routed to:  Other: Mk Navarro RNCC    Travel Screening: Not Applicable

## 2021-08-06 NOTE — PATIENT INSTRUCTIONS
Today:  - Continue paliperidone 12 mg at bedtime   - Increase lamotrigine to 200 mg daily (awaiting approval from guardian)  - Continue metformin 1000 mg BID (pt may take second dose with a snack in the early afternoon before going to work)  - continue hydroxyzine 25 mg HS  - Continue melatonin from 5 mg 1-2 hours before bedtime PRN       **For crisis resources, please see the information at the end of this document**     Patient Education      Thank you for coming to the [facility] MENTAL HEALTH & ADDICTION [place] [facility].    Lab Testing:  If you had lab testing today and your results are reassuring or normal they will be mailed to you or sent through [employer] within 7 days. If the lab tests need quick action we will call you with the results. The phone number we will call with results is # [phone] (home) . If this is not the best number please call our clinic and change the number.    Medication Refills:  If you need any refills please call your pharmacy and they will contact us. Our fax number for refills is [phone]. Please allow three business for refill processing. If you need to  your refill at a new pharmacy, please contact the new pharmacy directly. The new pharmacy will help you get your medications transferred.     Scheduling:  If you have any concerns about today's visit or wish to schedule another appointment please call our office during normal business hours [phone] (8-5:00 M-F)    Contact Us:  Please call [phone] during business hours (8-5:00 M-F).  If after clinic hours, or on the weekend, please call  [phone].    Financial Assistance [phone]  [facility] Billing [phone]  Central Billing Office, [facility]: [phone]  [place] Billing [phone]  Medical Records [phone]  [place] Patient Bill of Rights [email or web address]       MENTAL HEALTH CRISIS NUMBERS:  For a medical  emergency please call  911 or go to the nearest ER.     [facility]:   [facility] -[phone]   Crisis Residence [facility] -[phone]   Walk-In Counseling Center [facility] -[phone]   COPE 24/7 [place] Mobile Team -[phone] (adults)/[phone] (child)  CHILD: [facility] needs assessment team - [phone]      [facility]:   [facility] - [phone]   Walk-in counseling [facility] - [phone]   Walk-in counseling [facility] - [phone]   Crisis Residence [facility] - [phone]  Urgent Care Adult Mental [facility]-[phone] mobile unit/ 24/7 crisis line    National Crisis Numbers:   National Suicide Prevention Lifeline: 0-699-215-TALK (628-340-8751)  Poison Control Center - 0-278-033-0793  edPULSE/resources for a list of additional resources (SOS)  Trans Lifeline a hotline for transgender people 5-184-356-0045  The Damien Project a hotline for LGBT youth 1-248.906.1510  Crisis Text Line: For any crisis 24/7   To: 888646  see www.crisistextline.org  - IF MAKING A CALL FEELS TOO HARD, send a text!         Again thank you for choosing [facility] MENTAL HEALTH & ADDICTION [facility] and please let us know how we can best partner with you to improve you and your family's health.    You may be receiving a survey regarding this appointment. We would love to have your feedback, both positive and negative. The survey is done by an external company, so your answers are anonymous.

## 2021-08-06 NOTE — TELEPHONE ENCOUNTER
Mimir received call from pt's guardian, Shahnaz Bernal, who approved of increase in pt's Lamictal from 150 mg to 200 mg.    Writer conferred with Dr. Taylor about the dose increase. She identified a plan to update the order in pt's chart, coordinate with snf for implementing the updated dose, and send new order to pharmacy on Monday.    Writer left voice mail message for pt, identifying that the provider was coordinating the updated dose being implemented this weekend..  No PHI was provided as there was no identification on the voice mail greeting.

## 2021-08-06 NOTE — TELEPHONE ENCOUNTER
Writer received call from pt, who identified that she was unable to contact her guardian regarding the medication changes made today and inquired if the new prescriptions could be sent to the pharmacy without her guardian's approval, so that she could start the med changes.    Routed to Dr. Taylor.          =========================================      Odebunmi, Tolulope Oluwadamilola, MD Snyder, David J, RN  Caller: Unspecified (Today,  2:39 PM)  Thanks, Chau.   I also tried her guardian x2 and left a VM. I spoke with Dr. Lanier, and the plan is to try guardian again next week before sending the med increase to the pharmacy.     I called her pharmacy and she still has a refill on file for 1 month. Until the guardian gets back to us or we are able to reach her, she should continue the 150mg for the weekend.     [place]       =========================================    Writer called pt to review provider information. Pt identified that she just finished speaking with the guardian who would be contacting the clinic.

## 2021-08-13 DIAGNOSIS — F20.3 SCHIZOPHRENIA, UNDIFFERENTIATED (H): ICD-10-CM

## 2021-08-13 DIAGNOSIS — F41.9 ANXIETY: ICD-10-CM

## 2021-08-16 RX ORDER — PALIPERIDONE 6 MG/1
TABLET, EXTENDED RELEASE ORAL
Qty: 60 TABLET | Refills: 0 | Status: SHIPPED | OUTPATIENT
Start: 2021-08-16 | End: 2021-09-20

## 2021-08-16 RX ORDER — HYDROXYZINE HYDROCHLORIDE 25 MG/1
TABLET, FILM COATED ORAL
Qty: 30 TABLET | Refills: 0 | Status: SHIPPED | OUTPATIENT
Start: 2021-08-16 | End: 2021-09-20

## 2021-08-16 NOTE — TELEPHONE ENCOUNTER
hydrOXYzine (ATARAX) 25 MG  Last refilled: 7/30/21  Qty: 30  metFORMIN (GLUCOPHAGE) 500 MG   Last refilled: 7/30/21  Qty: 120    paliperidone ER (INVEGA) 6 MG   Last refilled: 7/30/21  Qty: 60    Last seen: 8/6/21  RTC:  unk  Cancel: 0  No-show: 0  Next appt: none  14 DAY LEXI Refill pended and routed to the provider for review/determination due to  :   Last note  8/6/21  Unsigned

## 2021-08-19 ENCOUNTER — TELEPHONE (OUTPATIENT)
Dept: PSYCHIATRY | Facility: CLINIC | Age: 26
End: 2021-08-19

## 2021-08-19 NOTE — TELEPHONE ENCOUNTER
2 fax pages was received from [employer] requesting completion of Medical Referral Form. The form is being held in Nurse Triage awaiting a signature.Shonna Sevilla LPN

## 2021-08-24 NOTE — TELEPHONE ENCOUNTER
Completed, signed forms were returned to this writer and faxed to [employer] at [phone]. The original copies were sent to scanning.Shonna Sevilla LPN

## 2021-09-02 DIAGNOSIS — F25.1 SCHIZOAFFECTIVE DISORDER, DEPRESSIVE TYPE (H): ICD-10-CM

## 2021-09-07 NOTE — TELEPHONE ENCOUNTER
melatonin 5 mg  Last refilled: 7/30/21  Qty: 30    Last seen: 8/6/21  RTC: 6-8 WEEKS  Cancel: 0  No-show: 0  Next appt: NONE  Scheduling has been notified to contact the pt for appointment.  Refill decision: 30 day shabbir refill sent to the pharmacy - including instructions for patient to call the clinic and schedule an appointment.

## 2021-09-17 NOTE — PROGRESS NOTES
Video- Visit Details  Type of service:  video visit for medication management  Time of service:    Date:  09/20/2021    Video Start Time:  8:45 AM        Video End Time:  09:00 am    Reason for video visit:  Patient unable to travel due to Covid-19  Originating Site (patient location):  [facility] [place]   Location- Patient's home  Distant Site (provider location):  [facility]  Mode of Communication:  Video Conference via AmWell  Consent:  Patient has given verbal consent for video visit?: Yes        [facility]  Psychiatry Clinic  PSYCHIATRY PROGRESS NOTE     Date of initial Diagnostic Assessment (DA) is 7/8/2016 and most recent Transfer of Care DA is 09/01/20.    CARE TEAM:  PCP- Becki Valencia  Specialty Providers- none    Therapist- Cynthia CALLEJAS at [facility] [phone] (Verbal BRIONNA on file) (sees weekly)   [facility] Team- [facility] Erica Sparks [phone],   (ProAct) Margie Velazquez [phone]    Guardian: Shahnaz Bernal through [facility] [phone]     Living Situation: [facility] [phone] - Shahnaz MARQUEZ (manager) [phone]    Cesia Nettles is a 26 year old patient who uses the name Therese and pronouns she, her.     PERTINENT BACKGROUND                [last transfer eval 09/01/20]      Psych pertinent item history includes suicide attempt [multiple], suicidal ideation, psychosis [sxs include paranoia, AH, ideas of reference], multiple psychotropic trials, psych hosp (>5), lives in a  and has a guardian.    INTERIM HISTORY                                    History was provided by the patient who was a fair historian. Treatment adherence is good.  Since the last visit:   - Patient reports that work is better. She was able to take a week off work after the last appointment which was helpful. She is doing mostly deliveries at work now which is a change to her usual work tasks. Things are going a lot better  "with co-worker that she previously had issues with.  - Reports that she's doing better and mood is improved. She has been going to the gym and is happy with her new lifestyle. She reports some weight loss which she's happy with.    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 Has been there since around 2018.       Social/ Spiritual Support- Her mother, half-siblings, [facility] workers, ,  staff (Renu Brady).  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 \"really close with my grandpa\"     Other- Enjoys spending time with her family , \"huge Dimitris Potter fan\". Mom has 4 cats and 3 dogs    PSYCH and SUBSTANCE USE Critical Summary Points since July 2020 9/1/20 - transfer DA, no med changes, labs not completed prior visit  12/1/20 - add additional 25 mg PRN hydroxyzine at bedtime for sleep if approved by guardian   1/26/21 - no med changes, provided [facility] Address for labs  3/16/21 - Switch to 12.5 mg every afternoon and 12.5 mg at bedtime   5/21/2021 - malia in the context of med non-adherence (starting administering her own meds), stopped fluoxetine, group home starting administering medications again  6/7/21 - no med changes, discussed labs again " "  8/6/21- increased Lamictal to 200 mg  9/20/21- no med changes.  MEDICAL HISTORY and ALLERGY     Neurological: H/o abscence epilepsy from age [age]    Major Surgery- none    ALLERGIES: Cats and Seasonal allergies     Patient Active Problem List   Diagnosis     Schizoaffective disorder, depressive type (H)     Hx of psychiatric care     Psychosis (H)         MEDICAL REVIEW OF SYSTEMS           Pregnant or breastfeeding- no      Contraception- Nexplanon implant      A comprehensive review of systems was not performed today    MEDICATIONS        Current Outpatient Medications   Medication Sig Dispense Refill     etonogestrel (IMPLANON/NEXPLANON) 68 MG IMPL 1 each by Subdermal route once       fluticasone (FLONASE) 50 MCG/ACT nasal spray Spray 2 sprays into both nostrils daily       hydrOXYzine (ATARAX) 25 MG tablet TAKE 1 TABLET BY MOUTH AT BEDTIME 30 tablet 0     ibuprofen (ADVIL/MOTRIN) 200 MG tablet Take 400 mg by mouth every 4 hours as needed for mild pain       lamoTRIgine (LAMICTAL) 100 MG tablet Take 2 tablets (200 mg) by mouth daily 60 tablet 0     melatonin 5 MG tablet Take 1 tablet (5 mg) by mouth nightly as needed for sleep *PLEASE SCHEDULE APPT. FOR REFILLS [phone]* 30 tablet 0     metFORMIN (GLUCOPHAGE) 500 MG tablet TAKE 2 TABLETS BY MOUTH TWICE A DAY WITH MEALS 120 tablet 0     paliperidone ER (INVEGA) 6 MG 24 hr tablet TAKE 2 TABLETS BY MOUTH AT BEDTIME 60 tablet 0     VITALS                                                                There were no vitals taken for this visit.   MENTAL STATUS EXAM                          Alertness: alert  and oriented  Appearance: casually groomed  Behavior/Demeanor: cooperative and pleasant, with good  eye contact   Speech: normal and regular rate and rhythm  Language: intact and no problems  Psychomotor: normal or unremarkable  Mood: \"improved\"  Affect: full range; congruent to: mood- yes, content- yes  Thought Process/Associations: unremarkable  Thought " Content:  Reports none;  Denies suicidal & violent ideation and delusions  Perception:  Reports none;  Denies auditory hallucinations  Insight: adequate  Judgment: good  Cognition: (6) oriented: time, person, and place  attention span: intact  concentration: intact  recent memory: intact  remote memory: intact  fund of knowledge: appropriate  Gait and Station: N/A (telehealth)    LABS and DATA     PHQ9 TODAY = N/A  PHQ 2/20/2020 3/4/2020 1/26/2021   PHQ-9 Total Score 9 18 13   Q9: Thoughts of better off dead/self-harm past 2 weeks Several days Several days Several days   F/U: Thoughts of suicide or self-harm - - No   F/U: Safety concerns - - No       Recent Labs   Lab Test 06/09/21  0840 02/02/17  0916 06/12/14  0723   CR 0.73 0.78 0.70   GFRESTIMATED >90 >90  Non  GFR Calc   >90     Recent Labs   Lab Test 06/09/21  0840 02/02/17  0916 12/21/13  1016   AST 27 24 33   ALT 17 23 18   ALKPHOS 95 102 107  107     Recent Labs   Lab Test 06/09/21  0840 02/02/17  0916   GLC 93 87   A1C 5.1 4.9     Recent Labs   Lab Test 06/09/21  0840 12/10/18  0912   CHOL 178 205*   TRIG 91 201*   * 120*   HDL 52 45*     Recent Labs   Lab Test 06/09/21  0840 02/02/17  0916   AST 27 24   ALT 17 23   ALKPHOS 95 102     Recent Labs   Lab Test 06/09/21  0840 12/10/18  0912   WBC 5.4 6.4   ANEU 2.5 3.3   HGB 12.4 11.7    292       PSYCHOTROPIC DRUG INTERACTIONS      hydrozyzine + paliperidone: concurrent use may increase risk of QTc prolongation.     paliperidone + lamotrigine + hydroxyzine: concurrent use may increase risk of CNS depression    LamoTRIgine may increase the serum concentration of MetFORMIN.       MANAGEMENT:  Monitoring for adverse effects, routine vitals, routine labs, periodic EKGs and using lowest therapeutic dose of [psychotropics]    RISK STATEMENT for SAFETY     Cesia Geffre did not appear to be an imminent safety risk to self or others.    DIAGNOSES                                            [m2, h3]      Schizoaffective Disorder, bipolar type, most recent episode depressed  Unspecified Anxiety Disorder  Emotional Dysregulation    ASSESSMENT                                        [m2, h3]          Therese reports mood stability with the increase in Lamictal dose and reports that work is also less stressful. She reported no medication side effects. She stated that sleep feels interrupted at times and other times it takes longer to go to bed. We talked about changing the timing of melatonin to 1-2 hours before bedtime instead of at bedtime. Patient is agreeable with the plan. No SI, HI or acute concerns. If changing the timing of melatonin isn't helpful for sleep, dose could also be increased.      Future Considerations(by [by previous provider)-  - consider resuming Prozac if re-emerging depressive symptoms, not the expected cause of last manic symptoms . Likely due to med non-adherence) .    PLAN                                                            [m2, h3]                                               1) Meds  - Continue paliperidone 12 mg at bedtime   - Continue lamotrigine to 200 mg daily  - Continue metformin 1000 mg BID (pt may take second dose with a snack in the early afternoon before going to work)  - continue hydroxyzine 25 mg HS  - Continue melatonin from 5 mg 1-2 hours before bedtime PRN     [place]MP review was not needed today. She's not taking any controlled substances    2) Therapy-    - Continue weekly individual therapy    3) Next Due   Labs- AP labs 12/2021  EKG- PRN  Rating scales- PHQ9 and AIMS due at next in-patient appt.    4) Referrals: None      5) RTC:  4 weeks    6) Crisis Numbers:   Provided routinely in AVS     After hours:  [phone]      TREATMENT RISK STATEMENT:  The risks, benefits, alternatives and potential adverse effects have been discussed and are understood by the pt. The pt understands the risks of using street drugs or alcohol. There are no medical  contraindications, the pt agrees to treatment with the ability to do so. The pt knows to call the clinic for any problems or to access emergency care if needed.  Medical and substance use concerns are documented above.  Psychotropic drug interaction check was done, including changes made today.    PROVIDER:  Anabel Taylor MD,MPH    Patient staffed with Dr. Zaidi who will sign the note.  Supervisor is Dr. Schofield.     TELEHEALTH ATTENDING ATTESTATION  Following the ACGME guidelines on telemedicine and direct supervision due to COVID-19, I was concurrently participating in and/or monitoring the patient care through appropriate telecommunication technology.  I discussed the key portions of the service with the resident, including the mental status examination and developing the plan of care. I reviewed key portions of the history with the resident. I agree with the findings and plan as documented in this note.   Mk Zaidi MD

## 2021-09-20 ENCOUNTER — VIRTUAL VISIT (OUTPATIENT)
Dept: PSYCHIATRY | Facility: CLINIC | Age: 26
End: 2021-09-20
Attending: PSYCHIATRY & NEUROLOGY
Payer: COMMERCIAL

## 2021-09-20 DIAGNOSIS — F25.1 SCHIZOAFFECTIVE DISORDER, DEPRESSIVE TYPE (H): ICD-10-CM

## 2021-09-20 DIAGNOSIS — F20.3 SCHIZOPHRENIA, UNDIFFERENTIATED (H): ICD-10-CM

## 2021-09-20 DIAGNOSIS — F41.9 ANXIETY: ICD-10-CM

## 2021-09-20 DIAGNOSIS — R45.851 SUICIDAL IDEATION: ICD-10-CM

## 2021-09-20 PROCEDURE — 99213 OFFICE O/P EST LOW 20 MIN: CPT | Mod: GT | Performed by: STUDENT IN AN ORGANIZED HEALTH CARE EDUCATION/TRAINING PROGRAM

## 2021-09-20 RX ORDER — PALIPERIDONE 6 MG/1
TABLET, EXTENDED RELEASE ORAL
Qty: 60 TABLET | Refills: 0 | Status: SHIPPED | OUTPATIENT
Start: 2021-09-20 | End: 2021-10-11

## 2021-09-20 RX ORDER — LAMOTRIGINE 100 MG/1
200 TABLET ORAL DAILY
Qty: 60 TABLET | Refills: 0 | Status: SHIPPED | OUTPATIENT
Start: 2021-09-20 | End: 2021-10-11

## 2021-09-20 RX ORDER — HYDROXYZINE HYDROCHLORIDE 25 MG/1
25 TABLET, FILM COATED ORAL AT BEDTIME
Qty: 30 TABLET | Refills: 0 | Status: SHIPPED | OUTPATIENT
Start: 2021-09-20 | End: 2021-10-11

## 2021-09-20 ASSESSMENT — PAIN SCALES - GENERAL: PAINLEVEL: NO PAIN (0)

## 2021-09-20 NOTE — PATIENT INSTRUCTIONS
**For crisis resources, please see the information at the end of this document**     Patient Education      Thank you for coming to the [facility] [place] [facility].    Lab Testing:  If you had lab testing today and your results are reassuring or normal they will be mailed to you or sent through [employer] within 7 days. If the lab tests need quick action we will call you with the results. The phone number we will call with results is # [phone] (home) . If this is not the best number please call our clinic and change the number.    Medication Refills:  If you need any refills please call your pharmacy and they will contact us. Our fax number for refills is [phone]. Please allow three business for refill processing. If you need to  your refill at a new pharmacy, please contact the new pharmacy directly. The new pharmacy will help you get your medications transferred.     Scheduling:  If you have any concerns about today's visit or wish to schedule another appointment please call our office during normal business hours [phone] (8-5:00 M-F)    Contact Us:  Please call [phone] during business hours (8-5:00 M-F).  If after clinic hours, or on the weekend, please call  [phone].    Financial Assistance [phone]  [facility] Billing [phone]  Central Billing Office, [facility]: [phone]  [facility] Billing [phone]  Medical Records [phone]  [facility] Patient Bill of Rights [email or web address]       MENTAL HEALTH CRISIS NUMBERS:  For a medical emergency please call  911 or go to the nearest ER.     [facility]:   [facility] -[phone]   Crisis Residence [facility] -[phone]   Walk-In Counseling Center [facility] -[phone]   COPE 24/7 [facility] Mobile Team -[phone] (adults)/[phone] (child)  CHILD: [facility] needs assessment  team - [phone]      [facility]:   [facility] - [phone]   Walk-in counseling [facility] - [phone]   Walk-in counseling [facility] - [phone]   Crisis Residence [facility] - [phone]  Urgent Care Adult Mental Usvzkw-[phone] mobile unit/ 24/7 crisis line    National Crisis Numbers:   National Suicide Prevention Lifeline: 9-969-144-TALK (349-041-2309)  Poison Control Center - 5-975-607-7926  [employer]/resources for a list of additional resources (SOS)  Trans Lifeline a hotline for transgender people 1-492.780.8703  The Damien Project a hotline for LGBT youth 1-880-900-6919  Crisis Text Line: For any crisis 24/7   To: 062048  see www.crisistextline.org  - IF MAKING A CALL FEELS TOO HARD, send a text!         Again thank you for choosing [facility] and please let us know how we can best partner with you to improve you and your family's health.    You may be receiving a survey regarding this appointment. We would love to have your feedback, both positive and negative. The survey is done by an external company, so your answers are anonymous.

## 2021-09-20 NOTE — PROGRESS NOTES
"VIDEO VISIT  Cesia Nettles is a 26 year old patient who is being evaluated via a billable video visit.      The patient has been notified of following:   \"This video visit will be conducted via a call between you and your physician/provider. We have found that certain health care needs can be provided without the need for an in-person physical exam. This service lets us provide the care you need with a video conversation. If a prescription is necessary we can send it directly to your pharmacy. If lab work is needed we can place an order for that and you can then stop by our lab to have the test done at a later time. Insurers are generally covering virtual visits as they would in-office visits so billing should not be different than normal.  If for some reason you do get billed incorrectly, you should contact the billing office to correct it and that number is in the AVS .    Video Conference to be completed via:  Katy    Patient has given verbal consent for video visit?:  Yes    Patient would prefer that any video invitations be sent by: Patient is using Songvice to log on and declined a link for this visit.        How would patient like to obtain AVS?:  Songvice    AVS SmartPhrase [PsychAVS] has been placed in 'Patient Instructions':  Yes    "

## 2021-10-02 ENCOUNTER — HEALTH MAINTENANCE LETTER (OUTPATIENT)
Age: 26
End: 2021-10-02

## 2021-10-06 ENCOUNTER — TELEPHONE (OUTPATIENT)
Dept: BEHAVIORAL HEALTH | Facility: CLINIC | Age: 26
End: 2021-10-06

## 2021-10-06 ENCOUNTER — TELEPHONE (OUTPATIENT)
Dept: PSYCHIATRY | Facility: CLINIC | Age: 26
End: 2021-10-06

## 2021-10-07 ENCOUNTER — TELEPHONE (OUTPATIENT)
Dept: PSYCHIATRY | Facility: CLINIC | Age: 26
End: 2021-10-07
Payer: COMMERCIAL

## 2021-10-07 NOTE — TELEPHONE ENCOUNTER
"Brief Note - Cross Cover    S: Alerted by intake that patient \"needs input on a relationship, doesn't know what to do. Crying/very emotional.\" I returned the call and spoke with yasemincharito. She shared that she's been crying on & off, also experiencing increased anxiety over the past week and half. She attributes these changes to some relationship stress (expressed having feelings for a friend who was \"wishy washy\" towards her). Sees a therapist at [facility] whom she met with earlier today, next appointment next Monday. This is somewhat helpful but she states she also \"definitely do need a med adjustment.\"     Denies SI at this time, feels safe (at home during phone call). Also denies AH/VH.     O: There were no vitals taken for this visit.     MSE: patient is awake and alert over the phone. States mood is \"anxious\" and voice is congruent. Not tearful when we spoke. Speech was normal speed, volume, and prosody.     A/P:  Increased situational anxiety vs adjustment disorder  - encouraged patient to continue attending regular therapy appointments  - encouraged her to call CRISIS line or 9-1-1 if she begins to feel unsafe / needing higher level of care  - Next med management appt scheduled in 2 weeks. I relayed that I would message her outpatient provider to attempt to schedule her for sooner follow-up if available.     Allison Cano MD  PGY2 Psychiatry     "

## 2021-10-07 NOTE — TELEPHONE ENCOUNTER
Writer received incoming call from patient returning this writer's call. She reports stressors from work and friends. Shared she was notified from work that she will be working along on Friday. Patient was very anxious about this yesterday. Patient did discuss this with her manager today and came up with a plan. Patient shared her work was better today and is feeling a lot better. She reports living with a roommate who she is not very comfortable with. Patient denies any safety concerns at this time.    Patient did not feel the need to call 911 or COPE last night. Shared she watched a movie after speaking with an on-call provider and fell asleep. Today she is feeling better. She denies any SI/SIB/HI at this time. She was able to get scheduled with provider on 10/11 for a urgent visit. She is willing to wait till the appt to discuss medications.     Routed to provider

## 2021-10-07 NOTE — TELEPHONE ENCOUNTER
S: 8:23PM- Pt called to request speaking to on call psychiatrist.     B: Pt reports she formerly saw Dr. Taylor at the [facility] clinic.  Pt feels safe.  She needs input on something; she has been very emotional in the past few hours about a friendship and does not know what to do.      A: Call back at [phone].    R: Paged on call psychiatrist at 9:19PM.

## 2021-10-07 NOTE — TELEPHONE ENCOUNTER
Writer attempted to reach out to patient to check in. No answer at number provided. LVM, requesting a call back. Clinic number provided.

## 2021-10-07 NOTE — TELEPHONE ENCOUNTER
Message  Received: Today  Gabbi Mckeon MD Moccio, Emily, RN Hi Emily,   Can you please reach out to Therese to see how she is doing today? I will ask scheduling to see if they can contact her guardian and schedule her for an earlier appointment.   Thanks!   Gabbi             Previous Messages       ----- Message -----   From: Allison Cano MD   Sent: 10/6/2021   9:46 PM CDT   To: Gabbi Mckeon MD, *     Patient called the after-hours line tonight with increased anxiety (mostly regarding some relationship stress), feels this has been worsening for ~2 weeks. I see her last appt was with Sergio and her next was is with Gabbi so wasn't sure who to message; I was wondering if she could possibly be seen sooner for some closer follow-up? She is continuing to see her therapist at [facility] weekly.     Follow up:     Writer placed a call to patient to follow up as requested by provider. No answer at number provided. LVM, requesting a call back. Clinic number provided.

## 2021-10-07 NOTE — TELEPHONE ENCOUNTER
Received notification from night float resident that Therese had called the after hours line and reported increased anxiety. I have not met Therese yet and she has an appointment scheduled with me on 10/25. Per chart review, she does not schedule her own appointments.   Plan:  - RNCC will reach out to Therese for follow-up.  - Scheduling will reach out to guardian to attempt to schedule earlier appointment (transfer of care diagnostic assessment).    Gabbi Mckoen MD  Psychiatry PGY-3

## 2021-10-10 NOTE — PROGRESS NOTES
VIDEO VISIT  Video- Visit Details  Type of service:  video visit for diagnostic assessment  Time of service:    Date:  10/11/2021    Video Start Time:  8:13 AM        Video End Time:  9:00 AM    Reason for video visit:  Patient unable to travel due to Covid-19  Originating Site (patient location):  [facility]   Location- halfway  Distant Site (provider location):  Remote location  Mode of Communication:  Video Conference via AmWell  Consent:  Patient has given verbal consent for video visit?: Yes        [facility]  Psychiatry Clinic  TRANSFER of CARE DIAGNOSTIC ASSESSMENT     CARE TEAM:  PCP- Becki Valencia, Psychotherapist- Cynthia Rueda LICSW at [facility] [phone] (verbal BRIONNA on file) (sees weekly), [facility] Team- [facility] Erica Sparks [phone],   (ProAct) Margie Velazquez [phone]    Guardian: Shahnaz Bernal through [facility] [phone]   Living Situation: [facility] [phone] - Shahnaz MARQUEZ (manager) [phone]    Cesia Nettles is a 26 year old who uses the name Therese and pronouns she, her, hers, herself.      DIAGNOSIS   Schizoaffective Disorder, bipolar type, most recent episode depressed  Unspecified Anxiety Disorder  Emotional Dysregulation     ASSESSMENT   Therese is seen via video visit for a transfer of care diagnostic assessment. She reports depression and anxiety and requests a change in her medication. She remembers that her anxiety was better-controlled when she was taking fluoxetine (which had been discontinued earlier this year after a manic episode). The manic episode had been preceded by medication discontinuation and was not believed to be due to fluoxetine. Given the aforementioned and the fact that fluoxetine can treat both depression and anxiety, we will resume fluoxetine at 20 mg daily. We will slowly increase this as able. We discussed the risk of malia with Therese and she feels confident  that she can let us know if she starts to develop symptoms. She also provided consent for our team to ask group home staff to watch for signs of malia (see separate telephone encounter by Anabelle Francisco, RNCC). There were no acute safety concerns today.    MNPMP review was not needed today.     PLAN                                                                                                                1) Meds-  - Continue paliperidone 12 mg at bedtime   - Continue lamotrigine 200 mg daily  - Continue metformin 1000 mg BID (pt may take second dose with a snack in the early afternoon before going to work)  - Continue hydroxyzine 25 mg at bedtime   - Continue melatonin 5 mg 1-2 hours before bedtime PRN  - Resume fluoxetine 20 mg daily (approval received from guardian - see separate telephone encounter).    2) Psychotherapy- Continue individual weekly therapy.    3) Next due-  Labs- second generation antipsychotic monitoring labs due in 06/2022  EKG- PRN  Rating scales- PHQ9 and TINO-7 at every visit, AIMS at future visit     4) Referrals-  None    5) Dispo- Return to clinic in 3-4 weeks.      PERTINENT BACKGROUND                           [most recent eval 10/11/21]   This patient first experienced mental health issues in childhood and has received treatment for psychosis and suicidality.  Notably, Therese has a history of chronic AH/VH dating back to age 8 and has good insight into her symptoms. Per chart review, she has a history of some Cluster B traits and frequent hospitalizations which tapered off after she started residing in a group home and supportive employment a few years ago. She has been very stable on paliperidone and fluoxetine in conjunction with group home living environment. She had neuropsychiatric testing as a child (see scanned documents 4/16/19). She is under guardianship (see above); all medication changes need to be approved by them (see media section 10/17/19 for paperwork).     Psych  "pertinent item history includes suicide attempt [xmultiple], suicidal ideation, psychosis [sxs include paranoia, auditory hallucinations, ideas of reference], mutiple psychotropic trials  and psych hosp (x8)      SUBJECTIVE   History obtained by chart review and patient interview.  Last visit on 9/20/21: melatonin shifted to 1-2 hours before bedtime instead of at bedtime.    Since the last visit:  - \"Up and down\"  - Last couple of weeks have been \"pretty rough\".  - Past six months, wishy skylar garcia about what he wanted. The past two weeks was anxious constantly. Has calmed down in the past couple of days. Stopped talking to him.      Mood   - Kind of down, crying a lot.  - Past couple of weeks.  - Feeling better.  - Feeling tired, \"exhausted\" from anxiety.   - Work has been rough. 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  - Working fewer hours because of anxiety, feels that is not doing good enough.   - Talking to  about whether or not to stay at job.  - Thinks that \"drama\" at work is contributing to anxiety.  - \"Full breakdown at work on Wednesday\" - could not stop crying (due to the jose and anxiety about work).  - Sleep: okay, pretty good  - Appetite: okay, no changes  - Anhedonia: likes to read and watch YouTube, still enjoys  - No feelings of worthlessness  - Energy: working is exhausting  - Concentration: varies  - SI: no    Psychosis  - Has not noticed anything different  - Auditory hallucinations: does not remember when she last noticed, probably in the last week, hard for her to remember.   - Questions reality a lot.  - Symptoms worsen with anxiety and stress.   - Visual hallucinations: have not experienced these in a while.  - Paranoia: paranoid about relationships and friendships in her life. Nervous about interaction with grandparents.  - No ideas of reference.    Anxiety  - Feels anxious most of the day.  - Distracts herself with " "YouTube or television (this works for her).  - A little bit more irritable    Medications  - Thinks that something needs to be adjusted.  - Feeling really anxious all of the time.  - Does not remember what her anxiety was like when she was taking fluoxetine.  - Fluoxetine helped decrease the anxiety.  - Hydroxyzine calms down a little bit but feels like not enough.   - Working on skills in therapy (distress tolerance, coping, processing through).  - Was taking hydroxyzine in the afternoon and at bedtime but it was causing issues with stomach. Felt like had to urinate all of the time.   - Did not change time at which took melatonin.    Body image  - Insecure about weight  - Lately has not had the energy to go to the gym.  - Does not restrict but does try not to eat when bored.    Bruise on right forearm - not sure how she got it.    RECENT PSYCH ROS:   Depression: Positive for depressed mood and low energy. Negative for worthlessness, anhedonia, change in sleep, and suicidal ideation.  Elevated:  None elicited  Psychosis: Positive for auditory hallucinations and paranoia. Negative for visual hallucinations and ideas of reference.     Anxiety: Positive for anxiety and irritability.   Trauma Related: None elicited.  Sleep: Reports that sleep is good.   Other: Reports insecurity about weight. Denies food restriction.     Adverse Effects: None reported  Pertinent Negative Symptoms: No suicidal ideation or visual hallucinations.  Recent Substance Use:     Denies substance use.   Alcohol: drinks \"a couple of sips\" every couple/few months.     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 Has been there since around 2018.       Social/ Spiritual Support- Her mother, half-siblings, [facility] workers, ,  staff (Renu Brady).  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 \"really close with my grandpa\"     Feels Safe at Home- yes but does get anxious at home because one of her housemates is mean to another housemate (as of 10/11/21).  Legal- has a legal guardian.  Per 9/1/20 note by GASTON Contreras MD:  Trauma History (self-report)- yes verbal bullying by peers. Denies sexual, physical or emotional abuse. States when she first lived in a group home one of the other housemates was \"rude to me and it made me afraid to go home.\"    Early History/Education- Sudha was born in [place] to both her parents who were  when she was about 3 mo old. They later  when she was 6 yo \"but they got along.\" Both parents moved to [place] when she was 7 yo, and from then on Sudha spent more time with mom, but continued to be very close with dad as well.  Both parents have since remarried and Sudha now has 2 half siblings on each side of the family.  Notably, Therese has 4 half sibs with 2 siblings from each her mom and her dad (2 are 10 yo and 2 are 7 yo). Was in MH treatment for a year and a half (part of the time was in residential.) Sudha has a HS diploma - graduated on time. Started HS and was getting good grades, things went downhill after her MH symptoms started getting worse and starting on meds. Had an IEP starting in 10th grade. Believes she met her developmental milestones on time. Feels she has been socially awkward her whole life - became more noticeable in 7th grade       " "PSYCHIATRIC HISTORY   See transfer of care diagnostic assessment from 9/1/20 for additional details.     SIB- History of cutting per chart review. She states that this was more picking at scabs.  Suicide Attempt [#, most recent]- 3, most recently in 2014.   Suicidal Ideation Hx- Yes, history of suicidal ideation with plan and intent. Cannot remember the last time.     Violence/Aggression Hx-  Middle and high school was verbally and physically aggressive. Can still be pretty verbally aggressive when really emotional or paranoid. Most recently when she sent a text message to the jose that was making her angry.  Psychosis Hx-Yes, since age 8. Symptoms have included auditory hallucinations w/ negative self talk especially surrounding social situations, command AH for SI, visual hallucinations, paranoia, and ideas of reference (per GASTON Contreras MD's note from 9/1/20).  Eating Disorder Hx- None  Wilma- States that last episode was about one month ago. Was really hyper and had a bunch of energy. Does not know how long it lasted (states it was all within one day). Denies increased energy for period of 4-7 days. Has had periods of time in which she does not sleep well but then feels groggy and tired. Will experience increased irritability.     Psych Hosp [#, most recent]- Approximately 8 (half of them when she was a sophomore in high school); most recently in November 2019 for worsening depression and psychosis  Commitment- None  ECT- None  Outpatient Programs - IOP in 2014 & 2016 at [facility], day treatment at [facility] the winter 2020  (per GASTON Contreras MD's note from 9/1/20).  Residential- [facility] for 9 months the summer before and most of ford year of high school (July 2012-2013) (per GASTON Contreras MD's note from 9/1/20).     PAST MED TRIALS   Per chart review     Medication Max Dose (mg) Dates / Duration Helpful? DC Reason / Adverse Effects?   risperidone 3   \"flattened her out\"   aripiprazole 30  N  "   quetiapine 800  N    Depakote    Enuresis   topiramate       ethosuxamide       perphenazine 16 04/2013 - 06/2017      paliperidone 12 ? - present Y    hydroxyzine 25  02/2017 - present Y (for anxiety)    benztropine 2 04/2013-07/2019     fluoxetine 80 04/2013 - 05/2021     melatonin 5 07/2019 - present     lamotrigine 200 07/2014 - present     modafinil 200 04/2013-02/2016     paliperidone 12 04/2017 - present         SUBSTANCE USE HISTORY     Past Use-   Alcohol: has had in the past.  Treatment- #, most recent- N/A  Medical Consequences- no  Legal Consequences- no     MEDICAL HISTORY and ALLERGY     Neurological: H/o absence epilepsy from ages [age]      ALLERGIES: Cats and Seasonal allergies    Patient Active Problem List   Diagnosis     Schizoaffective disorder, depressive type (H)     Hx of psychiatric care     Psychosis (H)        MEDICAL REVIEW OF SYSTEMS   Contraception- Nexplanon  GENERAL: Positive for weight gain.  HEENT: Positive for intermittent headaches.   GI: Negative for constipation.      MEDICATIONS     Current Outpatient Medications   Medication Sig Dispense Refill     etonogestrel (IMPLANON/NEXPLANON) 68 MG IMPL 1 each by Subdermal route once       fluticasone (FLONASE) 50 MCG/ACT nasal spray Spray 2 sprays into both nostrils daily       hydrOXYzine (ATARAX) 25 MG tablet Take 1 tablet (25 mg) by mouth At Bedtime 30 tablet 0     ibuprofen (ADVIL/MOTRIN) 200 MG tablet Take 400 mg by mouth every 4 hours as needed for mild pain       lamoTRIgine (LAMICTAL) 100 MG tablet Take 2 tablets (200 mg) by mouth daily 60 tablet 0     melatonin 5 MG tablet Take 1 tablet (5 mg) by mouth nightly as needed for sleep Take it 1-2 hours before bedtime. 30 tablet 0     metFORMIN (GLUCOPHAGE) 500 MG tablet TAKE 2 TABLETS BY MOUTH TWICE A DAY WITH MEALS 120 tablet 0     paliperidone ER (INVEGA) 6 MG 24 hr tablet TAKE 2 TABLETS BY MOUTH AT BEDTIME 60 tablet 0      VITALS   There were no vitals taken for this visit.     MENTAL STATUS EXAM     Alertness: alert   Appearance: well groomed ,bruise on right forearm  Behavior/Demeanor: cooperative, pleasant and calm, with good  eye contact   Speech: normal and regular rate and rhythm  Language: intact and no obvious problem  Psychomotor: normal or unremarkable  Mood: depressed and anxious  Affect: full range; congruent to: mood- yes, content- yes  Thought Process/Associations: unremarkable  Thought Content:  Reports paranoid ideation;  Denies suicidal ideation  Perception:  Reports auditory hallucinations;  Denies visual hallucinations  Insight: good  Judgment: good  Cognition: does  appear grossly intact; formal cognitive testing was not done  Gait and Station: N/A (telehealth)     LABS and DATA     PHQ-9 TODAY = not completed today  PHQ 2/20/2020 3/4/2020 1/26/2021   PHQ-9 Total Score 9 18 13   Q9: Thoughts of better off dead/self-harm past 2 weeks Several days Several days Several days   F/U: Thoughts of suicide or self-harm - - No   F/U: Safety concerns - - No     Recent Labs   Lab Test 06/09/21  0840 02/02/17  0916   GLC 93 87   A1C 5.1 4.9     Recent Labs   Lab Test 06/09/21  0840 12/10/18  0912   CHOL 178 205*   TRIG 91 201*   * 120*   HDL 52 45*     Recent Labs   Lab Test 06/09/21  0840 02/02/17  0916   AST 27 24   ALT 17 23   ALKPHOS 95 102     Recent Labs   Lab Test 06/09/21  0840 12/10/18  0912   WBC 5.4 6.4   ANEU 2.5 3.3   HGB 12.4 11.7    292       ECG 6/10/2019 QTc = 445 ms     PSYCHOTROPIC DRUG INTERACTIONS   Per Micromedex:  HYDROXYZINE and PALIPERIDONE: may result in increased risk of QT-interval prolongation.     Per UpToDate:  Hydroxyzine, paliperidone, and lamotrigine: enhanced CNS depression.  Lamotrigine and metformin: may increase serum concentration of metformin.   Paliperidone and metformin: paliperidone may diminish therapeutic effects of metformin.  Nexplanon and lamotrigine: lamotrigine may decrease serum concentration of progestins.         MANAGEMENT:  Monitoring for adverse effects and periodic EKGs     RISK STATEMENT for SAFETY     Cesia Nettles did not appear to be an imminent safety risk to self or others.    TREATMENT RISK STATEMENT: The risks, benefits, alternatives and potential adverse effects have been discussed and are understood by the pt. The pt understands the risks of using street drugs or alcohol. There are no medical contraindications, the pt agrees to treatment with the ability to do so. The pt knows to call the clinic for any problems or to access emergency care if needed.  Medical and substance use concerns are documented above.  Psychotropic drug interaction check was done, including changes made today.     PROVIDER: Gabbi Mckeon MD    Patient staffed in clinic with Dr. Zaidi who will sign the note.  Supervisor is Dr. Schofield.      TELEHEALTH ATTENDING ATTESTATION  Following the ACGME guidelines on telemedicine and direct supervision due to COVID-19, I was concurrently participating in and/or monitoring the patient care through appropriate telecommunication technology.  I discussed the key portions of the service with the resident, including the mental status examination and developing the plan of care. I reviewed key portions of the history with the resident. I agree with the findings and plan as documented in this note.   Mk Zaidi MD

## 2021-10-11 ENCOUNTER — TELEPHONE (OUTPATIENT)
Dept: PSYCHIATRY | Facility: CLINIC | Age: 26
End: 2021-10-11

## 2021-10-11 ENCOUNTER — VIRTUAL VISIT (OUTPATIENT)
Dept: PSYCHIATRY | Facility: CLINIC | Age: 26
End: 2021-10-11
Attending: PSYCHIATRY & NEUROLOGY
Payer: COMMERCIAL

## 2021-10-11 DIAGNOSIS — T50.905A WEIGHT GAIN DUE TO MEDICATION: ICD-10-CM

## 2021-10-11 DIAGNOSIS — R63.5 WEIGHT GAIN DUE TO MEDICATION: ICD-10-CM

## 2021-10-11 DIAGNOSIS — F41.9 ANXIETY: ICD-10-CM

## 2021-10-11 DIAGNOSIS — G47.00 INSOMNIA, UNSPECIFIED TYPE: ICD-10-CM

## 2021-10-11 DIAGNOSIS — F25.0 SCHIZOAFFECTIVE DISORDER, BIPOLAR TYPE (H): Primary | ICD-10-CM

## 2021-10-11 PROCEDURE — 90792 PSYCH DIAG EVAL W/MED SRVCS: CPT | Mod: GT | Performed by: STUDENT IN AN ORGANIZED HEALTH CARE EDUCATION/TRAINING PROGRAM

## 2021-10-11 RX ORDER — HYDROXYZINE HYDROCHLORIDE 25 MG/1
25 TABLET, FILM COATED ORAL AT BEDTIME
Qty: 30 TABLET | Refills: 1 | Status: SHIPPED | OUTPATIENT
Start: 2021-10-11 | End: 2021-12-03

## 2021-10-11 RX ORDER — PALIPERIDONE 6 MG/1
TABLET, EXTENDED RELEASE ORAL
Qty: 60 TABLET | Refills: 1 | Status: SHIPPED | OUTPATIENT
Start: 2021-10-11 | End: 2021-12-03

## 2021-10-11 RX ORDER — LAMOTRIGINE 100 MG/1
200 TABLET ORAL DAILY
Qty: 60 TABLET | Refills: 1 | Status: SHIPPED | OUTPATIENT
Start: 2021-10-11 | End: 2021-12-03

## 2021-10-11 ASSESSMENT — PAIN SCALES - GENERAL: PAINLEVEL: NO PAIN (0)

## 2021-10-11 NOTE — PROGRESS NOTES
"VIDEO VISIT  Cesia Nettles is a 26 year old patient that has consented to receive services via billable video visit.      The patient has been notified of following:   \"This video visit will be conducted via a call between you and your physician/provider. We have found that certain health care needs can be provided without the need for an in-person physical exam. This service lets us provide the care you need with a video conversation. If a prescription is necessary we can send it directly to your pharmacy. If lab work is needed we can place an order for that and you can then stop by our lab to have the test done at a later time. Insurers are generally covering virtual visits as they would in-office visits so billing should not be different than normal.  If for some reason you do get billed incorrectly, you should contact the billing office to correct it and that number is in the AVS .    Video Conference to be completed via:  Katy    If patient is unable to join the video via InflowControl, they would prefer that the provider send them a video invitation via:   Send to e-mail at: [email or web address])      How would patient like to obtain AVS?:  MyChart    "

## 2021-10-11 NOTE — TELEPHONE ENCOUNTER
Writer placed outgoing phone call to Gita with pt's group home, [facility]. Informed Gita that pt is restarting fluoxetine and asked that staff monitor pt closely for signs/symptoms of malia. Gita expressed understanding.

## 2021-10-11 NOTE — PATIENT INSTRUCTIONS
**For crisis resources, please see the information at the end of this document**     Patient Education      Thank you for coming to the [facility] MENTAL HEALTH & ADDICTION [place] [facility].    Lab Testing:  If you had lab testing today and your results are reassuring or normal they will be mailed to you or sent through SeeSpace within 7 days. If the lab tests need quick action we will call you with the results. The phone number we will call with results is # [phone] (home) . If this is not the best number please call our clinic and change the number.    Medication Refills:  If you need any refills please call your pharmacy and they will contact us. Our fax number for refills is [phone]. Please allow three business for refill processing. If you need to  your refill at a new pharmacy, please contact the new pharmacy directly. The new pharmacy will help you get your medications transferred.     Scheduling:  If you have any concerns about today's visit or wish to schedule another appointment please call our office during normal business hours [phone] (8-5:00 M-F)    Contact Us:  Please call [phone] during business hours (8-5:00 M-F).  If after clinic hours, or on the weekend, please call  [phone].    Financial Assistance [phone]  [facility] Billing [phone]  Central Billing Office, [facility]: [phone]  [place] Billing [phone]  Medical Records [phone]  [place] Patient Bill of Rights [email or web address]       MENTAL HEALTH CRISIS NUMBERS:  For a medical emergency please call  911 or go to the nearest ER.     [facility]:    [phone]   Crisis Residence [facility] -[phone]   Walk-In Counseling Center [facility] -[phone]   COPE 24/7 [place] Mobile Team -[phone] (adults)/[phone] (child)  CHILD: [facility] needs assessment  team - [phone]      [facility]:   [facility] - [phone]   Walk-in counseling [facility] - [phone]   Walk-in counseling [facility] - [phone]   Crisis Residence [facility] - [phone]  Urgent Care Adult Mental [facility]-[phone] mobile unit/ 24/7 crisis line    National Crisis Numbers:   National Suicide Prevention Lifeline: 7-742-568-TALK (211-889-6100)  Poison Control Center - 7-216-135-3053  Aibo/resources for a list of additional resources (SOS)  Trans Lifeline a hotline for transgender people 1-205.921.6043  The Damien Project a hotline for LGBT youth 7-388-518-2169  Crisis Text Line: For any crisis 24/7   To: 074107  see www.crisistextline.org  - IF MAKING A CALL FEELS TOO HARD, send a text!         Again thank you for choosing [facility] MENTAL HEALTH & [facility]DI[facility] and please let us know how we can best partner with you to improve you and your family's health.    You may be receiving a survey regarding this appointment. We would love to have your feedback, both positive and negative. The survey is done by an external company, so your answers are anonymous.

## 2021-10-11 NOTE — TELEPHONE ENCOUNTER
----- Message from Gabbi Mckeon MD sent at 10/11/2021  9:55 AM CDT   Can you please call Harley (spelling?) at [facility]'s group [place] and ask her and the evening staff at [facility]'s [facility] to keep an eye out for signs of malia? We are re-starting Prozac at a low dose.    Therese gave verbal consent for Harley to be contacted. Her phone number is [phone].

## 2021-10-18 NOTE — TELEPHONE ENCOUNTER
Phone call to guardian: Shahnaz Gabe through [facility] [phone]   - Provided update.  - Obtained consent to resume treatment with fluoxetine, noting the risk of inducing malia.   - Discussed that both Therese and group home staff will observe for any signs of malia and report to the clinic if noted.  - Scheduled follow-up appointment for 11/11.    Gabbi Mckeon MD  Psychiatry PGY-3

## 2021-11-10 ENCOUNTER — TELEPHONE (OUTPATIENT)
Dept: PSYCHIATRY | Facility: CLINIC | Age: 26
End: 2021-11-10
Payer: COMMERCIAL

## 2021-11-10 NOTE — TELEPHONE ENCOUNTER
Linda pt - authorization letter req  Received: Today  Faustina [place] Emily, RN  Phone Number: [phone]     Vicente Ahn,     Pt asked for an Authorization letter to be written to give her permission to take cold medicine. She said that she wanted to get back to work as soon as possible even if her mom disagreed. She said the letter would need to be faxed over to [facility].     Fax: [phone]     Pt: [phone]   Okay to [facility].     Thank you,   Faustina

## 2021-11-10 NOTE — TELEPHONE ENCOUNTER
Writer placed a call to patient to obtain additional information. Patient shared she has a cold and would like to take dayquil. She is requesting for a note from provider for GH to administer dayquil. Writer requested patient reach out to PCP for this note. Writer also agreed to reach out to provider.

## 2021-11-10 NOTE — TELEPHONE ENCOUNTER
[facility] Call Center    Phone Message    May a detailed message be left on voicemail: yes     Reason for Call: Other: Call back   Pt calling wanting to speak to a nurse about taking another OTC medication. Would like a call back to discuss.     Action Taken: Message routed to:  Other: nursing pool    Travel Screening: Not Applicable

## 2021-11-11 NOTE — TELEPHONE ENCOUNTER
Gabbi Mckeon MD  You 7 minutes ago (8:54 AM)     RUFUS Tatum,   Thanks for sending this. She should have her PCP write this letter.   Gabbi    Message text      Writer placed a call to patient to relay above information. No answer at number provided. LVM, requesting a call back. Clinic number provided.

## 2021-11-11 NOTE — TELEPHONE ENCOUNTER
Writer placed a call to patient and updated her that provider had recommended she reach out to PCP for letter for dayquil. Patient verbalized understanding.

## 2021-11-23 DIAGNOSIS — F25.0 SCHIZOAFFECTIVE DISORDER, BIPOLAR TYPE (H): ICD-10-CM

## 2021-11-23 DIAGNOSIS — F41.9 ANXIETY: ICD-10-CM

## 2021-11-30 NOTE — PROGRESS NOTES
Video- Visit Details  Type of service:  video visit for medication management  Time of service:    Date:  12/02/2021    Video Start Time:  10:01 AM        Video End Time:  10:33 AM    Reason for video visit:  Patient unable to travel due to Covid-19  Originating Site (patient location):  [facility]   Location- nursing home  Distant Site (provider location):  Remote location  Mode of Communication:  Video Conference via AmWell  Consent:  Patient has given verbal consent for video visit?: Yes          [facility]  Psychiatry Clinic  MEDICAL PROGRESS NOTE     CARE TEAM:  PCP- Becki Valencia, Psychotherapist- Cynthia Rueda LICSW at [facility] [phone] (verbal BRIONNA on file) (sees weekly), [facility] Team- [facility] Erica Sparks [phone],   (ProAct) Margie Velazquez [phone]    Guardian: Shahnaz Bernal through [facility] [phone]   Living Situation: [facility] [phone] - Shahnaz MARQUEZ (manager) [phone]    Cesia Nettles is a 26 year old who uses the name Therese and pronouns she, her, hers, herself.      DIAGNOSIS   Schizoaffective Disorder, bipolar type, most recent episode depressed  Unspecified Anxiety Disorder  Unspecified insomnia  Emotional Dysregulation     ASSESSMENT   Therese is doing well today. She notes improvement in her depression with the resumption of fluoxetine. She does not describe any episodes concerning for malia. Her anxiety is manageable. No medication changes are indicated at this time. Should her depression or anxiety worsen, we could increase her fluoxetine again (it has previously been as high as 80 mg). If we were to increase her fluoxetine, we would need to continue to monitor carefully for symptoms of malia.     MNPMP review was not needed today.     PLAN                                                                                                                1) Meds-  - Continue paliperidone 12  mg at bedtime   - Continue lamotrigine 200 mg daily  - Continue metformin 1000 mg BID (pt may take second dose with a snack in the early afternoon before going to work)  - Continue hydroxyzine 25 mg at bedtime   - Continue melatonin 5 mg 1-2 hours before bedtime PRN (will send in refill pending conversation with group home staff; Therese was not sure how much she had remaining).  - Continue fluoxetine 20 mg daily.    2) Psychotherapy- Continue individual weekly therapy.    3) Next due-  Labs- second generation antipsychotic monitoring labs due in 06/2022  EKG- PRN  Rating scales- PHQ9 and TINO-7 at every visit, AIMS at future visit     4) Referrals-  None    5) Dispo- Follow-up visit in 2 months (will schedule with guardian).     PERTINENT BACKGROUND                           [most recent eval 10/11/21]   This patient first experienced mental health issues in childhood and has received treatment for psychosis and suicidality.  Notably, Therese has a history of chronic AH/VH dating back to age 8 and has good insight into her symptoms. Per chart review, she has a history of some Cluster B traits and frequent hospitalizations which tapered off after she started residing in a group home and supportive employment a few years ago. She has been very stable on paliperidone and fluoxetine in conjunction with group home living environment. She had neuropsychiatric testing as a child (see scanned documents 4/16/19). She is under guardianship (see above); all medication changes need to be approved by them (see media section 10/17/19 for paperwork).     Psych pertinent item history includes suicide attempt [xmultiple], suicidal ideation, psychosis [sxs include paranoia, auditory hallucinations, ideas of reference], mutiple psychotropic trials  and psych hosp (x8)      SUBJECTIVE   History is provided by the patient who is a good historian. The patient reports good treatment adherence. The last visit was on 10/11/21 at which time  "fluoxetine 20 mg was started.    Since the last visit:  - \"Been alright.\"  - Feeling nervous about starting her new job tomorrow.  - Has been sleeping a lot, which she does when she is bored.   - Thinks that her mood has been a little bit better since starting fluoxetine again.  - Is not noticing depression as much.    - Able to distract herself from her anxiety.     - Stayed up all night watching Friends Saturday into Sunday.  - Did not feel tired but went to sleep after taking morning medications.   - Has been getting around 9 hours or more of sleep per night.  - States that she had a manic episode Saturday in Sunday night but then states that she was just determined to finish Friends.    - Gives verbal consent for me to call her adult foster care home to get their perspective on how she is doing.  - States that they will tell me she is sleeping a lot.      RECENT PSYCH ROS:   Depression: Negative for anhedonia.   Elevated: Negative for decreased need for sleep, increased energy, and elevated mood.   Psychosis:  Wonders if having AH and VH but checks with those around her and they did see or hear these things too.   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   Trauma Related:  None elicited  Sleep: Some difficulty falling asleep due to watching television (except for Saturday/Sunday when it was increased energy).   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     Family Hx (per 9/1/20 note by GASTON Contreras MD):  Father: major depressive disorder  Mother: borderline personality disorder and major depressive disorder  Maternal grandmother: major depressive disorder  Paternal grandfather: " "major depressive disorder and alcohol use disorder  Maternal great-grandfather: schizophrenia    Social Hx (per 9/20/21 note by KEVIN Taylor MD):  Partner/ - unknown (as of 10/11/21)  Children- none      Living situation- Therese lives in [facility] through [facility]. Has been there since around 2018.       Social/ Spiritual Support- Her mother, half-siblings, [facility] workers, ,  staff (Renu Brady).  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 \"really close with my grandpa\"     Feels Safe at Home- yes but does get anxious at home because one of her housemates is mean to another housemate (as of 10/11/21).      PSYCH and SUBSTANCE USE Critical Summary Points since July 2021 8/6/21: lamotrigine increased to 200 mg daily  9/20/21: no changes made  10/11/21:transfer of care diagnostic assessment; fluoxetine 20 mg started.     MEDICAL HISTORY and ALLERGY     Neurological: H/o absence epilepsy from ages [age]      ALLERGIES: Cats and Seasonal allergies    Patient Active Problem List   Diagnosis     Schizoaffective disorder, depressive type (H)     Hx of psychiatric care     Psychosis (H)        MEDICAL REVIEW OF SYSTEMS   Contraception- Nexplanon    Endorses: headaches (occasional, improves with hydration and ibuprofen), difficulty breathing through nose (is going to see primary care to evaluate for deviated septum), subjectively elevated heart rate (when anxious)  Denies: nausea, vomiting, diarrhea, constipation, chest pain, irregular heart rate     MEDICATIONS     Current Outpatient Medications   Medication Sig Dispense Refill     etonogestrel (IMPLANON/NEXPLANON) 68 MG IMPL 1 each by Subdermal route once       FLUoxetine (PROZAC) 20 MG capsule Take 1 capsule (20 mg) by mouth daily 30 capsule 1     fluticasone (FLONASE) 50 MCG/ACT nasal spray Spray 2 sprays into both nostrils daily       hydrOXYzine (ATARAX) 25 MG tablet Take 1 tablet (25 mg) by mouth At Bedtime " 30 tablet 1     ibuprofen (ADVIL/MOTRIN) 200 MG tablet Take 400 mg by mouth every 4 hours as needed for mild pain       lamoTRIgine (LAMICTAL) 100 MG tablet Take 2 tablets (200 mg) by mouth daily 60 tablet 1     melatonin 5 MG tablet Take 1 tablet (5 mg) by mouth nightly as needed for sleep Take it 1-2 hours before bedtime. 30 tablet 1     metFORMIN (GLUCOPHAGE) 500 MG tablet TAKE 2 TABLETS BY MOUTH TWICE A DAY WITH MEALS 120 tablet 1     paliperidone ER (INVEGA) 6 MG 24 hr tablet TAKE 2 TABLETS BY MOUTH AT BEDTIME 60 tablet 1      VITALS   There were no vitals taken for this visit.    MENTAL STATUS EXAM     Alertness: alert    Appearance: well groomed  Behavior/Demeanor: cooperative, pleasant and calm, with good  eye contact   Speech: normal and regular rate and rhythm  Language: intact and no obvious problem  Psychomotor: normal or unremarkable  Mood: anxious  Affect: appropriate and bright; congruent to: mood- yes, content- yes  Thought Process/Associations: unremarkable  Thought Content:  Reports none;  Denies suicidal ideation  Perception:  Reports none;  Denies auditory hallucinations and visual hallucinations  Insight: good  Judgment: good  Cognition: does  appear grossly intact; formal cognitive testing was not done  Gait and Station: N/A (telehealth)     LABS and DATA     PHQ-9 TODAY = not completed today  PHQ 2/20/2020 3/4/2020 1/26/2021   PHQ-9 Total Score 9 18 13   Q9: Thoughts of better off dead/self-harm past 2 weeks Several days Several days Several days   F/U: Thoughts of suicide or self-harm - - No   F/U: Safety concerns - - No     Recent Labs   Lab Test 06/09/21  0840 02/02/17  0916   GLC 93 87   A1C 5.1 4.9     Recent Labs   Lab Test 06/09/21  0840 12/10/18  0912   CHOL 178 205*   TRIG 91 201*   * 120*   HDL 52 45*     Recent Labs   Lab Test 06/09/21  0840 02/02/17  0916   AST 27 24   ALT 17 23   ALKPHOS 95 102     Recent Labs   Lab Test 06/09/21  0840 12/10/18  0912   WBC 5.4 6.4   ANEU  2.5 3.3   HGB 12.4 11.7    292       ECG 6/10/2019 QTc = 445 ms     PSYCHOTROPIC DRUG INTERACTIONS   Per Micromedex:  HYDROXYZINE and PALIPERIDONE: may result in increased risk of QT-interval prolongation.     Per UpToDate:  Hydroxyzine, paliperidone, and lamotrigine: enhanced CNS depression.  Lamotrigine and metformin: may increase serum concentration of metformin.   Paliperidone and metformin: paliperidone may diminish therapeutic effects of metformin.  Nexplanon and lamotrigine: lamotrigine may decrease serum concentration of progestins.        MANAGEMENT:  Monitoring for adverse effects and periodic EKGs     RISK STATEMENT for SAFETY     Cesia M Geffre did not appear to be an imminent safety risk to self or others.    TREATMENT RISK STATEMENT: The risks, benefits, alternatives and potential adverse effects have been discussed and are understood by the pt. The pt understands the risks of using street drugs or alcohol. There are no medical contraindications, the pt agrees to treatment with the ability to do so. The pt knows to call the clinic for any problems or to access emergency care if needed.  Medical and substance use concerns are documented above.  Psychotropic drug interaction check was done, including changes made today.     PROVIDER: Gabbi Mckeon MD    Patient staffed with Dr. Schofield who will sign the note.  Supervisor is Dr. Schofield.

## 2021-12-02 ENCOUNTER — VIRTUAL VISIT (OUTPATIENT)
Dept: PSYCHIATRY | Facility: CLINIC | Age: 26
End: 2021-12-02
Attending: PSYCHIATRY & NEUROLOGY
Payer: COMMERCIAL

## 2021-12-02 DIAGNOSIS — F41.9 ANXIETY: ICD-10-CM

## 2021-12-02 DIAGNOSIS — F25.0 SCHIZOAFFECTIVE DISORDER, BIPOLAR TYPE (H): ICD-10-CM

## 2021-12-02 DIAGNOSIS — T50.905A WEIGHT GAIN DUE TO MEDICATION: ICD-10-CM

## 2021-12-02 DIAGNOSIS — F25.0 SCHIZOAFFECTIVE DISORDER, BIPOLAR TYPE (H): Primary | ICD-10-CM

## 2021-12-02 DIAGNOSIS — G47.00 INSOMNIA, UNSPECIFIED TYPE: ICD-10-CM

## 2021-12-02 DIAGNOSIS — R63.5 WEIGHT GAIN DUE TO MEDICATION: ICD-10-CM

## 2021-12-02 PROCEDURE — 99213 OFFICE O/P EST LOW 20 MIN: CPT | Mod: GT | Performed by: STUDENT IN AN ORGANIZED HEALTH CARE EDUCATION/TRAINING PROGRAM

## 2021-12-02 RX ORDER — LAMOTRIGINE 100 MG/1
200 TABLET ORAL DAILY
Qty: 60 TABLET | Refills: 1 | Status: CANCELLED | OUTPATIENT
Start: 2021-12-02

## 2021-12-02 RX ORDER — HYDROXYZINE HYDROCHLORIDE 25 MG/1
25 TABLET, FILM COATED ORAL AT BEDTIME
Qty: 30 TABLET | Refills: 1 | Status: CANCELLED | OUTPATIENT
Start: 2021-12-02

## 2021-12-02 RX ORDER — PALIPERIDONE 6 MG/1
TABLET, EXTENDED RELEASE ORAL
Qty: 60 TABLET | Refills: 1 | Status: CANCELLED | OUTPATIENT
Start: 2021-12-02

## 2021-12-02 ASSESSMENT — PAIN SCALES - GENERAL: PAINLEVEL: NO PAIN (0)

## 2021-12-02 NOTE — PATIENT INSTRUCTIONS
**For crisis resources, please see the information at the end of this document**     Patient Education      Thank you for coming to the [facility] MENTAL HEALTH & AD[facility] [place] [facility].    Lab Testing:  If you had lab testing today and your results are reassuring or normal they will be mailed to you or sent through Ansible within 7 days. If the lab tests need quick action we will call you with the results. The phone number we will call with results is # [phone] (home) . If this is not the best number please call our clinic and change the number.    Medication Refills:  If you need any refills please call your pharmacy and they will contact us. Our fax number for refills is [phone]. Please allow three business for refill processing. If you need to  your refill at a new pharmacy, please contact the new pharmacy directly. The new pharmacy will help you get your medications transferred.     Scheduling:  If you have any concerns about today's visit or wish to schedule another appointment please call our office during normal business hours [phone] (8-5:00 M-F)    Contact Us:  Please call [phone] during business hours (8-5:00 M-F).  If after clinic hours, or on the weekend, please call  [phone].    Financial Assistance [phone]  [facility] Billing [phone]  Central Billing Office, [facility]: [phone]  [place] Billing [phone]  Medical Records [phone]  [place] Patient Bill of Rights [email or web address]       MENTAL HEALTH CRISIS NUMBERS:  For a medical emergency please call  911 or go to the nearest ER.     [facility]:   [facility] -[phone]   Crisis Residence [facility] Residence -[phone]   Walk-In Counseling Center [facility] -[phone]   COPE 24/7 [place] Mobile Team -[phone] (adults)/[phone] (child)  CHILD: [facility] needs assessment  team - [phone]      [facility]:   [facility] - [phone]   Walk-in counseling [facility] - [phone]   Walk-in counseling [facility] - [phone]   Crisis Residence [facility] Residence - [phone]  Urgent Care Adult Mental [facility]-[phone] mobile unit/ 24/7 crisis line    National Crisis Numbers:   National Suicide Prevention Lifeline: 3-272-128-TALK (678-848-8949)  Poison Control Center - 0-315-624-6484  Clctin/resources for a list of additional resources (SOS)  Trans Lifeline a hotline for transgender people 1-541.560.1961  The Damien Project a hotline for LGBT youth 3-602-970-4038  Crisis Text Line: For any crisis 24/7   To: 865440  see www.crisistextline.org  - IF MAKING A CALL FEELS TOO HARD, send a text!         Again thank you for choosing [facility] MENTAL HEALTH & ADDICTION [facility] and please let us know how we can best partner with you to improve you and your family's health.    You may be receiving a survey regarding this appointment. We would love to have your feedback, both positive and negative. The survey is done by an external company, so your answers are anonymous.

## 2021-12-02 NOTE — PROGRESS NOTES
"VIDEO VISIT  Cesia Nettles is a 26 year old patient that has consented to receive services via billable video visit.      The patient has been notified of following:   \"This video visit will be conducted via a call between you and your physician/provider. We have found that certain health care needs can be provided without the need for an in-person physical exam. This service lets us provide the care you need with a video conversation. If a prescription is necessary we can send it directly to your pharmacy. If lab work is needed we can place an order for that and you can then stop by our lab to have the test done at a later time. Insurers are generally covering virtual visits as they would in-office visits so billing should not be different than normal.  If for some reason you do get billed incorrectly, you should contact the billing office to correct it and that number is in the AVS .    Patient will join video visit via:  Bioclones (Patient / guardian confirmed to join via Bioclones)    If patient attempts to join the video via Bioclones at appointment start time, but is unable to, they would prefer that the provider send them a video invitation via:   Send to preferred e-mail: [email or web address]      How would patient like to obtain AVS?:  MyChart    "

## 2021-12-03 ENCOUNTER — TELEPHONE (OUTPATIENT)
Dept: PSYCHIATRY | Facility: CLINIC | Age: 26
End: 2021-12-03
Payer: COMMERCIAL

## 2021-12-03 RX ORDER — PALIPERIDONE 6 MG/1
12 TABLET, EXTENDED RELEASE ORAL AT BEDTIME
Qty: 60 TABLET | Refills: 1 | Status: SHIPPED | OUTPATIENT
Start: 2021-12-03 | End: 2022-02-16

## 2021-12-03 RX ORDER — HYDROXYZINE HYDROCHLORIDE 25 MG/1
25 TABLET, FILM COATED ORAL AT BEDTIME
Qty: 30 TABLET | Refills: 1 | Status: SHIPPED | OUTPATIENT
Start: 2021-12-03 | End: 2022-02-16

## 2021-12-03 RX ORDER — LAMOTRIGINE 100 MG/1
200 TABLET ORAL DAILY
Qty: 60 TABLET | Refills: 1 | Status: SHIPPED | OUTPATIENT
Start: 2021-12-03 | End: 2022-02-16

## 2021-12-03 NOTE — TELEPHONE ENCOUNTER
Phone call to guardian: Shahnaz Bernal through [facility] [phone]:  - Left VM indicating that I would call back on Monday 12/6 between 11 am and 12 pm.   - Left phone number for clinic in case she has any questions or concerns.  - Will obtain verbal consent from Shahnaz to communicate with group home staff and ask  staff to obtain written BRIONNA with consent to communicate with group home staff.    Gabbi Mckeon MD  Psychiatry PGY-3

## 2021-12-06 ENCOUNTER — TELEPHONE (OUTPATIENT)
Dept: PSYCHIATRY | Facility: CLINIC | Age: 26
End: 2021-12-06
Payer: COMMERCIAL

## 2021-12-06 DIAGNOSIS — G47.00 INSOMNIA, UNSPECIFIED TYPE: ICD-10-CM

## 2021-12-06 NOTE — TELEPHONE ENCOUNTER
11 AM: Phone call to guardian: Shahnaz Bernal through [facility] [phone]  - Provided update from visit on 12/2.  - Shahnaz thought that she had seemed pretty down due to not working.   - No medication changes requested.  - Discussed that no medication changes are recommended at this time.  - Scheduled follow-up appointment for Monday, 2/7 at 8:30 AM.  - Obtained verbal consent to speak with staff at living facility (ph: [phone]).    Gabbi Mckeon MD  Psychiatry PGY-3

## 2021-12-06 NOTE — TELEPHONE ENCOUNTER
"Phone call to adult foster care facility at [phone] (verbal consent provided by legal guardian Shahnaz Bernal).  - Spoke with Amal.  - Takes medications \"diligently\".  - They do not see any symptoms of any kind.  - Therese is happy, more interactive, always smiling. Cooking more.  - Has not seen anything negative in the past month or so.  - Has been staying up and watching television. Will sleep later into the morning.  - She does not seem like she is experiencing any symptoms of malia.  - No safety concerns.   - No refills of PRN melatonin needed, however Gita said we could send in a prescription to [facility] and they could hold on to it until needed.     Plan:  - Continue melatonin 5 mg at bedtime PRN 1-2 hours before bedtime.     Gabbi Mckeon MD  Psychiatry PGY-3      "

## 2022-01-21 DIAGNOSIS — F25.0 SCHIZOAFFECTIVE DISORDER, BIPOLAR TYPE (H): ICD-10-CM

## 2022-01-21 DIAGNOSIS — F41.9 ANXIETY: ICD-10-CM

## 2022-01-22 ENCOUNTER — HEALTH MAINTENANCE LETTER (OUTPATIENT)
Age: 27
End: 2022-01-22

## 2022-01-25 ENCOUNTER — TELEPHONE (OUTPATIENT)
Dept: PSYCHIATRY | Facility: CLINIC | Age: 27
End: 2022-01-25
Payer: COMMERCIAL

## 2022-01-25 NOTE — TELEPHONE ENCOUNTER
[facility] Call Center    Phone Message    May a detailed message be left on voicemail: yes     Reason for Call: Medication Refill Request    Has the patient contacted the pharmacy for the refill? Yes   Name of medication being requested: fluoxetine 20mg  Provider who prescribed the medication:   Pharmacy: [facility]  Date medication is needed: Pt will be out by 1/30. Staff at  is requesting the refill as soon as possible because the refill has to be mailed.          Action Taken: Other: [facility] psych pool    Travel Screening: Not Applicable

## 2022-01-25 NOTE — TELEPHONE ENCOUNTER
Last Seen: 12/02/2021  RTC: 2mo  Cancel: 0  No-Show: 0  Next Appt: 02/10/2022    Incoming Refill From  staff via phone call    Medication Requested: FLUoxetine (PROZAC) 20 MG capsule  Directions: Take 1 capsule (20 mg) by mouth daily  Qty: 30  Last Refill: 12/30/21    Writer called pharmacy to check if pended refill by Oralia Rosales on 1/21/22 was received. Pharmacist confirmed that they have zero refills on file.     Medication Refill Approved Per Refill Protocol

## 2022-02-04 ENCOUNTER — TELEPHONE (OUTPATIENT)
Dept: PSYCHIATRY | Facility: CLINIC | Age: 27
End: 2022-02-04
Payer: COMMERCIAL

## 2022-02-05 NOTE — TELEPHONE ENCOUNTER
"Telephone Call with Therese Nettles    Start time: 23:08  End time: 23:15    Notified by behavioral intake that Therese Nettles is tearful and would like a call back when possible.    On return call Therese reports that she feels much better after she \"got my cry out\". 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 Worrying that she may \"disappoint\" people such as family,  staff, her instructor. We discussed the natural function of anxiety in our lives, processed the concern that she may disappoint, and the need for a supportive outlet of emotions not currentlly provided at group [facility]. Therese reports that she has an appointment with Dr. Mckeon on 2/10/22, and that she would like Dr. Mckeon to be notified of our conversation.    Raudel Coppola MD, PhD  PGY-2 Psychiatry Resident       "

## 2022-02-10 ENCOUNTER — TELEPHONE (OUTPATIENT)
Dept: PSYCHIATRY | Facility: CLINIC | Age: 27
End: 2022-02-10
Payer: COMMERCIAL

## 2022-02-10 NOTE — TELEPHONE ENCOUNTER
I called Therese at 8:43 and left a voicemail reminding her about our appointment and requesting a return call to clinic. Next, I called her group home at 8:46 and spoke with staff member, Gita. She reports Therese is not feeling well and wants to reschedule. She states that Therese has been isolating a little bit more than usual and that she has been feeling really down because her therapist is on leave. Therese has requested to meet with someone else. Gita would like us to let her know when Therese has appointments. She does not have any safety concerns. Therese has not made any comments about suicidal ideation or harming herself. She seems happy about finishing school soon, although she is anxious about a test.    Plan:  I have asked the scheduling staff to reach out to Therese's legal guardian to re-schedule her appointment and then to inform the group home staff.    Gabbi Mckeon MD  Psychiatry PGY-3

## 2022-02-10 NOTE — TELEPHONE ENCOUNTER
On February 10, 2022, at 7:59 AM, writer called patient at mobile to confirm Virtual Visit. Writer unable to make contact with patient. Writer left detailed voice message for call back. [phone] left as call back number. A link to the video visit was sent to the patient's email address and mobile phone number. Janes Lopez, EMT

## 2022-02-15 DIAGNOSIS — R63.5 WEIGHT GAIN DUE TO MEDICATION: ICD-10-CM

## 2022-02-15 DIAGNOSIS — F25.0 SCHIZOAFFECTIVE DISORDER, BIPOLAR TYPE (H): ICD-10-CM

## 2022-02-15 DIAGNOSIS — F41.9 ANXIETY: ICD-10-CM

## 2022-02-15 DIAGNOSIS — T50.905A WEIGHT GAIN DUE TO MEDICATION: ICD-10-CM

## 2022-02-16 DIAGNOSIS — G47.00 INSOMNIA, UNSPECIFIED TYPE: ICD-10-CM

## 2022-02-16 RX ORDER — HYDROXYZINE HYDROCHLORIDE 25 MG/1
25 TABLET, FILM COATED ORAL AT BEDTIME
Qty: 30 TABLET | Refills: 0 | Status: SHIPPED | OUTPATIENT
Start: 2022-02-16 | End: 2022-03-14

## 2022-02-16 RX ORDER — LAMOTRIGINE 200 MG/1
200 TABLET ORAL DAILY
Qty: 30 TABLET | Refills: 0 | Status: SHIPPED | OUTPATIENT
Start: 2022-02-16 | End: 2022-03-14

## 2022-02-16 RX ORDER — PALIPERIDONE 6 MG/1
12 TABLET, EXTENDED RELEASE ORAL AT BEDTIME
Qty: 60 TABLET | Refills: 0 | Status: SHIPPED | OUTPATIENT
Start: 2022-02-16 | End: 2022-03-14

## 2022-02-16 NOTE — TELEPHONE ENCOUNTER
Medication requested: FLUoxetine (PROZAC) 20 MG capsule  Last refilled: 1/25/22  Qty: 30    Medication requested: hydrOXYzine (ATARAX) 25 MG tablet  Last refilled: 12/3/21  Qty: 30/1    Medication requested: lamoTRIgine (LAMICTAL) 100 MG tablet  Last refilled: 12/3/21  Qty: 60/1    Medication requested: metFORMIN (GLUCOPHAGE) 500 MG tablet  Last refilled: 12/3/21  Qty: 120/1    Medication requested: paliperidone ER (INVEGA) 6 MG 24 hr tablet  Last refilled: 12/3/21  Qty: 60/1    Last seen: 12/2/21  RTC: 2 months  Cancel: 0  No-show: x 1  Next appt: 3/7/22    Refill decision:   Refill pended and routed to the provider for review/determination due to   NO SHOW X 1  Scheduled for follow-up 3/7/22

## 2022-02-16 NOTE — TELEPHONE ENCOUNTER
Medication requested: melatonin 5 MG tablet  Last refilled: 12/6/21  Qty: 30/1      Last seen: 12/2/21  RTC: 2 months  Cancel: 0  No-show: x 1  Next appt: 3/7/22    Refill decision:   Refill pended and routed to the provider for review/determination due to   No show x 1  Scheduled for follow-up 3/7/22

## 2022-02-18 ENCOUNTER — TELEPHONE (OUTPATIENT)
Dept: BEHAVIORAL HEALTH | Facility: CLINIC | Age: 27
End: 2022-02-18
Payer: COMMERCIAL

## 2022-02-18 ENCOUNTER — TELEPHONE (OUTPATIENT)
Dept: PSYCHIATRY | Facility: CLINIC | Age: 27
End: 2022-02-18
Payer: COMMERCIAL

## 2022-02-19 NOTE — TELEPHONE ENCOUNTER
"  -----------------------------------------------------------------------------------------------------------  Brief Psychiatry Phone note      Cesia Nettles MRN# [id number]   Age: 26 year old   Clinic Provider:  Dr. Mckeon YOB: 1995   PCP: Becki Valencia            Phone note:     Received phone call from [facility] relaying that Therese had requested a call back. She reports that she has been having a \"meltdown.\" Her dog had to be put down. She feels like she has exhausted the bandwidth of verbal processing of her caregivers. She called [facility] crisis line, and felt like they were \"mad\" at her. She is not having any SI, HI, or psychosis symptoms. Reviewed her coping skills that don't require others, like listening to music and taking a shower. She \"feels like I need a PRN.\" We discussed her taking her qhs hydroxyzine, and utilizing an icepack for her face. She will reach out to crisis line if safetyt concerns arise, or go to the ED after talking to her  staff if safety concerns are unmanagable. I obtained her consent to talk to  staff to relay the plan about hydroxyzine.         Assessment and Plan:   Cesia Nettles is a 26 year old female with a history of SCAD-DT who contacted us regarding emotional dysregulation in the context of acute stressors. No safety concerns.    -She will take her qhs hydroxyzine now, which I cleared with her  staff   - TIPPs skills presented  - safety plan discussed    =============================================================================  Checo Howard MD  PGY-2 Psychiatry Resident            "

## 2022-02-19 NOTE — TELEPHONE ENCOUNTER
7:15 PM Paged Resident after Pt called Intake reporting uncontrolled anxiety and not sure if Med issue or should she go to ED.  No SI currently

## 2022-03-14 DIAGNOSIS — F41.9 ANXIETY: ICD-10-CM

## 2022-03-14 DIAGNOSIS — G47.00 INSOMNIA, UNSPECIFIED TYPE: ICD-10-CM

## 2022-03-14 DIAGNOSIS — T50.905A WEIGHT GAIN DUE TO MEDICATION: ICD-10-CM

## 2022-03-14 DIAGNOSIS — R63.5 WEIGHT GAIN DUE TO MEDICATION: ICD-10-CM

## 2022-03-14 DIAGNOSIS — F25.0 SCHIZOAFFECTIVE DISORDER, BIPOLAR TYPE (H): ICD-10-CM

## 2022-03-14 RX ORDER — HYDROXYZINE HYDROCHLORIDE 25 MG/1
TABLET, FILM COATED ORAL
Qty: 30 TABLET | Refills: 0 | Status: SHIPPED | OUTPATIENT
Start: 2022-03-14 | End: 2022-04-12

## 2022-03-14 RX ORDER — LAMOTRIGINE 200 MG/1
TABLET ORAL
Qty: 30 TABLET | Refills: 0 | Status: SHIPPED | OUTPATIENT
Start: 2022-03-14 | End: 2022-04-12

## 2022-03-14 RX ORDER — PALIPERIDONE 6 MG/1
TABLET, EXTENDED RELEASE ORAL
Qty: 60 TABLET | Refills: 0 | Status: SHIPPED | OUTPATIENT
Start: 2022-03-14 | End: 2022-04-12

## 2022-03-14 NOTE — CONFIDENTIAL NOTE
FLUoxetine (PROZAC) 20 MG  hydrOXYzine (ATARAX) 25 MG  lamoTRIgine (LAMICTAL) 200 MG  melatonin 5 MG   Last refilled: 2/16/22  Qty: 30    paliperidone ER (INVEGA) 6 MG   Last refilled: 2/16/22  Qty: 60    metFORMIN (GLUCOPHAGE) 500 MG   Last refilled: 2/16/22  Qty: 120    Last seen: 12/2/21  RTC: 2 MOS  Cancel: 2/7/22    3/7/22  No-show: 2/10/22  Next appt: NONE  Refill pended and routed to the provider for review/determination due to : Pt outside of RTC timeframe WITH CANCEL X2 & NOSHOW X1

## 2022-03-29 ENCOUNTER — TELEPHONE (OUTPATIENT)
Dept: PSYCHIATRY | Facility: CLINIC | Age: 27
End: 2022-03-29
Payer: COMMERCIAL

## 2022-03-29 NOTE — TELEPHONE ENCOUNTER
On 2/17/22, the patient's legal guardian signed a PHI authorizing person to person communication with Shahnaz Parson (Guardian, [phone]) and [facility] / Gita Padilla ([phone])  for medical, scheduling, and billing information, as well as picking up items on the patient's behalf. Writer sent document to scanning and a hard copy is being held in nurse triage until scanning is confirmed. Amanda Ramos, EMT

## 2022-04-12 DIAGNOSIS — F41.9 ANXIETY: ICD-10-CM

## 2022-04-12 DIAGNOSIS — R63.5 WEIGHT GAIN DUE TO MEDICATION: ICD-10-CM

## 2022-04-12 DIAGNOSIS — F25.0 SCHIZOAFFECTIVE DISORDER, BIPOLAR TYPE (H): ICD-10-CM

## 2022-04-12 DIAGNOSIS — T50.905A WEIGHT GAIN DUE TO MEDICATION: ICD-10-CM

## 2022-04-12 RX ORDER — PALIPERIDONE 6 MG/1
TABLET, EXTENDED RELEASE ORAL
Qty: 60 TABLET | Refills: 0 | Status: SHIPPED | OUTPATIENT
Start: 2022-04-12 | End: 2022-05-05

## 2022-04-12 RX ORDER — LAMOTRIGINE 200 MG/1
200 TABLET ORAL DAILY
Qty: 30 TABLET | Refills: 0 | Status: SHIPPED | OUTPATIENT
Start: 2022-04-12 | End: 2022-05-05

## 2022-04-12 RX ORDER — HYDROXYZINE HYDROCHLORIDE 25 MG/1
25 TABLET, FILM COATED ORAL AT BEDTIME
Qty: 30 TABLET | Refills: 0 | Status: SHIPPED | OUTPATIENT
Start: 2022-04-12 | End: 2022-05-05

## 2022-04-12 NOTE — CONFIDENTIAL NOTE
FLUoxetine (PROZAC) 20 MG  hydrOXYzine (ATARAX) 25 MG  lamoTRIgine (LAMICTAL) 200 MG   Last refilled: 3/14/22  Qty: 30    paliperidone ER (INVEGA) 6 MG   Last refilled: 3/14/22  Qty: 60    metFORMIN (GLUCOPHAGE) 500 MG  Last refilled: 3/14/22  Qty: 120    Last seen: 12/2/21  RTC: 2 MOS  Cancel: 2/7/22   3/7/22  No-show: 2/10/22  Next appt: NONE  Scheduling has been notified to contact the pt for appointment.  30 DAY LEXI Refill pended and routed to the provider for review/determination due to :  Pt outside of RTC timeframe  WITH CANCEL X2 & NO SHOW X 1

## 2022-04-20 ENCOUNTER — TELEPHONE (OUTPATIENT)
Dept: PSYCHIATRY | Facility: CLINIC | Age: 27
End: 2022-04-20
Payer: MEDICAID

## 2022-04-20 NOTE — TELEPHONE ENCOUNTER
Prior Authorization Retail Medication Request    Medication/Dose: paliperidone ER (INVEGA) 6 MG 24 hr tablet  ICD code (if different than what is on RX):  Schizoaffective disorder, bipolar type (H) [F25.0]   Previously Tried and Failed:  Perphenazine  Rationale:    Patient has been on this medication since April 2017, and has been stable on it, together with her other medications.  She has experienced lessening of auditory and visual hallucinations, to a level that is manageable and does not interfere with her functioning.  Her mood has also been relatively stable, and she has been doing well.  She feels stable enough emotionally and can cope with stressors that previously would have led to suicidal ideation and gestures.  On her current medications, she is  Not experiencing dysregulation, allowing her to apply the skills that she has learned in therapy.  Please consider continuing to cover this medication for her to prevent decompensation and possible need for hospitalization.    (Please see 4/24/2018 PA encounter)    Insurance Name:  [employer]  Insurance ID:  [id number]      Pharmacy Information (if different than what is on RX)  Name:  [facility] [id number] - [place], [place] - [address]  Phone:  [phone]

## 2022-04-22 NOTE — TELEPHONE ENCOUNTER
Central Prior Authorization Team   Phone: [phone]      P/A demographics have been submitted to insurance, am awaiting question set.

## 2022-04-25 NOTE — TELEPHONE ENCOUNTER
[facility] could not find coverage for patient. I checked [facility] and see patient has straight Medicaid - ID# [id number].  I'll resubmit to Medicaid.       PA Initiation    Medication: paliperidone ER (INVEGA) 6 MG 24 hr tablet  Insurance Company: [place] Medicaid ([facility]) - Phone [phone] Fax [phone]  Pharmacy Filling the Rx: [facility] [id number] - [place], [place] - [address]  Filling Pharmacy Phone: [phone]  Filling Pharmacy Fax:    Start Date: 4/22/2022

## 2022-04-26 NOTE — TELEPHONE ENCOUNTER
"One page fax received from [facility] indicating that Paliperidone Er has been approved.    [facility] PA #: [id number]       Medicaid ID: [id number]                                                    From: 4/12/2022                                                   Through: 4/11/2023                                                    NDC: [id number]  QTY Per RX:60  Prescriber: Oralia Rosales  Prescriber NPI: [id number]     Pharmacy NPI: [id number]    \"In order to process a claim for the prescription noted above, the prior authorization (PA) number and corresponding NDC must be transmitted with the prescription, as well as the prescriber NPI and pharmacy NPI numbers.\"      Document was sent to scanning and a hard copy is being held in nurse triage until scanning is confirmed.    JOVANI Ascencio    "

## 2022-04-26 NOTE — TELEPHONE ENCOUNTER
1 page fax received from [facility] stating that the pharmacy called to update the NDC. New NDC is:    [id number]    Document was sent to scanning and a hard copy is being held in nurse triage until scanning is confirmed.   Amanda Ramos, EMT

## 2022-05-04 ASSESSMENT — ANXIETY QUESTIONNAIRES
GAD7 TOTAL SCORE: 19
8. IF YOU CHECKED OFF ANY PROBLEMS, HOW DIFFICULT HAVE THESE MADE IT FOR YOU TO DO YOUR WORK, TAKE CARE OF THINGS AT HOME, OR GET ALONG WITH OTHER PEOPLE?: VERY DIFFICULT
5. BEING SO RESTLESS THAT IT IS HARD TO SIT STILL: MORE THAN HALF THE DAYS
GAD7 TOTAL SCORE: 19
7. FEELING AFRAID AS IF SOMETHING AWFUL MIGHT HAPPEN: NEARLY EVERY DAY
6. BECOMING EASILY ANNOYED OR IRRITABLE: MORE THAN HALF THE DAYS
2. NOT BEING ABLE TO STOP OR CONTROL WORRYING: NEARLY EVERY DAY
4. TROUBLE RELAXING: NEARLY EVERY DAY
7. FEELING AFRAID AS IF SOMETHING AWFUL MIGHT HAPPEN: NEARLY EVERY DAY
GAD7 TOTAL SCORE: 19
1. FEELING NERVOUS, ANXIOUS, OR ON EDGE: NEARLY EVERY DAY
3. WORRYING TOO MUCH ABOUT DIFFERENT THINGS: NEARLY EVERY DAY

## 2022-05-04 ASSESSMENT — PATIENT HEALTH QUESTIONNAIRE - PHQ9
SUM OF ALL RESPONSES TO PHQ QUESTIONS 1-9: 16
10. IF YOU CHECKED OFF ANY PROBLEMS, HOW DIFFICULT HAVE THESE PROBLEMS MADE IT FOR YOU TO DO YOUR WORK, TAKE CARE OF THINGS AT HOME, OR GET ALONG WITH OTHER PEOPLE: VERY DIFFICULT
SUM OF ALL RESPONSES TO PHQ QUESTIONS 1-9: 16

## 2022-05-04 NOTE — PROGRESS NOTES
Video- Visit Details  Type of service:  video visit for medication management  Time of service:    Date:  05/05/2022    Video Start Time:  7:59 AM        Video End Time:  8:42 AM     Reason for video visit:  Services only offered telehealth  Originating Site (patient location):  [facility]   Location- skilled nursing  Distant Site (provider location):  [facility]  Mode of Communication:  Video Conference via AmWell  Consent:  Patient has given verbal consent for video visit?: Yes          [facility]  Psychiatry Clinic  MEDICAL PROGRESS NOTE     CARE TEAM:  PCP- Becki Valencia, Psychotherapist- Andria Jarrell, [facility], [facility] Team- [facility] Erica Sparks [phone],   ([employer]) Margie Velazquez [phone]    Guardian: Shahnaz Bernal [facility] [phone]   Living Situation: [facility] [phone] - Shahnaz MARQUEZ (manager) [phone]    Cesia Nettles is a 27 year old who uses the name Therese and pronouns she, her, hers, herself.      DIAGNOSIS   Schizoaffective Disorder, bipolar type, most recent episode depressed  Unspecified Anxiety Disorder  Unspecified insomnia  Weight gain due to medication  Emotional Dysregulation     ASSESSMENT   Therese reports periods of emotional dysregulation associated with life stressors and caffeine use. She reports difficulty sleeping and feels that sleep hygiene interventions would be an appropriate next step. We also discussed possibly reducing her dose of melatonin since it might not be providing much benefit. I will confirm that her guardian is okay with this. Should her depression or anxiety worsen, we could increase her fluoxetine again (it has previously been as high as 80 mg). If we were to increase her fluoxetine, we would need to continue to monitor carefully for symptoms of malia. There are no acute safety concerns at this time.      [facility]     PLAN                                                                                                                1) Meds-  - Continue paliperidone 12 mg at bedtime   - Continue lamotrigine 200 mg daily  - Continue metformin 1000 mg BID (pt may take second dose with a snack in the early afternoon before going to work)  - Continue hydroxyzine 25 mg at bedtime   - Continue melatonin 5 mg 1-2 hours before bedtime PRN. May decrease to 1-3 mg 1-2 hours before bedtime pending discussion with guardian.  - Continue fluoxetine 20 mg daily.    2) Psychotherapy- Continue individual weekly therapy.    3) Next due-  Labs- second generation antipsychotic monitoring labs due in 06/2022, ordered today  EKG- PRN  Rating scales- PHQ9 and TINO-7 at every visit, AIMS at future visit     4) Referrals-  None    5) Dispo- Follow-up visit in 2 months with incoming resident, Dr. Eubanks.     PERTINENT BACKGROUND                           [most recent eval 10/11/21]   This patient first experienced mental health issues in childhood and has received treatment for psychosis and suicidality.  Notably, Therese has a history of chronic AH/VH dating back to age 8 and has good insight into her symptoms. Per chart review, she has a history of some Cluster B traits and frequent hospitalizations which tapered off after she started residing in a group home and supportive employment a few years ago. She has been very stable on paliperidone and fluoxetine in conjunction with group home living environment. She had neuropsychiatric testing as a child (see scanned documents 4/16/19). She is under guardianship (see above); all medication changes need to be approved by them (see media section 10/17/19 for paperwork).     Psych pertinent item history includes suicide attempt [xmultiple], suicidal ideation, psychosis [sxs include paranoia, auditory hallucinations, ideas of reference], mutiple psychotropic trials  and psych hosp (x8)       "SUBJECTIVE   Since the last visit:    She had a rough start to the year: lost her dog and got Covid. April was pretty good.     She had a hard day on Monday so she slept all day on Tuesday because she was depressed about the preceding day.     She found out on Tuesday night that her ex-boyfriend is engaged.     She went to the gym and feels a little bit.    Therese took a nursing assistant course. She finished the course, took the exam, and passed.     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 Overall she feels that she is getting better but states that there are some people at work who are hard on her because she works slowly. She has been working there for a month.     She says that her depression and anxiety have been fluctuating but recently has had some challenges due to social stressors.     She reports a couple of manic episodes since our last visit. She states that she was \"super manic\" after her dog  in February. She was really talkative, animated. This lasted few hours.     The most recent episode was last week. It lasted less than a day.     No elevated mood states lasting multiple days since our last visit. Then she states that she did have one because she had a lot of caffeine in her system and was \"manic for a day and a half\".     She feels that her medications are working pretty well but is thinking of increasing her dose of melatonin (takes 5 mg every night).    Anxiety keeps her awake.    Takes a few hours to fall asleep. Sometimes hard because of using computer.     Discussed resident transition.     She does not remember if she has had any psychotic symptoms since our last visit.    Phone call to legal guardian: No response. Left a voice mail requesting call back to clinic.    Phone call to group home: Spoke with Hilary. She is just starting to work at this house. Team meeting last week went well and they talked about the possibility of Therese moving out in the future. Hilary " "believes that Therese is doing well generally based on what she hears from everyone (noting that she has just started to work with Therese). There are no safety concerns.     RECENT PSYCH ROS:   Depression: Endorses excessive crying.    Sleep: Difficulty falling asleep.     Adverse Effects: Dry mouth  Pertinent Negative Symptoms: No suicidal ideation, self-injurious behavior/urges or violent ideation    Recent Substance Use:     Caffeine - had a drink from [place] on Tuesday.      FAMILY and SOCIAL HISTORY                                 pt reported     Financial/ Work- Starting a new job tomorrow. 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     Family Hx (per 9/1/20 note by GASTON Contreras MD):  Father: major depressive disorder  Mother: borderline personality disorder and major depressive disorder  Maternal grandmother: major depressive disorder  Paternal grandfather: major depressive disorder and alcohol use disorder  Maternal great-grandfather: schizophrenia    Social Hx (per 9/20/21 note by KEVIN Taylor MD):  Partner/ - unknown (as of 10/11/21)  Children- none      Living situation- Therese lives in [facility] through [facility]. Has been there since around 2018.       Social/ Spiritual Support- Her mother, half-siblings, [facility] workers, ,  staff (Renu Brady).  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 \"really close with my grandpa\"     Feels Safe at Home- yes but does get anxious at home because one of her housemates is mean to another housemate (as of 10/11/21).      PSYCH and SUBSTANCE USE Critical Summary Points since July 2021 8/6/21: lamotrigine increased to 200 mg daily  9/20/21: no changes made  10/11/21:transfer of care diagnostic assessment; fluoxetine 20 mg started.  12/2/21: no medication changes.     MEDICAL HISTORY and ALLERGY     Neurological: H/o absence epilepsy from ages [age]    ALLERGIES: Cats and Seasonal allergies    Patient Active Problem List "   Diagnosis     Schizoaffective disorder, depressive type (H)     Hx of psychiatric care     Psychosis (H)        MEDICAL REVIEW OF SYSTEMS   Contraception- Nexplanon    Endorses: dry mouth, constipation (depends on diet), blurry vision at times (after she has been looking at computer for long time), headaches, fast heart rate (when anxious)     MEDICATIONS     Current Outpatient Medications   Medication Sig Dispense Refill     etonogestrel (IMPLANON/NEXPLANON) 68 MG IMPL 1 each by Subdermal route once       FLUoxetine (PROZAC) 20 MG capsule Take 1 capsule (20 mg) by mouth in the morning. *SCHEDULE APPT. FOR FURTHER REFILLS [phone] 30 capsule 0     fluticasone (FLONASE) 50 MCG/ACT nasal spray Spray 2 sprays into both nostrils daily       hydrOXYzine (ATARAX) 25 MG tablet Take 1 tablet (25 mg) by mouth At Bedtime *SCHEDULE APPT. FOR FURTHER REFILLS [phone] 30 tablet 0     ibuprofen (ADVIL/MOTRIN) 200 MG tablet Take 400 mg by mouth every 4 hours as needed for mild pain       lamoTRIgine (LAMICTAL) 200 MG tablet Take 1 tablet (200 mg) by mouth in the morning. *SCHEDULE APPT. FOR FURTHER REFILLS [phone] 30 tablet 0     melatonin 5 MG tablet Take 1 tablet (5 mg) by mouth nightly as needed for sleep Take 1-2 hours before bed 30 tablet 0     metFORMIN (GLUCOPHAGE) 500 MG tablet TAKE 2 TABLETS BY MOUTH TWICE A DAY WITH MEALS 120 tablet 0     paliperidone ER (INVEGA) 6 MG 24 hr tablet TAKE 2 TABLETS BY MOUTH AT BEDTIME 60 tablet 0      VITALS   There were no vitals taken for this visit.    MENTAL STATUS EXAM     Alertness: alert    Appearance: well groomed  Behavior/Demeanor: cooperative, pleasant and calm, with good  eye contact   Speech: normal and regular rate and rhythm  Language: intact and no obvious problem  Psychomotor: normal or unremarkable  Mood: labile  Affect: appropriate and bright; congruent to: mood- yes, content- yes  Thought Process/Associations: unremarkable  Thought Content:  Reports none;   Denies suicidal ideation and violent ideation  Perception:  Reports none;  Denies none  Insight: good  Judgment: good  Cognition: does  appear grossly intact; formal cognitive testing was not done  Gait and Station: N/A (telehealth)     LABS and DATA     PHQ-9 TODAY =16  PHQ 3/4/2020 1/26/2021 5/4/2022   PHQ-9 Total Score 18 13 16   Q9: Thoughts of better off dead/self-harm past 2 weeks Several days Several days Not at all   F/U: Thoughts of suicide or self-harm - No -   F/U: Safety concerns - No -     TINO-7 SCORE 1/14/2020 2/20/2020 5/4/2022   Total Score - - 19 (severe anxiety)   Total Score 13 10 19   Total Score BEH Adult - - -         Recent Labs   Lab Test 06/09/21  0840 02/02/17  0916   GLC 93 87   A1C 5.1 4.9     Recent Labs   Lab Test 06/09/21  0840 12/10/18  0912   CHOL 178 205*   TRIG 91 201*   * 120*   HDL 52 45*     Recent Labs   Lab Test 06/09/21  0840 02/02/17  0916   AST 27 24   ALT 17 23   ALKPHOS 95 102     Recent Labs   Lab Test 06/09/21  0840 12/10/18  0912   WBC 5.4 6.4   ANEU 2.5 3.3   HGB 12.4 11.7    292       ECG 6/10/2019 QTc = 445 ms     PSYCHOTROPIC DRUG INTERACTIONS   Per Micromedex:  HYDROXYZINE and PALIPERIDONE: may result in increased risk of QT-interval prolongation.     Per UpToDate:  Hydroxyzine, paliperidone, and lamotrigine: enhanced CNS depression.  Lamotrigine and metformin: may increase serum concentration of metformin.   Paliperidone and metformin: paliperidone may diminish therapeutic effects of metformin.  Nexplanon and lamotrigine: lamotrigine may decrease serum concentration of progestins.        MANAGEMENT:  Monitoring for adverse effects and periodic EKGs     RISK STATEMENT for SAFETY     Cesia M Geffre did not appear to be an imminent safety risk to self or others.    TREATMENT RISK STATEMENT: The risks, benefits, alternatives and potential adverse effects have been discussed and are understood by the pt. The pt understands the risks of using street  drugs or alcohol. There are no medical contraindications, the pt agrees to treatment with the ability to do so. The pt knows to call the clinic for any problems or to access emergency care if needed.  Medical and substance use concerns are documented above.  Psychotropic drug interaction check was done, including changes made today.     PROVIDER: Gabbi Mckeon MD    Patient staffed in clinic with Dr. Schofield who will sign the note.  Supervisor is Dr. Schofield.

## 2022-05-05 ENCOUNTER — VIRTUAL VISIT (OUTPATIENT)
Dept: PSYCHIATRY | Facility: CLINIC | Age: 27
End: 2022-05-05
Attending: PSYCHIATRY & NEUROLOGY
Payer: MEDICAID

## 2022-05-05 DIAGNOSIS — G47.00 INSOMNIA, UNSPECIFIED TYPE: ICD-10-CM

## 2022-05-05 DIAGNOSIS — Z79.899 ENCOUNTER FOR LONG-TERM (CURRENT) USE OF MEDICATIONS: ICD-10-CM

## 2022-05-05 DIAGNOSIS — R63.5 WEIGHT GAIN DUE TO MEDICATION: ICD-10-CM

## 2022-05-05 DIAGNOSIS — T50.905A WEIGHT GAIN DUE TO MEDICATION: ICD-10-CM

## 2022-05-05 DIAGNOSIS — F25.0 SCHIZOAFFECTIVE DISORDER, BIPOLAR TYPE (H): Primary | ICD-10-CM

## 2022-05-05 DIAGNOSIS — F41.9 ANXIETY: ICD-10-CM

## 2022-05-05 PROCEDURE — 99214 OFFICE O/P EST MOD 30 MIN: CPT | Mod: GC | Performed by: STUDENT IN AN ORGANIZED HEALTH CARE EDUCATION/TRAINING PROGRAM

## 2022-05-05 RX ORDER — LAMOTRIGINE 200 MG/1
200 TABLET ORAL DAILY
Qty: 30 TABLET | Refills: 2 | Status: SHIPPED | OUTPATIENT
Start: 2022-05-05 | End: 2022-08-17

## 2022-05-05 RX ORDER — HYDROXYZINE HYDROCHLORIDE 25 MG/1
25 TABLET, FILM COATED ORAL AT BEDTIME
Qty: 30 TABLET | Refills: 2 | Status: SHIPPED | OUTPATIENT
Start: 2022-05-05 | End: 2022-08-17

## 2022-05-05 RX ORDER — PALIPERIDONE 6 MG/1
12 TABLET, EXTENDED RELEASE ORAL AT BEDTIME
Qty: 60 TABLET | Refills: 2 | Status: SHIPPED | OUTPATIENT
Start: 2022-05-05 | End: 2022-08-17

## 2022-05-05 RX ORDER — PSYLLIUM HUSK 0.4 G
CAPSULE ORAL
Qty: 30 TABLET | Refills: 0 | OUTPATIENT
Start: 2022-05-05

## 2022-05-05 ASSESSMENT — ANXIETY QUESTIONNAIRES: GAD7 TOTAL SCORE: 19

## 2022-05-05 ASSESSMENT — PATIENT HEALTH QUESTIONNAIRE - PHQ9: SUM OF ALL RESPONSES TO PHQ QUESTIONS 1-9: 16

## 2022-05-05 NOTE — PATIENT INSTRUCTIONS
**For crisis resources, please see the information at the end of this document**   Patient Education    Thank you for coming to the [facility] [place] [facility].    Lab Testing:  If you had lab testing today and your results are reassuring or normal they will be mailed to you or sent through Fewzion within 7 days. If the lab tests need quick action we will call you with the results. The phone number we will call with results is # [phone]. If this is not the best number please call our clinic and change the number.     Medication Refills:  If you need any refills please call your pharmacy and they will contact us. Our fax number for refills is [phone]. Please allow three business days for refill processing.   If you need to change to a different pharmacy, please contact the new pharmacy directly. The new pharmacy will help you get your medications transferred.     Contact Us:  Please call [phone] during business hours (8-5:00 M-F).  If you have medication related questions after clinic hours, or on the weekend, please call [phone].    Financial Assistance [phone]  Medical Records [phone]       MENTAL HEALTH CRISIS RESOURCES:  For a emergency help, please call 911 or go to the nearest Emergency Department.     Emergency Walk-In Options:   [facility] Unit @ [facility] ([place]): [phone] - Specialized mental health emergency area designed to be calming  [facility] ([place]): [phone]  [facility] ([place]): [phone]  [facility]): [phone]    County Crisis Information:   [place]: [phone]  [place]: [phone]  [place] - Adult: [phone]     Child: [phone]  [place] - Adult: [phone]     Child: [phone]  [place]: [phone]  List of all [facility] resources:    https://[place].gov/dhs/people-we-serve/adults/health-care/mental-health/resources/crisis-contacts.jsp    National Crisis Information:   Crisis Text Line: Text  [place]  to 208245  National Suicide Prevention Lifeline: 9-080-216-TALK (1-714.964.6257)       For online chat options, visit https://suicidepreventionlifeline.org/chat/  Poison Control Center: 7-413-342-9511  Trans Lifeline: 6-485-802-2402 - Hotline for transgender people of all ages  The Damien Project: 5-763-051-4814 - Hotline for LGBT youth     For Non-Emergency Support:   Fast Tracker: Mental Health & Substance Use Disorder Resources -   https://www.Mediasurfacen.org/

## 2022-05-05 NOTE — PROGRESS NOTES
Cesia Nettles is a 27 year old who has consented to receive services via billable video visit.      Pt will join video visit via: Kayo technology  If there are problems joining the visit, send backup video invite via: Text to preferred phone: [phone]      Originating Location (patient location): Patient's home  Distant Location (provider location): [facility] [place] [facility]    Will anyone else be joining the video visit? No    How would you prefer to obtain AVS?: Joselin

## 2022-05-10 ENCOUNTER — TELEPHONE (OUTPATIENT)
Dept: PSYCHIATRY | Facility: CLINIC | Age: 27
End: 2022-05-10
Payer: MEDICAID

## 2022-05-10 NOTE — TELEPHONE ENCOUNTER
Phone call to legal guardian at [phone]. Paperwork in chart lists legal guardian as Shahnaz Gabe, however calls are going to a mail box owned by Shahnaz Parson (spelling?). I requested that she call back and confirm that she had changed her last name (since we don't have documentation of this in the chart) prior to me discussing this patient's medical information. There are no urgent issues or safety concerns to discuss with the guardian at this time, this is just an update regarding my last appointment with Therese and recommendations regarding melatonin and follow-up.     I have also reached out to social work for guidance regarding the [facility] paperwork which is noted to be  in Therese's chart.    Gabbi Mckeon MD  Psychiatry PGY-3

## 2022-05-20 ENCOUNTER — DOCUMENTATION ONLY (OUTPATIENT)
Dept: PSYCHIATRY | Facility: CLINIC | Age: 27
End: 2022-05-20
Payer: MEDICAID

## 2022-05-20 NOTE — PROGRESS NOTES
"I sent the following message to [employer] today:    \"I have a patient who has a legal guardian however:    1. It appears the paperwork in the chart has  (as of 2021) and   2. The guardian's last name has changed on their voicemail but we have not received any notification or confirmation from the guardian about this name change.    How do we get updated paperwork so that I can communicate with her guardian?\"    Gabbi Mckeon MD  Psychiatry PGY-3          "

## 2022-05-23 DIAGNOSIS — G47.00 INSOMNIA, UNSPECIFIED TYPE: ICD-10-CM

## 2022-05-24 RX ORDER — PSYLLIUM HUSK 0.4 G
CAPSULE ORAL
Qty: 30 TABLET | Refills: 0 | Status: SHIPPED | OUTPATIENT
Start: 2022-05-24 | End: 2022-06-17

## 2022-05-24 NOTE — TELEPHONE ENCOUNTER
melatonin 5 MG   Last refilled: 4/21/22  Qty: 30    Last seen: 4/21/22  RTC: 2 MOS  Cancel: 0  No-show: 0  Next appt: NONE  Scheduling has been notified to contact the pt for appointment.  30 day shabbir refill sent to the pharmacy - including instructions for patient to call the clinic and schedule an appointment.

## 2022-06-08 ENCOUNTER — TELEPHONE (OUTPATIENT)
Dept: PSYCHIATRY | Facility: CLINIC | Age: 27
End: 2022-06-08
Payer: MEDICAID

## 2022-06-08 DIAGNOSIS — G47.00 INSOMNIA, UNSPECIFIED TYPE: ICD-10-CM

## 2022-06-08 RX ORDER — PSYLLIUM HUSK 0.4 G
CAPSULE ORAL
Qty: 30 TABLET | Refills: 0 | OUTPATIENT
Start: 2022-06-08

## 2022-06-08 NOTE — TELEPHONE ENCOUNTER
Writer received 2 fax pages from [employer] requesting that the patient's Melatonin 5 mg be changed from a PRN to a scheduled medication.     [facility] goes on to indicate that the patient takes this medication every night and that listing it as a scheduled med would lessen the amount of error in documentation. This encounter was routed to Dr. Mckeon and Cindy Lagos, RNCC. These fax pages are being held in psychiatry. Janes Lopez, EMT    Ethel Alicea, an RN at [facility], is listed as being available to take questions regarding the request ([phone]).

## 2022-06-14 ENCOUNTER — TELEPHONE (OUTPATIENT)
Dept: PSYCHIATRY | Facility: CLINIC | Age: 27
End: 2022-06-14
Payer: MEDICAID

## 2022-06-14 ENCOUNTER — LAB REQUISITION (OUTPATIENT)
Dept: LAB | Facility: CLINIC | Age: 27
End: 2022-06-14

## 2022-06-14 PROCEDURE — 86481 TB AG RESPONSE T-CELL SUSP: CPT | Performed by: INTERNAL MEDICINE

## 2022-06-14 NOTE — TELEPHONE ENCOUNTER
Writer spoke to pt on the phone. Pt stated she's doing well and denied any safety concerns. Regarding the Melatonin, pt stated she takes it nightly and would like to keep the dose at 5 mg. Pt also stated her insurance no longer covers Melatonin, but she won't have any issues affording it out of pocket as the medication is OTC and fairly inexpensive. Will updated provider and follow up as needed.   Cindy Lagos RN

## 2022-06-15 NOTE — TELEPHONE ENCOUNTER
Writer called Shahnaz Marielacie, pt's guardian, to obtain consent for a change in pt's Melatonin prescription from PRN to scheduled and obtained no answer. Left a DVM requesting a call back. Writer's direct number provided.   Cindy Lagos RN

## 2022-06-16 LAB
GAMMA INTERFERON BACKGROUND BLD IA-ACNC: 0 IU/ML
M TB IFN-G BLD-IMP: NEGATIVE
M TB IFN-G CD4+ BCKGRND COR BLD-ACNC: 10 IU/ML
MITOGEN IGNF BCKGRD COR BLD-ACNC: 0.05 IU/ML
MITOGEN IGNF BCKGRD COR BLD-ACNC: 0.08 IU/ML
QUANTIFERON MITOGEN: 10 IU/ML
QUANTIFERON NIL TUBE: 0 IU/ML
QUANTIFERON TB1 TUBE: 0.05 IU/ML
QUANTIFERON TB2 TUBE: 0.08

## 2022-06-17 DIAGNOSIS — G47.00 INSOMNIA, UNSPECIFIED TYPE: ICD-10-CM

## 2022-06-17 RX ORDER — PSYLLIUM HUSK 0.4 G
CAPSULE ORAL
Qty: 30 TABLET | Refills: 0 | Status: SHIPPED | OUTPATIENT
Start: 2022-06-17 | End: 2022-10-03

## 2022-06-17 NOTE — TELEPHONE ENCOUNTER
MELATONIN 5 MG   Last refilled: 5/24/22  Qty: 30    Last seen: 5/5/22  RTC: 2 MOS  Cancel: 0  No-show: 0  Next appt: 7/22/22  Refilled for 30 days per protocol.

## 2022-06-20 ENCOUNTER — TELEPHONE (OUTPATIENT)
Dept: PSYCHIATRY | Facility: CLINIC | Age: 27
End: 2022-06-20
Payer: MEDICAID

## 2022-06-20 ENCOUNTER — DOCUMENTATION ONLY (OUTPATIENT)
Dept: OTHER | Facility: CLINIC | Age: 27
End: 2022-06-20
Payer: MEDICAID

## 2022-06-23 ENCOUNTER — LAB REQUISITION (OUTPATIENT)
Dept: LAB | Facility: CLINIC | Age: 27
End: 2022-06-23

## 2022-06-23 PROCEDURE — U0005 INFEC AGEN DETEC AMPLI PROBE: HCPCS | Performed by: INTERNAL MEDICINE

## 2022-06-24 ENCOUNTER — TELEPHONE (OUTPATIENT)
Dept: PSYCHIATRY | Facility: CLINIC | Age: 27
End: 2022-06-24

## 2022-06-24 LAB — SARS-COV-2 RNA RESP QL NAA+PROBE: NEGATIVE

## 2022-06-27 ENCOUNTER — TELEPHONE (OUTPATIENT)
Dept: PSYCHIATRY | Facility: CLINIC | Age: 27
End: 2022-06-27

## 2022-06-27 NOTE — TELEPHONE ENCOUNTER
Writer placed a call to pt's group home ([phone]) and guardian requesting a Guardianship Renewal Letter to be faxed to our clinic prior to 7/31/22 (when the current one expires). Also spoke to pt on her mobile number regarding above request.   Cindy Lagos RN

## 2022-06-27 NOTE — TELEPHONE ENCOUNTER
----- Message from Cindy Lagos RN sent at 2022 10:30 AM CDT -----    ----- Message -----  From: Gabbi Mckeon MD  Sent: 2022   9:03 AM CDT  To: Cindy Lagos RN, Psychiatry Scheduling-[facility] all,  Can someone please let the group home know that the current letter we have for guardianship authorization (in the media section, scanned in 22) will  on 22 and that [facility] will need an updated authorization letter before the expiration date?  Thank you!  Gabbi

## 2022-07-18 NOTE — TELEPHONE ENCOUNTER
Medication requested:  benztropine (COGENTIN) 1 MG   Last refilled: 3/26/18  Qty: 30     Medication requested:  FLUoxetine (PROZAC) 40 MG   Last refilled:  3/26/18  Qty: 60    Medication requested: lamoTRIgine (LAMICTAL) 100 MG  Last refilled: 3/26/18  Qty: 30     paliperidone (INVEGA) 6 MG 24 hr     Last Written Prescription Date:  3/26/18  Last Fill Quantity: 60    # refills: 0    Last seen: 2/22/18  RTC: 4 WEEKS   Cancel: X 2  No-show: 0  Next appt:  NONE  Refill pended and routed to the provider for review/determination due to : Pt outside of RTC timeframe WITH CANCEL X 2   14 DAY RF PENDING ( 30 DAY RF  3/23/18)     NO APPT. SCHED.  Scheduling has been notified to contact the pt for appointment.        No

## 2022-07-22 ENCOUNTER — VIRTUAL VISIT (OUTPATIENT)
Dept: PSYCHIATRY | Facility: CLINIC | Age: 27
End: 2022-07-22
Attending: PSYCHIATRY & NEUROLOGY
Payer: MEDICAID

## 2022-07-22 DIAGNOSIS — F25.1 SCHIZOAFFECTIVE DISORDER, DEPRESSIVE TYPE (H): Primary | ICD-10-CM

## 2022-07-22 PROCEDURE — 90792 PSYCH DIAG EVAL W/MED SRVCS: CPT | Mod: 95 | Performed by: STUDENT IN AN ORGANIZED HEALTH CARE EDUCATION/TRAINING PROGRAM

## 2022-07-22 ASSESSMENT — PATIENT HEALTH QUESTIONNAIRE - PHQ9
10. IF YOU CHECKED OFF ANY PROBLEMS, HOW DIFFICULT HAVE THESE PROBLEMS MADE IT FOR YOU TO DO YOUR WORK, TAKE CARE OF THINGS AT HOME, OR GET ALONG WITH OTHER PEOPLE: EXTREMELY DIFFICULT
SUM OF ALL RESPONSES TO PHQ QUESTIONS 1-9: 20
SUM OF ALL RESPONSES TO PHQ QUESTIONS 1-9: 20

## 2022-07-22 NOTE — PATIENT INSTRUCTIONS
- no changes     **For crisis resources, please see the information at the end of this document**   Patient Education    Thank you for coming to the [facility] [place] [facility].     Lab Testing:  If you had lab testing today and your results are reassuring or normal they will be mailed to you or sent through [employer] within 7 days. If the lab tests need quick action we will call you with the results. The phone number we will call with results is # [phone]. If this is not the best number please call our clinic and change the number.     Medication Refills:  If you need any refills please call your pharmacy and they will contact us. Our fax number for refills is [phone].   Three business days of notice are needed for general medication refill requests.   Five business days of notice are needed for controlled substance refill requests.   If you need to change to a different pharmacy, please contact the new pharmacy directly. The new pharmacy will help you get your medications transferred.     Contact Us:  Please call [phone] during business hours (8-5:00 M-F).   If you have medication related questions after clinic hours, or on the weekend, please call [phone].     Financial Assistance [phone]   Medical Records [phone]       MENTAL HEALTH CRISIS RESOURCES:  For a emergency help, please call 911 or go to the nearest Emergency Department.     Emergency Walk-In Options:   EmPATH Unit @ [facility] ([place]): [phone] - Specialized mental health emergency area designed to be calming  [facility] ([place]): [phone]  [facility] ([place]): [phone]  [facility]): [phone]    [facility] Crisis Information:   [place]: [phone]  [place]: [phone]  [place] ([place]) - Adult: [phone]     Child: [phone]  Alfred - Adult: [phone]     Child: [phone]  [place]:  [phone]  List of all [facility] resources:   https://[place].gov/dhs/people-we-serve/adults/health-care/mental-health/resources/crisis-contacts.jsp    National Crisis Information:   Crisis Text Line: Text  [place]  to 512837  National Suicide Prevention Lifeline: 6-752-114-TALK (1-369.943.1393)       For online chat options, visit https://suicidepreventionlifeline.org/chat/  Poison Control Center: 2-197-094-7696  Trans Lifeline: 0-726-016-2638 - Hotline for transgender people of all ages  The Damien Project: 6-309-010-1621 - Hotline for LGBT youth     For Non-Emergency Support:   Fast Tracker: Mental Health & Substance Use Disorder Resources -   https://www.[employer].org/

## 2022-07-22 NOTE — PROGRESS NOTES
Cesia Nettles is a 27 year old who has consented to receive services via billable video visit.      Pt will join video visit via: [employer]  Originating Location (patient location): nursing home  Distant Location (provider location): [facility]    Will anyone else be joining the video visit? No    How would you prefer to obtain AVS?: Spencerhargraeme  Video- Visit Details  Type of service:  video visit for diagnostic assessment   Time of service:    Video Start Time:  9:05 AM        Video End Time:  9:40  Reason for video visit:  Patient unable to travel due to Covid-19  Originating Site (patient location):  [facility]   Location- nursing home  Distant Site (provider location):  [facility]  Mode of Communication:  Video Conference via [employer]       [facility]  Psychiatry Clinic  TRANSFER of CARE DIAGNOSTIC ASSESSMENT     CARE TEAM:  PCP- Becki Valencia, Psychotherapist- Andria Jarrell, [facility] Team- [facility] Erica Sparks [phone],   (ProAct) Margie Velazquez [phone]    Guardian: Shahnaz Bernal through [facility] [phone]   Living Situation: [facility] [phone] - Shahnaz MARQUEZ (manager) [phone]    This person is a 27 year old who uses the name Therese and pronouns she, her, hers.     This patient has been seen by previous providers in the [facility]ry clinic. Portions of the history below have been copied from previous diagnostic assessments though were independently confirmed and updated as needed during this appointment.      DIAGNOSIS     # Schizoaffective Disorder, bipolar type, most recent episode depressed   # Unspecified Anxiety Disorder   # Unspecified insomnia   # Weight gain due to medication   # Emotional Dysregulation      ASSESSMENT   Therese is doing well overall.    She was last seen on 05/05/2022 by Dr. CHRIS Mckeon.   Labs were  ordered, but have yet to be collected.     Issues addressed today are below.     Tl;dr: no changes today     -depression (schizoaffective disorder): Patient did have a brief dip in mood the last month related to psychosocial stressors. She has since rebounded well, which she attributes to her medications and support system.    -She briefly had passive SI though now denies any SI or SIB.     -PHQ-9 was higher though it seems it was heavily related to brief worsening.     -She will resume weekly therapy with her longtime therapist next week (had 2 weeks of scheduling conflicts).    -anxiety: Acutely worsened with depression briefly though she also rebounded and has been functional.     -insomnia: she has been sleeping well/better since last appointment even with stressors.     -weight gain: We will plan to get labs before next visit and vitals (weight) at next visit.     MNPMP review was not needed today.     PLAN                                                                                                                1) Meds-  - Continue paliperidone 12 mg at bedtime   - Continue lamotrigine 200 mg daily   - Continue metformin 1000 mg BID (pt may take second dose with a snack in the early afternoon before going to work)   - Continue hydroxyzine 25 mg at bedtime   - Continue melatonin 5 mg 1-2 hours before bedtime PRN   - Continue fluoxetine 20 mg daily      2) Psychotherapy- Continue individual weekly therapy with [facility]      3) Next due-  Labs- second generation antipsychotic monitoring labs due in 06/2022, Dr. Mckeon ordered labs last visit   EKG- PRN   Rating scales- PHQ9 and TINO-7 at every visit, AIMS at future visit      4) Referrals-  None     5) Dispo- Follow-up visit in 2 months in-person       PERTINENT BACKGROUND                                                    [most recent eval 07/22/22]   This patient first experienced mental health issues in childhood and has received treatment for  psychosis and suicidality.  Notably, Therese has a history of chronic AH/VH dating back to age 8 and has good insight into her symptoms. Per chart review, she has a history of some Cluster B traits and frequent hospitalizations which tapered off after she started residing in a group home and supportive employment a few years ago. She has been very stable on paliperidone and fluoxetine in conjunction with group home living environment. She had neuropsychiatric testing as a child (see scanned documents 4/16/19). She is under guardianship (see above); all medication changes need to be approved by them (see media section 10/17/19 for paperwork).      Psych pertinent item history includes suicide attempt [xmultiple], suicidal ideation, psychosis [sxs include paranoia, auditory hallucinations, ideas of reference], mutiple psychotropic trials  and psych hosp (x8)      SUBJECTIVE     Most recent history began 2 months ago     - Therese feels that she is doing well overall though she did have a 2 week period with increased stressors in which she experienced more anxiety and depression.   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 At the second place she was having similar difficulties with staff and residents also complained about her. She was then fired.   - During the same period she had also expressed romantic feelings for a longtime friend. He did not reciprocate those feelings and this caused Therese to have worsening self-esteem/depressive symptoms.   - between 2-1 weeks ago she did start having passive suicidal ideation, which has since resolved. She had no plan or intent.   - Her mood started to improve about a week ago and she feels hopeful she is rebounding strongly to those issues.   - She got a new job at [employer] which she really enjoys so far.   - She denies any issues with medications though does have increased thirst  "related to metformin.   - She states the current medication regimen she is on has been the \"best combination\" she has been on.   - No current SI or safety concerns.   - She was made aware labs are due as well as AIMS.   - She will come in person for next visit for AIMS.     Recent Social History: see below    Recent Psych Symptoms:   Depression:  suicidal ideation without plan, without intent, depressed mood, anhedonia, feeling worthless and feeling hopeless   Elevated:  none  Psychosis:  none  Anxiety:  excessive worry and nervous/overwhelmed  Trauma Related:  none  Sleep: better  Other: N/A    Recent Substance Use:     -alcohol: No   -cannabis: No   -tobacco: No  -caffeine:  No   -opioids: No   Narcan Kit currrent: No   -other: none    Pertinent Negatives: No violent ideation, self-injury, psychosis, hallucinations and malia  Adverse Effects: increased thirst from metformin      FAMILY and SOCIAL HISTORY                                 pt reported     Family Hx:  Father: major depressive disorder   Mother: borderline personality disorder and major depressive disorder   Maternal grandmother: major depressive disorder   Paternal grandfather: major depressive disorder and alcohol use disorder   Maternal great-grandfather: schizophrenia      Social Hx:  Partner/ - never    Children- none   Living situation- Therese lives in [facility] through [email or web address]. Has been there since around 2018.       Social/ Spiritual Support- Her mother, 4 half-siblings she is the oldest, ,  staff (Renu Brady).       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 She started Working at [employer] 7/18/2022.     Feels Safe at Home- Yes     Legal- has a legal guardian.   Trauma History (self-report)- yes verbal bullying by " "peers. Denies sexual, physical or emotional abuse.   Early History/Education- Sudha was born in [place] to both her parents who were  when she was about 3 mo old. They later  when she was 4 yo \"but they got along.\" Both parents moved to [place] when she was 5 yo, and from then on Sudha spent more time with mom, but continued to be very close with dad as well.  Both parents have since remarried and Sudha now has 2 half siblings on each side of the family.  Notably, Therese has 4 half sibs with 2 siblings from each her mom and her dad. Was in MH treatment for a year and a half (part of the time was in residential.) Sudha has a HS diploma - graduated on time. Started HS and was getting good grades, things went downhill after her MH symptoms started getting worse and starting on meds. Had an IEP starting in 10th grade. Believes she met her developmental milestones on time. Feels she has been socially awkward her whole life - became more noticeable in 7th grade.      PAST PSYCHIATRIC HISTORY     SIB- History of cutting per chart review. She states that this was more picking at scabs. Last years ago.   Suicide Attempt [#, most recent]- 3, most recently in 2014.   Suicidal Ideation Hx- Yes, history of suicidal ideation with plan and intent. Cannot remember the last time.      Violence/Aggression Hx-  Middle and high school was verbally and physically aggressive. Can still be pretty verbally aggressive when really emotional or paranoid. Most recently when she sent a text message to the jose that was making her angry.  Psychosis Hx-Yes, since age 8. Symptoms have included auditory hallucinations w/ negative self talk especially surrounding social situations, command AH for SI, visual hallucinations, paranoia, and ideas of reference.   Eating Disorder Hx- None   Wilma- Last was in February. Present as euphoria, restless, high energy.      Psych Hosp [#, most recent]- Approximately 8 (half of them when she was a " "sophomore in high school); most recently in November 2019 for worsening depression and psychosis.   Commitment- None   ECT- None   Outpatient Programs - IOP in 2014 & 2016 at [facility], day treatment at [facility] the winter 2020.   Residential- [facility] for 9 months the summer before and most of ford year of high school (July 2012-2013).      PAST MED TRIALS     Per chart review      Medication Max Dose (mg) Dates / Duration Helpful? DC Reason / Adverse Effects?   risperidone 3     \"flattened her out\"   aripiprazole 30   N     quetiapine 800   N     Depakote       Enuresis   topiramate           ethosuxamide           perphenazine 16 04/2013 - 06/2017        paliperidone 12 ? - present Y     hydroxyzine 25  02/2017 - present Y (for anxiety)     benztropine 2 04/2013-07/2019       fluoxetine 80 04/2013 - 05/2021       melatonin 5 07/2019 - present       lamotrigine 200 07/2014 - present       modafinil 200 04/2013-02/2016       paliperidone 12 04/2017 - present             PAST SUBSTANCE USE HISTORY     Past Use-   -alcohol: Yes: has tried, never more than 1 drink.    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      MEDICAL REVIEW OF SYSTEMS     none in addition to that documented above     MEDICATIONS     Current Outpatient Medications   Medication Sig Dispense Refill     etonogestrel (IMPLANON/NEXPLANON) 68 MG IMPL 1 each by Subdermal route once       FLUoxetine (PROZAC) 20 MG capsule Take 1 capsule (20 mg) by mouth daily 30 capsule 2 "     fluticasone (FLONASE) 50 MCG/ACT nasal spray Spray 2 sprays into both nostrils daily       hydrOXYzine (ATARAX) 25 MG tablet Take 1 tablet (25 mg) by mouth At Bedtime 30 tablet 2     ibuprofen (ADVIL/MOTRIN) 200 MG tablet Take 400 mg by mouth every 4 hours as needed for mild pain       lamoTRIgine (LAMICTAL) 200 MG tablet Take 1 tablet (200 mg) by mouth daily 30 tablet 2     MELATONIN MAXIMUM STRENGTH 5 MG tablet TAKE 1 TABLET (5MG) BY MOUTH EVERY NIGHT AT BEDTIME AS NEEDED FOR SLEEP (TAKE 1-2 HOURS BEFORE BED) 30 tablet 0     metFORMIN (GLUCOPHAGE) 500 MG tablet Take 2 tablets (1,000 mg) by mouth 2 times daily (with meals) 120 tablet 2     paliperidone ER (INVEGA) 6 MG 24 hr tablet Take 2 tablets (12 mg) by mouth At Bedtime 60 tablet 2      VITALS   There were no vitals taken for this visit.      MENTAL STATUS EXAM     Alertness: alert  and oriented  Appearance: well groomed  Behavior/Demeanor: cooperative, pleasant and calm, with good  eye contact   Speech: normal and regular rate and rhythm  Language: intact  Psychomotor: normal or unremarkable  Mood: description consistent with euthymia  Affect: full range; congruent to: mood- yes, content- yes  Thought Process/Associations: unremarkable  Thought Content:  Reports none;  Denies suicidal & violent ideation and delusions  Perception:  Reports none;  Denies hallucinations  Insight: excellent  Judgment: excellent  Cognition: does  appear grossly intact; formal cognitive testing was not done  Gait and Station: N/A (telehealth)     LABS and DATA     PHQ 1/26/2021 5/4/2022 7/22/2022   PHQ-9 Total Score 13 16 20   Q9: Thoughts of better off dead/self-harm past 2 weeks Several days Not at all Several days   F/U: Thoughts of suicide or self-harm No - Yes   F/U: Self harm-plan - - No   F/U: Self-harm action - - No   F/U: Safety concerns No - No       Recent Labs   Lab Test 06/09/21  0840 02/02/17  0916   CR 0.73 0.78   GFRESTIMATED >90 >90  Non  GFR  Calc       Recent Labs   Lab Test 06/09/21  0840 02/02/17  0916   AST 27 24   ALT 17 23   ALKPHOS 95 102     ECG 6/10/2019 QTc = 445 ms     PSYCHOTROPIC DRUG INTERACTIONS                                                       PSYCHCLINICDDI   Per [employer]:  HYDROXYZINE and PALIPERIDONE: may result in increased risk of QT-interval prolongation.      Per UpToDate:  Hydroxyzine, paliperidone, and lamotrigine: enhanced CNS depression.  Lamotrigine and metformin: may increase serum concentration of metformin.   Paliperidone and metformin: paliperidone may diminish therapeutic effects of metformin.  Nexplanon and lamotrigine: lamotrigine may decrease serum concentration of progestins.          MANAGEMENT:  Monitoring for adverse effects and periodic EKGs     RISK STATEMENT for SAFETY     Therese did not appear to be an imminent safety risk to self or others.    TREATMENT RISK STATEMENT: The risks, benefits, alternatives and potential adverse effects have been discussed and are understood by the pt. The pt understands the risks of using street drugs or alcohol. There are no medical contraindications, the pt agrees to treatment with the ability to do so. The pt knows to call the clinic for any problems or to access emergency care if needed.  Medical and substance use concerns are documented above.  Psychotropic drug interaction check was done, including changes made today.     PROVIDER: Bassam Eubanks MD    Patient staffed in clinic with Dr. Urbano who will sign the note.  Supervisor is Dr. Schofield.      Answers for HPI/ROS submitted by the patient on 7/22/2022  If you checked off any problems, how difficult have these problems made it for you to do your work, take care of things at home, or get along with other people?: Extremely difficult  PHQ9 TOTAL SCORE: 20

## 2022-08-04 DIAGNOSIS — F25.0 SCHIZOAFFECTIVE DISORDER, BIPOLAR TYPE (H): ICD-10-CM

## 2022-08-04 DIAGNOSIS — F41.9 ANXIETY: ICD-10-CM

## 2022-08-04 DIAGNOSIS — R63.5 WEIGHT GAIN DUE TO MEDICATION: ICD-10-CM

## 2022-08-04 DIAGNOSIS — T50.905A WEIGHT GAIN DUE TO MEDICATION: ICD-10-CM

## 2022-08-08 RX ORDER — HYDROXYZINE HYDROCHLORIDE 25 MG/1
TABLET, FILM COATED ORAL
Qty: 30 TABLET | Refills: 2 | OUTPATIENT
Start: 2022-08-08

## 2022-08-08 RX ORDER — PALIPERIDONE 6 MG/1
TABLET, EXTENDED RELEASE ORAL
Qty: 60 TABLET | Refills: 2 | OUTPATIENT
Start: 2022-08-08

## 2022-08-08 RX ORDER — LAMOTRIGINE 200 MG/1
TABLET ORAL
Qty: 30 TABLET | Refills: 2 | OUTPATIENT
Start: 2022-08-08

## 2022-08-16 DIAGNOSIS — T50.905A WEIGHT GAIN DUE TO MEDICATION: ICD-10-CM

## 2022-08-16 DIAGNOSIS — F25.0 SCHIZOAFFECTIVE DISORDER, BIPOLAR TYPE (H): ICD-10-CM

## 2022-08-16 DIAGNOSIS — F41.9 ANXIETY: ICD-10-CM

## 2022-08-16 DIAGNOSIS — R63.5 WEIGHT GAIN DUE TO MEDICATION: ICD-10-CM

## 2022-08-17 RX ORDER — LAMOTRIGINE 200 MG/1
200 TABLET ORAL DAILY
Qty: 30 TABLET | Refills: 1 | Status: SHIPPED | OUTPATIENT
Start: 2022-08-17 | End: 2022-10-03

## 2022-08-17 RX ORDER — HYDROXYZINE HYDROCHLORIDE 25 MG/1
25 TABLET, FILM COATED ORAL AT BEDTIME
Qty: 30 TABLET | Refills: 1 | Status: SHIPPED | OUTPATIENT
Start: 2022-08-17 | End: 2022-10-03

## 2022-08-17 RX ORDER — PALIPERIDONE 6 MG/1
12 TABLET, EXTENDED RELEASE ORAL AT BEDTIME
Qty: 60 TABLET | Refills: 1 | Status: SHIPPED | OUTPATIENT
Start: 2022-08-17 | End: 2022-10-03

## 2022-08-17 NOTE — TELEPHONE ENCOUNTER
Medication requested:   FLUoxetine (PROZAC) 20 MG capsule  hydrOXYzine (ATARAX) 25 MG tablet  lamoTRIgine (LAMICTAL) 200 MG tablet  Last refilled: 5/5/22  Qty: 30/2    Medication requested: metFORMIN (GLUCOPHAGE) 500 MG tablet  Last refilled: 5/5/22  Qty: 120/2    Medication requested: paliperidone ER (INVEGA) 6 MG 24 hr tablet  Last refilled: 5/5/22  Qty: 60/2      Last seen: 7/22/22  RTC: 2 months  Cancel: 0  No-show: 0  Next appt: 10/3/22    Refill decision:   Refilled for 30 days per protocol.      Warnings Override History for FLUoxetine (PROZAC) 20 MG capsule [[id number]]    Overridden by Gabbi Mckeon MD on May 5, 2022 9:21 AM   Drug-Drug   1. IBUPROFEN / SELECTIVE SEROTONIN REUPTAKE INHIBITORS [Level: Major]   Other Orders: ibuprofen (ADVIL/MOTRIN) 200 MG tablet

## 2022-09-03 ENCOUNTER — HEALTH MAINTENANCE LETTER (OUTPATIENT)
Age: 27
End: 2022-09-03

## 2022-09-28 DIAGNOSIS — F41.9 ANXIETY: ICD-10-CM

## 2022-09-28 DIAGNOSIS — R63.5 WEIGHT GAIN DUE TO MEDICATION: ICD-10-CM

## 2022-09-28 DIAGNOSIS — T50.905A WEIGHT GAIN DUE TO MEDICATION: ICD-10-CM

## 2022-09-28 DIAGNOSIS — F25.0 SCHIZOAFFECTIVE DISORDER, BIPOLAR TYPE (H): ICD-10-CM

## 2022-09-30 RX ORDER — PALIPERIDONE 6 MG/1
TABLET, EXTENDED RELEASE ORAL
Qty: 60 TABLET | Refills: 1 | OUTPATIENT
Start: 2022-09-30

## 2022-09-30 RX ORDER — HYDROXYZINE HYDROCHLORIDE 25 MG/1
TABLET, FILM COATED ORAL
Qty: 30 TABLET | Refills: 1 | OUTPATIENT
Start: 2022-09-30

## 2022-09-30 RX ORDER — LAMOTRIGINE 200 MG/1
TABLET ORAL
Qty: 30 TABLET | Refills: 1 | OUTPATIENT
Start: 2022-09-30

## 2022-09-30 NOTE — TELEPHONE ENCOUNTER
Medication requested: FLUoxetine HCl 20MG CAPS*    hydrOXYzine HCl 25MG TABS*  lamoTRIgine 200MG TABS*   Last refilled: 8/17/22  Qty: 30/1  metFORMIN HCl 500MG TABS*  Last refilled: 8/17/22  Qty: 120/1   Paliperidone ER 6MG TB24     Last refilled: 8/17/22  Qty: 60/1     Last seen: 7/22/22  RTC: 2 months  Cancel: 0  No-show: 0  Next appt: 10/3/22    Refill decision: Denied, adequate through 10/17/22  Refill at time of appt 10/3/22/

## 2022-10-03 ENCOUNTER — VIRTUAL VISIT (OUTPATIENT)
Dept: PSYCHIATRY | Facility: CLINIC | Age: 27
End: 2022-10-03
Attending: PSYCHIATRY & NEUROLOGY
Payer: MEDICAID

## 2022-10-03 DIAGNOSIS — Z79.899 ENCOUNTER FOR MEDICATION MANAGEMENT: ICD-10-CM

## 2022-10-03 DIAGNOSIS — T50.905A WEIGHT GAIN DUE TO MEDICATION: ICD-10-CM

## 2022-10-03 DIAGNOSIS — G47.00 INSOMNIA, UNSPECIFIED TYPE: ICD-10-CM

## 2022-10-03 DIAGNOSIS — F25.0 SCHIZOAFFECTIVE DISORDER, BIPOLAR TYPE (H): Primary | ICD-10-CM

## 2022-10-03 DIAGNOSIS — R63.5 WEIGHT GAIN DUE TO MEDICATION: ICD-10-CM

## 2022-10-03 DIAGNOSIS — F41.9 ANXIETY DISORDER, UNSPECIFIED TYPE: ICD-10-CM

## 2022-10-03 PROCEDURE — 99214 OFFICE O/P EST MOD 30 MIN: CPT | Mod: GC | Performed by: STUDENT IN AN ORGANIZED HEALTH CARE EDUCATION/TRAINING PROGRAM

## 2022-10-03 RX ORDER — LAMOTRIGINE 200 MG/1
200 TABLET ORAL DAILY
Qty: 30 TABLET | Refills: 1 | Status: SHIPPED | OUTPATIENT
Start: 2022-10-03 | End: 2022-12-01

## 2022-10-03 RX ORDER — PALIPERIDONE 6 MG/1
12 TABLET, EXTENDED RELEASE ORAL AT BEDTIME
Qty: 60 TABLET | Refills: 1 | Status: SHIPPED | OUTPATIENT
Start: 2022-10-03 | End: 2022-12-01

## 2022-10-03 RX ORDER — HYDROXYZINE HYDROCHLORIDE 25 MG/1
25 TABLET, FILM COATED ORAL AT BEDTIME
Qty: 30 TABLET | Refills: 1 | Status: SHIPPED | OUTPATIENT
Start: 2022-10-03 | End: 2022-12-01

## 2022-10-03 ASSESSMENT — ANXIETY QUESTIONNAIRES
2. NOT BEING ABLE TO STOP OR CONTROL WORRYING: MORE THAN HALF THE DAYS
7. FEELING AFRAID AS IF SOMETHING AWFUL MIGHT HAPPEN: MORE THAN HALF THE DAYS
5. BEING SO RESTLESS THAT IT IS HARD TO SIT STILL: SEVERAL DAYS
GAD7 TOTAL SCORE: 16
GAD7 TOTAL SCORE: 16
3. WORRYING TOO MUCH ABOUT DIFFERENT THINGS: NEARLY EVERY DAY
4. TROUBLE RELAXING: MORE THAN HALF THE DAYS
7. FEELING AFRAID AS IF SOMETHING AWFUL MIGHT HAPPEN: MORE THAN HALF THE DAYS
1. FEELING NERVOUS, ANXIOUS, OR ON EDGE: NEARLY EVERY DAY
8. IF YOU CHECKED OFF ANY PROBLEMS, HOW DIFFICULT HAVE THESE MADE IT FOR YOU TO DO YOUR WORK, TAKE CARE OF THINGS AT HOME, OR GET ALONG WITH OTHER PEOPLE?: VERY DIFFICULT
IF YOU CHECKED OFF ANY PROBLEMS ON THIS QUESTIONNAIRE, HOW DIFFICULT HAVE THESE PROBLEMS MADE IT FOR YOU TO DO YOUR WORK, TAKE CARE OF THINGS AT HOME, OR GET ALONG WITH OTHER PEOPLE: VERY DIFFICULT
GAD7 TOTAL SCORE: 16
6. BECOMING EASILY ANNOYED OR IRRITABLE: NEARLY EVERY DAY

## 2022-10-03 ASSESSMENT — PATIENT HEALTH QUESTIONNAIRE - PHQ9
SUM OF ALL RESPONSES TO PHQ QUESTIONS 1-9: 12
10. IF YOU CHECKED OFF ANY PROBLEMS, HOW DIFFICULT HAVE THESE PROBLEMS MADE IT FOR YOU TO DO YOUR WORK, TAKE CARE OF THINGS AT HOME, OR GET ALONG WITH OTHER PEOPLE: SOMEWHAT DIFFICULT
SUM OF ALL RESPONSES TO PHQ QUESTIONS 1-9: 12

## 2022-10-03 NOTE — PROGRESS NOTES
Cesia Nettles is a 27 year old who has consented to receive services via billable video visit.      Pt will join video visit via: Wecash  Originating Location (patient location): jail  Distant Location (provider location): [facility]    Will anyone else be joining the video visit? No    How would you prefer to obtain AVS?: Joselin  Video- Visit Details  Type of service:  video visit for diagnostic assessment   Time of service:  Video Start Time:  9:05 AM        Video End Time:  9:40  Reason for video visit:  Patient unable to travel due to Covid-19  Originating Site (patient location):  [facility]   Location- jail  Distant Site (provider location):  [facility]  Mode of Communication:  Video Conference via Wecash       [facility]  Psychiatry Clinic  MEDICAL PROGRESS NOTE     CARE TEAM:  PCP- Becki Valencia, Psychotherapist- Andria Jarrell, [facility], [facility] Team- [facility] [[email or web address]] Erica Sparks [phone],   (ProAct) Margie Velazquez [phone]    Guardian: Shahnaz Bernal through [employer] [phone]   Living Situation: [facility] [phone] - Shahnaz MARQUEZ (manager) [phone]    This person is a 27 year old who uses the name Therese and pronouns she, her, hers.      DIAGNOSIS     # Schizoaffective Disorder, bipolar type, most recent episode depressed   # Unspecified Anxiety Disorder   # Unspecified insomnia   # Weight gain due to medication   # Emotional Dysregulation      ASSESSMENT   Therese is doing well overall.    Issues addressed today are below.     No changes today.     -depression (schizoaffective disorder): Patient has had improved mood since having stable employment. No issues with feeling down. No SI or SIB.     -anxiety: Better with stable job and improved depressive symptoms.      -ADHD: interested in being evaluated. We will defer  to her current psychologist and refer for evaluation Madina if her therapist is unable to do so.     -insomnia: she has been sleeping well/better since last appointment even with stressors.     -weight gain: We will plan to get labs before next visit and vitals (weight) at next visit.     MNPMP review was not needed today.     PLAN                                                                                                                1) Meds-  - Continue paliperidone 12 mg at bedtime   - Continue lamotrigine 200 mg daily   - Continue metformin 1000 mg BID (pt may take second dose with a snack in the early afternoon before going to work)   - Continue hydroxyzine 25 mg at bedtime   - Continue melatonin 5 mg 1-2 hours before bedtime PRN   - Continue fluoxetine 20 mg daily      2) Psychotherapy- Continue individual weekly therapy with [facility]      3) Next due-  Labs- second generation antipsychotic monitoring labs due now   EKG- PRN   Rating scales- PHQ9 and TINO-7 at every visit, AIMS at future visit      4) Referrals-  None     5) Dispo- Follow-up visit in 2 months in-person       PERTINENT BACKGROUND                                                    [most recent eval 07/22/22]   This patient first experienced mental health issues in childhood and has received treatment for psychosis and suicidality.  Notably, Therese has a history of chronic AH/VH dating back to age 8 and has good insight into her symptoms. Per chart review, she has a history of some Cluster B traits and frequent hospitalizations which tapered off after she started residing in a group home and supportive employment a few years ago. She has been very stable on paliperidone and fluoxetine in conjunction with group home living environment. She had neuropsychiatric testing as a child (see scanned documents 4/16/19). She is under guardianship (see above); all medication changes need to be approved by them (see media section 10/17/19 for  "paperwork).      Psych pertinent item history includes suicide attempt [xmultiple], suicidal ideation, psychosis [sxs include paranoia, auditory hallucinations, ideas of reference], mutiple psychotropic trials  and psych hosp (x8)      SUBJECTIVE     Most recent history began 2 months ago     - States doing well with no issues with anxiety or depression   - Working at [employer] now, she enjoys working there, working there for over a month now   - neuropsych testing via current psychologist   - She was made aware labs are due as well as AIMS.   - She will come in person for next visit for AIMS.   - She denies any issues with medications though does have increased thirst related to metformin.   - She states the current medication regimen she is on has been the \"best combination\" she has been on.   - No current SI or safety concerns.     Recent Social History: see below    Recent Psych Symptoms:   Depression:  depressed mood and anhedonia   Elevated:  none  Psychosis:  none  Anxiety:  excessive worry and nervous/overwhelmed  Trauma Related:  none  Sleep:  better  Other: N/A    Recent Substance Use:     -alcohol: No   -cannabis: No   -tobacco: No  -caffeine:  No   -opioids: No   Narcan Kit currrent: No   -other: none    Pertinent Negatives: No violent ideation, self-injury, psychosis, hallucinations and malia  Adverse Effects: increased thirst from metformin      SOCIAL HISTORY                                 pt reported     Social Hx:  Partner/ - never    Children- none   Living situation- Therese lives in [facility] through [facility]. Has been there since around 2018.       Social/ Spiritual Support- Her mother, 4 half-siblings she is the oldest, ,  staff (Renu Brady).       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 She started Working at [employer] 7/18/2022.     Feels Safe at Home- Yes     Legal- has a legal guardian.     PSYCH and SUBSTANCE USE Critical Summary Points since July 2022 07/22/2022: DA. No changes.   10/03/2022: Stable, no changes.      PAST MED TRIALS      Medication Max Dose (mg) Dates / Duration Helpful? DC Reason / Adverse Effects?   risperidone 3     \"flattened her out\"   aripiprazole 30   N     quetiapine 800   N     Depakote       Enuresis   topiramate           ethosuxamide           perphenazine 16 04/2013 - 06/2017        paliperidone 12 ? 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      MEDICAL REVIEW OF SYSTEMS     none in addition to that documented above     MEDICATIONS     Current Outpatient Medications   Medication Sig Dispense Refill    etonogestrel (IMPLANON/NEXPLANON) 68 MG IMPL 1 each by Subdermal route once      FLUoxetine (PROZAC) 20 MG capsule Take 1 capsule (20 mg) by mouth daily 30 capsule 1    fluticasone (FLONASE) 50 MCG/ACT nasal spray Spray 2 sprays into both nostrils daily      hydrOXYzine (ATARAX) 25 MG tablet Take 1 tablet (25 mg) by mouth At Bedtime 30 tablet 1    ibuprofen (ADVIL/MOTRIN) 200 MG tablet Take 400 mg by mouth every 4 hours as needed for mild pain      lamoTRIgine (LAMICTAL) 200 MG tablet Take 1 tablet (200 mg) by mouth daily 30 tablet 1    melatonin (MELATONIN MAXIMUM STRENGTH) 5 MG tablet TAKE 1 TABLET (5MG) BY MOUTH EVERY NIGHT AT BEDTIME " AS NEEDED FOR SLEEP (TAKE 1-2 HOURS BEFORE BED) 30 tablet 1    metFORMIN (GLUCOPHAGE) 500 MG tablet Take 2 tablets (1,000 mg) by mouth 2 times daily (with meals) 120 tablet 1    paliperidone ER (INVEGA) 6 MG 24 hr tablet Take 2 tablets (12 mg) by mouth At Bedtime 60 tablet 1      VITALS   There were no vitals taken for this visit.      MENTAL STATUS EXAM     Alertness: alert  and oriented  Appearance: well groomed  Behavior/Demeanor: cooperative, pleasant and calm, with good  eye contact   Speech: normal and regular rate and rhythm  Language: intact  Psychomotor: normal or unremarkable  Mood: description consistent with euthymia  Affect: full range; congruent to: mood- yes, content- yes  Thought Process/Associations: unremarkable  Thought Content:  Reports none;  Denies suicidal & violent ideation and delusions  Perception:  Reports none;  Denies hallucinations  Insight: excellent  Judgment: excellent  Cognition: does  appear grossly intact; formal cognitive testing was not done  Gait and Station: N/A (telehealth)     LABS and DATA     PHQ 5/4/2022 7/22/2022 10/3/2022   PHQ-9 Total Score 16 20 12   Q9: Thoughts of better off dead/self-harm past 2 weeks Not at all Several days Not at all   F/U: Thoughts of suicide or self-harm - Yes -   F/U: Self harm-plan - No -   F/U: Self-harm action - No -   F/U: Safety concerns - No -     Recent Labs   Lab Test 06/09/21  0840 02/02/17  0916   CR 0.73 0.78   GFRESTIMATED >90 >90  Non African American GFR Calc       Recent Labs   Lab Test 06/09/21  0840 02/02/17  0916   AST 27 24   ALT 17 23   ALKPHOS 95 102     ECG 6/10/2019 QTc = 445 ms     PSYCHOTROPIC DRUG INTERACTIONS                                                       PSYCHCLINICDDI   Per Micromedex:  HYDROXYZINE and PALIPERIDONE: may result in increased risk of QT-interval prolongation.      Per UpToDate:  Hydroxyzine, paliperidone, and lamotrigine: enhanced CNS depression.  Lamotrigine and metformin: may increase serum  concentration of metformin.   Paliperidone and metformin: paliperidone may diminish therapeutic effects of metformin.  Nexplanon and lamotrigine: lamotrigine may decrease serum concentration of progestins.       MANAGEMENT:  Monitoring for adverse effects and periodic EKGs     RISK STATEMENT for SAFETY     Therese did not appear to be an imminent safety risk to self or others.    TREATMENT RISK STATEMENT: The risks, benefits, alternatives and potential adverse effects have been discussed and are understood by the pt. The pt understands the risks of using street drugs or alcohol. There are no medical contraindications, the pt agrees to treatment with the ability to do so. The pt knows to call the clinic for any problems or to access emergency care if needed.  Medical and substance use concerns are documented above.  Psychotropic drug interaction check was done, including changes made today.     PROVIDER: Bassam Eubanks MD    Patient staffed in clinic with Dr. Acosta who will sign the note.  Supervisor is Dr. Schofield.       MEDICAL DECISION MAKING        (SmartPhrase .PSYCHBILLMDM)     - At least 1 chronic problem that is not stable  Number of tests / labs ordered: 3+  - Engaged in prescription drug management during visit (discussed any medication benefits, side effects, alternatives, etc.)

## 2022-10-03 NOTE — PROGRESS NOTES
Cesia Nettles is a 27 year old who has consented to receive services via billable video visit.      Pt will join video visit via: [email or web address]  If there are problems joining the visit, send backup video invite via: Send to preferred e-mail: [email or web address]      Originating Location (patient location): Patient's home  Distant Location (provider location): [facility] [place] [facility]    Will anyone else be joining the video visit? No  Answers for HPI/ROS submitted by the patient on 10/3/2022  If you checked off any problems, how difficult have these problems made it for you to do your work, take care of things at home, or get along with other people?: Somewhat difficult  PHQ9 TOTAL SCORE: 12  TINO 7 TOTAL SCORE: 16

## 2022-10-03 NOTE — PATIENT INSTRUCTIONS
- No medication changes  -Ask your therapist at tomorrow's appointment for ADHD evaluation; if needed we can refer you to ADHD evaluation through our clinic.  Please let us know if he needs a referral from us.   -Labs are due, we will need to be fasting.  I will order them today to go get them done at [facility].  Try to get them done at least a week before our next appointment.     **For crisis resources, please see the information at the end of this document**   Patient Education    Thank you for coming to the [facility] [place] [facility].     Lab Testing:  If you had lab testing today and your results are reassuring or normal they will be mailed to you or sent through [employer] within 7 days. If the lab tests need quick action we will call you with the results. The phone number we will call with results is # [phone]. If this is not the best number please call our clinic and change the number.     Medication Refills:  If you need any refills please call your pharmacy and they will contact us. Our fax number for refills is [phone].   Three business days of notice are needed for general medication refill requests.   Five business days of notice are needed for controlled substance refill requests.   If you need to change to a different pharmacy, please contact the new pharmacy directly. The new pharmacy will help you get your medications transferred.     Contact Us:  Please call [phone] during business hours (8-5:00 M-F).   If you have medication related questions after clinic hours, or on the weekend, please call [phone].     Financial Assistance [phone]   Medical Records [phone]       MENTAL HEALTH CRISIS RESOURCES:  For a emergency help, please call 911 or go to the nearest Emergency Department.     Emergency Walk-In Options:   Em[facility] Unit @ [facility] ([place]): [phone] - Specialized mental health emergency area designed to be calming  [facility] ([place]): [phone]  [facility] ([place]): [phone]  [facility] ([place]): [phone]    [facility] Crisis Information:   [place]: [phone]  [place]: [phone]  [place] ([place]) - Adult: [phone]     Child: [phone]  [place] - Adult: [phone]     Child: [phone]  [place]: [phone]  List of all [facility] resources:   https://[place].[facility]/dhs/people-we-serve/adults/health-care/mental-health/resources/crisis-contacts.jsp    National Crisis Information:   Crisis Text Line: Text  [place]  to 163798  Suicide & Crisis Lifeline: 988  National Suicide Prevention Lifeline: 4-066-652-TALK (1-153.409.4741)       For online chat options, visit https://suicidepreventionlifeline.org/chat/  Poison Control Center: 1-324.848.7115  Trans Lifeline: 7-030-743-9100 - Hotline for transgender people of all ages  The Damien Project: 6-374-325-9644 - Hotline for LGBT youth     For Non-Emergency Support:   Fast Tracker: Mental Health & Substance Use Disorder Resources -   https://[email or web address] [email or web address].[email or web address]/

## 2022-11-21 DIAGNOSIS — T50.905A WEIGHT GAIN DUE TO MEDICATION: ICD-10-CM

## 2022-11-21 DIAGNOSIS — F41.9 ANXIETY DISORDER, UNSPECIFIED TYPE: ICD-10-CM

## 2022-11-21 DIAGNOSIS — R63.5 WEIGHT GAIN DUE TO MEDICATION: ICD-10-CM

## 2022-11-21 DIAGNOSIS — F25.0 SCHIZOAFFECTIVE DISORDER, BIPOLAR TYPE (H): ICD-10-CM

## 2022-11-22 RX ORDER — LAMOTRIGINE 200 MG/1
TABLET ORAL
Qty: 30 TABLET | Refills: 1 | OUTPATIENT
Start: 2022-11-22

## 2022-11-22 RX ORDER — PALIPERIDONE 6 MG/1
TABLET, EXTENDED RELEASE ORAL
Qty: 60 TABLET | Refills: 1 | OUTPATIENT
Start: 2022-11-22

## 2022-11-23 RX ORDER — HYDROXYZINE HYDROCHLORIDE 25 MG/1
TABLET, FILM COATED ORAL
Qty: 30 TABLET | Refills: 1 | OUTPATIENT
Start: 2022-11-23

## 2022-11-28 ASSESSMENT — PATIENT HEALTH QUESTIONNAIRE - PHQ9
10. IF YOU CHECKED OFF ANY PROBLEMS, HOW DIFFICULT HAVE THESE PROBLEMS MADE IT FOR YOU TO DO YOUR WORK, TAKE CARE OF THINGS AT HOME, OR GET ALONG WITH OTHER PEOPLE: VERY DIFFICULT
SUM OF ALL RESPONSES TO PHQ QUESTIONS 1-9: 20
SUM OF ALL RESPONSES TO PHQ QUESTIONS 1-9: 20

## 2022-11-29 ENCOUNTER — VIRTUAL VISIT (OUTPATIENT)
Dept: PSYCHIATRY | Facility: CLINIC | Age: 27
End: 2022-11-29
Attending: PSYCHIATRY & NEUROLOGY
Payer: MEDICAID

## 2022-11-29 DIAGNOSIS — R63.5 WEIGHT GAIN DUE TO MEDICATION: ICD-10-CM

## 2022-11-29 DIAGNOSIS — G47.00 INSOMNIA, UNSPECIFIED TYPE: ICD-10-CM

## 2022-11-29 DIAGNOSIS — F25.0 SCHIZOAFFECTIVE DISORDER, BIPOLAR TYPE (H): ICD-10-CM

## 2022-11-29 DIAGNOSIS — F41.9 ANXIETY DISORDER, UNSPECIFIED TYPE: ICD-10-CM

## 2022-11-29 DIAGNOSIS — T50.905A WEIGHT GAIN DUE TO MEDICATION: ICD-10-CM

## 2022-11-29 PROCEDURE — 99214 OFFICE O/P EST MOD 30 MIN: CPT | Mod: GT | Performed by: STUDENT IN AN ORGANIZED HEALTH CARE EDUCATION/TRAINING PROGRAM

## 2022-11-29 NOTE — PROGRESS NOTES
Cesia Nettles is a 27 year old who is being evaluated via a billable video visit.      Pt will join video visit via: MusicAll  If there are problems joining the visit, send backup video invite via: Text to preferred phone: [phone]    Reason for telehealth visit: Patient has requested telehealth visit    Originating location (patient location): Patient's home    Will anyone else be joining the visit? Marina JACKMAN

## 2022-11-29 NOTE — PROGRESS NOTES
Cesia Nettles is a 27 year old who has consented to receive services via billable video visit.      Pt will join video visit via: GlobeIn  Originating Location (patient location): senior care  Distant Location (provider location): [facility]    Will anyone else be joining the video visit? No    How would you prefer to obtain AVS?: Spencerhargraeme  Video- Visit Details  Type of service:  video visit for diagnostic assessment   Time of service:    Video Start Time:  9:50 AM        Video End Time:  10:25 AM   Reason for video visit:  Patient unable to travel due to Covid-19  Originating Site (patient location):  [facility]   Location- senior care  Distant Site (provider location):  [facility]  Mode of Communication:  Video Conference via GlobeIn       [facility]  Psychiatry Clinic  MEDICAL PROGRESS NOTE     CARE TEAM:  PCP- Becki Valencia, Psychotherapist- Andria Jarrell, [facility], [facility] Team- [facility] Erica Sparks [phone],   (ProAct) Margie Velazquez [phone]    Guardian: Shahnaz Bernal through [place] [employer] [phone]   Living Situation: [facility] [phone] - Shahnaz MARQUEZ (manager) [phone]    This person is a 27 year old who uses the name Therese and pronouns she, her, hers.      DIAGNOSIS     # Schizoaffective Disorder, bipolar type, most recent episode depressed   # Unspecified Anxiety Disorder   # Unspecified insomnia   # Weight gain due to medication   # Emotional Dysregulation      ASSESSMENT   Therese is doing well overall.    Issues addressed today are below.     No changes today.     -depression (schizoaffective disorder): Patient has had improved mood since having stable employment. No issues with feeling down. No SI or SIB.     -anxiety: Has been slightly worse since not seen therapist consistently and with how busy work has been with the  holiday season.  Has peaks of anxiety though feels stable enough which she does not want medication changes.  She started with a new therapist today which she feels will be the most helpful going forward to work on her anxiety.    -ADHD: interested in being evaluated. We will defer to her current psychologist and refer for evaluation if her therapist is unable to do so.     -insomnia: she has been sleeping well/better since last appointment even with stressors.     -weight gain: We will plan to get labs before next visit and vitals (weight) at next visit.  Next appointment scheduled in person for January 24 at 10 AM.    MNPMP review was not needed today.     PLAN                                                                                                                1) Meds-  - Continue paliperidone 12 mg at bedtime   - Continue lamotrigine 200 mg daily   - Continue metformin 1000 mg BID (pt may take second dose with a snack in the early afternoon before going to work)   - Continue hydroxyzine 25 mg at bedtime   - Continue melatonin 5 mg 1-2 hours before bedtime PRN   - Continue fluoxetine 20 mg daily      2) Psychotherapy- Continue individual weekly therapy with [facility]      3) Next due-  Labs- second generation antipsychotic monitoring labs due now   EKG- PRN   Rating scales- PHQ9 and TINO-7 at every visit, AIMS at future visit      4) Referrals-  None     5) Dispo- Follow-up visit in 2 months in-person      PERTINENT BACKGROUND                                                    [most recent eval 07/22/22]   This patient first experienced mental health issues in childhood and has received treatment for psychosis and suicidality.  Notably, Therese has a history of chronic AH/VH dating back to age 8 and has good insight into her symptoms. Per chart review, she has a history of some Cluster B traits and frequent hospitalizations which tapered off after she started residing in a group home and supportive  "employment a few years ago. She has been very stable on paliperidone and fluoxetine in conjunction with group home living environment. She had neuropsychiatric testing as a child (see scanned documents 4/16/19). She is under guardianship (see above); all medication changes need to be approved by them (see media section 10/17/19 for paperwork).      Psych pertinent item history includes suicide attempt [xmultiple], suicidal ideation, psychosis [sxs include paranoia, auditory hallucinations, ideas of reference], mutiple psychotropic trials  and psych hosp (x8)      SUBJECTIVE     Most recent history began 2 months ago     - Working at [employer], has been stressful due to holidays   - On Sunday, work was very busy and overwhelming   - Manager let her have Monday off, off work today   - Starting new therapist today, had on-going scheduling conflicts with previous   - Had good thanksgiving, spent time with boyfriends family   - She was made aware labs are due as well as AIMS   - She will come in person for next visit for AIMS   - She denies any issues with medications though does have increased thirst related to metformin   - She states the current medication regimen she is on has been the \"best combination\" she has been on   - No current SI or safety concerns       Recent Social History: see below    Recent Psych Symptoms:   Depression:  depressed mood and anhedonia   Elevated:  none  Psychosis:  none  Anxiety:  excessive worry and nervous/overwhelmed  Trauma Related:  none  Sleep:  better  Other: N/A    Recent Substance Use:     -alcohol: No   -cannabis: No   -tobacco: No  -caffeine:  No   -opioids: No   Narcan Kit currrent: No   -other: none    Pertinent Negatives: No violent ideation, self-injury, psychosis, hallucinations and malia  Adverse Effects: increased thirst from metformin      SOCIAL HISTORY                                 pt reported     Social Hx:  Partner/ - never    Children- none   Living " "situation- Therese lives in [facility] through [facility]. Has been there since around 2018.      Social/ Spiritual Support- Her mother, 4 half-siblings she is the oldest, ,  staff (Renu Brady).       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 She started Working at [employer] 7/18/2022 then after a month changed Jobs to [employer].     Feels Safe at Home- Yes     Legal- has a legal guardian.     PSYCH and SUBSTANCE USE Critical Summary Points since July 2022 07/22/2022: DA. No changes.   10/03/2022: Stable, no changes.   11/19/2022: Stable with some spikes and anxiety due to holiday season (working at [employer]).  Started with new therapist after this appointment.  No med changes.   11/29/2022: Stable, no changes.      PAST MED TRIALS      Medication Max Dose (mg) Dates / Duration Helpful? DC Reason / Adverse Effects?   risperidone 3     \"flattened her out\"   aripiprazole 30   N     quetiapine 800   N     Depakote       Enuresis   topiramate           ethosuxamide           perphenazine 16 04/2013 - 06/2017        paliperidone 12 ? 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      MEDICAL REVIEW OF SYSTEMS     none in addition to that documented above     MEDICATIONS     Current Outpatient Medications "   Medication Sig Dispense Refill     etonogestrel (IMPLANON/NEXPLANON) 68 MG IMPL 1 each by Subdermal route once       FLUoxetine (PROZAC) 20 MG capsule Take 1 capsule (20 mg) by mouth daily 30 capsule 1     fluticasone (FLONASE) 50 MCG/ACT nasal spray Spray 2 sprays into both nostrils daily       hydrOXYzine (ATARAX) 25 MG tablet Take 1 tablet (25 mg) by mouth At Bedtime 30 tablet 1     ibuprofen (ADVIL/MOTRIN) 200 MG tablet Take 400 mg by mouth every 4 hours as needed for mild pain       lamoTRIgine (LAMICTAL) 200 MG tablet Take 1 tablet (200 mg) by mouth daily 30 tablet 1     melatonin (MELATONIN MAXIMUM STRENGTH) 5 MG tablet TAKE 1 TABLET (5MG) BY MOUTH EVERY NIGHT AT BEDTIME AS NEEDED FOR SLEEP (TAKE 1-2 HOURS BEFORE BED) 30 tablet 1     metFORMIN (GLUCOPHAGE) 500 MG tablet Take 2 tablets (1,000 mg) by mouth 2 times daily (with meals) 120 tablet 1     paliperidone ER (INVEGA) 6 MG 24 hr tablet Take 2 tablets (12 mg) by mouth At Bedtime 60 tablet 1      VITALS   There were no vitals taken for this visit.      MENTAL STATUS EXAM     Alertness: alert  and oriented  Appearance: well groomed  Behavior/Demeanor: cooperative, pleasant and calm, with good  eye contact   Speech: normal and regular rate and rhythm  Language: intact  Psychomotor: normal or unremarkable  Mood: description consistent with euthymia  Affect: full range; congruent to: mood- yes, content- yes  Thought Process/Associations: unremarkable  Thought Content:  Reports none;  Denies suicidal & violent ideation and delusions  Perception:  Reports none;  Denies hallucinations  Insight: excellent  Judgment: excellent  Cognition: does  appear grossly intact; formal cognitive testing was not done  Gait and Station: N/A ([facility])     LABS and DATA     PHQ 7/22/2022 10/3/2022 11/28/2022   PHQ-9 Total Score 20 12 20   Q9: Thoughts of better off dead/self-harm past 2 weeks Several days Not at all Not at all   F/U: Thoughts of suicide or self-harm Yes - -    F/U: Self harm-plan No - -   F/U: Self-harm action No - -   F/U: Safety concerns No - -     Recent Labs   Lab Test 06/09/21  0840 02/02/17  0916   CR 0.73 0.78   GFRESTIMATED >90 >90  Non African American GFR Calc       Recent Labs   Lab Test 06/09/21  0840 02/02/17  0916   AST 27 24   ALT 17 23   ALKPHOS 95 102     ECG 6/10/2019 QTc = 445 ms     PSYCHOTROPIC DRUG INTERACTIONS                                                       PSYCHCLINICDDI   Per [employer]:  HYDROXYZINE and PALIPERIDONE: may result in increased risk of QT-interval prolongation.      Per UpToDate:  Hydroxyzine, paliperidone, and lamotrigine: enhanced CNS depression.  Lamotrigine and metformin: may increase serum concentration of metformin.   Paliperidone and metformin: paliperidone may diminish therapeutic effects of metformin.  Nexplanon and lamotrigine: lamotrigine may decrease serum concentration of progestins.       MANAGEMENT:  Monitoring for adverse effects and periodic EKGs     RISK STATEMENT for SAFETY     Therese did not appear to be an imminent safety risk to self or others.    TREATMENT RISK STATEMENT: The risks, benefits, alternatives and potential adverse effects have been discussed and are understood by the pt. The pt understands the risks of using street drugs or alcohol. There are no medical contraindications, the pt agrees to treatment with the ability to do so. The pt knows to call the clinic for any problems or to access emergency care if needed.  Medical and substance use concerns are documented above.  Psychotropic drug interaction check was done, including changes made today.     PROVIDER: Bassam Eubanks MD    Patient staffed in clinic with Dr. Tijerina who will sign the note.  Supervisor is Dr. Schofield.        MEDICAL DECISION MAKING        (SmartPhrase .PSYCHBILLMDM)     - At least 1 chronic problem that is not stable  Number of tests / labs ordered: 3+  - Engaged in prescription drug management during visit (discussed any  medication benefits, side effects, alternatives, etc.)

## 2022-11-29 NOTE — PATIENT INSTRUCTIONS
- follow-up Jan 24th 10am, in-person   - no med changes     **For crisis resources, please see the information at the end of this document**   Patient Education    Thank you for coming to the [facility] [place] [facility].     Lab Testing:  If you had lab testing today and your results are reassuring or normal they will be mailed to you or sent through [employer] within 7 days. If the lab tests need quick action we will call you with the results. The phone number we will call with results is # [phone]. If this is not the best number please call our clinic and change the number.     Medication Refills:  If you need any refills please call your pharmacy and they will contact us. Our fax number for refills is [phone].   Three business days of notice are needed for general medication refill requests.   Five business days of notice are needed for controlled substance refill requests.   If you need to change to a different pharmacy, please contact the new pharmacy directly. The new pharmacy will help you get your medications transferred.     Contact Us:  Please call [phone] during business hours (8-5:00 M-F).   If you have medication related questions after clinic hours, or on the weekend, please call [phone].     Financial Assistance [phone]   Medical Records [phone]       MENTAL HEALTH CRISIS RESOURCES:  For a emergency help, please call 911 or go to the nearest Emergency Department.     Emergency Walk-In Options:   [facility] Unit @ [place] [facility] ([place]): [phone] - Specialized mental health emergency area designed to be calming  [facility] ([place]): [phone]  [facility] ([place]): [phone]  [facility]): [phone]    [facility] Crisis Information:   [place]: [phone]  [place]: [phone]  [place] ([place]) - Adult: [phone]     Child: [phone]  [place] - Adult:  [phone]     Child: [phone]  [place]: [phone]  List of all [facility] resources:   https://[place].gov/dhs/people-we-serve/adults/health-care/mental-health/resources/crisis-contacts.jsp    National Crisis Information:   Crisis Text Line: Text  [place]  to 606830  Suicide & Crisis Lifeline: 988  National Suicide Prevention Lifeline: 4-890-978-TALK (1-507.940.8922)       For online chat options, visit https://suicidepreventionlifeline.org/chat/  Poison Control Center: 4-979-216-4779  Trans Lifeline: 5-322-360-9448 - Hotline for transgender people of all ages  The Damien Project: 7-617-805-0877 - Hotline for LGBT youth     For Non-Emergency Support:   Fast Tracker: Mental Health & Substance Use Disorder Resources -   https://www.Mobile MessengertrackCisivn.org/

## 2022-11-30 ENCOUNTER — TELEPHONE (OUTPATIENT)
Dept: PSYCHIATRY | Facility: CLINIC | Age: 27
End: 2022-11-30

## 2022-11-30 NOTE — TELEPHONE ENCOUNTER
Received call from patient's group home inquiring about whether refills were going to be sent after patient's appointment yesterday. She states patient has a couple of days left of current supply. Preferred pharmacy is [facility] in [place].

## 2022-12-01 RX ORDER — LAMOTRIGINE 200 MG/1
200 TABLET ORAL DAILY
Qty: 30 TABLET | Refills: 2 | Status: SHIPPED | OUTPATIENT
Start: 2022-12-01 | End: 2023-01-24

## 2022-12-01 RX ORDER — HYDROXYZINE HYDROCHLORIDE 25 MG/1
25 TABLET, FILM COATED ORAL AT BEDTIME
Qty: 30 TABLET | Refills: 2 | Status: SHIPPED | OUTPATIENT
Start: 2022-12-01 | End: 2023-01-24

## 2022-12-01 RX ORDER — PALIPERIDONE 6 MG/1
12 TABLET, EXTENDED RELEASE ORAL AT BEDTIME
Qty: 60 TABLET | Refills: 2 | Status: SHIPPED | OUTPATIENT
Start: 2022-12-01 | End: 2023-01-24

## 2023-01-24 ENCOUNTER — OFFICE VISIT (OUTPATIENT)
Dept: PSYCHIATRY | Facility: CLINIC | Age: 28
End: 2023-01-24
Attending: PSYCHIATRY & NEUROLOGY
Payer: MEDICAID

## 2023-01-24 ENCOUNTER — LAB (OUTPATIENT)
Dept: LAB | Facility: CLINIC | Age: 28
End: 2023-01-24
Attending: PSYCHIATRY & NEUROLOGY
Payer: MEDICAID

## 2023-01-24 VITALS
BODY MASS INDEX: 34.08 KG/M2 | HEART RATE: 85 BPM | DIASTOLIC BLOOD PRESSURE: 70 MMHG | WEIGHT: 217.6 LBS | SYSTOLIC BLOOD PRESSURE: 106 MMHG

## 2023-01-24 DIAGNOSIS — F25.0 SCHIZOAFFECTIVE DISORDER, BIPOLAR TYPE (H): ICD-10-CM

## 2023-01-24 DIAGNOSIS — T50.905A WEIGHT GAIN DUE TO MEDICATION: ICD-10-CM

## 2023-01-24 DIAGNOSIS — G47.00 INSOMNIA, UNSPECIFIED TYPE: ICD-10-CM

## 2023-01-24 DIAGNOSIS — R63.5 WEIGHT GAIN DUE TO MEDICATION: ICD-10-CM

## 2023-01-24 DIAGNOSIS — Z79.899 ENCOUNTER FOR MEDICATION MANAGEMENT: ICD-10-CM

## 2023-01-24 DIAGNOSIS — F41.9 ANXIETY DISORDER, UNSPECIFIED TYPE: ICD-10-CM

## 2023-01-24 LAB
ALBUMIN SERPL-MCNC: 3.8 G/DL (ref 3.4–5)
ALP SERPL-CCNC: 104 U/L (ref 40–150)
ALT SERPL W P-5'-P-CCNC: 14 U/L (ref 0–50)
ANION GAP SERPL CALCULATED.3IONS-SCNC: 6 MMOL/L (ref 3–14)
AST SERPL W P-5'-P-CCNC: 22 U/L (ref 0–45)
BASOPHILS # BLD AUTO: 0 10E3/UL (ref 0–0.2)
BASOPHILS NFR BLD AUTO: 0 %
BILIRUB SERPL-MCNC: 0.3 MG/DL (ref 0.2–1.3)
BUN SERPL-MCNC: 11 MG/DL (ref 7–30)
CALCIUM SERPL-MCNC: 9.4 MG/DL (ref 8.5–10.1)
CHLORIDE BLD-SCNC: 109 MMOL/L (ref 94–109)
CHOLEST SERPL-MCNC: 190 MG/DL
CO2 SERPL-SCNC: 26 MMOL/L (ref 20–32)
CREAT SERPL-MCNC: 0.83 MG/DL (ref 0.52–1.04)
EOSINOPHIL # BLD AUTO: 0.2 10E3/UL (ref 0–0.7)
EOSINOPHIL NFR BLD AUTO: 2 %
ERYTHROCYTE [DISTWIDTH] IN BLOOD BY AUTOMATED COUNT: 14.2 % (ref 10–15)
FASTING STATUS PATIENT QL REPORTED: YES
GFR SERPL CREATININE-BSD FRML MDRD: >90 ML/MIN/1.73M2
GLUCOSE BLD-MCNC: 85 MG/DL (ref 70–99)
HBA1C MFR BLD: 4.7 % (ref 0–5.6)
HCT VFR BLD AUTO: 39.8 % (ref 35–47)
HDLC SERPL-MCNC: 43 MG/DL
HGB BLD-MCNC: 12.6 G/DL (ref 11.7–15.7)
IMM GRANULOCYTES # BLD: 0 10E3/UL
IMM GRANULOCYTES NFR BLD: 0 %
LDLC SERPL CALC-MCNC: 113 MG/DL
LYMPHOCYTES # BLD AUTO: 2.2 10E3/UL (ref 0.8–5.3)
LYMPHOCYTES NFR BLD AUTO: 34 %
MCH RBC QN AUTO: 26 PG (ref 26.5–33)
MCHC RBC AUTO-ENTMCNC: 31.7 G/DL (ref 31.5–36.5)
MCV RBC AUTO: 82 FL (ref 78–100)
MONOCYTES # BLD AUTO: 0.5 10E3/UL (ref 0–1.3)
MONOCYTES NFR BLD AUTO: 7 %
NEUTROPHILS # BLD AUTO: 3.7 10E3/UL (ref 1.6–8.3)
NEUTROPHILS NFR BLD AUTO: 57 %
NONHDLC SERPL-MCNC: 147 MG/DL
NRBC # BLD AUTO: 0 10E3/UL
NRBC BLD AUTO-RTO: 0 /100
PLATELET # BLD AUTO: 370 10E3/UL (ref 150–450)
POTASSIUM BLD-SCNC: 4.2 MMOL/L (ref 3.4–5.3)
PROT SERPL-MCNC: 7.6 G/DL (ref 6.8–8.8)
RBC # BLD AUTO: 4.84 10E6/UL (ref 3.8–5.2)
SODIUM SERPL-SCNC: 141 MMOL/L (ref 133–144)
TRIGL SERPL-MCNC: 170 MG/DL
WBC # BLD AUTO: 6.6 10E3/UL (ref 4–11)

## 2023-01-24 PROCEDURE — 83036 HEMOGLOBIN GLYCOSYLATED A1C: CPT

## 2023-01-24 PROCEDURE — 99214 OFFICE O/P EST MOD 30 MIN: CPT | Mod: GC | Performed by: STUDENT IN AN ORGANIZED HEALTH CARE EDUCATION/TRAINING PROGRAM

## 2023-01-24 PROCEDURE — 82374 ASSAY BLOOD CARBON DIOXIDE: CPT

## 2023-01-24 PROCEDURE — 80061 LIPID PANEL: CPT

## 2023-01-24 PROCEDURE — 85025 COMPLETE CBC W/AUTO DIFF WBC: CPT

## 2023-01-24 PROCEDURE — 36415 COLL VENOUS BLD VENIPUNCTURE: CPT

## 2023-01-24 PROCEDURE — G0463 HOSPITAL OUTPT CLINIC VISIT: HCPCS | Performed by: STUDENT IN AN ORGANIZED HEALTH CARE EDUCATION/TRAINING PROGRAM

## 2023-01-24 RX ORDER — PALIPERIDONE 6 MG/1
12 TABLET, EXTENDED RELEASE ORAL AT BEDTIME
Qty: 60 TABLET | Refills: 2 | Status: SHIPPED | OUTPATIENT
Start: 2023-01-24 | End: 2023-04-19

## 2023-01-24 RX ORDER — LAMOTRIGINE 200 MG/1
200 TABLET ORAL DAILY
Qty: 30 TABLET | Refills: 2 | Status: SHIPPED | OUTPATIENT
Start: 2023-01-24 | End: 2023-04-19

## 2023-01-24 RX ORDER — HYDROXYZINE HYDROCHLORIDE 25 MG/1
25 TABLET, FILM COATED ORAL AT BEDTIME
Qty: 30 TABLET | Refills: 2 | Status: SHIPPED | OUTPATIENT
Start: 2023-01-24 | End: 2023-04-19

## 2023-01-24 ASSESSMENT — PATIENT HEALTH QUESTIONNAIRE - PHQ9
10. IF YOU CHECKED OFF ANY PROBLEMS, HOW DIFFICULT HAVE THESE PROBLEMS MADE IT FOR YOU TO DO YOUR WORK, TAKE CARE OF THINGS AT HOME, OR GET ALONG WITH OTHER PEOPLE: VERY DIFFICULT
SUM OF ALL RESPONSES TO PHQ QUESTIONS 1-9: 17
SUM OF ALL RESPONSES TO PHQ QUESTIONS 1-9: 17

## 2023-01-24 ASSESSMENT — ANXIETY QUESTIONNAIRES
IF YOU CHECKED OFF ANY PROBLEMS ON THIS QUESTIONNAIRE, HOW DIFFICULT HAVE THESE PROBLEMS MADE IT FOR YOU TO DO YOUR WORK, TAKE CARE OF THINGS AT HOME, OR GET ALONG WITH OTHER PEOPLE: EXTREMELY DIFFICULT
1. FEELING NERVOUS, ANXIOUS, OR ON EDGE: NEARLY EVERY DAY
GAD7 TOTAL SCORE: 19
2. NOT BEING ABLE TO STOP OR CONTROL WORRYING: NEARLY EVERY DAY
5. BEING SO RESTLESS THAT IT IS HARD TO SIT STILL: MORE THAN HALF THE DAYS
GAD7 TOTAL SCORE: 19
6. BECOMING EASILY ANNOYED OR IRRITABLE: MORE THAN HALF THE DAYS
7. FEELING AFRAID AS IF SOMETHING AWFUL MIGHT HAPPEN: NEARLY EVERY DAY
GAD7 TOTAL SCORE: 19
3. WORRYING TOO MUCH ABOUT DIFFERENT THINGS: NEARLY EVERY DAY
4. TROUBLE RELAXING: NEARLY EVERY DAY
7. FEELING AFRAID AS IF SOMETHING AWFUL MIGHT HAPPEN: NEARLY EVERY DAY
8. IF YOU CHECKED OFF ANY PROBLEMS, HOW DIFFICULT HAVE THESE MADE IT FOR YOU TO DO YOUR WORK, TAKE CARE OF THINGS AT HOME, OR GET ALONG WITH OTHER PEOPLE?: EXTREMELY DIFFICULT

## 2023-01-24 ASSESSMENT — PAIN SCALES - GENERAL: PAINLEVEL: NO PAIN (0)

## 2023-01-24 NOTE — PROGRESS NOTES
[facility]  Psychiatry Clinic  MEDICAL PROGRESS NOTE     CARE TEAM:  PCP- Becki Valencia, Psychotherapist- [facility], Armida Avalos, Community  Team- [facility] Erica Gonzaleznd [phone],   (ProAct) Margie Velazquez [phone]    Guardian: Shahnaz Bernal through [facility] [phone]   Living Situation: [facility] [phone] - Shahnaz MARQUEZ (manager) [phone]    This person is a 27 year old who uses the name Therese and pronouns she, her, hers.      DIAGNOSIS     # Schizoaffective Disorder, bipolar type, most recent episode depressed   # Unspecified Anxiety Disorder   # Unspecified insomnia   # Weight gain due to medication   # Emotional Dysregulation      ASSESSMENT   Therese is doing well overall.    Issues addressed today are below.     No changes today.     -depression (schizoaffective disorder): Patient has had improved mood since having stable employment. No issues with feeling down. No SI or SIB.     -anxiety: Has been slightly worse since not seen therapist consistently and with how busy work has been with the holiday season.  Has peaks of anxiety though feels stable enough which she does not want medication changes.  Since last visit has started with a new therapist and work has been less stressful so she is trending towards a better direction.     -ADHD: interested in being evaluated. We will defer to her current psychologist and refer for evaluation if her therapist is unable to do so.  This work-up is still pending.    -insomnia: she has been sleeping well/better since last appointment even with stressors.     -weight gain: We will plan to get labs before next visit and vitals (weight) at next visit.  Next appointment scheduled in person for January 24 at 10 AM.     [place]MP review was not needed today.     PLAN                                                                                                                 1) Meds-  - Continue paliperidone 12 mg at bedtime    - Continue lamotrigine 200 mg daily   - Continue metformin 1000 mg BID (pt may take second dose with a snack in the early afternoon before going to work)   - Continue hydroxyzine 25 mg at bedtime   - Continue melatonin 5 mg 1-2 hours before bedtime PRN   - Continue fluoxetine 20 mg daily      2) Psychotherapy- Continue individual weekly therapy with [facility], Armida Avalos     3) Next due-  Labs- second generation antipsychotic monitoring labs due now   EKG- PRN   Rating scales- PHQ9 and TINO-7 at every visit   AIMS- 01/24/2023= 0      4) Referrals-  None     5) Dispo- Follow-up visit in 2 months in-person      PERTINENT BACKGROUND                                                    [most recent eval 07/22/22]   This patient first experienced mental health issues in childhood and has received treatment for psychosis and suicidality.  Notably, Therese has a history of chronic AH/VH dating back to age 8 and has good insight into her symptoms. Per chart review, she has a history of some Cluster B traits and frequent hospitalizations which tapered off after she started residing in a group home and supportive employment a few years ago. She has been very stable on paliperidone and fluoxetine in conjunction with group home living environment. She had neuropsychiatric testing as a child (see scanned documents 4/16/19). She is under guardianship (see above); all medication changes need to be approved by them (see media section 10/17/19 for paperwork).      Psych pertinent item history includes suicide attempt [xmultiple], suicidal ideation, psychosis [sxs include paranoia, auditory hallucinations, ideas of reference], mutiple psychotropic trials  and psych hosp (x8)      SUBJECTIVE     Most recent history began 2 months ago     - Working at [employer], has been stressful due to holidays   - work has been much better since after holidays   - has  meeting with  today at 1 pm (housing situation), has a house mate who makes her feel unsafe for those not directly been or violent towards [place]  - PT for leg injury from gym, worsened by work   - started therapy again; got a new therapist, it is going well now   - doesn't want medication changes       Recent Social History: see below    Recent Psych Symptoms:   Depression:  depressed mood and anhedonia   Elevated:  none  Psychosis:  none  Anxiety:  excessive worry and nervous/overwhelmed  Trauma Related:  none  Sleep:  better  Other: N/A    Recent Substance Use:     -alcohol: No   -cannabis: No   -tobacco: No  -caffeine:  No   -opioids: No   Narcan Kit currrent: No   -other: none    Pertinent Negatives: No violent ideation, self-injury, psychosis, hallucinations and malia   Adverse Effects: increased thirst from metformin      SOCIAL HISTORY                                 pt reported     Social Hx:  Partner/ - never    Children- none   Living situation- Therese lives in [facility] through [facility]. Has been there since around 2018.      Social/ Spiritual Support- Her mother, 4 half-siblings she is the oldest, ,  staff (Renu Brady).       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 She started Working at [employer] 7/18/2022 then after a month changed Jobs to [employer].     Feels Safe at Home- Yes     Legal- has a legal guardian.     PSYCH and SUBSTANCE USE Critical Summary Points since July 2022 07/22/2022: DA. No changes.   10/03/2022: Stable, no changes.   11/19/2022: Stable with some spikes and anxiety due to holiday season (working at [employer]).  Started with new therapist after this appointment.  No med changes.   11/29/2022: Stable, no changes.   01/24/2023: Stable, no  "changes.      PAST MED TRIALS      Medication Max Dose (mg) Dates / Duration Helpful? DC Reason / Adverse Effects?   risperidone 3     \"flattened her out\"   aripiprazole 30   N     quetiapine 800   N     Depakote       Enuresis   topiramate           ethosuxamide           perphenazine 16 04/2013 - 06/2017        paliperidone 12 ? 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      MEDICAL REVIEW OF SYSTEMS     none in addition to that documented above     MEDICATIONS     Current Outpatient Medications   Medication Sig Dispense Refill     etonogestrel (IMPLANON/NEXPLANON) 68 MG IMPL 1 each by Subdermal route once       FLUoxetine (PROZAC) 20 MG capsule Take 1 capsule (20 mg) by mouth daily 30 capsule 2     fluticasone (FLONASE) 50 MCG/ACT nasal spray Spray 2 sprays into both nostrils daily       hydrOXYzine (ATARAX) 25 MG tablet Take 1 tablet (25 mg) by mouth At Bedtime 30 tablet 2     ibuprofen (ADVIL/MOTRIN) 200 MG tablet Take 400 mg by mouth every 4 hours as needed for mild pain       lamoTRIgine (LAMICTAL) 200 MG tablet Take 1 tablet (200 mg) by mouth daily 30 tablet 2     melatonin (MELATONIN MAXIMUM STRENGTH) 5 MG tablet TAKE 1 TABLET (5MG) BY MOUTH EVERY NIGHT AT BEDTIME AS NEEDED FOR SLEEP (TAKE 1-2 HOURS BEFORE BED) 30 tablet 2     metFORMIN (GLUCOPHAGE) 500 MG tablet Take 2 tablets (1,000 mg) by mouth 2 times daily (with meals) 120 tablet 2     paliperidone ER (INVEGA) 6 MG 24 hr tablet Take 2 " tablets (12 mg) by mouth At Bedtime 60 tablet 2      VITALS   /70 (BP Location: Left arm, Patient Position: Sitting, Cuff Size: Adult Regular)   Pulse 85   Wt 98.7 kg (217 lb 9.6 oz)   BMI 34.08 kg/m        MENTAL STATUS EXAM     Alertness: alert  and oriented  Appearance: well groomed  Behavior/Demeanor: cooperative, pleasant and calm, with good  eye contact   Speech: normal and regular rate and rhythm  Language: intact  Psychomotor: normal or unremarkable  Mood: description consistent with euthymia  Affect: full range; congruent to: mood- yes, content- yes  Thought Process/Associations: unremarkable  Thought Content:  Reports none;  Denies suicidal & violent ideation and delusions  Perception:  Reports none;  Denies hallucinations  Insight: excellent  Judgment: excellent  Cognition: does  appear grossly intact; formal cognitive testing was not done  Gait and Station: unremarkable     LABS and DATA     PHQ 10/3/2022 11/28/2022 1/24/2023   PHQ-9 Total Score 12 20 17   Q9: Thoughts of better off dead/self-harm past 2 weeks Not at all Not at all Not at all   F/U: Thoughts of suicide or self-harm - - -   F/U: Self harm-plan - - -   F/U: Self-harm action - - -   F/U: Safety concerns - - -     Recent Labs   Lab Test 06/09/21  0840 02/02/17  0916   CR 0.73 0.78   GFRESTIMATED >90 >90  Non African American GFR Calc       Recent Labs   Lab Test 06/09/21  0840 02/02/17  0916   AST 27 24   ALT 17 23   ALKPHOS 95 102     ECG 6/10/2019 QTc = 445 ms     PSYCHOTROPIC DRUG INTERACTIONS                                                       PSYCHCLINICDDI   Per Micromedex:  HYDROXYZINE and PALIPERIDONE: may result in increased risk of QT-interval prolongation.      Per UpToDate:  Hydroxyzine, paliperidone, and lamotrigine: enhanced CNS depression.  Lamotrigine and metformin: may increase serum concentration of metformin.   Paliperidone and metformin: paliperidone may diminish therapeutic effects of metformin.  Nexplanon and  lamotrigine: lamotrigine may decrease serum concentration of progestins.       MANAGEMENT:  Monitoring for adverse effects and periodic EKGs     RISK STATEMENT for SAFETY     Therese did not appear to be an imminent safety risk to self or others.    TREATMENT RISK STATEMENT: The risks, benefits, alternatives and potential adverse effects have been discussed and are understood by the pt. The pt understands the risks of using street drugs or alcohol. There are no medical contraindications, the pt agrees to treatment with the ability to do so. The pt knows to call the clinic for any problems or to access emergency care if needed.  Medical and substance use concerns are documented above.  Psychotropic drug interaction check was done, including changes made today.     PROVIDER: Bassam Eubanks MD    Patient staffed in clinic with Dr. Urbano who will sign the note.  Supervisor is Dr. Schofield.         MEDICAL DECISION MAKING        (SmartPhrase .PSYCHBILLMDM)     - At least 1 chronic problem that is not stable  Number of tests / labs ordered: 3+  - Engaged in prescription drug management during visit (discussed any medication benefits, side effects, alternatives, etc.)

## 2023-01-24 NOTE — PATIENT INSTRUCTIONS
- no med changes   - follow-up 2 months, patient to schedule at later date (planning trip)       **For crisis resources, please see the information at the end of this document**   Patient Education    Thank you for coming to the [facility].     Lab Testing:  If you had lab testing today and your results are reassuring or normal they will be mailed to you or sent through [facility] within 7 days. If the lab tests need quick action we will call you with the results. The phone number we will call with results is # [phone]. If this is not the best number please call our clinic and change the number.     Medication Refills:  If you need any refills please call your pharmacy and they will contact us. Our fax number for refills is [phone].   Three business days of notice are needed for general medication refill requests.   Five business days of notice are needed for controlled substance refill requests.   If you need to change to a different pharmacy, please contact the new pharmacy directly. The new pharmacy will help you get your medications transferred.     Contact Us:  Please call [phone] during business hours (8-5:00 M-F).   If you have medication related questions after clinic hours, or on the weekend, please call [phone].     Financial Assistance [phone]   Medical Records [phone]       MENTAL HEALTH CRISIS RESOURCES:  For a emergency help, please call 911 or go to the nearest Emergency Department.     Emergency Walk-In Options:   [facility] Unit @ [facility] ([place]): [phone] - Specialized mental health emergency area designed to be calming  [facility] ([place]): [phone]  [facility] ([place]): [phone]  [facility]): [phone]    [facility] Crisis Information:   [place]: [phone]  [place]: [phone]  [place] ([place] - Adult: [phone]     Child:  [phone]  [place] - Adult: [phone]     Child: [phone]  [place]: [phone]  List of all [facility] resources:   https://[place].gov/dhs/people-we-serve/adults/health-care/mental-health/resources/crisis-contacts.jsp    National Crisis Information:   Crisis Text Line: Text  [place]  to 277768  Suicide & Crisis Lifeline: 988  National Suicide Prevention Lifeline: 2-900-568-TALK (1-880.632.6678)       For online chat options, visit https://suicidepreventionlifeline.org/chat/  Poison Control Center: 1-959-303-1749  Trans Lifeline: 1-462.863.8239 - Hotline for transgender people of all ages  The Damien Project: 0-096-001-6290 - Hotline for LGBT youth     For Non-Emergency Support:   Fast Tracker: Mental Health & Substance Use Disorder Resources -   https://www.[employer].org/

## 2023-03-23 ENCOUNTER — TELEPHONE (OUTPATIENT)
Dept: PSYCHIATRY | Facility: CLINIC | Age: 28
End: 2023-03-23
Payer: MEDICAID

## 2023-03-23 NOTE — TELEPHONE ENCOUNTER
"Received one page from [facility] Drug Prior Authorization form. Requesting renewal  Authorization.    Prior Authorization Retail Medication Request    Medication/Dose: Paliperidone  ER6 mg   ICD code (if different than what is on RX):    Schizoaffective disorder, bipolar type (H) [F25.0]       Previously Tried and Failed:     PAST MED TRIALS       Medication Max Dose (mg) Dates / Duration Helpful? DC Reason / Adverse Effects?   risperidone 3     \"flattened her out\"   aripiprazole 30   N     quetiapine 800   N     Depakote       Enuresis   topiramate           ethosuxamide           perphenazine 16 2013 - 2017        paliperidone 12 ? - present Y     hydroxyzine 25  2017 - present Y (for anxiety)     benztropine 2 2013-2019       fluoxetine 80 2013 - 2021       melatonin 5 2019 - present       lamotrigine 200 2014 - present       modafinil 200 2013-2016       paliperidone 12 2017 - present            Rationale: patient have long standing history of Schizoeffective disorder, anxiety , and Bipolar. She have been on this medication since 2017. She has been very stable on paliperidone and fluoxetine in conjunction with group home living environment.  Please continue to approve this medication. If medication is stopped, patient likely will be decompensate and  Admitted.     Insurance Name:  [place]ID [place]  Insurance ID:  [id number]      Pharmacy Information (if different than what is on RX)  Name:  [facility]   [address] [place] [address]  Phone:  [phone]    Ella Kelly on 3/23/2023 at 7:53 AM   "

## 2023-03-26 NOTE — TELEPHONE ENCOUNTER
Central Prior Authorization Team   Phone: [phone]      PA Initiation    Medication: Paliperidone  ER 6 mg   Insurance Company: [place] Medicaid ([facility]) - Phone [phone] Fax [phone]  Pharmacy Filling the Rx: [facility] [place] [address] - [place], [place] - [facility] [address]  Filling Pharmacy Phone: [phone]  Filling Pharmacy Fax:    Start Date: 3/26/2023

## 2023-03-28 NOTE — TELEPHONE ENCOUNTER
Prior Authorization Approval    Authorization Effective Date: 3/26/2023  Authorization Expiration Date: 3/19/2024  Medication: Paliperidone  ER 6 mg   Approved Dose/Quantity:   Reference #:   [id number]  Insurance Company: [place] Medicaid ([facility]) - Phone [phone] Fax [phone]  Expected CoPay:       CoPay Card Available:      Foundation Assistance Needed:    Which Pharmacy is filling the prescription (Not needed for infusion/clinic administered): [facility] ([place] [id number] - [place], [place] - [address] [place] [address]  Pharmacy Notified: Yes  Patient Notified: No

## 2023-04-18 DIAGNOSIS — F25.0 SCHIZOAFFECTIVE DISORDER, BIPOLAR TYPE (H): ICD-10-CM

## 2023-04-18 DIAGNOSIS — T50.905A WEIGHT GAIN DUE TO MEDICATION: ICD-10-CM

## 2023-04-18 DIAGNOSIS — F41.9 ANXIETY DISORDER, UNSPECIFIED TYPE: ICD-10-CM

## 2023-04-18 DIAGNOSIS — R63.5 WEIGHT GAIN DUE TO MEDICATION: ICD-10-CM

## 2023-04-19 RX ORDER — HYDROXYZINE HYDROCHLORIDE 25 MG/1
TABLET, FILM COATED ORAL
Qty: 30 TABLET | Refills: 0 | Status: SHIPPED | OUTPATIENT
Start: 2023-04-19 | End: 2023-05-16

## 2023-04-19 RX ORDER — PALIPERIDONE 6 MG/1
TABLET, EXTENDED RELEASE ORAL
Qty: 60 TABLET | Refills: 0 | Status: SHIPPED | OUTPATIENT
Start: 2023-04-19 | End: 2023-05-16

## 2023-04-19 RX ORDER — LAMOTRIGINE 200 MG/1
TABLET ORAL
Qty: 30 TABLET | Refills: 0 | Status: SHIPPED | OUTPATIENT
Start: 2023-04-19 | End: 2023-05-16

## 2023-04-19 NOTE — TELEPHONE ENCOUNTER
Medication requested: FLUoxetine HCl 20MG CAPS*  Last refilled: 1/24/23  Qty: 30/2  hydrOXYzine HCl 25MG TABS*  Last refilled: 1/24/23  Qty: 30/2  lamoTRIgine 200MG TABS*  Last refilled: 1/24/23  Qty: 30/2  metFORMIN HCl 500MG TABS*Take 2 tablets (1,000 mg) by mouth 2 times daily (with meals  Last refilled: 1/24/23  Qty: 120/2  Paliperidone ER 6MG TB24  Last refilled: 1/14/23  Qty: 60/2     Last seen: 1/24/23  RTC: 2 months in person  Cancel: 0  No-show: 0  Next appt: 0    Refill decision:   30 day shabbir refill sent to the pharmacy.    Scheduling has been notified to contact the pt for appointment.

## 2023-05-12 DIAGNOSIS — F41.9 ANXIETY DISORDER, UNSPECIFIED TYPE: ICD-10-CM

## 2023-05-12 DIAGNOSIS — T50.905A WEIGHT GAIN DUE TO MEDICATION: ICD-10-CM

## 2023-05-12 DIAGNOSIS — F25.0 SCHIZOAFFECTIVE DISORDER, BIPOLAR TYPE (H): ICD-10-CM

## 2023-05-12 DIAGNOSIS — R63.5 WEIGHT GAIN DUE TO MEDICATION: ICD-10-CM

## 2023-05-15 RX ORDER — PALIPERIDONE 6 MG/1
TABLET, EXTENDED RELEASE ORAL
Qty: 60 TABLET | Refills: 0 | OUTPATIENT
Start: 2023-05-15

## 2023-05-15 RX ORDER — HYDROXYZINE HYDROCHLORIDE 25 MG/1
TABLET, FILM COATED ORAL
Qty: 30 TABLET | Refills: 0 | OUTPATIENT
Start: 2023-05-15

## 2023-05-15 RX ORDER — LAMOTRIGINE 200 MG/1
TABLET ORAL
Qty: 30 TABLET | Refills: 0 | OUTPATIENT
Start: 2023-05-15

## 2023-05-16 ENCOUNTER — VIRTUAL VISIT (OUTPATIENT)
Dept: PSYCHIATRY | Facility: CLINIC | Age: 28
End: 2023-05-16
Attending: PSYCHIATRY & NEUROLOGY
Payer: MEDICAID

## 2023-05-16 DIAGNOSIS — F41.9 ANXIETY DISORDER, UNSPECIFIED TYPE: ICD-10-CM

## 2023-05-16 DIAGNOSIS — T50.905A WEIGHT GAIN DUE TO MEDICATION: ICD-10-CM

## 2023-05-16 DIAGNOSIS — R63.5 WEIGHT GAIN DUE TO MEDICATION: ICD-10-CM

## 2023-05-16 DIAGNOSIS — F25.0 SCHIZOAFFECTIVE DISORDER, BIPOLAR TYPE (H): ICD-10-CM

## 2023-05-16 DIAGNOSIS — G47.00 INSOMNIA, UNSPECIFIED TYPE: ICD-10-CM

## 2023-05-16 PROCEDURE — 99214 OFFICE O/P EST MOD 30 MIN: CPT | Mod: GC | Performed by: STUDENT IN AN ORGANIZED HEALTH CARE EDUCATION/TRAINING PROGRAM

## 2023-05-16 RX ORDER — HYDROXYZINE HYDROCHLORIDE 25 MG/1
25 TABLET, FILM COATED ORAL AT BEDTIME
Qty: 30 TABLET | Refills: 3 | Status: SHIPPED | OUTPATIENT
Start: 2023-05-16 | End: 2023-05-25

## 2023-05-16 RX ORDER — LAMOTRIGINE 200 MG/1
200 TABLET ORAL DAILY
Qty: 30 TABLET | Refills: 3 | Status: SHIPPED | OUTPATIENT
Start: 2023-05-16 | End: 2023-09-02

## 2023-05-16 RX ORDER — PALIPERIDONE 6 MG/1
12 TABLET, EXTENDED RELEASE ORAL AT BEDTIME
Qty: 60 TABLET | Refills: 3 | Status: SHIPPED | OUTPATIENT
Start: 2023-05-16 | End: 2023-09-02

## 2023-05-16 ASSESSMENT — ANXIETY QUESTIONNAIRES
8. IF YOU CHECKED OFF ANY PROBLEMS, HOW DIFFICULT HAVE THESE MADE IT FOR YOU TO DO YOUR WORK, TAKE CARE OF THINGS AT HOME, OR GET ALONG WITH OTHER PEOPLE?: VERY DIFFICULT
7. FEELING AFRAID AS IF SOMETHING AWFUL MIGHT HAPPEN: NEARLY EVERY DAY
1. FEELING NERVOUS, ANXIOUS, OR ON EDGE: MORE THAN HALF THE DAYS
3. WORRYING TOO MUCH ABOUT DIFFERENT THINGS: NEARLY EVERY DAY
6. BECOMING EASILY ANNOYED OR IRRITABLE: SEVERAL DAYS
GAD7 TOTAL SCORE: 14
IF YOU CHECKED OFF ANY PROBLEMS ON THIS QUESTIONNAIRE, HOW DIFFICULT HAVE THESE PROBLEMS MADE IT FOR YOU TO DO YOUR WORK, TAKE CARE OF THINGS AT HOME, OR GET ALONG WITH OTHER PEOPLE: VERY DIFFICULT
4. TROUBLE RELAXING: MORE THAN HALF THE DAYS
2. NOT BEING ABLE TO STOP OR CONTROL WORRYING: MORE THAN HALF THE DAYS
GAD7 TOTAL SCORE: 14
7. FEELING AFRAID AS IF SOMETHING AWFUL MIGHT HAPPEN: NEARLY EVERY DAY
GAD7 TOTAL SCORE: 14
5. BEING SO RESTLESS THAT IT IS HARD TO SIT STILL: SEVERAL DAYS

## 2023-05-16 ASSESSMENT — PATIENT HEALTH QUESTIONNAIRE - PHQ9
SUM OF ALL RESPONSES TO PHQ QUESTIONS 1-9: 14
SUM OF ALL RESPONSES TO PHQ QUESTIONS 1-9: 14
10. IF YOU CHECKED OFF ANY PROBLEMS, HOW DIFFICULT HAVE THESE PROBLEMS MADE IT FOR YOU TO DO YOUR WORK, TAKE CARE OF THINGS AT HOME, OR GET ALONG WITH OTHER PEOPLE: VERY DIFFICULT

## 2023-05-16 NOTE — PROGRESS NOTES
Virtual Visit Details    Type of service:  Video Visit   Video Start Time: 10:00 am   Video End Time:10:38 AM    Originating Location (pt. Location): Home    Distant Location (provider location):  On-site  Platform used for Video Visit: [facility]  Psychiatry Clinic  MEDICAL PROGRESS NOTE     CARE TEAM:  PCP- Becki Valencia, Psychotherapist- [facility], Armida Avalos, [facility] Team- [facility] Erica Sparks [phone],   (ProAct) Margie Marcus [phone]    Guardian: Shahnaz Bernal [facility] [place] [facility] [phone]   Living Situation: [facility] [phone] - Shahnaz MARQUEZ (manager) [phone]    This person is a 28 year old who uses the name Therese and pronouns she, her, hers.      DIAGNOSIS     # Schizoaffective Disorder, bipolar type, most recent episode depressed   # Unspecified Anxiety Disorder   # Unspecified insomnia   # Weight gain due to medication   # Emotional Dysregulation      ASSESSMENT     -depression (schizoaffective disorder): Patient has had improved mood since having stable employment. No issues with feeling downdepressed. No SI or SIB. No mood cycling. Has been stable for over a year with issues just related to mild depressive smx and anxiety.     -anxiety: Has been slightly worse since not seen therapist consistently and with how busy work has been with the holiday season.  Has peaks of anxiety though feels stable enough which she does not want medication changes.  Since last visit has started with a new therapist and work has been less stressful so she is trending towards a better direction.     -ADHD: interested in being evaluated. We will defer to her current psychologist and refer for evaluation if her therapist is unable to do so.  This work-up is still pending.    -insomnia: she has been sleeping well/better since last appointment even with stressors.       Future considerations:    - maximize lamotrigine   - neuropsych testing     MNPMP review was not needed today.     PLAN                                                                                                                1) Meds-  - Continue paliperidone 12 mg at bedtime    - Continue lamotrigine 200 mg daily   - Continue metformin 1000 mg BID (pt may take second dose with a snack in the early afternoon before going to work)   - Continue hydroxyzine 25 mg at bedtime   - Continue melatonin 5 mg 1-2 hours before bedtime PRN   - Continue fluoxetine 20 mg daily      2) Psychotherapy- Continue individual weekly therapy with [facility], Armida Avalos     3) Next due-  Labs- done 01/24/2023, WNL limits with exception of mild lipid elevations   EKG- PRN   Rating scales- PHQ9 and TINO-7 at every visit   AIMS- 01/24/2023= 0      4) Referrals-  None     5) Dispo- Follow-up visit in 3 mo with new resident      PERTINENT BACKGROUND                                                    [most recent eval 07/22/22]   This patient first experienced mental health issues in childhood and has received treatment for psychosis and suicidality.  Notably, Therese has a history of chronic AH/VH dating back to age 8 and has good insight into her symptoms. Per chart review, she has a history of some Cluster B traits and frequent hospitalizations which tapered off after she started residing in a group home and supportive employment a few years ago. She has been very stable on paliperidone and fluoxetine in conjunction with group home living environment. She had neuropsychiatric testing as a child (see scanned documents 4/16/19). She is under guardianship (see above); all medication changes need to be approved by them (see media section 10/17/19 for paperwork).      Psych pertinent item history includes suicide attempt [xmultiple], suicidal ideation, psychosis [sxs include paranoia, auditory hallucinations, ideas of reference], mutiple  psychotropic trials  and psych hosp (x8)      SUBJECTIVE     Most recent history began 2 months ago     - started new job last week so anxiety has been higher, just related to new environment and learning. Working at senior living facility.   - [employer] was too stressful   - overall feels good about new job, feels like it is a good fit   - feels anxiety is getting better and she expects it to continue to get better   - drinking less caffeine has also been helpful, now only having a couple caffeine beverages per week   - no mood cycling, no episodes of malia or hypomania   - mild depressive symptoms after having issues with boyfriend last week too. Anxiety was high which may have caused issues between them.   - Therapy, continues almost weekly, had to miss last week due ot orientation for work         Recent Social History: see below    Recent Psych Symptoms:   Depression:  depressed mood and anhedonia   Elevated:  none  Psychosis:  none  Anxiety:  excessive worry and nervous/overwhelmed  Trauma Related:  none  Sleep:  better  Other: N/A    Recent Substance Use:     -alcohol: No   -cannabis: No   -tobacco: No  -caffeine:  No   -opioids: No   Narcan Kit currrent: No   -other: none    Pertinent Negatives: No violent ideation, self-injury, psychosis, hallucinations and malia   Adverse Effects: increased thirst from metformin      SOCIAL HISTORY                                 pt reported     Social Hx:  Partner/ - never    Children- none   Living situation- Therese lives in [facility]. Has been there since around 2018.      Social/ Spiritual Support- Her mother, 4 half-siblings she is the oldest, ,  staff (Renu Brady).       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 She started Working  "at [employer] 7/18/2022 then after a month changed Jobs to [employer]. Quit [employer] 03/2023 and started CNA work again.     Feels Safe at Home- Yes     Legal- has a legal guardian.     PSYCH and SUBSTANCE USE Critical Summary Points since July 2022 07/22/2022: DA. No changes.   10/03/2022: Stable, no changes.   11/19/2022: Stable with some spikes and anxiety due to holiday season (working at [employer]).  Started with new therapist after this appointment.  No med changes.   11/29/2022: Stable, no changes.   01/24/2023: Stable, no changes.   05/16/2023: Stable, no changes.       PAST MED TRIALS      Medication Max Dose (mg) Dates / Duration Helpful? DC Reason / Adverse Effects?   risperidone 3     \"flattened her out\"   aripiprazole 30   N     quetiapine 800   N     Depakote       Enuresis   topiramate           ethosuxamide           perphenazine 16 04/2013 - 06/2017        paliperidone 12 ? 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      MEDICAL REVIEW OF SYSTEMS     none in addition to that documented above     MEDICATIONS     Current Outpatient Medications   Medication Sig Dispense Refill     etonogestrel (IMPLANON/NEXPLANON) 68 MG IMPL 1 each by Subdermal route once       FLUoxetine (PROZAC) 20 MG capsule TAKE 1 CAPSULE BY MOUTH DAILY 30 capsule 0     fluticasone (FLONASE) 50 MCG/ACT nasal spray Spray 2 sprays into both nostrils daily       hydrOXYzine (ATARAX) 25 MG tablet TAKE 1 TABLET BY MOUTH AT " BEDTIME 30 tablet 0     ibuprofen (ADVIL/MOTRIN) 200 MG tablet Take 400 mg by mouth every 4 hours as needed for mild pain       lamoTRIgine (LAMICTAL) 200 MG tablet TAKE 1 TABLET BY MOUTH DAILY 30 tablet 0     melatonin (MELATONIN MAXIMUM STRENGTH) 5 MG tablet TAKE 1 TABLET (5MG) BY MOUTH EVERY NIGHT AT BEDTIME AS NEEDED FOR SLEEP (TAKE 1-2 HOURS BEFORE BED) 30 tablet 2     metFORMIN (GLUCOPHAGE) 500 MG tablet TAKE 2 TABLETS BY MOUTH TWICE A DAY WITH MEALS 120 tablet 0     paliperidone ER (INVEGA) 6 MG 24 hr tablet TAKE 2 TABLETS BY MOUTH AT BEDTIME 60 tablet 0      VITALS   There were no vitals taken for this visit.      MENTAL STATUS EXAM     Alertness: alert  and oriented  Appearance: well groomed  Behavior/Demeanor: cooperative, pleasant and calm, with good  eye contact   Speech: normal and regular rate and rhythm  Language: intact  Psychomotor: normal or unremarkable  Mood: description consistent with euthymia  Affect: full range; congruent to: mood- yes, content- yes  Thought Process/Associations: unremarkable  Thought Content:  Reports none;  Denies suicidal & violent ideation and delusions  Perception:  Reports none;  Denies hallucinations  Insight: excellent  Judgment: excellent  Cognition: does  appear grossly intact; formal cognitive testing was not done  Gait and Station: unremarkable     LABS and DATA         11/28/2022    11:47 AM 1/24/2023     9:28 AM 5/16/2023     9:52 AM   PHQ   PHQ-9 Total Score 20 17 14   Q9: Thoughts of better off dead/self-harm past 2 weeks Not at all Not at all Not at all     Recent Labs   Lab Test 01/24/23  1121 06/09/21  0840   CR 0.83 0.73   GFRESTIMATED >90 >90     Recent Labs   Lab Test 01/24/23  1121 06/09/21  0840   AST 22 27   ALT 14 17   ALKPHOS 104 95     ECG 6/10/2019 QTc = 445 ms     PSYCHOTROPIC DRUG INTERACTIONS                                                       PSYCHCLINICDDI   Per [employer]:  HYDROXYZINE and PALIPERIDONE: may result in increased risk of  QT-interval prolongation.      Per UpToDate:  Hydroxyzine, paliperidone, and lamotrigine: enhanced CNS depression.  Lamotrigine and metformin: may increase serum concentration of metformin.   Paliperidone and metformin: paliperidone may diminish therapeutic effects of metformin.  Nexplanon and lamotrigine: lamotrigine may decrease serum concentration of progestins.       MANAGEMENT:  Monitoring for adverse effects and periodic EKGs     RISK STATEMENT for SAFETY     Therese did not appear to be an imminent safety risk to self or others.    TREATMENT RISK STATEMENT: The risks, benefits, alternatives and potential adverse effects have been discussed and are understood by the pt. The pt understands the risks of using street drugs or alcohol. There are no medical contraindications, the pt agrees to treatment with the ability to do so. The pt knows to call the clinic for any problems or to access emergency care if needed.  Medical and substance use concerns are documented above.  Psychotropic drug interaction check was done, including changes made today.     PROVIDER: Bassam Eubanks MD    Patient staffed in clinic with Dr. Tijerina who will sign the note.  Supervisor is Dr. Schofield.         MEDICAL DECISION MAKING        (SmartPhrase .PSYCHBILLMDM)     - At least 1 chronic problem that is not stable  Number of test / lab results reviewed: 3+  - Engaged in prescription drug management during visit (discussed any medication benefits, side effects, alternatives, etc.)

## 2023-05-16 NOTE — NURSING NOTE
Is the patient currently in the state of [place]? YES    Visit mode:VIDEO    If the visit is dropped, the patient can be reconnected by: VIDEO VISIT: Text to cell phone: [phone]    Will anyone else be joining the visit? NO      How would you like to obtain your AVS? MyChart    Are changes needed to the allergy or medication list? NO    Reason for visit: Video Visit    Bhakti JACKMAN

## 2023-05-24 ENCOUNTER — MYC MEDICAL ADVICE (OUTPATIENT)
Dept: PSYCHIATRY | Facility: CLINIC | Age: 28
End: 2023-05-24
Payer: MEDICAID

## 2023-05-24 DIAGNOSIS — F41.9 ANXIETY DISORDER, UNSPECIFIED TYPE: ICD-10-CM

## 2023-05-25 RX ORDER — HYDROXYZINE HYDROCHLORIDE 25 MG/1
25 TABLET, FILM COATED ORAL 2 TIMES DAILY
Qty: 60 TABLET | Refills: 2 | Status: SHIPPED | OUTPATIENT
Start: 2023-05-25 | End: 2023-08-04

## 2023-05-25 NOTE — TELEPHONE ENCOUNTER
Received return phone call from Shahnaz client's guardian. She confirmed she was aware of the client's worsening anxiety and approves of the increased hydroxyzine. No further action needed at this time.    Jena Juarez RN on 5/25/2023 at 12:06 PM

## 2023-05-25 NOTE — TELEPHONE ENCOUNTER
Bassam Eubanks MD Labossiere, Laura, RN  Phone Number: [phone]     Yes we can.   If you can change it then co-sign me Id appreciate it.     Thank you!       *Writer sent new prescription for hydroxyzine 25 mg BID and updated client via [employer] Called client's guardian, Shahnaz at [phone] to confirm if she approves of this change as well.    Jena Juarez RN on 5/25/2023 at 8:37 AM

## 2023-05-26 ENCOUNTER — TELEPHONE (OUTPATIENT)
Dept: PSYCHIATRY | Facility: CLINIC | Age: 28
End: 2023-05-26
Payer: MEDICAID

## 2023-05-26 NOTE — TELEPHONE ENCOUNTER
[facility] Call Center    Phone Message    May a detailed message be left on voicemail: yes     Reason for Call: Other: The pharmacy called due to this patient's prescriptions for Hydroyzine being put under Dr. Eubanks instead of the intending, the NPI is coming up as an error.      Pharmacy would like a call back ASAP since it is a holiday weekend.     Please call the pharmacy back at [phone].     Action Taken: Other: Nurses.     Travel Screening: Not Applicable

## 2023-05-26 NOTE — TELEPHONE ENCOUNTER
Reached out to [id number] - [place], [place] - [facility] [address] and spoke with pharmacist, who agreed to switch Hydroxyzine rx from Dr. Eubanks to Dr. Tijerina due to insurance coverage. Provider notified as FYI.

## 2023-06-22 ENCOUNTER — TELEPHONE (OUTPATIENT)
Dept: PSYCHIATRY | Facility: CLINIC | Age: 28
End: 2023-06-22
Payer: MEDICAID

## 2023-06-22 NOTE — TELEPHONE ENCOUNTER
The [facility] received a fax addressed to Dr. Oralia Rosales from the [place] Department of [employer] Services for this patient. The goal of this outreach is to assist providers in caring for their patients who are receiving psychotropic drugs by promoting safe drug therapy. Because of significant concerns about the safety risks of SGAs, they suggest a regular monitoring plan is in place before therapy is initiated.     Per submitted pharmacy and medical claims data, Therese is one year overdue for a blood glucose measurement. Upon chart review, writer was able to confirm patient is not actually overdue for a blood glucose measurement, as this was last completed on 1/24/23.     No further action needed a this time.

## 2023-08-04 DIAGNOSIS — F41.9 ANXIETY DISORDER, UNSPECIFIED TYPE: ICD-10-CM

## 2023-08-04 RX ORDER — HYDROXYZINE HYDROCHLORIDE 25 MG/1
25 TABLET, FILM COATED ORAL 2 TIMES DAILY
Qty: 60 TABLET | Refills: 0 | Status: SHIPPED | OUTPATIENT
Start: 2023-08-04 | End: 2023-09-06

## 2023-08-04 NOTE — TELEPHONE ENCOUNTER
Last Seen: 5-  RTC: 3 month with new resident   Cancel: none   No-Show: none   Next Appt: none     Incoming Refill From [facility] pharmacy  via  faxed     Medication Requested: hydrOXYzine (ATARAX) 25 MG tablet   Directions: Sig - Route: Take 1 tablet (25 mg) by mouth 2 times daily - Oral   Qty: 60  Last Refill: 7/12/2023    Medication Refill pended  Per Refill Protocol     Ella Kelly on 8/4/2023 at 6:37 AM

## 2023-08-10 ENCOUNTER — TELEPHONE (OUTPATIENT)
Dept: PSYCHIATRY | Facility: CLINIC | Age: 28
End: 2023-08-10

## 2023-09-02 DIAGNOSIS — R63.5 WEIGHT GAIN DUE TO MEDICATION: ICD-10-CM

## 2023-09-02 DIAGNOSIS — F25.0 SCHIZOAFFECTIVE DISORDER, BIPOLAR TYPE (H): ICD-10-CM

## 2023-09-02 DIAGNOSIS — T50.905A WEIGHT GAIN DUE TO MEDICATION: ICD-10-CM

## 2023-09-02 DIAGNOSIS — F41.9 ANXIETY DISORDER, UNSPECIFIED TYPE: ICD-10-CM

## 2023-09-05 RX ORDER — PALIPERIDONE 6 MG/1
12 TABLET, EXTENDED RELEASE ORAL AT BEDTIME
Qty: 60 TABLET | Refills: 0 | Status: SHIPPED | OUTPATIENT
Start: 2023-09-05 | End: 2023-10-03

## 2023-09-05 RX ORDER — LAMOTRIGINE 200 MG/1
200 TABLET ORAL DAILY
Qty: 30 TABLET | Refills: 0 | Status: SHIPPED | OUTPATIENT
Start: 2023-09-05 | End: 2023-10-03

## 2023-09-05 NOTE — TELEPHONE ENCOUNTER
Medication requested: paliperidone ER (INVEGA) 6 MG 24 hr tablet  Last refilled: 5/16/23  Qty: 60/3  metFORMIN (GLUCOPHAGE) 500 MG tablet  Last refilled: 5/16/23  Qty: 120/3  lamoTRIgine (LAMICTAL) 200 MG tablet  Last refilled: 5/16/23  Qty: 30/3  FLUoxetine (PROZAC) 20 MG capsule  Last refilled: 5/16/23  Qty: 30/3  Last seen: 5/16/23 Raimundo  RTC: 3 months with new resident  Cancel: 0  No-show: 0  Next appt: 0    Refill decision:   Refill pended and routed to the provider for review/determination due to previous Dr. Eubanks, per transfer list,Dr Friedman  Scheduling has been notified to contact the pt for appointment.

## 2023-09-06 DIAGNOSIS — F41.9 ANXIETY DISORDER, UNSPECIFIED TYPE: ICD-10-CM

## 2023-09-06 RX ORDER — HYDROXYZINE HYDROCHLORIDE 25 MG/1
25 TABLET, FILM COATED ORAL 2 TIMES DAILY
Qty: 60 TABLET | Refills: 0 | Status: SHIPPED | OUTPATIENT
Start: 2023-09-06 | End: 2023-10-03

## 2023-09-06 NOTE — TELEPHONE ENCOUNTER
Last seen: 05/16/2023  RTC: 3 months  Cancel: Cancelled Oct 24th appointment with Dr Friedman  No-show: None  Next appt: None      Incoming refill from Pharmacy via phone    Medication requested:   hydrOXYzine (ATARAX) 25 MG tablet 60 tablet 0 8/4/2023  No   Sig - Route: Take 1 tablet (25 mg) by mouth 2 times daily Please call [phone] to schedule an appointment for future refills - Oral     From chart note: Continue Hydroxyzine 25 MG    Please call [phone] to schedule an appointment for future refills     Medication sent to provider for review.

## 2023-09-06 NOTE — TELEPHONE ENCOUNTER
[facility] Call Center    Phone Message    May a detailed message be left on voicemail: yes     Reason for Call: Other: the [facility] pharmacy called and wanted the ok from Dr. Urbano for  the medication hydroxyzine 25 mg the pharmacy stated that this is very urgent and they need the prescription put in  before 3 pm or the pt will go without it and they need it. The pharmacy also said that if the nurse has any question they can give them a call    Action Taken: Other: nurse pool    Travel Screening: Not Applicable

## 2023-09-29 DIAGNOSIS — T50.905A WEIGHT GAIN DUE TO MEDICATION: ICD-10-CM

## 2023-09-29 DIAGNOSIS — F25.0 SCHIZOAFFECTIVE DISORDER, BIPOLAR TYPE (H): ICD-10-CM

## 2023-09-29 DIAGNOSIS — R63.5 WEIGHT GAIN DUE TO MEDICATION: ICD-10-CM

## 2023-09-29 DIAGNOSIS — F41.9 ANXIETY DISORDER, UNSPECIFIED TYPE: ICD-10-CM

## 2023-10-03 NOTE — TELEPHONE ENCOUNTER
Medication requested:  paliperidone ER (INVEGA) 6 MG 24 hr tablet   Last refilled: 9/5/23  Qty: 60/0    Medication requested:  metFORMIN (GLUCOPHAGE) 500 MG tablet   Last refilled: 9/5/23  Qty: 120/0  Medication requested:  lamoTRIgine (LAMICTAL) 200 MG tablet   Last refilled: 9/5/23  Qty: 30/0    Medication requested:  FLUoxetine (PROZAC) 20 MG capsule   Last refilled: 9/5/23  Qty: 30/0  Medication requested:  hydrOXYzine (ATARAX) 25 MG tablet   Last refilled: 9/6/23   Qty: 60/0   Last seen: 05/16/2023  RTC: 3 months  Cancel: Cancelled Oct 24th appointment with Dr Friedman  No-show: None  Next appt: 11/7/23    Refill decision:   Refill pended and routed to the provider for review/determination due to cancel 10/24> scheduled for 11/7. 30 days pended.

## 2023-10-04 RX ORDER — HYDROXYZINE HYDROCHLORIDE 25 MG/1
25 TABLET, FILM COATED ORAL 2 TIMES DAILY
Qty: 60 TABLET | Refills: 0 | Status: SHIPPED | OUTPATIENT
Start: 2023-10-04 | End: 2023-11-07

## 2023-10-04 RX ORDER — PALIPERIDONE 6 MG/1
12 TABLET, EXTENDED RELEASE ORAL AT BEDTIME
Qty: 60 TABLET | Refills: 0 | Status: SHIPPED | OUTPATIENT
Start: 2023-10-04 | End: 2023-10-26

## 2023-10-04 RX ORDER — LAMOTRIGINE 200 MG/1
200 TABLET ORAL DAILY
Qty: 30 TABLET | Refills: 0 | Status: SHIPPED | OUTPATIENT
Start: 2023-10-04 | End: 2023-11-07

## 2023-10-26 DIAGNOSIS — F25.0 SCHIZOAFFECTIVE DISORDER, BIPOLAR TYPE (H): ICD-10-CM

## 2023-10-26 RX ORDER — PALIPERIDONE 6 MG/1
12 TABLET, EXTENDED RELEASE ORAL AT BEDTIME
Qty: 60 TABLET | Refills: 0 | Status: SHIPPED | OUTPATIENT
Start: 2023-10-26 | End: 2023-10-30

## 2023-10-26 NOTE — TELEPHONE ENCOUNTER
Last seen: 05/16/2023  RTC: 3 mo with new resident   Cancel: None - provider initiated  No-show: None  Next appt: 11/07/2023     Incoming refill from Caregiver via phone    Medication requested:   Pending Prescriptions:                       Disp   Refills    paliperidone ER (INVEGA) 6 MG 24 hr [id number] tab*0            Sig: Take 2 tablets (12 mg) by mouth at bedtime    From chart note:   - Continue paliperidone 12 mg at bedtime       Medication sent to provider for review.

## 2023-10-26 NOTE — ADDENDUM NOTE
Addended by: SHERLYN SAMUEL on: 10/26/2023 04:16 PM     Modules accepted: Orders     Topical Sulfur Applications Pregnancy And Lactation Text: This medication is Pregnancy Category C and has an unknown safety profile during pregnancy. It is unknown if this topical medication is excreted in breast milk.

## 2023-10-26 NOTE — TELEPHONE ENCOUNTER
Health Call Center    Phone Message    May a detailed message be left on voicemail: yes     Reason for Call: Medication Refill Request    Has the patient contacted the pharmacy for the refill? Yes   Name of medication being requested: paliperidone ER 6mg  Provider who prescribed the medication: Dr. Friedman  Pharmacy: [id number] - [place], [place] - [address]   Date medication is needed: 11/7/23      Patient's group home called confirming if the patient was scheduled for a follow up appointment with their provider. The group home also wanted to look into refilling the patient's prescription of Paliperidone ER 6mg. The patient currently has around 16 days remaining of this prescription, but the group home was hoping that the refill for this prescription could be prepared to be filled by the time of their follow-up on 11/7/23.    Action Taken: Message routed to:  Other: [facility] psychiatry nurse pool    Travel Screening: Not Applicable

## 2023-10-30 ENCOUNTER — TELEPHONE (OUTPATIENT)
Dept: PSYCHIATRY | Facility: CLINIC | Age: 28
End: 2023-10-30
Payer: MEDICAID

## 2023-10-30 DIAGNOSIS — F25.0 SCHIZOAFFECTIVE DISORDER, BIPOLAR TYPE (H): ICD-10-CM

## 2023-10-30 RX ORDER — PALIPERIDONE 6 MG/1
12 TABLET, EXTENDED RELEASE ORAL AT BEDTIME
Qty: 60 TABLET | Refills: 0 | Status: SHIPPED | OUTPATIENT
Start: 2023-10-30 | End: 2023-11-07

## 2023-10-30 NOTE — TELEPHONE ENCOUNTER
Health Call Center    Phone Message    May a detailed message be left on voicemail: yes     Reason for Call: Other: [facility] pharmacy is calling about this patient's prescription of  generic Invega 6MG. The prescription requires a prior authorization from the provider that the patient would be seeing, Dr. Friedman, and there would be an issue with it coming from Dr. Acosta. The pharmacy would like to speak to a nurse about what the best course of action would be in this situation to get the patient this medication.     Please call back at [phone].      Action Taken: Other: Nurses.     Travel Screening: Not Applicable

## 2023-11-07 ENCOUNTER — TELEPHONE (OUTPATIENT)
Dept: PSYCHIATRY | Facility: CLINIC | Age: 28
End: 2023-11-07
Payer: MEDICAID

## 2023-11-07 ENCOUNTER — VIRTUAL VISIT (OUTPATIENT)
Dept: PSYCHIATRY | Facility: CLINIC | Age: 28
End: 2023-11-07
Attending: STUDENT IN AN ORGANIZED HEALTH CARE EDUCATION/TRAINING PROGRAM
Payer: MEDICAID

## 2023-11-07 DIAGNOSIS — F25.0 SCHIZOAFFECTIVE DISORDER, BIPOLAR TYPE (H): ICD-10-CM

## 2023-11-07 DIAGNOSIS — N92.6 IRREGULAR MENSES: Primary | ICD-10-CM

## 2023-11-07 DIAGNOSIS — F41.9 ANXIETY DISORDER, UNSPECIFIED TYPE: ICD-10-CM

## 2023-11-07 DIAGNOSIS — T50.905A WEIGHT GAIN DUE TO MEDICATION: ICD-10-CM

## 2023-11-07 DIAGNOSIS — R63.5 WEIGHT GAIN DUE TO MEDICATION: ICD-10-CM

## 2023-11-07 PROCEDURE — 99214 OFFICE O/P EST MOD 30 MIN: CPT | Mod: VID

## 2023-11-07 PROCEDURE — 90833 PSYTX W PT W E/M 30 MIN: CPT | Mod: VID

## 2023-11-07 RX ORDER — PALIPERIDONE 6 MG/1
12 TABLET, EXTENDED RELEASE ORAL AT BEDTIME
Qty: 60 TABLET | Refills: 2 | Status: SHIPPED | OUTPATIENT
Start: 2023-11-07 | End: 2024-01-25

## 2023-11-07 RX ORDER — HYDROXYZINE HYDROCHLORIDE 25 MG/1
25 TABLET, FILM COATED ORAL 2 TIMES DAILY
Qty: 60 TABLET | Refills: 2 | Status: SHIPPED | OUTPATIENT
Start: 2023-11-07 | End: 2024-01-25

## 2023-11-07 RX ORDER — LAMOTRIGINE 200 MG/1
200 TABLET ORAL DAILY
Qty: 30 TABLET | Refills: 2 | Status: SHIPPED | OUTPATIENT
Start: 2023-11-07 | End: 2024-01-25

## 2023-11-07 ASSESSMENT — ANXIETY QUESTIONNAIRES
4. TROUBLE RELAXING: MORE THAN HALF THE DAYS
1. FEELING NERVOUS, ANXIOUS, OR ON EDGE: NEARLY EVERY DAY
IF YOU CHECKED OFF ANY PROBLEMS ON THIS QUESTIONNAIRE, HOW DIFFICULT HAVE THESE PROBLEMS MADE IT FOR YOU TO DO YOUR WORK, TAKE CARE OF THINGS AT HOME, OR GET ALONG WITH OTHER PEOPLE: EXTREMELY DIFFICULT
3. WORRYING TOO MUCH ABOUT DIFFERENT THINGS: NEARLY EVERY DAY
GAD7 TOTAL SCORE: 18
7. FEELING AFRAID AS IF SOMETHING AWFUL MIGHT HAPPEN: NEARLY EVERY DAY
2. NOT BEING ABLE TO STOP OR CONTROL WORRYING: NEARLY EVERY DAY
GAD7 TOTAL SCORE: 18
6. BECOMING EASILY ANNOYED OR IRRITABLE: MORE THAN HALF THE DAYS
5. BEING SO RESTLESS THAT IT IS HARD TO SIT STILL: MORE THAN HALF THE DAYS

## 2023-11-07 ASSESSMENT — PATIENT HEALTH QUESTIONNAIRE - PHQ9
SUM OF ALL RESPONSES TO PHQ QUESTIONS 1-9: 13
10. IF YOU CHECKED OFF ANY PROBLEMS, HOW DIFFICULT HAVE THESE PROBLEMS MADE IT FOR YOU TO DO YOUR WORK, TAKE CARE OF THINGS AT HOME, OR GET ALONG WITH OTHER PEOPLE: VERY DIFFICULT
SUM OF ALL RESPONSES TO PHQ QUESTIONS 1-9: 13

## 2023-11-07 NOTE — Clinical Note
Hi,  I ordered prolactin level but Therese has a guardian Shahnaz ([phone]). I called guardian and left a voicemail with the plan to order prolactin level since she has irregular menses and is on antipsychotic medication. I asked guardian to give clinic a call back. If you didn't hear from her could you give a follow up call this evening? If she called and said ok no need to do anything.  Thanks, Blanca

## 2023-11-07 NOTE — PROGRESS NOTES
Virtual Visit Details    Type of service:  Video Visit   Video Start Time:  10:18 AM  Video End Time: 11:03 AM    Originating Location (pt. Location): Home    Distant Location (provider location):  On-site  Platform used for Video Visit: Prabhu

## 2023-11-07 NOTE — NURSING NOTE
Is the patient currently in the state of [place]? YES    Visit mode:VIDEO    If the visit is dropped, the patient can be reconnected by: VIDEO VISIT: Text to cell phone:   Telephone Information:   Mobile [phone]       Will anyone else be joining the visit? NO  (If patient encounters technical issues they should call [phone]    How would you like to obtain your AVS? MyChart    Are changes needed to the allergy or medication list? Pt stated no changes to allergies and Pt stated no med changes    Reason for visit: PEYTON SCHULZ

## 2023-11-07 NOTE — PROGRESS NOTES
[facility]  Psychiatry Clinic  TRANSFER of CARE DIAGNOSTIC ASSESSMENT     CARE TEAM:  PCP- Becki Valencia, Psychotherapist- Andria Jarrell, [facility], [facility] Team- [facility] Erica Sparks [phone],   (ProAct) Margie Velazquez [phone]    Guardian: Shahnaz Bernal through [facility] [phone]   Living Situation: [facility] [phone] - Shahnaz HAnn (manager) [phone]    Therese is a 28 year old who uses the pronouns she, her, hers.      DIAGNOSIS     Schizoaffective Disorder, bipolar type, most recent episode depressed   Unspecified Anxiety Disorder   Unspecified insomnia   Weight gain due to medication   Emotional Dysregulation      ASSESSMENT     - previous diagnosis of schizoaffective disorder: I did extensive chart review to confirm the diagnosis because the content of voices/hallucinations and the age it was started is not typical for a primary psychotic disorder. There is a note from Junito Goel, Child fellow at that time, supervisor Dr. Miranda 3/4/20 with detailed review of history until that time and a thorough assessment. After reviewing the full history I agree with the diagnosis mainly due to evidence of academic decline in highschool in addition to delusions/hallucinations and some periods of having negative symptoms including low hygiene. She has a history of epilepsy which is resolved but studies have shown a bidirectional effect between epilepsy and schizophrenia. She also has a strong genetic predisposition to psychosis (see family history) that may have caused symptoms to emerge at a very young age.      -depression (schizoaffective disorder): Patient has had improved mood since having stable employment. No issues with feeling downdepressed. No SI or SIB. No mood cycling. Has been stable for over a year with issues just related to mild depressive smx and anxiety.      -anxiety:  Feels stable enough which she does not want medication changes. Recently has had more anxiety related to her work, stressful being around peers.      -ADHD: interested in being evaluated. We will defer to her current psychologist and refer for evaluation if her therapist is unable to do so. This work-up is still pending. Of note in the past a trial of stimulant has led to worsening of psychosis.     -insomnia: she has been sleeping well/better since last appointment even with stressors.     - irregular menses: Noticed this in the past few months, denies headaches. Ordered prolactin since she is on paliperidone. Informed guardian. She is following up with PCP this week.        Future Considerations:       Psychotropic Drug Interactions:  [PSYCHCLINICDDI]  ADDITIVE QTc: HYDROXYZINE and PALIPERIDON  ADDITIVE CNS/RESPIRATORY DEPRESSION: Hydroxyzine, paliperidone, and lamotrigine:   Lamotrigine may increase serum concentration of metformin.   Lamotrigine may decrease serum concentration of progestins.    Management: limit med redundancy    MNPMP was not checked today: not using controlled substances    Risk Statements:   Treatment Risk- Risks, benefits, alternatives and potential adverse effects have been discussed and are understood.  Safety Risk-Therese did not appear to be an imminent safety risk to self or others.       PLAN                                                                                                                1) Meds-  - Continue paliperidone 12 mg at bedtime    - Continue lamotrigine 200 mg daily   - Continue metformin 1000 mg BID (pt may take second dose with a snack in the early afternoon before going to work)   - Continue hydroxyzine 25 mg at bedtime   - Continue melatonin 5 mg 1-2 hours before bedtime PRN (every night usually)  - Continue fluoxetine 20 mg daily      2) Psychotherapy- Continue individual weekly therapy with [facility], Armida Avalos     3) Next  "due-  Labs- done 01/24/2023, WNL limits with exception of mild lipid elevations   EKG- PRN   Rating scales- PHQ9 and TINO-7 at every visit   AIMS- 01/24/2023= 0       4) Referrals-  None     5) Dispo- Follow-up visit in 4-6 weeks     PERTINENT BACKGROUND                                                    [most recent eval 07/22/22]     She started \"hearing voices\" in the second grade saying that other kids don't want to play with her. Per chart review Therese received the diagnosis of schizophrenia around 8th grade (command auditory hallucinations, with voices telling her to do things like jump off a bridge). Around the same time there was concern about inattention and academic decline and Adderall was attempted which made the hallucination worse. She had her first hospitalization at age 16 after she put a cord around her neck in the context of a fight with dad and frustration. She had symptoms of depression including  \"difficulty concentrating, increased irritability, feeling inadequate and low self-esteem. At that time the voices that she was hearing was saying she is fat, ugly and worthless and that no one likes her. She had also stated , \"Things are difficult at school because I feel like nobody likes me, I am scared that people do not like me, so I isolate myself; then people do not get to know me and things just get worse.\" At the time she was hospitalized she was in tenth grade. Earning A's and B's. During that hospitalization an attempt to lower risperidone was not successful as patient became more suspicious of peers therefore risperidone was re-established at 1.5mgs BID.     Later she had an MMPI done showing evidence of thought disorder.    Per chart review, she has a history of some Cluster B traits and frequent hospitalizations which tapered off after she started residing in a group home and supportive employment a few years ago. She has been very stable on paliperidone and fluoxetine in conjunction with " "group home living environment. She had neuropsychiatric testing as a child (see scanned documents 4/16/19). She is under guardianship (see above); all medication changes need to be approved by them (see media section 10/17/19 for paperwork).      Psych pertinent item history includes suicide attempt [xmultiple], suicidal ideation, psychosis [sxs include paranoia, auditory hallucinations, ideas of reference], mutiple psychotropic trials  and psych hosp (x8)      SUBJECTIVE     - Has had a irregular period  - More often than a month, on and off getting checked out on that on Thursday     - Childhood: part of it good, parts of it not.  - bullying and harrasment in school  3rd grade diagnosed with epilepsy, taking medications for that  When got off of meds in 13 she had worsening of psychosis, anxiety, depression   - Psychosis symptoms: seeing things or people that weren't there telling her that something was going on in her relationship  - Was diagnosed with schizoaffective disorder sophomore of highschool   - Almost went to college but felt she wasn't ready    - Self care varies day by day.   - Lately really stressed by the work and feeling insecure by peers   - No period of 2 weeks with decrease self care     - last 1.5 month has had more stressors at job, serving at a restaurant   - likes her job for the most part    - Today mood isn't great but overall mood is ok   Distress with work and hormones is the main contributor to mood    - sleep \"not fantastic\" but decent has to take a nap in the afternoon because work starts at 5:30     \"Wilma beginning of September\" really angry and frustrated and overwhelmed, lasting for a day   States longest episode was 3-4 days, triggered by dog putting down, hyper, chatty, very animated, doesn't remember sleep pattern at that time    Continues therapy and that is going well.   No SI in the past year  No self injury     Issue with eye contact, socially awkward,    Recent Social " "History: see below        Recent Substance Use:     -alcohol: every now and then  -cannabis: No   -tobacco: No  -caffeine:  No   -opioids: No   Narcan Kit currrent: No   -other: none    Pertinent Negatives: No violent ideation, self-injury, psychosis, hallucinations and malia  Adverse Effects: increased thirst from metformin      FAMILY and SOCIAL HISTORY                                 pt reported     Family Hx:  Father: major depressive disorder   Mother: borderline personality disorder and major depressive disorder   Maternal grandmother: major depressive disorder   Paternal grandfather: major depressive disorder and alcohol use disorder   Maternal great-grandfather: schizophrenia and suicide     Social Hx:  Partner/ - never    Children- none   Living situation- Therese lives in [facility] through [email or web address]. Has been there since around 2018.       Social/ Spiritual Support- Her mother, 4 half-siblings she is the oldest, ,  staff (Renu Brady).       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 She started Working at [employer] 7/18/2022.     Feels Safe at Home- Yes     Legal- has a legal guardian.   Trauma History (self-report)- yes verbal bullying by peers. Denies sexual, physical or emotional abuse.   Early History/Education- Sudha was born in [place] to both her parents who were  when she was about 3 mo old. They later  when she was 6 yo \"but they got along.\" Both parents moved to [place] when she was 7 yo, and from then on Sudha spent more time with mom, but continued to be very close with dad as well.  Both parents have since remarried and Sudha now has 2 half siblings on each side of the family.  Notably, Therese has 4 half sibs with 2 siblings from each her mom and her dad. Was " in MH treatment for a year and a half (part of the time was in residential.) Sudha has a HS diploma - graduated on time. Started HS and was getting good grades, things went downhill after her MH symptoms started getting worse and starting on meds. Had an IEP starting in 10th grade. Believes she met her developmental milestones on time. Feels she has been socially awkward her whole life - became more noticeable in 7th grade.      PAST PSYCHIATRIC HISTORY     SIB- History of cutting per chart review. She states that this was more picking at scabs. Last years ago.   Suicide Attempt [#, most recent]- 3, most recently in 2014.   Suicidal Ideation Hx- Yes, history of suicidal ideation with plan and intent. Cannot remember the last time.      Violence/Aggression Hx-  Middle and high school was verbally and physically aggressive. Can still be pretty verbally aggressive when really emotional or paranoid. Most recently when she sent a text message to the jose that was making her angry.  Psychosis Hx-Yes, since age 8. Symptoms have included auditory hallucinations w/ negative self talk especially surrounding social situations, command AH for SI, visual hallucinations, paranoia, and ideas of reference.   Eating Disorder Hx- None   Wilma- Last was in February. Present as euphoria, restless, high energy.      Psych Hosp [#, most recent]- Approximately 8 (half of them when she was a sophomore in high school); most recently in November 2019 for worsening depression and psychosis.   Commitment- None   ECT- None   Outpatient Programs - IOP in 2014 & 2016 at [facility], day treatment at [facility] the winter 2020.   Residential- [facility] for 9 months the summer before and most of ford year of high school (July 2012-2013).     PSYCH and SUBSTANCE USE Critical Summary Points since July 2022 07/22/2022: DA. No changes.   10/03/2022: Stable, no changes.   11/19/2022: Stable with some spikes and anxiety due to holiday  "season (working at [employer]).  Started with new therapist after this appointment.  No med changes.   11/29/2022: Stable, no changes.   01/24/2023: Stable, no changes.   05/16/2023: Stable, no changes.    11/23: no change       PAST MED TRIALS     Per chart review      Medication Max Dose (mg) Dates / Duration Helpful? DC Reason / Adverse Effects?   risperidone 3     \"flattened her out\"   aripiprazole 30   N     quetiapine 800   N     Depakote       Enuresis   topiramate           ethosuxamide           perphenazine 16 04/2013 - 06/2017        paliperidone 12 ? - present Y     hydroxyzine 25 02/2017 - present Y (for anxiety)     benztropine 2 04/2013-07/2019       fluoxetine 80 04/2013 - 05/2021       melatonin 5 07/2019 - present       lamotrigine 200 07/2014 - present       modafinil 200 04/2013-02/2016       paliperidone 12 04/2017 - present             PAST SUBSTANCE USE HISTORY     Past Use-   -alcohol: Yes: has tried, never more than 1 drink.    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      MEDICAL REVIEW OF SYSTEMS     none in addition to that documented above     MEDICATIONS     Current Outpatient Medications   Medication Sig Dispense Refill     etonogestrel (IMPLANON/NEXPLANON) 68 MG IMPL 1 each by Subdermal route once       FLUoxetine (PROZAC) 20 MG capsule Take 1 capsule (20 mg) by mouth daily 30 capsule 0     fluticasone (FLONASE) 50 MCG/ACT nasal spray Spray 2 sprays into both nostrils daily       hydrOXYzine (ATARAX) 25 MG tablet Take 1 tablet " (25 mg) by mouth 2 times daily 60 tablet 0     ibuprofen (ADVIL/MOTRIN) 200 MG tablet Take 400 mg by mouth every 4 hours as needed for mild pain       lamoTRIgine (LAMICTAL) 200 MG tablet Take 1 tablet (200 mg) by mouth daily 30 tablet 0     melatonin (MELATONIN MAXIMUM STRENGTH) 5 MG tablet TAKE 1 TABLET (5MG) BY MOUTH EVERY NIGHT AT BEDTIME AS NEEDED FOR SLEEP (TAKE 1-2 HOURS BEFORE BED) 30 tablet 3     metFORMIN (GLUCOPHAGE) 500 MG tablet Take 2 tablets (1,000 mg) by mouth 2 times daily (with meals) 120 tablet 0     paliperidone ER (INVEGA) 6 MG 24 hr tablet Take 2 tablets (12 mg) by mouth at bedtime 60 tablet 0      VITALS   There were no vitals taken for this visit.     Pulse Readings from Last 3 Encounters:   01/24/23 85   03/04/20 90   01/22/20 103     Wt Readings from Last 3 Encounters:   01/24/23 98.7 kg (217 lb 9.6 oz)   03/04/20 88.6 kg (195 lb 6.4 oz)   01/22/20 89.4 kg (197 lb)     BP Readings from Last 3 Encounters:   01/24/23 106/70   03/04/20 120/67   01/22/20 120/77          MENTAL STATUS EXAM     Alertness: alert  and oriented  Appearance: well groomed  Behavior/Demeanor: cooperative, pleasant and calm, with good  eye contact   Speech: normal and regular rate and rhythm  Language: intact  Psychomotor: normal or unremarkable  Mood: description consistent with euthymia  Affect: full range; congruent to: mood- yes, content- yes  Thought Process/Associations: unremarkable  Thought Content:  Reports none;  Denies suicidal & violent ideation and delusions  Perception:  Reports none;  Denies hallucinations  Insight: excellent  Judgment: excellent  Cognition: does  appear grossly intact; formal cognitive testing was not done  Gait and Station: N/A ([facility])     LABS and DATA         11/28/2022    11:47 AM 1/24/2023     9:28 AM 5/16/2023     9:52 AM   PHQ   PHQ-9 Total Score 20 17 14   Q9: Thoughts of better off dead/self-harm past 2 weeks Not at all Not at all Not at all         Liver/Kidney Function,  TSH Metabolic Blood counts   Recent Labs   Lab Test 01/24/23  1121 06/09/21  0840   AST 22 27   ALT 14 17   ALKPHOS 104 95   CR 0.83 0.73     Recent Labs   Lab Test 12/15/15  0000   TSH 0.98    Recent Labs   Lab Test 01/24/23  1121   CHOL 190   TRIG 170*   *   HDL 43*     Recent Labs   Lab Test 01/24/23  1121   A1C 4.7     Recent Labs   Lab Test 01/24/23  1121   GLC 85    Recent Labs   Lab Test 01/24/23  1121   WBC 6.6   HGB 12.6   HCT 39.8   MCV 82             ECG 6/10/2019 QTc = 445 ms      PROVIDER: Blanca Friedman MD    Level of Medical Decision Making:   - At least 2 stable chronic problems  - Engaged in prescription drug management during visit (discussed any medication benefits, side effects, alternatives, etc.)          Psychiatry Individual Psychotherapy Note   Psychotherapy start time - 10:20 AM  Psychotherapy end time - 10:48 AM  Date treatment plan last reviewed with patient - 11/06/23  Subjective: This supportive psychotherapy session addressed issues related to goals of therapy and current psychosocial stressors. Patient's reaction: Action in the context of mental status appropriate for ambulatory setting.    Interactive complexity indicated? No  Plan: RTC in timeframe noted above  Psychotherapy services during this visit included myself and the patient.   Treatment Plan      SYMPTOMS; PROBLEMS   MEASURABLE GOALS;    FUNCTIONAL IMPROVEMENT / GAINS INTERVENTIONS DISCHARGE CRITERIA   Depression: depressed mood   reduce depressive symptoms Supportive / psychodynamic marked symptom improvement       Patient staffed in clinic with Dr. Taylor who will sign the note.  Supervisor is Dr. Urbano.

## 2023-11-07 NOTE — PATIENT INSTRUCTIONS
It was nice meeting you today. We talked about ordering prolactin level due to recent abnormalities in your periods. I did not make any medication changes.    **For crisis resources, please see the information at the end of this document**   Patient Education    Thank you for coming to the [facility].     Lab Testing:  If you had lab testing today and your results are reassuring or normal they will be mailed to you or sent through [employer] within 7 days. If the lab tests need quick action we will call you with the results. The phone number we will call with results is # [phone]. If this is not the best number please call our clinic and change the number.     Medication Refills:  If you need any refills please call your pharmacy and they will contact us. Our fax number for refills is [phone].   Three business days of notice are needed for general medication refill requests.   Five business days of notice are needed for controlled substance refill requests.   If you need to change to a different pharmacy, please contact the new pharmacy directly. The new pharmacy will help you get your medications transferred.     Contact Us:  Please call [phone] during business hours (8-5:00 M-F).   If you have medication related questions after clinic hours, or on the weekend, please call [phone].     Financial Assistance [phone]   Medical Records [phone]       MENTAL HEALTH CRISIS RESOURCES:  For a emergency help, please call 911 or go to the nearest Emergency Department.     Emergency Walk-In Options:   EmPATH Unit @ [facility] ([place]): [phone] - Specialized mental health emergency area designed to be calming  [facility] ([place]): [phone]  [facility] ([place]): [phone]  [facility]): [phone]    [facility] Crisis Information:   [place]: [phone]  [place]:  [phone]  [place] (COPE) - Adult: [phone]     Child: [phone]  [place] - Adult: [phone]     Child: [phone]  [place]: [phone]  List of all [facility] resources:   https://[place].gov/dhs/people-we-serve/adults/health-care/mental-health/resources/crisis-contacts.jsp    National Crisis Information:   Crisis Text Line: Text  [place]  to 817397  Suicide & Crisis Lifeline: 988  National Suicide Prevention Lifeline: 0-679-685-TALK (1-134.442.8907)       For online chat options, visit https://suicidepreventionlifeline.org/chat/  Poison Control Center: 1-895.865.4008  Trans Lifeline: 1-892.489.5010 - Hotline for transgender people of all ages  The [employer] Project: 7-878-572-5340 - Hotline for LGBT youth     For Non-Emergency Support:   Fast Tracker: Mental Health & Substance Use Disorder Resources -   https://www.[employer] (ROOF)ckComparisign.comn.org/

## 2023-11-07 NOTE — TELEPHONE ENCOUNTER
From: Blanca Robertson MD   Sent: 11/7/2023  11:12 AM CST   To: Psychiatry Nurse-[facility],     I ordered prolactin level but Therese has a guardian Shahnaz ([phone]). I called guardian and left a voicemail with the plan to order prolactin level since she has irregular menses and is on antipsychotic medication. I asked guardian to give clinic a call back. If you didn't hear from her could you give a follow up call this evening? If she called and said ok no need to do anything.     Thanks,   lBanca     Follow Up:  Writer attempted to call Shahnaz to discuss the above, but was unable to connect.

## 2023-12-26 DIAGNOSIS — G47.00 INSOMNIA, UNSPECIFIED TYPE: ICD-10-CM

## 2023-12-27 NOTE — TELEPHONE ENCOUNTER
melatonin (MELATONIN MAXIMUM STRENGTH) 5 MG tablet 30 tablet 3 5/16/2023     Last seen: 11/7/23  RTC: 4-6 weeks  Cancel: 0  No-show: 0  Next appt: 0    Refill decision:   30 day shabbir refill sent to the pharmacy - including instructions for patient to call the clinic and schedule an appointment.  Scheduling has been notified to contact the pt for appointment.

## 2024-01-25 DIAGNOSIS — F41.9 ANXIETY DISORDER, UNSPECIFIED TYPE: ICD-10-CM

## 2024-01-25 DIAGNOSIS — G47.00 INSOMNIA, UNSPECIFIED TYPE: ICD-10-CM

## 2024-01-25 DIAGNOSIS — F25.0 SCHIZOAFFECTIVE DISORDER, BIPOLAR TYPE (H): ICD-10-CM

## 2024-01-25 RX ORDER — PALIPERIDONE 6 MG/1
12 TABLET, EXTENDED RELEASE ORAL AT BEDTIME
Qty: 60 TABLET | Refills: 2 | Status: SHIPPED | OUTPATIENT
Start: 2024-01-25 | End: 2024-02-19

## 2024-01-25 RX ORDER — HYDROXYZINE HYDROCHLORIDE 25 MG/1
25 TABLET, FILM COATED ORAL 2 TIMES DAILY
Qty: 60 TABLET | Refills: 2 | Status: SHIPPED | OUTPATIENT
Start: 2024-01-25 | End: 2024-02-19

## 2024-01-25 RX ORDER — LAMOTRIGINE 200 MG/1
200 TABLET ORAL DAILY
Qty: 30 TABLET | Refills: 2 | Status: SHIPPED | OUTPATIENT
Start: 2024-01-25 | End: 2024-02-19

## 2024-01-25 NOTE — TELEPHONE ENCOUNTER
Date of Last Office Visit: 11/7/2023  [facility]     Blanca Robertson MD     Date of Next Office Visit: 0  No shows since last visit: 0  Cancellations since last visit: 0  ------------------------------  Medication requested: melatonin (MELATONIN MAXIMUM STRENGTH) 5 MG tablet     Date last ordered:12/27/2023   Qty:30  Refills: 0           Sig - Route: Take 1 tablet (5 mg) by mouth nightly as needed for sleep (TAKE 1-2 HOURS BEFORE BED)  Sent to pharmacy as: Melatonin 5 MG Oral Tablet (Melatonin Maximum Strength)   Class: E-Prescribe  ------------------------------  Medication requested:  FLUoxetine (PROZAC) 20 MG capsule   Date last ordered: 11/7/2023  Qty:30  Refills: 2           Sig - Route: Take 1 capsule (20 mg) by mouth daily - Oral   Sent to pharmacy as: FLUoxetine HCl 20 MG Oral Capsule (PROzac)   Class: E-Prescribe  ------------------------------  Medication requested:  hydrOXYzine (ATARAX) 25 MG tablet   Date last ordered: 11/7/2023  Qty:60  Refills: 2          Sig - Route: Take 1 tablet (25 mg) by mouth 2 times daily - Oral   Sent to pharmacy as: hydrOXYzine HCl 25 MG Oral Tablet (ATARAX)   Class: E-Prescribe  ------------------------------  Medication requested:  lamoTRIgine (LAMICTAL) 200 MG tablet   Date last ordered: 11/7/2023 Qty: 30 Refills: 2           Sig - Route: Take 1 tablet (200 mg) by mouth daily - Oral   Sent to pharmacy as:  lamoTRIgine 200 MG Oral Tablet (LaMICtal)   Class: E-Prescribe  ------------------------------  Medication requested:  paliperidone ER (INVEGA) 6 MG 24 hr tablet   Date last ordered: 11/7/2023 Qty:60  Refills: 2           Sig - Route: Take 2 tablets (12 mg) by mouth at bedtime - Oral   Sent to pharmacy as:  Paliperidone ER 6 MG Oral Tablet Extended Release 24 Hour (INVEGA)   Class: E-Prescribe  ------------------------------     Lapse in medication adherence greater than 5 days?: no  If yes, call patient and  gather details: -  Medication refill request verified as identical to current order?: yes       Last Visit Treatment Plan     1) PSYCHOTROPIC MEDICATIONS:  - Continue paliperidone 12 mg at bedtime    - Continue lamotrigine 200 mg daily   - Continue metformin 1000 mg BID (pt may take second dose with a snack in the early afternoon before going to work)   - Continue hydroxyzine 25 mg at bedtime   - Continue melatonin 5 mg 1-2 hours before bedtime PRN (every night usually)  - Continue fluoxetine 20 mg daily      2) THERAPY:  Continue individual weekly therapy with [facility], Armida Avalos      3) MONITORING:  Labs- done 01/24/2023, WNL limits with exception of mild lipid elevations   EKG- PRN   Rating scales- PHQ9 and TINO-7 at every visit   AIMS- 01/24/2023= 0     4) REFERRALS:  None      5) RTC: 4-6 weeks        Refill decision: Refill pended and routed to the provider for review/determination due to the following criteria not met: Outside of RTC timeframe no future appt.     Scheduling has been notified to contact the pt for appointment.      Medication unable to be refilled by RN due to criteria not met as indicated.                 []Eligibility - not seen in the last year              [x]Supervision - no future appointment              []Compliance - no shows, cancellations or lapse in therapy              []Verification - order discrepancy              []Controlled medication              []Medication not included in policy              []90-day supply request              []Other:

## 2024-02-05 ENCOUNTER — TELEPHONE (OUTPATIENT)
Dept: PSYCHIATRY | Facility: CLINIC | Age: 29
End: 2024-02-05
Payer: MEDICAID

## 2024-02-05 NOTE — TELEPHONE ENCOUNTER
Health Call Center    Phone Message    May a detailed message be left on voicemail: yes     Reason for Call: Medication Question or concern regarding medication   Prescription Clarification  Name of Medication: Paliperidone 6mg  Prescribing Provider: Dr. Urbano   Pharmacy: [facility]    What on the order needs clarification?     Pharmacy did receive refill request but pharmacy needs clinic to fill out the prior authorization update form, and return to the state. It was faxed to the clinic on 1/25/24 at 10 am. PT has less than a week of medication and will need to  medication on Friday.     Action Taken: Other: Liiiike pool    Travel Screening: Not Applicable

## 2024-02-08 NOTE — TELEPHONE ENCOUNTER
"Writer received a fax from [place] Department of Human Services. Per fax \"Pharmacy called to update the National Drug Code. In order to process claim for the prescription noted above the PA number and corresponding NDC MUST be transmitted with the prescription, as well as the prescriber NPI and Pharmacy NPI number. Writer routed a copy to scanning and a copy was held with writer until scanning complete.   "

## 2024-02-17 ENCOUNTER — HEALTH MAINTENANCE LETTER (OUTPATIENT)
Age: 29
End: 2024-02-17

## 2024-02-19 ENCOUNTER — VIRTUAL VISIT (OUTPATIENT)
Dept: PSYCHIATRY | Facility: CLINIC | Age: 29
End: 2024-02-19
Attending: STUDENT IN AN ORGANIZED HEALTH CARE EDUCATION/TRAINING PROGRAM
Payer: MEDICAID

## 2024-02-19 DIAGNOSIS — T50.905A WEIGHT GAIN DUE TO MEDICATION: ICD-10-CM

## 2024-02-19 DIAGNOSIS — F41.9 ANXIETY DISORDER, UNSPECIFIED TYPE: ICD-10-CM

## 2024-02-19 DIAGNOSIS — F25.0 SCHIZOAFFECTIVE DISORDER, BIPOLAR TYPE (H): ICD-10-CM

## 2024-02-19 DIAGNOSIS — R63.5 WEIGHT GAIN DUE TO MEDICATION: ICD-10-CM

## 2024-02-19 DIAGNOSIS — N92.6 IRREGULAR PERIODS: Primary | ICD-10-CM

## 2024-02-19 DIAGNOSIS — Z79.899 ENCOUNTER FOR LONG-TERM (CURRENT) USE OF MEDICATIONS: ICD-10-CM

## 2024-02-19 DIAGNOSIS — G47.00 INSOMNIA, UNSPECIFIED TYPE: ICD-10-CM

## 2024-02-19 PROCEDURE — 99214 OFFICE O/P EST MOD 30 MIN: CPT | Mod: 95

## 2024-02-19 PROCEDURE — 90833 PSYTX W PT W E/M 30 MIN: CPT | Mod: 95

## 2024-02-19 RX ORDER — PALIPERIDONE 6 MG/1
12 TABLET, EXTENDED RELEASE ORAL AT BEDTIME
Qty: 60 TABLET | Refills: 2 | Status: SHIPPED | OUTPATIENT
Start: 2024-03-25 | End: 2024-04-02

## 2024-02-19 RX ORDER — LAMOTRIGINE 200 MG/1
200 TABLET ORAL DAILY
Qty: 30 TABLET | Refills: 2 | Status: SHIPPED | OUTPATIENT
Start: 2024-03-25 | End: 2024-04-02

## 2024-02-19 RX ORDER — HYDROXYZINE HYDROCHLORIDE 25 MG/1
25 TABLET, FILM COATED ORAL 2 TIMES DAILY
Qty: 60 TABLET | Refills: 2 | Status: SHIPPED | OUTPATIENT
Start: 2024-03-25 | End: 2024-03-12

## 2024-02-19 ASSESSMENT — PAIN SCALES - GENERAL: PAINLEVEL: NO PAIN (0)

## 2024-02-19 ASSESSMENT — ANXIETY QUESTIONNAIRES
4. TROUBLE RELAXING: NEARLY EVERY DAY
8. IF YOU CHECKED OFF ANY PROBLEMS, HOW DIFFICULT HAVE THESE MADE IT FOR YOU TO DO YOUR WORK, TAKE CARE OF THINGS AT HOME, OR GET ALONG WITH OTHER PEOPLE?: EXTREMELY DIFFICULT
2. NOT BEING ABLE TO STOP OR CONTROL WORRYING: NEARLY EVERY DAY
6. BECOMING EASILY ANNOYED OR IRRITABLE: MORE THAN HALF THE DAYS
7. FEELING AFRAID AS IF SOMETHING AWFUL MIGHT HAPPEN: NEARLY EVERY DAY
5. BEING SO RESTLESS THAT IT IS HARD TO SIT STILL: MORE THAN HALF THE DAYS
7. FEELING AFRAID AS IF SOMETHING AWFUL MIGHT HAPPEN: NEARLY EVERY DAY
3. WORRYING TOO MUCH ABOUT DIFFERENT THINGS: NEARLY EVERY DAY
GAD7 TOTAL SCORE: 19
1. FEELING NERVOUS, ANXIOUS, OR ON EDGE: NEARLY EVERY DAY
GAD7 TOTAL SCORE: 19
GAD7 TOTAL SCORE: 19
IF YOU CHECKED OFF ANY PROBLEMS ON THIS QUESTIONNAIRE, HOW DIFFICULT HAVE THESE PROBLEMS MADE IT FOR YOU TO DO YOUR WORK, TAKE CARE OF THINGS AT HOME, OR GET ALONG WITH OTHER PEOPLE: EXTREMELY DIFFICULT

## 2024-02-19 ASSESSMENT — PATIENT HEALTH QUESTIONNAIRE - PHQ9
10. IF YOU CHECKED OFF ANY PROBLEMS, HOW DIFFICULT HAVE THESE PROBLEMS MADE IT FOR YOU TO DO YOUR WORK, TAKE CARE OF THINGS AT HOME, OR GET ALONG WITH OTHER PEOPLE: VERY DIFFICULT
SUM OF ALL RESPONSES TO PHQ QUESTIONS 1-9: 10
SUM OF ALL RESPONSES TO PHQ QUESTIONS 1-9: 10

## 2024-02-19 NOTE — PATIENT INSTRUCTIONS
**For crisis resources, please see the information at the end of this document**   Patient Education    Thank you for coming to the [facility].     Lab Testing:  If you had lab testing today and your results are reassuring or normal they will be mailed to you or sent through RUNform within 7 days. If the lab tests need quick action we will call you with the results. The phone number we will call with results is # [phone]. If this is not the best number please call our clinic and change the number.     Medication Refills:  If you need any refills please call your pharmacy and they will contact us. Our fax number for refills is [phone].   Three business days of notice are needed for general medication refill requests.   Five business days of notice are needed for controlled substance refill requests.   If you need to change to a different pharmacy, please contact the new pharmacy directly. The new pharmacy will help you get your medications transferred.     Contact Us:  Please call [phone] during business hours (8-5:00 M-F).   If you have medication related questions after clinic hours, or on the weekend, please call [phone].     Financial Assistance [phone]   Medical Records [phone]       MENTAL HEALTH CRISIS RESOURCES:  For a emergency help, please call 911 or go to the nearest Emergency Department.     Emergency Walk-In Options:   [facility] Unit @ [facility] ([place]): [phone] - Specialized mental health emergency area designed to be calming  [facility] ([place]): [phone]  [facility] ([place]): [phone]  [facility]): [phone]    [facility] Crisis Information:   [place]: [phone]  [place]: [phone]  [place] - Adult: [phone]     Child: [phone]  [place] - Adult: [phone]     Child: [phone]  [place]:  [phone]  List of all [facility] resources:   https://[place].gov/dhs/people-we-serve/adults/health-care/mental-health/resources/crisis-contacts.jsp    National Crisis Information:   Crisis Text Line: Text  [place]  to 827331  Suicide & Crisis Lifeline: 988  National Suicide Prevention Lifeline: 5-815-233-TALK (1-602.384.3723)       For online chat options, visit https://suicidepreventionlifeline.org/chat/  Poison Control Center: 3-521-115-2298  Trans Lifeline: 3-902-682-6084 - Hotline for transgender people of all ages  The Damien Project: 4-176-187-4060 - Hotline for LGBT youth     For Non-Emergency Support:   Fast Tracker: Mental Health & Substance Use Disorder Resources -   https://www.[employer].org/

## 2024-02-19 NOTE — PROGRESS NOTES
Virtual Visit Details    Type of service:  Video Visit   Video Start Time: 1:34 PM  Video End Time:1:56 PM    Originating Location (pt. Location): Home    Distant Location (provider location):  On-site  Platform used for Video Visit: [facility]  Psychiatry Clinic  MEDICAL PROGRESS NOTE     CARE TEAM:  PCP- Becki Valencia, Psychotherapist- Andria Jarrell, [place] [facility], [facility] Team- [facility] Ericashayy AnthonyBridger [phone],   (ProAct) Margie Asherx [phone]    Guardian: Shahnaz Bernal through [facility] [phone]   Living Situation: [facility] [phone] - Shahnaz MARQUEZ (manager) [phone]    Therese is a 28 year old who uses the pronouns she, her, hers.      DIAGNOSIS     Schizoaffective Disorder, bipolar type, most recent episode depressed   Unspecified Anxiety Disorder   Unspecified insomnia   Weight gain due to medication   Emotional Dysregulation      ASSESSMENT     - schizoaffective disorder: Stable, no SI, SIB, No mood cycling. Has been stable for over a year with issues just related to mild depressive smx and anxiety.      -ADHD: previously has expressed interest in being evaluated. We will defer to her current psychologist and refer for evaluation if her therapist is unable to do so. This work-up is still pending. Of note in the past a trial of stimulant has led to worsening of psychosis.     - irregular menses: Noticed this in the past few months, denies headaches. Ordered prolactin since she is on paliperidone. Informed guardian.      Future Considerations:    Psychotropic Drug Interactions:  [PSYCHCLINICDDI]  ADDITIVE QTc: HYDROXYZINE and PALIPERIDON  ADDITIVE CNS/RESPIRATORY DEPRESSION: Hydroxyzine, paliperidone, and lamotrigine:    Lamotrigine may increase serum concentration of metformin.   Lamotrigine may decrease serum concentration of progestins.    Management: limit med  "redundancy    [place] was not checked today: not using controlled substances    Risk Statements:   Treatment Risk- Risks, benefits, alternatives and potential adverse effects have been discussed and are understood.  Safety Risk-Therese did not appear to be an imminent safety risk to self or others.       PLAN                                                                                                                1) Meds-  - Continue paliperidone 12 mg at bedtime    - Continue lamotrigine 200 mg daily   - Continue metformin 1000 mg BID (pt may take second dose with a snack in the early afternoon before going to work)   - Continue hydroxyzine 25 mg at bedtime   - Continue melatonin 5 mg 1-2 hours before bedtime PRN (every night usually)  - Continue fluoxetine 20 mg daily      2) Psychotherapy- Continue individual weekly therapy with [facility], Armida Avalos     3) Next due-  Labs- Due now, last 01/24/2023, WNL limits with exception of mild lipid elevations   EKG- PRN   Rating scales- PHQ9 and TINO-7 at every visit   AIMS- 01/24/2023= 0       4) Referrals-  None     5) Dispo- Follow-up visit in 3 months     PERTINENT BACKGROUND                                                    [most recent eval 07/22/22]     She started \"hearing voices\" in the second grade saying that other kids don't want to play with her. Per chart review Therese received the diagnosis of schizophrenia around 8th grade (command auditory hallucinations, with voices telling her to do things like jump off a bridge). Around the same time there was concern about inattention and academic decline and Adderall was attempted which made the hallucination worse. She had her first hospitalization at age 16 after she put a cord around her neck in the context of a fight with dad and frustration. She had symptoms of depression including  \"difficulty concentrating, increased irritability, feeling inadequate and low self-esteem. At that time the " "voices that she was hearing was saying she is fat, ugly and worthless and that no one likes her. She had also stated , \"Things are difficult at school because I feel like nobody likes me, I am scared that people do not like me, so I isolate myself; then people do not get to know me and things just get worse.\" At the time she was hospitalized she was in tenth grade. Earning A's and B's. During that hospitalization an attempt to lower risperidone was not successful as patient became more suspicious of peers therefore risperidone was re-established at 1.5mgs BID.     Later she had an MMPI done showing evidence of thought disorder.    Per chart review, she has a history of some Cluster B traits and frequent hospitalizations which tapered off after she started residing in a group home and supportive employment a few years ago. She has been very stable on paliperidone and fluoxetine in conjunction with group home living environment. She had neuropsychiatric testing as a child (see scanned documents 4/16/19). She is under guardianship (see above); all medication changes need to be approved by them (see media section 10/17/19 for paperwork).      Psych pertinent item history includes suicide attempt [xmultiple], suicidal ideation, psychosis [sxs include paranoia, auditory hallucinations, ideas of reference], mutiple psychotropic trials  and psych hosp (x8)      SUBJECTIVE     Mood: up and down, sometimes good sometimes not, Cat had some issues  He's ok now  \"Boyfriend stuff\" is getting better  Sleep: either not enough or sleeps too much, can't predict. Distracted by technology so won't fall asleep.   Anxiety: not great, life makes her anxious, \"Aggressive and borderline violent housemate\" and that has been stressful. We talked about TIPP skils.    Hasn't had periods for a while, went on a hormone pill and it stopped     Continues therapy and that is going well.   No SI in the past year  No self injury     Recent Social " "History: see below  Partner/ - never    Children- none   Living situation- Therese lives in [facility] through [employer]. Has been there since around 2018.       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 No changes.   10/03/2022: Stable, no changes.   11/19/2022: Stable with some spikes and anxiety due to holiday season (working at [employer]).  Started with new therapist after this appointment.  No med changes.   11/29/2022: Stable, no changes.   01/24/2023: Stable, no changes.   05/16/2023: Stable, no changes.    11/23: no change  2/19/24: prolactin level ordered for irregular menses       PAST MED TRIALS     Per chart review      Medication Max Dose (mg) Dates / Duration Helpful? DC Reason / Adverse Effects?   risperidone 3     \"flattened her out\"   aripiprazole 30   N     quetiapine 800   N     Depakote       Enuresis   topiramate           ethosuxamide           perphenazine 16 04/2013 - 06/2017        paliperidone 12 ? 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      MEDICAL REVIEW OF SYSTEMS     none in addition to that documented above     MEDICATIONS     Current Outpatient Medications   Medication Sig Dispense Refill    etonogestrel (IMPLANON/NEXPLANON) 68 MG IMPL 1 each by Subdermal route once      FLUoxetine (PROZAC) 20 MG capsule Take 1 capsule (20 mg) by mouth daily 30 capsule 2    hydrOXYzine HCl (ATARAX) 25 MG tablet Take 1 tablet (25 mg) by mouth 2 times daily 60 tablet 2    ibuprofen (ADVIL/MOTRIN) 200 MG tablet Take 400 mg by mouth every 4 hours as needed for mild pain      lamoTRIgine (LAMICTAL) 200 MG tablet Take 1 tablet (200 mg) by mouth daily 30 tablet 2    melatonin (MELATONIN MAXIMUM STRENGTH) 5 MG tablet Take 1 tablet (5 mg) by mouth nightly as needed for sleep (TAKE 1-2 HOURS BEFORE BED)For more refills,schedule an appointment at [phone] 30 tablet 0    metFORMIN (GLUCOPHAGE) 500 MG tablet Take 2 tablets (1,000 mg) by mouth 2 times daily (with meals) 120 tablet 2    paliperidone ER (INVEGA) 6 MG 24 hr tablet Take 2 tablets (12 mg) by mouth at bedtime 60 tablet 2    fluticasone (FLONASE) 50 MCG/ACT nasal spray Spray 2 sprays into both nostrils daily        VITALS   There were no vitals taken for this visit.     Pulse Readings from Last 3 Encounters:   01/24/23 85   03/04/20 90   01/22/20 103     Wt Readings from Last 3 Encounters:   01/24/23 98.7 kg (217 lb 9.6 oz)   03/04/20 88.6 kg (195 lb 6.4 oz)   01/22/20 89.4 kg (197 lb)     BP Readings from Last 3 Encounters:   01/24/23 106/70   03/04/20 120/67   01/22/20 120/77          MENTAL STATUS EXAM     Alertness: alert  and oriented  Appearance: well groomed  Behavior/Demeanor: cooperative, pleasant and calm, with good  eye contact   Speech: normal and regular rate and rhythm  Language: intact  Psychomotor: normal or unremarkable  Mood: description consistent with euthymia  Affect: full range; congruent to: mood- yes, content- yes  Thought Process/Associations: unremarkable  Thought  Content:  Reports none;  Denies suicidal & violent ideation and delusions  Perception:  Reports none;  Denies hallucinations  Insight: excellent  Judgment: excellent  Cognition: does  appear grossly intact; formal cognitive testing was not done  Gait and Station: N/A (telehealth)     LABS and DATA         5/16/2023     9:52 AM 11/7/2023     9:46 AM 2/19/2024     1:10 PM   PHQ   PHQ-9 Total Score 14 13 10   Q9: Thoughts of better off dead/self-harm past 2 weeks Not at all Not at all Not at all         Liver/Kidney Function, TSH Metabolic Blood counts   Recent Labs   Lab Test 01/24/23  1121 06/09/21  0840   AST 22 27   ALT 14 17   ALKPHOS 104 95   CR 0.83 0.73     Recent Labs   Lab Test 12/15/15  0000   TSH 0.98    Recent Labs   Lab Test 01/24/23  1121   CHOL 190   TRIG 170*   *   HDL 43*     Recent Labs   Lab Test 01/24/23  1121   A1C 4.7     Recent Labs   Lab Test 01/24/23  1121   GLC 85    Recent Labs   Lab Test 01/24/23  1121   WBC 6.6   HGB 12.6   HCT 39.8   MCV 82             ECG 6/10/2019 QTc = 445 ms      PROVIDER: Blanca Friedman MD    Level of Medical Decision Making:   - At least 2 stable chronic problems  - Engaged in prescription drug management during visit (discussed any medication benefits, side effects, alternatives, etc.)          Psychiatry Individual Psychotherapy Note   Psychotherapy start time - 1:34 AM  Psychotherapy end time - 1:51 AM  Date treatment plan last reviewed with patient - 2/019/24  Subjective: This supportive psychotherapy session addressed issues related to goals of therapy and current psychosocial stressors. Patient's reaction: Action in the context of mental status appropriate for ambulatory setting.    Interactive complexity indicated? No  Plan: RTC in timeframe noted above  Psychotherapy services during this visit included myself and the patient.   Treatment Plan      SYMPTOMS; PROBLEMS   MEASURABLE GOALS;    FUNCTIONAL IMPROVEMENT / GAINS INTERVENTIONS DISCHARGE  CRITERIA   Dysregulation: mood dysregulation   Practicing TiPP skills Supportive / psychodynamic marked symptom improvement       Patient staffed in clinic with Dr. Taylor who will sign the note.  Supervisor is Dr. Urbano.

## 2024-02-19 NOTE — NURSING NOTE
Is the patient currently in the state of [place]? YES    Visit mode:VIDEO    If the visit is dropped, the patient can be reconnected by: VIDEO VISIT: Text to cell phone:   Telephone Information:   Mobile [phone]       Will anyone else be joining the visit? NO  (If patient encounters technical issues they should call [phone])    How would you like to obtain your AVS? MyChart    Are changes needed to the allergy or medication list? Pt stated no changes to allergies and Pt stated no med changes    Reason for visit: RECHECK    Bunny RASMUSSENF

## 2024-03-05 ENCOUNTER — MYC MEDICAL ADVICE (OUTPATIENT)
Dept: PSYCHIATRY | Facility: CLINIC | Age: 29
End: 2024-03-05
Payer: MEDICAID

## 2024-03-05 NOTE — TELEPHONE ENCOUNTER
attempted to call patient's guardian at [phone]. M requesting a call back at the main clinic number.

## 2024-03-06 ENCOUNTER — DOCUMENTATION ONLY (OUTPATIENT)
Dept: OTHER | Facility: CLINIC | Age: 29
End: 2024-03-06
Payer: MEDICAID

## 2024-03-11 ENCOUNTER — TELEPHONE (OUTPATIENT)
Dept: PSYCHIATRY | Facility: CLINIC | Age: 29
End: 2024-03-11
Payer: MEDICAID

## 2024-03-11 NOTE — TELEPHONE ENCOUNTER
On 3/5/24, the patient signed a Consent to Communicate authorizing Person to Person communication with parent Savanna Hastings [phone] and parent Tino Tho [phone] for scheduling and medical information. I scanned this document into the patient's chart on 3/11/24. Denise MARQUEZ

## 2024-03-12 RX ORDER — HYDROXYZINE HYDROCHLORIDE 25 MG/1
25 TABLET, FILM COATED ORAL 2 TIMES DAILY PRN
Qty: 60 TABLET | Refills: 2 | Status: SHIPPED | OUTPATIENT
Start: 2024-03-12 | End: 2024-04-02

## 2024-03-12 NOTE — TELEPHONE ENCOUNTER
Writer placed second call to patient's guardian and they are in agreement with changing patient's hydroxyzine prescription to BID PRN.

## 2024-03-13 NOTE — TELEPHONE ENCOUNTER
Writer called [facility] at [phone] to advise of the medication change. Will fax updated medication list to them at:    Fax#: [phone]    Writer also called patient's mother to update.

## 2024-04-02 ENCOUNTER — TELEPHONE (OUTPATIENT)
Dept: PSYCHIATRY | Facility: CLINIC | Age: 29
End: 2024-04-02
Payer: MEDICAID

## 2024-04-02 DIAGNOSIS — F41.9 ANXIETY DISORDER, UNSPECIFIED TYPE: ICD-10-CM

## 2024-04-02 DIAGNOSIS — T50.905A WEIGHT GAIN DUE TO MEDICATION: ICD-10-CM

## 2024-04-02 DIAGNOSIS — G47.00 INSOMNIA, UNSPECIFIED TYPE: ICD-10-CM

## 2024-04-02 DIAGNOSIS — F25.0 SCHIZOAFFECTIVE DISORDER, BIPOLAR TYPE (H): ICD-10-CM

## 2024-04-02 DIAGNOSIS — R63.5 WEIGHT GAIN DUE TO MEDICATION: ICD-10-CM

## 2024-04-02 RX ORDER — PALIPERIDONE 6 MG/1
12 TABLET, EXTENDED RELEASE ORAL AT BEDTIME
Qty: 60 TABLET | Refills: 2 | Status: SHIPPED | OUTPATIENT
Start: 2024-04-02 | End: 2024-05-08

## 2024-04-02 RX ORDER — LAMOTRIGINE 200 MG/1
200 TABLET ORAL DAILY
Qty: 30 TABLET | Refills: 2 | Status: SHIPPED | OUTPATIENT
Start: 2024-04-02 | End: 2024-05-08

## 2024-04-02 RX ORDER — HYDROXYZINE HYDROCHLORIDE 25 MG/1
25 TABLET, FILM COATED ORAL 2 TIMES DAILY PRN
Qty: 60 TABLET | Refills: 2 | Status: SHIPPED | OUTPATIENT
Start: 2024-04-02 | End: 2024-05-08

## 2024-04-02 NOTE — TELEPHONE ENCOUNTER
Called and spoke to pharmacist and they did not receive any of the recently prescribed medications. Resent meds.

## 2024-04-02 NOTE — TELEPHONE ENCOUNTER
[facility] Call Center    Phone Message    May a detailed message be left on voicemail: yes     Reason for Call: Medication Refill Request    Has the patient contacted the pharmacy for the refill? Yes   Name of medication being requested: Melatonin  Provider who prescribed the medication: Dr. Friedman   Pharmacy: [id number] - [place], [place] - [address]   Date medication is needed: ASAP, the pharmacy called to request that the medication be filled as soon as possible due to the patient going out of town soon. Pharmacy stated that it was requested awhile ago and they had not heard back.        Action Taken: Other: Nurses.     Travel Screening: Not Applicable

## 2024-04-02 NOTE — TELEPHONE ENCOUNTER
Last seen: ***  RTC: ***  Cancel: ***  No-show: ***  Next appt: ***     Incoming refill from {PATIENT, FAMILY, CAREGIVER:[id number]} via {PHONE4:[id number]}    Medication requested:   No prescriptions requested or ordered in this encounter        Last refill per       From chart note:        Medication refill approved per refill protocol.     Medication unable to be refilled by RN due to criteria not met as indicated.                 []Eligibility - not seen in the last year              []Supervision - no future appointment              []Compliance - no shows, cancellations or lapse in therapy              []Verification - order discrepancy              []Controlled medication              []Medication not included in policy              []90-day supply request              []Other:

## 2024-04-17 NOTE — GROUP NOTE
Patient is requesting referral.     Name of specialist and specialty department : ophthalmology, no specific doctor, stated cannot get in with current ophthalmologist until October  Reason for visit with the specialist: broke glasses, needs new glasses  Address of the specialist office:none  Appointment date: none         CSS informed patient the turnaround time for referral is 5-7 business days.  Patient was informed to check their weendy account for referral status.             RN Group Note    PATIENT'S NAME: Cesia Nettles  MRN:   [id number]  :   1995  ACCT. NUMBER: [id number]  DATE OF SERVICE: 19  START TIME: 10:00 AM  END TIME: 10:50 AM  FACILITATOR: Svetlana Khan RN  TOPIC: KATIE RN Group: Mental Health Maintenance  Adult Mental Health Day Treatment  TRACK: 2A    NUMBER OF PARTICIPANTS: 7    Summary of Group / Topics Discussed:  Mental Health Maintenance:  Letting Go of Stress: Patients worked on a packet describin their personal stressors, symptoms of stress, and coping skills to help with stress.  Patient Session Goals / Objectives:  ? Patients discovered effective stress reduction techniques  ? Patients identified one technique they will use to cope with symptoms          Patient Participation / Response:  Fully participated with the group by sharing personal reflections / insights and openly received / provided feedback with other participants.    Demonstrated understanding of topics discussed through group discussion and participation    Treatment Plan:  Patient has an initial individualized treatment plan that was created as part of their diagnostic assessment / admission process.  A master individualized treatment plan is in the process of being developed with the patient and multi-disciplinary care team.    Svetlana Khan RN

## 2024-04-29 DIAGNOSIS — G47.00 INSOMNIA, UNSPECIFIED TYPE: ICD-10-CM

## 2024-04-29 NOTE — TELEPHONE ENCOUNTER
Last Seen 2/19  RTC 3 months  Cancel None  No-Show None    Next Appt None    Incoming Refill From [facility] via fax    Medication Requested   melatonin (MELATONIN MAXIMUM STRENGTH) 5 MG tablet     Directions   Take 1 tablet (5 mg) by mouth nightly as needed for sleep (TAKE 1-2 HOURS BEFORE BED)     Qty 30    Last Refill 4/2      Medication pended and routed to provider for approval    Writer routed message to scheduling to patient scheduled for follow up.

## 2024-05-08 ENCOUNTER — LAB (OUTPATIENT)
Dept: LAB | Facility: CLINIC | Age: 29
End: 2024-05-08
Payer: MEDICAID

## 2024-05-08 ENCOUNTER — VIRTUAL VISIT (OUTPATIENT)
Dept: PSYCHIATRY | Facility: CLINIC | Age: 29
End: 2024-05-08
Attending: STUDENT IN AN ORGANIZED HEALTH CARE EDUCATION/TRAINING PROGRAM
Payer: MEDICAID

## 2024-05-08 VITALS — BODY MASS INDEX: 33.74 KG/M2 | HEIGHT: 67 IN | WEIGHT: 215 LBS

## 2024-05-08 DIAGNOSIS — N92.6 IRREGULAR PERIODS: ICD-10-CM

## 2024-05-08 DIAGNOSIS — R63.5 WEIGHT GAIN DUE TO MEDICATION: ICD-10-CM

## 2024-05-08 DIAGNOSIS — T50.905A WEIGHT GAIN DUE TO MEDICATION: ICD-10-CM

## 2024-05-08 DIAGNOSIS — G47.00 INSOMNIA, UNSPECIFIED TYPE: ICD-10-CM

## 2024-05-08 DIAGNOSIS — F25.0 SCHIZOAFFECTIVE DISORDER, BIPOLAR TYPE (H): ICD-10-CM

## 2024-05-08 DIAGNOSIS — Z79.899 ENCOUNTER FOR LONG-TERM (CURRENT) USE OF MEDICATIONS: ICD-10-CM

## 2024-05-08 DIAGNOSIS — F41.9 ANXIETY DISORDER, UNSPECIFIED TYPE: ICD-10-CM

## 2024-05-08 LAB
BASOPHILS # BLD AUTO: 0 10E3/UL (ref 0–0.2)
BASOPHILS NFR BLD AUTO: 0 %
EOSINOPHIL # BLD AUTO: 0.1 10E3/UL (ref 0–0.7)
EOSINOPHIL NFR BLD AUTO: 2 %
ERYTHROCYTE [DISTWIDTH] IN BLOOD BY AUTOMATED COUNT: 13.7 % (ref 10–15)
HBA1C MFR BLD: 4.9 % (ref 0–5.6)
HCT VFR BLD AUTO: 39.8 % (ref 35–47)
HGB BLD-MCNC: 13.5 G/DL (ref 11.7–15.7)
IMM GRANULOCYTES # BLD: 0 10E3/UL
IMM GRANULOCYTES NFR BLD: 0 %
LYMPHOCYTES # BLD AUTO: 1.5 10E3/UL (ref 0.8–5.3)
LYMPHOCYTES NFR BLD AUTO: 32 %
MCH RBC QN AUTO: 26.7 PG (ref 26.5–33)
MCHC RBC AUTO-ENTMCNC: 33.9 G/DL (ref 31.5–36.5)
MCV RBC AUTO: 79 FL (ref 78–100)
MONOCYTES # BLD AUTO: 0.4 10E3/UL (ref 0–1.3)
MONOCYTES NFR BLD AUTO: 8 %
NEUTROPHILS # BLD AUTO: 2.8 10E3/UL (ref 1.6–8.3)
NEUTROPHILS NFR BLD AUTO: 58 %
PLATELET # BLD AUTO: 297 10E3/UL (ref 150–450)
RBC # BLD AUTO: 5.06 10E6/UL (ref 3.8–5.2)
WBC # BLD AUTO: 4.9 10E3/UL (ref 4–11)

## 2024-05-08 PROCEDURE — 84443 ASSAY THYROID STIM HORMONE: CPT

## 2024-05-08 PROCEDURE — 99214 OFFICE O/P EST MOD 30 MIN: CPT | Mod: 95

## 2024-05-08 PROCEDURE — 36415 COLL VENOUS BLD VENIPUNCTURE: CPT

## 2024-05-08 PROCEDURE — 80053 COMPREHEN METABOLIC PANEL: CPT

## 2024-05-08 PROCEDURE — 90833 PSYTX W PT W E/M 30 MIN: CPT | Mod: 95

## 2024-05-08 PROCEDURE — 80061 LIPID PANEL: CPT

## 2024-05-08 PROCEDURE — 85025 COMPLETE CBC W/AUTO DIFF WBC: CPT

## 2024-05-08 PROCEDURE — 83036 HEMOGLOBIN GLYCOSYLATED A1C: CPT

## 2024-05-08 PROCEDURE — 84146 ASSAY OF PROLACTIN: CPT

## 2024-05-08 RX ORDER — PALIPERIDONE 6 MG/1
12 TABLET, EXTENDED RELEASE ORAL AT BEDTIME
Qty: 60 TABLET | Refills: 2 | Status: SHIPPED | OUTPATIENT
Start: 2024-05-08 | End: 2024-07-29

## 2024-05-08 RX ORDER — LAMOTRIGINE 200 MG/1
200 TABLET ORAL DAILY
Qty: 30 TABLET | Refills: 2 | Status: SHIPPED | OUTPATIENT
Start: 2024-05-08 | End: 2024-07-29

## 2024-05-08 RX ORDER — HYDROXYZINE HYDROCHLORIDE 25 MG/1
25 TABLET, FILM COATED ORAL 2 TIMES DAILY PRN
Qty: 60 TABLET | Refills: 2 | Status: SHIPPED | OUTPATIENT
Start: 2024-05-08 | End: 2024-08-22

## 2024-05-08 ASSESSMENT — PATIENT HEALTH QUESTIONNAIRE - PHQ9
10. IF YOU CHECKED OFF ANY PROBLEMS, HOW DIFFICULT HAVE THESE PROBLEMS MADE IT FOR YOU TO DO YOUR WORK, TAKE CARE OF THINGS AT HOME, OR GET ALONG WITH OTHER PEOPLE: VERY DIFFICULT
SUM OF ALL RESPONSES TO PHQ QUESTIONS 1-9: 13
SUM OF ALL RESPONSES TO PHQ QUESTIONS 1-9: 13

## 2024-05-08 ASSESSMENT — PAIN SCALES - GENERAL: PAINLEVEL: NO PAIN (0)

## 2024-05-08 NOTE — PROGRESS NOTES
Virtual Visit Details    Type of service:  Video Visit   Video Start Time:  8:30  Video End Time: 9:03    Originating Location (pt. Location): Home    Distant Location (provider location):  On-site  Platform used for Video Visit: [facility]  Psychiatry Clinic  MEDICAL PROGRESS NOTE     CARE TEAM:  PCP- Becki Valencia, Psychotherapist- Andria Jarrell, [facility], [facility] Team- [facility] Erica Gonzaleznd [phone],   (ProAct) Margie Marcus [phone]    Guardian: Shahnaz Bernal through [facility] [phone]   CADI  Norma Hobson   Living Situation: [facility] [phone] - Shahnaz MARQUEZ (manager) [phone]    Therese is a 29 year old who uses the pronouns she, her, hers.      DIAGNOSIS     Previous diagnosis of Schizoaffective Disorder, bipolar type  R/O Autism spectrum disorder  Unspecified Anxiety Disorder   Unspecified insomnia   Weight gain due to medication   Emotional Dysregulation      ASSESSMENT     Overall Therese was doing pretty well considering the psychosocial stressors including putting down her cat in March and anticipating moving to a new apartment living alone. We did not make any medication changes today.     - Schizoaffective disorder vs ASD: Stable, no SI, SIB, No mood cycling. Has been stable for over a year with issues just related to mild depressive smx and anxiety. I     - ADHD: previously has expressed interest in being evaluated. We will defer to her current psychologist and refer for evaluation if her therapist is unable to do so. This work-up is still pending. Of note in the past a trial of stimulant has led to worsening of psychosis.     - Irregular menses: Noticed this in the past few months, denies headaches. Ordered prolactin since she is on paliperidone. Informed guardian. Blood draw not done yet. Plans to do it today.     Future Considerations:    Psychotropic  "Drug Interactions:  [PSYCHCLINICDDI]  ADDITIVE QTc: HYDROXYZINE and PALIPERIDON  ADDITIVE CNS/RESPIRATORY DEPRESSION: Hydroxyzine, paliperidone, and lamotrigine:    Lamotrigine may increase serum concentration of metformin.   Lamotrigine may decrease serum concentration of progestins.    Management: limit med redundancy    MNPMP was not checked today: not using controlled substances    Risk Statements:   Treatment Risk- Risks, benefits, alternatives and potential adverse effects have been discussed and are understood.  Safety Risk-Therese did not appear to be an imminent safety risk to self or others.       PLAN                                                                                                                1) Meds-  - Continue paliperidone 12 mg at bedtime    - Continue lamotrigine 200 mg daily   - Continue metformin 1000 mg BID (pt may take second dose with a snack in the early afternoon before going to work)   - Continue hydroxyzine bid prn 25 mg (mostly every day)  - Continue melatonin 5 mg 1-2 hours before bedtime PRN (every night usually)  - Continue fluoxetine 20 mg daily      2) Psychotherapy- Continue individual weekly therapy with [facility], Armida Avalos     3) Next due-  Labs- Due now, last 01/24/2023, WNL limits with exception of mild lipid elevations, will do it today  EKG- PRN   Rating scales- PHQ9 and TINO-7 at every visit   AIMS- 01/24/2023= 0       4) Referrals-  None     5) Dispo- Follow-up visit in 8 weeks with a new provider or sooner if needed.     PERTINENT BACKGROUND                                                    [most recent eval 07/22/22]     She started \"hearing voices\" in the second grade saying that other kids don't want to play with her. Per chart review Therese received the diagnosis of schizophrenia around 8th grade (command auditory hallucinations, with voices telling her to do things like jump off a bridge). Around the same time there was concern " "about inattention and academic decline and Adderall was attempted which made the hallucination worse. She had her first hospitalization at age 16 after she put a cord around her neck in the context of a fight with dad and frustration. She had symptoms of depression including  \"difficulty concentrating, increased irritability, feeling inadequate and low self-esteem. At that time the voices that she was hearing was saying she is fat, ugly and worthless and that no one likes her. She had also stated , \"Things are difficult at school because I feel like nobody likes me, I am scared that people do not like me, so I isolate myself; then people do not get to know me and things just get worse.\" At the time she was hospitalized she was in tenth grade. Earning A's and B's. During that hospitalization an attempt to lower risperidone was not successful as patient became more suspicious of peers therefore risperidone was re-established at 1.5mgs BID.     Later she had an MMPI done showing evidence of thought disorder.    Per chart review, she has a history of some Cluster B traits and frequent hospitalizations which tapered off after she started residing in a group home and supportive employment a few years ago. She has been very stable on paliperidone and fluoxetine in conjunction with group home living environment. She had neuropsychiatric testing as a child (see scanned documents 4/16/19). She is under guardianship (see above); all medication changes need to be approved by them (see media section 10/17/19 for paperwork).      Psych pertinent item history includes suicide attempt [xmultiple], suicidal ideation, psychosis [sxs include paranoia, auditory hallucinations, ideas of reference], mutiple psychotropic trials  and psych hosp (x8)      SUBJECTIVE     Moving out on her own to an apartment that is a step above her current living place ([facility]) in the next couple of months. Feels excited about it.     Mood: up and down, " "cat was put down in March and that was upsetting, went to [place] last month and it was a lot of fun. Other than the stressors right now her mood is peaceful and better.     Sleep: when a lot is going on her mind sleep is affected    Anxiety: It was really high a few weeks ago but it's lower. We talked about TIPP skils. Has been practicing cold shower to address anxiety and that helps.    \"Paranoia\" related to family and boyfriend, that her dad side of family doesn't love her much, or boyfriend doesn't like me.     Continues therapy and that is going well.   No SI in the past year  No self injury     Recent Social History: see below  Partner/ - never    Children- none   Living situation- Therese lives in [facility] through [facility]. Has been there since around 2018.       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 No changes.   10/03/2022: Stable, no changes.   11/19/2022: Stable with some spikes and anxiety due to holiday season (working at [employer]).  Started with new therapist after this appointment.  No med changes.   11/29/2022: Stable, no changes.   01/24/2023: Stable, no changes.   05/16/2023: Stable, no changes.    11/23: no change  2/19/24: prolactin level ordered for irregular menses  5/8/24: No medication changes       PAST MED TRIALS     Per chart review      Medication Max Dose (mg) Dates / Duration Helpful? DC Reason / Adverse Effects?   risperidone 3     \"flattened her out\"   aripiprazole 30   N     quetiapine 800   N     Depakote       Enuresis " "  topiramate           ethosuxamide           perphenazine 16 04/2013 - 06/2017        paliperidone 12 ? 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      MEDICAL REVIEW OF SYSTEMS     none in addition to that documented above     MEDICATIONS     Current Outpatient Medications   Medication Sig Dispense Refill    etonogestrel (IMPLANON/NEXPLANON) 68 MG IMPL 1 each by Subdermal route once      FLUoxetine (PROZAC) 20 MG capsule Take 1 capsule (20 mg) by mouth daily 30 capsule 2    hydrOXYzine HCl (ATARAX) 25 MG tablet Take 1 tablet (25 mg) by mouth 2 times daily as needed for anxiety 60 tablet 2    ibuprofen (ADVIL/MOTRIN) 200 MG tablet Take 400 mg by mouth every 4 hours as needed for mild pain      lamoTRIgine (LAMICTAL) 200 MG tablet Take 1 tablet (200 mg) by mouth daily 30 tablet 2    melatonin (MELATONIN MAXIMUM STRENGTH) 5 MG tablet Take 1 tablet (5 mg) by mouth nightly as needed for sleep (TAKE 1-2 HOURS BEFORE BED) 30 tablet 0    metFORMIN (GLUCOPHAGE) 500 MG tablet Take 2 tablets (1,000 mg) by mouth 2 times daily (with meals) 120 tablet 2    paliperidone ER (INVEGA) 6 MG 24 hr tablet Take 2 tablets (12 mg) by mouth at bedtime 60 tablet 2      VITALS   Ht 1.702 m (5' 7\")   Wt 97.5 kg (215 lb)   BMI 33.67 kg/m       Pulse Readings from Last 3 Encounters:   01/24/23 85   03/04/20 90   01/22/20 103     Wt Readings from Last 3 Encounters:   05/08/24 97.5 kg (215 lb)   01/24/23 98.7 kg (217 lb 9.6 oz) "   03/04/20 88.6 kg (195 lb 6.4 oz)     BP Readings from Last 3 Encounters:   01/24/23 106/70   03/04/20 120/67   01/22/20 120/77          MENTAL STATUS EXAM     Alertness: alert  and oriented  Appearance: well groomed  Behavior/Demeanor: cooperative, pleasant and calm, with good  eye contact   Speech: normal and regular rate and rhythm  Language: intact  Psychomotor: normal or unremarkable  Mood: description consistent with euthymia  Affect: full range; congruent to: mood- yes, content- yes  Thought Process/Associations: unremarkable  Thought Content:  Reports none;  Denies suicidal & violent ideation and delusions  Perception:  Reports none;  Denies hallucinations  Insight: excellent  Judgment: excellent  Cognition: does  appear grossly intact; formal cognitive testing was not done  Gait and Station: N/A (telehealth)     LABS and DATA         11/7/2023     9:46 AM 2/19/2024     1:10 PM 5/8/2024     8:16 AM   PHQ   PHQ-9 Total Score 13 10 13   Q9: Thoughts of better off dead/self-harm past 2 weeks Not at all Not at all Not at all         Liver/Kidney Function, TSH Metabolic Blood counts   Recent Labs   Lab Test 01/24/23  1121 06/09/21  0840   AST 22 27   ALT 14 17   ALKPHOS 104 95   CR 0.83 0.73     No lab results found.   Recent Labs   Lab Test 01/24/23  1121   CHOL 190   TRIG 170*   *   HDL 43*     Recent Labs   Lab Test 01/24/23  1121   A1C 4.7     Recent Labs   Lab Test 01/24/23  1121   GLC 85    Recent Labs   Lab Test 01/24/23  1121   WBC 6.6   HGB 12.6   HCT 39.8   MCV 82             ECG 6/10/2019 QTc = 445 ms      PROVIDER: Blanca Friedman MD    Level of Medical Decision Making:   - At least 2 stable chronic problems  - Engaged in prescription drug management during visit (discussed any medication benefits, side effects, alternatives, etc.)          Psychiatry Individual Psychotherapy Note   Psychotherapy start time - 8:35 AM  Psychotherapy end time - 9:00 AM  Date treatment plan last reviewed  with patient - 2/019/24  Subjective: This supportive psychotherapy session addressed issues related to goals of therapy and current psychosocial stressors. Patient's reaction: Action in the context of mental status appropriate for ambulatory setting.    Interactive complexity indicated? No  Plan: RTC in timeframe noted above  Psychotherapy services during this visit included myself and the patient.   Treatment Plan      SYMPTOMS; PROBLEMS   MEASURABLE GOALS;    FUNCTIONAL IMPROVEMENT / GAINS INTERVENTIONS DISCHARGE CRITERIA   Dysregulation: mood dysregulation   Practicing TiPP skills Supportive / psychodynamic marked symptom improvement       Patient staffed in clinic with Dr. Urbano who will sign the note.  Supervisor is Dr. Urbano.

## 2024-05-08 NOTE — PATIENT INSTRUCTIONS
**For crisis resources, please see the information at the end of this document**   Patient Education    Thank you for coming to the [facility] [place] [facility].     Lab Testing:  If you had lab testing today and your results are reassuring or normal they will be mailed to you or sent through [employer] within 7 days. If the lab tests need quick action we will call you with the results. The phone number we will call with results is # [phone]. If this is not the best number please call our clinic and change the number.     Medication Refills:  If you need any refills please call your pharmacy and they will contact us. Our fax number for refills is [phone].   Three business days of notice are needed for general medication refill requests.   Five business days of notice are needed for controlled substance refill requests.   If you need to change to a different pharmacy, please contact the new pharmacy directly. The new pharmacy will help you get your medications transferred.     Contact Us:  Please call [phone] during business hours (8-5:00 M-F).   If you have medication related questions after clinic hours, or on the weekend, please call [phone].     Financial Assistance [phone]   Medical Records [phone]       MENTAL HEALTH CRISIS RESOURCES:  For a emergency help, please call 911 or go to the nearest Emergency Department.     Emergency Walk-In Options:   EmPATH Unit @ [facility] ([place]): [phone] - Specialized mental health emergency area designed to be calming  [facility] ([place]): [phone]  [facility] ([place]): [phone]  [facility]): [phone]    [facility] Crisis Information:   [place]: [phone]  [place]: [phone]  [place] ([place]) - Adult: [phone]     Child: [phone]  [place] - Adult: [phone]     Child: [phone]  [place]:  [phone]  List of all [facility] resources:   https://[place].gov/dhs/people-we-serve/adults/health-care/mental-health/resources/crisis-contacts.jsp    National Crisis Information:   Crisis Text Line: Text  [place]  to 931239  Suicide & Crisis Lifeline: 988  National Suicide Prevention Lifeline: 8-867-193-TALK (1-115.775.5859)       For online chat options, visit https://suicidepreventionlifeline.org/chat/  Poison Control Center: 0-285-573-1080  Trans Lifeline: 0-019-746-1439 - Hotline for transgender people of all ages  The Damien Project: 5-083-987-7333 - Hotline for LGBT youth     For Non-Emergency Support:   Fast Tracker: Mental Health & Substance Use Disorder Resources -   https://www.[employer].org/

## 2024-05-08 NOTE — NURSING NOTE
Is the patient currently in the state of [place]? YES    Visit mode:VIDEO    If the visit is dropped, the patient can be reconnected by: VIDEO VISIT: Text to cell phone:   Telephone Information:   Mobile [phone]       Will anyone else be joining the visit? NO  (If patient encounters technical issues they should call [phone]    How would you like to obtain your AVS? MyChart    Are changes needed to the allergy or medication list? No    Are refills needed on medications prescribed by this physician? NO    Reason for visit: RECHECK    Tasneem SCHULZ

## 2024-05-09 LAB
ALBUMIN SERPL BCG-MCNC: 4.8 G/DL (ref 3.5–5.2)
ALP SERPL-CCNC: 92 U/L (ref 40–150)
ALT SERPL W P-5'-P-CCNC: 12 U/L (ref 0–50)
ANION GAP SERPL CALCULATED.3IONS-SCNC: 12 MMOL/L (ref 7–15)
AST SERPL W P-5'-P-CCNC: 24 U/L (ref 0–45)
BILIRUB SERPL-MCNC: 0.2 MG/DL
BUN SERPL-MCNC: 11.1 MG/DL (ref 6–20)
CALCIUM SERPL-MCNC: 10.2 MG/DL (ref 8.6–10)
CHLORIDE SERPL-SCNC: 103 MMOL/L (ref 98–107)
CHOLEST SERPL-MCNC: 198 MG/DL
CREAT SERPL-MCNC: 0.81 MG/DL (ref 0.51–0.95)
DEPRECATED HCO3 PLAS-SCNC: 25 MMOL/L (ref 22–29)
EGFRCR SERPLBLD CKD-EPI 2021: >90 ML/MIN/1.73M2
FASTING STATUS PATIENT QL REPORTED: YES
GLUCOSE SERPL-MCNC: 94 MG/DL (ref 70–99)
GLUCOSE SERPL-MCNC: 94 MG/DL (ref 70–99)
HDLC SERPL-MCNC: 39 MG/DL
LDLC SERPL CALC-MCNC: 125 MG/DL
NONHDLC SERPL-MCNC: 159 MG/DL
POTASSIUM SERPL-SCNC: 4.4 MMOL/L (ref 3.4–5.3)
PROLACTIN SERPL 3RD IS-MCNC: 43 NG/ML (ref 5–23)
PROT SERPL-MCNC: 7.6 G/DL (ref 6.4–8.3)
SODIUM SERPL-SCNC: 140 MMOL/L (ref 135–145)
TRIGL SERPL-MCNC: 170 MG/DL
TSH SERPL DL<=0.005 MIU/L-ACNC: 3.83 UIU/ML (ref 0.3–4.2)

## 2024-07-29 DIAGNOSIS — F25.0 SCHIZOAFFECTIVE DISORDER, BIPOLAR TYPE (H): ICD-10-CM

## 2024-07-29 DIAGNOSIS — G47.00 INSOMNIA, UNSPECIFIED TYPE: ICD-10-CM

## 2024-07-29 DIAGNOSIS — T50.905A WEIGHT GAIN DUE TO MEDICATION: ICD-10-CM

## 2024-07-29 DIAGNOSIS — F41.9 ANXIETY DISORDER, UNSPECIFIED TYPE: ICD-10-CM

## 2024-07-29 DIAGNOSIS — R63.5 WEIGHT GAIN DUE TO MEDICATION: ICD-10-CM

## 2024-07-29 NOTE — TELEPHONE ENCOUNTER
Last Seen: 5-8-2024  RTC: 8 weeks   Cancel: none   No-Show: none   Next Appt: none     Incoming Refill From [facility]  via right fax     Medication Requested: FLUoxetine (PROZAC) 20 MG capsule   Directions: Sig - Route: Take 1 capsule (20 mg) by mouth daily - Oral   Qty: 30  Last Refill: 7-5-24    Medication Requested: lamoTRIgine (LAMICTAL) 200 MG tablet   Directions: Sig - Route: Take 1 tablet (200 mg) by mouth daily - Oral   Qty: 30   Last Refill: 7-5-24      Medication Requested: metFORMIN (GLUCOPHAGE) 500 MG tablet   Directions: Sig - Route: Take 2 tablets (1,000 mg) by mouth 2 times daily (with meals) - Oral   Qty: 120   Last Refill: 7-5-24    Medication Requested: paliperidone ER (INVEGA) 6 MG 24 hr tablet   Directions: Sig - Route: Take 2 tablets (12 mg) by mouth at bedtime - Oral   Qty: 60   Last Refill: 7-5-24    Medication Requested: melatonin (MELATONIN MAXIMUM STRENGTH) 5 MG tablet     Directions: Sig - Route: Take 1 tablet (5 mg) by mouth nightly as needed for sleep (TAKE 1-2 HOURS BEFORE BED) - Oral   Qty: 30   Last Refill: 7-5-24    Medication Refill pended  Per Refill Protocol        PLAN                                                                                                                 1) Meds-  - Continue paliperidone 12 mg at bedtime    - Continue lamotrigine 200 mg daily   - Continue metformin 1000 mg BID (pt may take second dose with a snack in the early afternoon before going to work)   - Continue hydroxyzine bid prn 25 mg (mostly every day)  - Continue melatonin 5 mg 1-2 hours before bedtime PRN (every night usually)  - Continue fluoxetine 20 mg daily    Ella Kelly on 7/29/2024 at 5:42 PM

## 2024-07-30 RX ORDER — LAMOTRIGINE 200 MG/1
200 TABLET ORAL DAILY
Qty: 30 TABLET | Refills: 0 | Status: SHIPPED | OUTPATIENT
Start: 2024-07-30 | End: 2024-08-22

## 2024-07-30 RX ORDER — PALIPERIDONE 6 MG/1
12 TABLET, EXTENDED RELEASE ORAL AT BEDTIME
Qty: 60 TABLET | Refills: 0 | Status: SHIPPED | OUTPATIENT
Start: 2024-07-30 | End: 2024-08-22

## 2024-07-30 NOTE — TELEPHONE ENCOUNTER
From chart note:     1) Meds-  - Continue paliperidone 12 mg at bedtime    - Continue lamotrigine 200 mg daily   - Continue metformin 1000 mg BID (pt may take second dose with a snack in the early afternoon before going to work)   - Continue hydroxyzine bid prn 25 mg (mostly every day)  - Continue melatonin 5 mg 1-2 hours before bedtime PRN (every night usually)  - Continue fluoxetine 20 mg daily        Medication refill approved per refill protocol.

## 2024-08-22 DIAGNOSIS — F25.0 SCHIZOAFFECTIVE DISORDER, BIPOLAR TYPE (H): ICD-10-CM

## 2024-08-22 DIAGNOSIS — F41.9 ANXIETY DISORDER, UNSPECIFIED TYPE: ICD-10-CM

## 2024-08-22 DIAGNOSIS — R63.5 WEIGHT GAIN DUE TO MEDICATION: ICD-10-CM

## 2024-08-22 DIAGNOSIS — T50.905A WEIGHT GAIN DUE TO MEDICATION: ICD-10-CM

## 2024-08-22 DIAGNOSIS — G47.00 INSOMNIA, UNSPECIFIED TYPE: ICD-10-CM

## 2024-08-23 RX ORDER — PALIPERIDONE 6 MG/1
12 TABLET, EXTENDED RELEASE ORAL AT BEDTIME
Qty: 60 TABLET | Refills: 0 | Status: SHIPPED | OUTPATIENT
Start: 2024-08-23 | End: 2024-08-29

## 2024-08-23 RX ORDER — LAMOTRIGINE 200 MG/1
200 TABLET ORAL DAILY
Qty: 30 TABLET | Refills: 0 | Status: SHIPPED | OUTPATIENT
Start: 2024-08-23 | End: 2024-08-29

## 2024-08-23 RX ORDER — HYDROXYZINE HYDROCHLORIDE 25 MG/1
25 TABLET, FILM COATED ORAL 2 TIMES DAILY PRN
Qty: 60 TABLET | Refills: 0 | Status: SHIPPED | OUTPATIENT
Start: 2024-08-23 | End: 2024-08-29

## 2024-08-23 NOTE — TELEPHONE ENCOUNTER
"Date of Last Office Visit: 5/8/2024  [facility]      RTC:  8 weeks with a new provider or sooner if needed.     No shows: 0  Cancellations: 0  Date of Next Office Visit:   8/29/2024 9:10 AM (60 min)  [place]    Arrive by:  8:55 AM   ADULT PSYCHIATRY RETURN    [facility] ([facility])   Judy Alva MD     ------------------------------    Incoming refill from Pharmacy via interface  Medication requested:    paliperidone ER (INVEGA) 6 MG 24 hr tablet 60 tablet 0 7/30/2024 -- No   Sig - Route: Take 2 tablets (12 mg) by mouth at bedtime      metFORMIN (GLUCOPHAGE) 500 MG tablet 120 tablet 0 7/30/2024 -- No   Sig - Route: Take 2 tablets (1,000 mg) by mouth 2 times daily (with meals)     melatonin (MELATONIN MAXIMUM STRENGTH) 5 MG tablet 30 tablet 0 7/30/2024 -- No   Sig - Route: Take 1 tablet (5 mg) by mouth nightly as needed for sleep (TAKE 1-2 HOURS BEFORE BED).       FLUoxetine (PROZAC) 20 MG capsule 30 capsule 0 7/30/2024 -- No   Sig - Route: Take 1 capsule (20 mg) by mouth daily      hydrOXYzine HCl (ATARAX) 25 MG tablet 60 tablet 2 5/8/2024 -- No   Sig - Route: Take 1 tablet (25 mg) by mouth 2 times daily as needed for anxiety - Oral     lamoTRIgine (LAMICTAL) 200 MG tablet 30 tablet 0 7/30/2024 -- No   Sig - Route: Take 1 tablet (200 mg) by mouth daily      ------------------------------  From last visit note:   \"- Continue paliperidone 12 mg at bedtime    - Continue lamotrigine 200 mg daily   - Continue metformin 1000 mg BID (pt may take second dose with a snack in the early afternoon before going to work)   - Continue hydroxyzine bid prn 25 mg (mostly every day)  - Continue melatonin 5 mg 1-2 hours before bedtime PRN (every night usually)  - Continue fluoxetine 20 mg daily \"         Refill decision: Refill pended and routed to the provider for review/determination due to the following criteria not met: Not yet out of supply, Upcoming appt, send to provider in " event of any med adjustments.

## 2024-08-29 ENCOUNTER — VIRTUAL VISIT (OUTPATIENT)
Dept: PSYCHIATRY | Facility: CLINIC | Age: 29
End: 2024-08-29
Attending: PSYCHIATRY & NEUROLOGY
Payer: MEDICAID

## 2024-08-29 DIAGNOSIS — G47.00 INSOMNIA, UNSPECIFIED TYPE: ICD-10-CM

## 2024-08-29 DIAGNOSIS — R63.5 WEIGHT GAIN DUE TO MEDICATION: ICD-10-CM

## 2024-08-29 DIAGNOSIS — F25.0 SCHIZOAFFECTIVE DISORDER, BIPOLAR TYPE (H): Primary | ICD-10-CM

## 2024-08-29 DIAGNOSIS — R45.89 EMOTIONAL DYSREGULATION: ICD-10-CM

## 2024-08-29 DIAGNOSIS — F41.9 ANXIETY DISORDER, UNSPECIFIED TYPE: ICD-10-CM

## 2024-08-29 DIAGNOSIS — T50.905A WEIGHT GAIN DUE TO MEDICATION: ICD-10-CM

## 2024-08-29 PROCEDURE — 90792 PSYCH DIAG EVAL W/MED SRVCS: CPT | Mod: 52

## 2024-08-29 RX ORDER — LAMOTRIGINE 200 MG/1
200 TABLET ORAL DAILY
Qty: 30 TABLET | Refills: 3 | Status: SHIPPED | OUTPATIENT
Start: 2024-08-29

## 2024-08-29 RX ORDER — HYDROXYZINE HYDROCHLORIDE 25 MG/1
25 TABLET, FILM COATED ORAL 2 TIMES DAILY PRN
Qty: 60 TABLET | Refills: 3 | Status: SHIPPED | OUTPATIENT
Start: 2024-08-29

## 2024-08-29 RX ORDER — PALIPERIDONE 6 MG/1
12 TABLET, EXTENDED RELEASE ORAL AT BEDTIME
Qty: 60 TABLET | Refills: 3 | Status: SHIPPED | OUTPATIENT
Start: 2024-08-29

## 2024-08-29 ASSESSMENT — ANXIETY QUESTIONNAIRES
GAD7 TOTAL SCORE: 17
IF YOU CHECKED OFF ANY PROBLEMS ON THIS QUESTIONNAIRE, HOW DIFFICULT HAVE THESE PROBLEMS MADE IT FOR YOU TO DO YOUR WORK, TAKE CARE OF THINGS AT HOME, OR GET ALONG WITH OTHER PEOPLE: EXTREMELY DIFFICULT
7. FEELING AFRAID AS IF SOMETHING AWFUL MIGHT HAPPEN: MORE THAN HALF THE DAYS
GAD7 TOTAL SCORE: 17
3. WORRYING TOO MUCH ABOUT DIFFERENT THINGS: MORE THAN HALF THE DAYS
6. BECOMING EASILY ANNOYED OR IRRITABLE: MORE THAN HALF THE DAYS
4. TROUBLE RELAXING: MORE THAN HALF THE DAYS
5. BEING SO RESTLESS THAT IT IS HARD TO SIT STILL: NEARLY EVERY DAY
7. FEELING AFRAID AS IF SOMETHING AWFUL MIGHT HAPPEN: MORE THAN HALF THE DAYS
1. FEELING NERVOUS, ANXIOUS, OR ON EDGE: NEARLY EVERY DAY
2. NOT BEING ABLE TO STOP OR CONTROL WORRYING: NEARLY EVERY DAY
8. IF YOU CHECKED OFF ANY PROBLEMS, HOW DIFFICULT HAVE THESE MADE IT FOR YOU TO DO YOUR WORK, TAKE CARE OF THINGS AT HOME, OR GET ALONG WITH OTHER PEOPLE?: EXTREMELY DIFFICULT
GAD7 TOTAL SCORE: 17

## 2024-08-29 ASSESSMENT — PATIENT HEALTH QUESTIONNAIRE - PHQ9
SUM OF ALL RESPONSES TO PHQ QUESTIONS 1-9: 11
SUM OF ALL RESPONSES TO PHQ QUESTIONS 1-9: 11
10. IF YOU CHECKED OFF ANY PROBLEMS, HOW DIFFICULT HAVE THESE PROBLEMS MADE IT FOR YOU TO DO YOUR WORK, TAKE CARE OF THINGS AT HOME, OR GET ALONG WITH OTHER PEOPLE: VERY DIFFICULT
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
## 2024-08-29 NOTE — PATIENT INSTRUCTIONS
It was nice seeing you today, Therese     Treatment Plan summary from today's visit:      1) Medications  - No changes     2) Please return to clinic in 3 months     3) Crisis numbers are below and clinic after hours number is [phone]          **For crisis resources, please see the information at the end of this document**   Patient Education    Thank you for coming to the [facility].     Lab Testing:  If you had lab testing today and your results are reassuring or normal they will be mailed to you or sent through [employer] within 7 days. If the lab tests need quick action we will call you with the results. The phone number we will call with results is # [phone]. If this is not the best number please call our clinic and change the number.     Medication Refills:  If you need any refills please call your pharmacy and they will contact us. Our fax number for refills is [phone].   Three business days of notice are needed for general medication refill requests.   Five business days of notice are needed for controlled substance refill requests.   If you need to change to a different pharmacy, please contact the new pharmacy directly. The new pharmacy will help you get your medications transferred.     Contact Us:  Please call [phone] during business hours (8-5:00 M-F).   If you have medication related questions after clinic hours, or on the weekend, please call [phone].     Financial Assistance [phone]   Medical Records [phone]       MENTAL HEALTH CRISIS RESOURCES:  For a emergency help, please call 911 or go to the nearest Emergency Department.     Emergency Walk-In Options:   EmPATH Unit @ [facility] ([place]): [phone] - Specialized mental health emergency area designed to be calming  [facility] ([place]): [phone]  [facility] ([place]): [phone]  [facility]  [facility]): [phone]    [facility] Crisis Information:   [place]: [phone]  [place]: [phone]  [place] ([place]) - Adult: [phone]     Child: [phone]  [place] - Adult: [phone]     Child: [phone]  [place]: [phone]  List of all [facility] resources:   https://[place].[facility]/dhs/people-we-serve/adults/health-care/mental-health/resources/crisis-contacts.jsp    National Crisis Information:   Crisis Text Line: Text  [place]  to 376787  Suicide & Crisis Lifeline: 988  National Suicide Prevention Lifeline: 7-994-765-NCWW (1-741.340.7989)       For online chat options, visit https://suicidepreventionlifeline.org/chat/  Poison Control Center: 1-643.874.9470  Trans Lifeline: 1-397.218.4878 - Hotline for transgender people of all ages  The Damien Project: 5-984-292-7466 - Hotline for LGBT youth     For Non-Emergency Support:   Fast Tracker: Mental Health & Substance Use Disorder Resources -   https://www.[facility].org/

## 2024-08-29 NOTE — NURSING NOTE
Current patient location: [address] [place] [address] [address]ACE  [facility] [address]    Is the patient currently in the state of [place]? YES    Visit mode:VIDEO    If the visit is dropped, the patient can be reconnected by: VIDEO VISIT: Text to cell phone:   Telephone Information:   Mobile [phone]       Will anyone else be joining the visit? NO  (If patient encounters technical issues they should call [phone])    How would you like to obtain your AVS? MyChart    Are changes needed to the allergy or medication list? Pt stated no changes to allergies and Pt stated no med changes    Are refills needed on medications prescribed by this physician? NO    Rooming Documentation:  Questionnaire(s) completed      Reason for visit: RECHECK    Niurka Cuevas VVF

## 2024-11-07 ENCOUNTER — TELEPHONE (OUTPATIENT)
Dept: PSYCHIATRY | Facility: CLINIC | Age: 29
End: 2024-11-07
Payer: MEDICAID

## 2024-11-07 NOTE — TELEPHONE ENCOUNTER
Writer called patient to advise that prolactin lab was not needed at this time. Patient denied any other questions or concerns.

## 2024-11-07 NOTE — TELEPHONE ENCOUNTER
Received notification from intake that patient had called the clinic asking whether or not she needed to fast for her prolactin lab. Per intake staff, patient received a message stating she needed to have her prolactin lab drawn today. No lab order in chart and writer was unable to find any message with this information. Routed to provider for clarification.

## 2024-11-11 ENCOUNTER — TELEPHONE (OUTPATIENT)
Dept: PSYCHIATRY | Facility: CLINIC | Age: 29
End: 2024-11-11
Payer: MEDICAID

## 2024-11-11 NOTE — TELEPHONE ENCOUNTER
On 10/9/2024 the patient signed an BRIONNA authorizing medical records to be released from [facility] to [facility] for the purpose of continuing care. Request was faxed to our medical records department at [phone] and sent to scanning. Hard copy is being held in nurse triage until scanning is confirmed.     Jeri Greenwood, EMT

## 2025-03-05 ENCOUNTER — VIRTUAL VISIT (OUTPATIENT)
Dept: PSYCHIATRY | Facility: CLINIC | Age: 30
End: 2025-03-05
Attending: PSYCHIATRY & NEUROLOGY
Payer: MEDICARE

## 2025-03-05 DIAGNOSIS — R45.89 EMOTIONAL DYSREGULATION: ICD-10-CM

## 2025-03-05 DIAGNOSIS — G47.00 INSOMNIA, UNSPECIFIED TYPE: ICD-10-CM

## 2025-03-05 DIAGNOSIS — R63.5 WEIGHT GAIN DUE TO MEDICATION: ICD-10-CM

## 2025-03-05 DIAGNOSIS — T50.905A WEIGHT GAIN DUE TO MEDICATION: ICD-10-CM

## 2025-03-05 DIAGNOSIS — F25.0 SCHIZOAFFECTIVE DISORDER, BIPOLAR TYPE (H): Primary | ICD-10-CM

## 2025-03-05 DIAGNOSIS — Z79.899 ENCOUNTER FOR LONG-TERM (CURRENT) USE OF MEDICATIONS: ICD-10-CM

## 2025-03-05 DIAGNOSIS — F41.9 ANXIETY DISORDER, UNSPECIFIED TYPE: ICD-10-CM

## 2025-03-05 RX ORDER — LAMOTRIGINE 200 MG/1
200 TABLET ORAL DAILY
Qty: 30 TABLET | Refills: 3 | Status: SHIPPED | OUTPATIENT
Start: 2025-03-05

## 2025-03-05 RX ORDER — PALIPERIDONE 6 MG/1
12 TABLET, EXTENDED RELEASE ORAL AT BEDTIME
Qty: 60 TABLET | Refills: 3 | Status: SHIPPED | OUTPATIENT
Start: 2025-03-05

## 2025-03-05 RX ORDER — HYDROXYZINE HYDROCHLORIDE 25 MG/1
25 TABLET, FILM COATED ORAL 2 TIMES DAILY PRN
Qty: 60 TABLET | Refills: 3 | Status: SHIPPED | OUTPATIENT
Start: 2025-03-05

## 2025-03-05 ASSESSMENT — ANXIETY QUESTIONNAIRES
6. BECOMING EASILY ANNOYED OR IRRITABLE: NEARLY EVERY DAY
2. NOT BEING ABLE TO STOP OR CONTROL WORRYING: NEARLY EVERY DAY
GAD7 TOTAL SCORE: 20
7. FEELING AFRAID AS IF SOMETHING AWFUL MIGHT HAPPEN: NEARLY EVERY DAY
4. TROUBLE RELAXING: NEARLY EVERY DAY
GAD7 TOTAL SCORE: 20
8. IF YOU CHECKED OFF ANY PROBLEMS, HOW DIFFICULT HAVE THESE MADE IT FOR YOU TO DO YOUR WORK, TAKE CARE OF THINGS AT HOME, OR GET ALONG WITH OTHER PEOPLE?: EXTREMELY DIFFICULT
5. BEING SO RESTLESS THAT IT IS HARD TO SIT STILL: MORE THAN HALF THE DAYS
IF YOU CHECKED OFF ANY PROBLEMS ON THIS QUESTIONNAIRE, HOW DIFFICULT HAVE THESE PROBLEMS MADE IT FOR YOU TO DO YOUR WORK, TAKE CARE OF THINGS AT HOME, OR GET ALONG WITH OTHER PEOPLE: EXTREMELY DIFFICULT
GAD7 TOTAL SCORE: 20
3. WORRYING TOO MUCH ABOUT DIFFERENT THINGS: NEARLY EVERY DAY
7. FEELING AFRAID AS IF SOMETHING AWFUL MIGHT HAPPEN: NEARLY EVERY DAY
1. FEELING NERVOUS, ANXIOUS, OR ON EDGE: NEARLY EVERY DAY

## 2025-03-05 ASSESSMENT — PATIENT HEALTH QUESTIONNAIRE - PHQ9
SUM OF ALL RESPONSES TO PHQ QUESTIONS 1-9: 18
SUM OF ALL RESPONSES TO PHQ QUESTIONS 1-9: 18
10. IF YOU CHECKED OFF ANY PROBLEMS, HOW DIFFICULT HAVE THESE PROBLEMS MADE IT FOR YOU TO DO YOUR WORK, TAKE CARE OF THINGS AT HOME, OR GET ALONG WITH OTHER PEOPLE: EXTREMELY DIFFICULT

## 2025-03-05 ASSESSMENT — PAIN SCALES - GENERAL: PAINLEVEL_OUTOF10: NO PAIN (0)

## 2025-03-05 NOTE — PROGRESS NOTES
Virtual Visit Details    Type of service:  Video Visit   Video Start Time: 9:52 AM  Video End Time:10:22 AM     Originating Location (pt. Location): Home    Distant Location (provider location):  On-site  Platform used for Video Visit: [facility]  MEDICAL PROGRESS NOTE  General Clinic Team       CARE TEAM:    PCP- Becki Valencia  Therapist- Armida Avalos   (ProAct) Niurka [phone]      Guardian: Shahnaz Parson through [facility], [phone]   CADI : Norma Joce Saleh is a 29 year old who uses the pronouns she, her, hers.                 Assessment & Plan   Previous diagnosis of Schizoaffective Disorder, bipolar type, R/O Autism spectrum disorder  Unspecified Anxiety Disorder   Emotional Dysregulation   Unspecified insomnia   Weight gain due to medication (Current Weight ~197)    Therese is a 29 year old  female with a past psychiatric history of schizoaffective disorder, bipolar type, anxiety, emotional dysregulation, and insomnia who presents for medication management appointment. Medical history is significant for weight gain due to medication and does not appear to be playing a role in the patient's symptomology. Substance use does not appear to be playing a contributing role in the patient's presentation.     Today, Therese  reports psychosocial stressors that have been impacting her levels of anxiety.  These factors have also contributed to a slightly lower mood.  She does note that it also could be season related, and as such, discussed light box use with patient today.  Patient continues to have paranoia surrounding her boyfriend being unfaithful in the relationship, and occasionally has auditory hallucinations about this as well.  No concerns for her or his safety. She notes that she has been having some trouble sleeping, however, has poor sleep hygiene.  Discussed better sleep practices  with patient as well today.  Provided patient with outpatient neuropsychological testing resources, due to her desire to be tested for autism.  Overall, encouraged patient to optimize use of her hydroxyzine PRNs to address anxiety, to which she is agreeable.  As such, will continue medications without changes.    Future considerations:  Continue monitoring prolactin levels, reassess if patient reports troublesome menstrual side effects    Psychotropic Drug Interactions:    ADDITIVE SEROTONERGIC: Paliperidone, fluoxetine  ADDITIVE CNS/RESPIRATORY DEPRESSION: Hydroxyzine, paliperidone, lamotrigine  Lamotrigine may increase serum concentration of metformin  Lamotrigine may decrease serum concentration of progestins  Lamictal, hydroxyzine may enhance the adverse/toxic effect of fluoxetine, specifically with the risk of psychomotor impairment  Paliperidone, Mirena may diminish the therapeutic effect of metformin  Management: routine monitoring    [place] was not checked today: not using controlled substances    Risk Statements:   Treatment Risk: Risks, benefits, alternatives and potential adverse effects have been discussed and are understood.   Safety Risk: Therese did not appear to be an imminent safety risk to self or others.    PLAN    1) Medications:   - Continue paliperidone 12 mg at bedtime    - Continue lamotrigine 200 mg daily   - Continue fluoxetine 20 mg daily   - Continue metformin 1000 mg BID   - Continue hydroxyzine bid prn 25 mg (mostly every day)  - Continue melatonin 5 mg 1-2 hours before bedtime PRN (every night usually)    Other:  Mirena IUD    2) Psychotherapy: Continue individual weekly therapy with [facility], Armida Avalos     3) Next due:  Labs: Annual [facility] Labs done 5/2024 (Elevated Lipids), Next Due 5/2025, ordered.  Prolactin level 5/8/2024= 43.  Will continue to monitor.    EKG: PRN   Rating scales:  PHQ9, TINO-7 and AIMS: 01/24/2023= 0, due at next in person appointment    4)  "Referrals: None    5) Follow-up: Return to clinic in 2 months                   Interval History     Things have not been that great. Anxiety has been worse due to \"outside factors\". Grandmother has cancer, anxiety with boyfriend (feeling like she's not good enough for him), anxiety with work    Wants to get tested for autism, provided community resources    Paranoia about boyfriend cheating on her    AH: voices telling her that her boyfriend is cheating on her    Mood: \"Alright\", nothing out of the ordinary  Sleep: Has been having some trouble sleeping due to anxiety and poor sleep hygiene   Appetite: Okay, sometimes eats a bit more than she should, but sometimes doesn't eat  Energy: Intermittent  SI/HI/Safety Concerns: No  Side effects from medications: Dry mouth (thirsty all the time)    Psychiatric Review of Systems:   Depression:  depressed mood  Anxiety: worries, ruminations, last panic attack was Monday (due to concern that her boyfriend was out with another girl)  Elevated: no  Psychosis:  paranoia \"mostly\" about boyfriend and female coworker,   Trauma Related: re-experiencing (rare), hyper arousal, avoidance  Insomnia: No      Other:   Disordered Eating: binging is better  Cluster B: difficulty w/ stable relationships, emptiness, poor coping/ distress tolerance, difficulty regulating mood/affect   Dysregulation:  mood dysregulation, irritability (during paranoia)    Current Social History:  Financial/occupational: Guardian takes care of finances, Works at [employer]  Living situation: [employer] apartments through [employer]  Social/spiritual support: Boyfrienreggie Pitts, Her mother, 4 half-siblings she is the oldest, ,  staff (Renu Brady)      Pertinent Substance Use:  [Last updated 3/05/25]  Alcohol: Yes: very rare, ~2 times a year  Cannabis: No  Tobacco: No  Caffeine:  No   Opioids: No   Narcan Kit current: N/A  Other substances: No     Medical Review of Systems / Med sfx: " "  Lightheadedness/orthostasis: No  Headaches: No  GI: A little diarrhea last night (probably something she ate)  CV: No  Sexual health concerns: No  A comprehensive review of systems was performed and is negative other than noted above.    Contraception: Yes: Mirena IUD                Summary Points of Current Care  8/29/2024: Transfer of care appointment, no changes                  Physical Exam  (Vitals Only)    There were no vitals taken for this visit.  Pulse Readings from Last 3 Encounters:   01/24/23 85   03/04/20 90   01/22/20 103     Wt Readings from Last 3 Encounters:   05/08/24 97.5 kg (215 lb)   01/24/23 98.7 kg (217 lb 9.6 oz)   03/04/20 88.6 kg (195 lb 6.4 oz)     BP Readings from Last 3 Encounters:   01/24/23 106/70   03/04/20 120/67   01/22/20 120/77                      Mental Status Exam    Alertness: alert  and oriented  Appearance: casually groomed  Behavior/Demeanor: cooperative, pleasant, and calm, with good  eye contact   Speech: normal  Language: intact and no problems  Psychomotor: normal or unremarkable  Mood: description consistent with euthymia  Affect: full range; congruent to: mood- yes, content- yes  Thought Process/Associations: unremarkable  Thought Content:  Reports paranoid ideation;  Denies suicidal ideation and violent ideation  Perception:  Reports none;  Denies hallucinations  Insight: good  Judgment: good  Cognition: does  appear grossly intact; formal cognitive testing was not done  Gait and Station: N/A (telehealth)                  Past Psychotropic Medication Trials     Medication Max Dose (mg) Dates / Duration Helpful? DC Reason / Adverse Effects?   risperidone 3     \"flattened her out\"   aripiprazole 30   N     quetiapine 800   N     Depakote       Enuresis   topiramate           ethosuxamide           perphenazine 16 04/2013 - 06/2017        paliperidone 12 ? - present Y     hydroxyzine 25  02/2017 - present Y (for anxiety)     benztropine 2 04/2013-07/2019     "   fluoxetine 80 04/2013 - 05/2021       melatonin 5 07/2019 - present       lamotrigine 200 07/2014 - present       modafinil 200 04/2013-02/2016       paliperidone 12 04/2017 - present                          Past Medical History     Patient Active Problem List   Diagnosis    Schizoaffective disorder, depressive type (H)    Hx of psychiatric care    Psychosis (H)                     Medications     Current Outpatient Medications   Medication Sig Dispense Refill    FLUoxetine (PROZAC) 20 MG capsule Take 1 capsule (20 mg) by mouth daily. 30 capsule 3    hydrOXYzine HCl (ATARAX) 25 MG tablet Take 1 tablet (25 mg) by mouth 2 times daily as needed for anxiety. 60 tablet 3    ibuprofen (ADVIL/MOTRIN) 200 MG tablet Take 400 mg by mouth every 4 hours as needed for mild pain      lamoTRIgine (LAMICTAL) 200 MG tablet Take 1 tablet (200 mg) by mouth daily. 30 tablet 3    levonorgestrel (MIRENA) 52 MG (20 mcg/day) IUD 1 each by Intrauterine route.      melatonin (MELATONIN MAXIMUM STRENGTH) 5 MG tablet Take 1 tablet (5 mg) by mouth nightly as needed for sleep. (TAKE 1-2 HOURS BEFORE BED). 30 tablet 3    metFORMIN (GLUCOPHAGE) 500 MG tablet Take 2 tablets (1,000 mg) by mouth 2 times daily (with meals). 120 tablet 3    paliperidone ER (INVEGA) 6 MG 24 hr tablet Take 2 tablets (12 mg) by mouth at bedtime. 60 tablet 3                     Data         8/29/2024     9:01 AM 12/3/2024    11:07 PM 3/5/2025     9:17 AM   PROMIS-10 Total Score w/o Sub Scores   PROMIS TOTAL - SUBSCORES 22 22  20       Patient-reported         8/29/2024     9:00 AM 12/3/2024    11:05 PM 3/5/2025     9:16 AM   PHQ-9 SCORE   PHQ-9 Total Score MyChart 11 (Moderate depression) 19 (Moderately severe depression) 18 (Moderately severe depression)   PHQ-9 Total Score 11 19  18        Patient-reported         8/29/2024     9:01 AM 12/3/2024    11:06 PM 3/5/2025     9:16 AM   TINO-7 SCORE   Total Score 17 (severe anxiety) 15 (severe anxiety) 20 (severe anxiety)    Total Score 17 15  20        Patient-reported       Liver/Kidney Function, TSH Metabolic Blood counts   Recent Labs   Lab Test 05/08/24  1018 01/24/23  1121   AST 24 22   ALT 12 14   ALKPHOS 92 104   CR 0.81 0.83     Recent Labs   Lab Test 05/08/24  1018   TSH 3.83    Recent Labs   Lab Test 05/08/24  1018   CHOL 198   TRIG 170*   *   HDL 39*     Recent Labs   Lab Test 05/08/24  1018   A1C 4.9     Recent Labs   Lab Test 05/08/24  1018   GLC 94  94    Recent Labs   Lab Test 05/08/24  1018   WBC 4.9   HGB 13.5   HCT 39.8   MCV 79              ECG: none recent in EHR       Psychiatry Individual Psychotherapy Note   Psychotherapy start time - 10:00 AM   Psychotherapy end time - 10:16 AM  Date treatment plan last reviewed with patient - 12/04/24  Subjective: This supportive psychotherapy session addressed issues related to goals of therapy and current psychosocial stressors. Patient's reaction: Action in the context of mental status appropriate for ambulatory setting.    Interactive complexity indicated? No  Plan: RTC in timeframe noted above  Psychotherapy services during this visit included myself and the patient.   Treatment Plan      SYMPTOMS; PROBLEMS   MEASURABLE GOALS;    FUNCTIONAL IMPROVEMENT / GAINS INTERVENTIONS DISCHARGE CRITERIA   Depression: depressed mood  Dysregulation: mood dysregulation  Psychosocial: relationship stress and living situation stress   reduce depressive symptoms, walk away from situations that trigger strong emotions, and learn 2 new ways of coping with routine stressors Supportive / psychodynamic marked symptom improvement         Level of Medical Decision Making:   - At least 1 chronic problem that is not stable  - Engaged in prescription drug management during visit (discussed any medication benefits, side effects, alternatives, etc.)    {   Must meet 2 out of 3 of the above MDM elements to bill at the specified level     For help more info about elements / criteria,  click here-> MDM Help Grid     **No need to delete blue text, it disappears when note is signed**      The longitudinal plan of care for the diagnosis(es)/condition(s) as documented were addressed during this visit. Due to the added complexity in care, I will continue to support Therese in the subsequent management and with ongoing continuity of care.      PROVIDER: Judy Alva MD    Patient staffed in clinic with Dr. Tiwari who will sign the note.  Supervisor is Dr. Miranda.    I saw the patient with the resident, and participated in key portions of the service, including the mental status examination and developing the plan of care. I reviewed key portions of the history with the resident. I agree with the findings and plan as documented in this note.    Yulia Tiwari MD

## 2025-03-05 NOTE — PATIENT INSTRUCTIONS
It was nice seeing you today, Therese     Treatment Plan summary from today's visit:      1) Medications  - No changes       2) Please return to clinic in 2 months     3) Crisis numbers are below and clinic after hours number is [phone]      4) Labs have been ordered. Please get these done prior to your next appointment at your [email or web address].     5) Please Obtain Lightbox from [employer] or another retailer    Bright light therapy information    Instructions:    o If you have SAD, use your light box daily throughout the fall and winter. You can stop in the spring and summer.   o If you have MDD, you will likely need to use your light box daily while you are feeling depressed, even in summer. If you stop using the box too early, your symptoms may return.     Sit in front of the light box every day. This can be while you are waking up in bed, getting ready in the morning, eating breakfast, or even working at a desk.  The instructions that come with the light box will tell you the distance to sit from the box. For 10,000 lux lights (the most common clinical strength) this is less than 18 inches usually.  It is important that the light is in front of you and not behind you, so that the light can reach your eyes.   Do not stare directly into the light. Just  direct the light downward towards your face. Using your light box at a downward angle will help decrease eye side effects.    It is best to use your light box as early as possible in the morning after you wake up. If you use the light box at night, you may find it more difficult to fall asleep at your regular bedtime.     How long you will need to use your light box will depend on the intensity of your box.   For a 10,000-lux box 30 minutes every morning is effective.   Do not use the box for more than 1 hour.      Background information:    What is bright light therapy?    Bright light therapy (BLT) is a non-drug way to treat depression  and sleep troubles.  BLT can be used to treat seasonal affective disorder (SAD), major depressive disorder (MDD), and bipolar disorder. It can also be used for sleep problems related to dementia, the sleep/wake cycle, and insomnia. BLT involves sitting in front of a light box for at least 30 minutes every morning. Light therapy can be used by both adults and children. It can be used alone or along with medications. BLT is safe, and people find it acceptable.      Is BLT effective?    Seasonal affective disorder (SAD)  SAD is a condition in which people have low moods that appear in the fall or winter and improve in the spring or summer. People with SAD may also be very tired, sleep more, be hungrier, crave sugar, and gain weight. BLT is the treatment used first to treat SAD. BLT will help about 60-90% of people who use it correctly. Some people find that it starts to help after only 2-4 days. It may take up to 2-4 weeks for others.    Major depressive disorder (MDD)    MDD is a condition in which individuals suffer from long periods of such low mood that it affects their daily life. Unlike SAD, MDD does not change with the seasons. About 14% of people will experience MDD at some time during their lives. MDD is the second most common cause of disability worldwide. BLT is very effective for treating MDD. BLT along with medications can improve depression more than medications alone. Depression also improves faster when BLT is used in addition to medications.     Bipolar disorder  Bipolar disorder is a condition in which people have extreme mood swings from very low (depression) to very high (malia). These mood swings affect their energy, activities, ability to do daily tasks, and sleep. BLT can help improve depression in people with bipolar disorder. BLT is safe and effective for the low moods of bipolar disorder when   used along with medications that help even out mood. BLT can trigger manic mood, so if you think you  might have bipolar disorder and are not on medications for it, talk with your health care professional before using BLT. Wilma caused by BLT is rare. While helpful, BLT may not help as much for bipolar disorder as it does for SAD and MDD.     Sleep problems  BLT is an effective treatment for some sleep conditions. These include difficulties with the sleep/wake cycle, insomnia, and sleep problems related to Alzheimer s disease and other types of dementia. BLT works best when it is combined with other treatments. Sleep improves the most if a person uses a light box of strong intensity (10,000 lux).     How does BLT work?     How BLT works is not fully understood. SAD is thought to occur because of a decrease in the amount of light a person is exposed to during the fall and winter months. Less light affects the sleep/wake clock in the brain. This changes how some chemicals in the brain (such as serotonin and melatonin) are produced. When these chemicals are not balanced, changes in mood and the symptoms of SAD and MDD can occur.     It is believed that the light used in BLT travels to the cells in the back of the eye. These cells send signals to the brain to change the production of brain chemicals. Low moods, then, improve with BLT because of changes in the balance of brain chemicals and changes in the sleep/wake cycle.    See [facility] Website  [email or web address]-hkbdue-k-[email or web address]-a-light-therapy-for-seasonal-affective-disorder/      6)   Community neuropsychologists and psychologists include:    - [facility] - with locations throughout the [facility] area: [phone].    - [facility] - with locations throughout the [facility] area: [phone].    - [facility] - [phone], [email or web address].at.org/psychological-evaluation-treatment/    - [employer] [place] - [phone], [email or web address]://aus.org/therapy/make-an-appointment/    -Apryl Jurado  [facility], [facility] [phone]  [email or web address]ists.[email or web address]    Other resources for patients:    - [facility] - you can search for providers in your community who offer ADHD assessments.    - Contact your insurance company for additional options.     5)  For insomnia practice good sleep hygiene:  ? Establish a regular sleep schedule: Go to bed at same time; Get up at same time each day  ? Avoid sleeping-in on Leonid morning  ? Cut down time in bed (if not asleep, get up)  ? Use your bed only for sleep and sex; Do not read or watch television in bed  ? Make the bedroom comfortable:  keep it quiet, cool, dark- consider using the eye mask.  ? Consider ear plugs (silicon)  ? Deal with your worries before bedtime:  set aside a worry time for 30 minutes earlier  ? Perform measures to make you tired at bedtime:               --Get regular Exercise each day (6 hours before bedtime)               --Take medications only as directed               --Eat a light bedtime snack or warm drink              --Warm milk or camomile tea (non-caffeinated)  Things to avoid  Do not exercise 2-3 hours before bedtime  No overstimulating activities just before bed  No competitive games before bedtime  No exciting television programs before bedtime  Avoid caffeine (coffee, soda, caffeinated teas) after lunchtime  Do not use alcohol to induce sleep (worsens middle evening insomnia)  Do not take someone else's sleeping pills  Do not look at the clock when awakening  Do not turn on light when getting up to use bathroom     For insomnia avoid exposure to blue light in the late evening:  The blue light from electronic screens (computers, phones, tablets) keeps your brain from releasing melatonin, the brain chemical that makes you feel sleepy. So, you might have a harder time falling asleep. Try dimming the smartphone or tablet brightness settings and holding the device at least 14 inches from your face while using it  can help. Or, avoid the screens altogether and read a book.        **For crisis resources, please see the information at the end of this document**   Patient Education    Thank you for coming to the [facility] [place] [facility].     Lab Testing:  If you had lab testing today and your results are reassuring or normal they will be mailed to you or sent through DrAvailable within 7 days. If the lab tests need quick action we will call you with the results. The phone number we will call with results is # [phone]. If this is not the best number please call our clinic and change the number.     Medication Refills:  If you need any refills please call your pharmacy and they will contact us. Our fax number for refills is [phone].   Three business days of notice are needed for general medication refill requests.   Five business days of notice are needed for controlled substance refill requests.   If you need to change to a different pharmacy, please contact the new pharmacy directly. The new pharmacy will help you get your medications transferred.     Contact Us:  Please call [phone] during business hours (8-5:00 M-F).   If you have medication related questions after clinic hours, or on the weekend, please call [phone].     Financial Assistance [phone]   Medical Records [phone]       MENTAL HEALTH CRISIS RESOURCES:  For a emergency help, please call 911 or go to the nearest Emergency Department.     Emergency Walk-In Options:   EmPATH Unit @ [facility] ([place]): [phone] - Specialized mental health emergency area designed to be calming  [facility] ([place]): [phone]  [facility] ([place]): [phone]  [facility] ([place]): [phone]    [facility] Crisis Information:   [place]: [phone]  [place]: [phone]  [place] ([place]) - Adult: [phone]     Child: [phone]  [place] - Adult:  [phone]     Child: [phone]  [place]: [phone]  List of all [facility] resources:   https://[place].gov/dhs/people-we-serve/adults/health-care/mental-health/resources/crisis-contacts.jsp    National Crisis Information:   Crisis Text Line: Text  [place]  to 450671  Suicide & Crisis Lifeline: 988  National Suicide Prevention Lifeline: 4-058-485-TALK (1-870.751.9418)       For online chat options, visit https://suicidepreventionlifeline.org/chat/  Poison Control Center: 7-529-287-0711  Trans Lifeline: 1-569.922.6591 - Hotline for transgender people of all ages  [employer]: 1-293.184.2511 - Hotline for LGBT youth     For Non-Emergency Support:   Fast Tracker: Mental Health & Substance Use Disorder Resources -   https://www.Memoirn.org/             **For crisis resources, please see the information at the end of this document**   Patient Education    Thank you for coming to the [facility] [place] [facility].     Lab Testing:  If you had lab testing today and your results are reassuring or normal they will be mailed to you or sent through [employer] within 7 days. If the lab tests need quick action we will call you with the results. The phone number we will call with results is # [phone]. If this is not the best number please call our clinic and change the number.     Medication Refills:  If you need any refills please call your pharmacy and they will contact us. Our fax number for refills is [phone].   Three business days of notice are needed for general medication refill requests.   Five business days of notice are needed for controlled substance refill requests.   If you need to change to a different pharmacy, please contact the new pharmacy directly. The new pharmacy will help you get your medications transferred.     Contact Us:  Please call [phone] during business hours (8-5:00 M-F).   If you have medication related questions after clinic hours, or on  the weekend, please call [phone].     Financial Assistance [phone]   Medical Records [phone]       MENTAL HEALTH CRISIS RESOURCES:  For a emergency help, please call 911 or go to the nearest Emergency Department.     Emergency Walk-In Options:   EmPATH Unit @ [facility] ([place]): [phone] - Specialized mental health emergency area designed to be calming  [facility] ([place]): [phone]  [facility] ([place]): [phone]  [facility] ([place]): [phone]    County Crisis Information:   [place]: [phone]  [place]: [phone]  [place] ([place]) - Adult: [phone]     Child: [phone]  [place] - Adult: [phone]     Child: [phone]  [place]: [phone]  List of all [facility] resources:   https://[place].gov/dhs/people-we-serve/adults/health-care/mental-health/resources/crisis-contacts.jsp    National Crisis Information:   Crisis Text Line: Text  [place]  to 273804  Suicide & Crisis Lifeline: 988  National Suicide Prevention Lifeline: 7-398-458-TALK (1-203.510.1169)       For online chat options, visit https://suicidepreventionlifeline.org/chat/  Poison Control Center: 1-969.571.8206  Trans Lifeline: 3-328-507-8370 - Hotline for transgender people of all ages  The [employer]: 6-753-963-9669 - Hotline for LGBT youth     For Non-Emergency Support:   Fast Tracker: Mental Health & Substance Use Disorder Resources -   https://www.iBoxPayn.org/

## 2025-03-05 NOTE — NURSING NOTE
Current patient location: [address]  [facility] [address]    Is the patient currently in the state of [place]? YES    Visit mode: VIDEO    If the visit is dropped, the patient can be reconnected by:VIDEO VISIT: Text to cell phone:   Telephone Information:   Mobile [phone]       Will anyone else be joining the visit? NO  (If patient encounters technical issues they should call [phone]    Are changes needed to the allergy or medication list? No    Are refills needed on medications prescribed by this physician?Patient is not sure if refills are needed today    Rooming Documentation:  Questionnaire(s) completed    Reason for visit: RECHECK    Vickie RASMUSSENF

## 2025-05-19 ENCOUNTER — OFFICE VISIT (OUTPATIENT)
Dept: SURGERY | Facility: CLINIC | Age: 30
End: 2025-05-19
Payer: MEDICARE

## 2025-05-19 ENCOUNTER — APPOINTMENT (OUTPATIENT)
Dept: ULTRASOUND IMAGING | Facility: CLINIC | Age: 30
End: 2025-05-19
Attending: EMERGENCY MEDICINE
Payer: MEDICARE

## 2025-05-19 ENCOUNTER — HOSPITAL ENCOUNTER (EMERGENCY)
Facility: CLINIC | Age: 30
Discharge: HOME | End: 2025-05-19
Attending: EMERGENCY MEDICINE | Admitting: EMERGENCY MEDICINE
Payer: MEDICARE

## 2025-05-19 ENCOUNTER — TELEPHONE (OUTPATIENT)
Dept: SURGERY | Facility: CLINIC | Age: 30
End: 2025-05-19

## 2025-05-19 VITALS
HEART RATE: 76 BPM | BODY MASS INDEX: 36.88 KG/M2 | SYSTOLIC BLOOD PRESSURE: 100 MMHG | HEIGHT: 67 IN | WEIGHT: 235 LBS | OXYGEN SATURATION: 96 % | DIASTOLIC BLOOD PRESSURE: 72 MMHG

## 2025-05-19 VITALS
HEIGHT: 67 IN | OXYGEN SATURATION: 96 % | HEART RATE: 75 BPM | SYSTOLIC BLOOD PRESSURE: 119 MMHG | TEMPERATURE: 97.8 F | BODY MASS INDEX: 36.92 KG/M2 | DIASTOLIC BLOOD PRESSURE: 66 MMHG | RESPIRATION RATE: 16 BRPM | WEIGHT: 235.23 LBS

## 2025-05-19 DIAGNOSIS — K80.50 BILIARY COLIC: ICD-10-CM

## 2025-05-19 DIAGNOSIS — K80.50 BILIARY COLIC: Primary | ICD-10-CM

## 2025-05-19 LAB
ALBUMIN SERPL BCG-MCNC: 4.6 G/DL (ref 3.5–5.2)
ALBUMIN UR-MCNC: NEGATIVE MG/DL
ALP SERPL-CCNC: 122 U/L (ref 40–150)
ALT SERPL W P-5'-P-CCNC: 12 U/L (ref 0–50)
ANION GAP SERPL CALCULATED.3IONS-SCNC: 12 MMOL/L (ref 7–15)
APPEARANCE UR: CLEAR
AST SERPL W P-5'-P-CCNC: 52 U/L (ref 0–45)
BASOPHILS # BLD AUTO: 0 10E3/UL (ref 0–0.2)
BASOPHILS NFR BLD AUTO: 0 %
BILIRUB SERPL-MCNC: 0.4 MG/DL
BILIRUB UR QL STRIP: NEGATIVE
BUN SERPL-MCNC: 12.1 MG/DL (ref 6–20)
CALCIUM SERPL-MCNC: 9.8 MG/DL (ref 8.8–10.4)
CHLORIDE SERPL-SCNC: 103 MMOL/L (ref 98–107)
COLOR UR AUTO: ABNORMAL
CREAT SERPL-MCNC: 0.9 MG/DL (ref 0.51–0.95)
EGFRCR SERPLBLD CKD-EPI 2021: 88 ML/MIN/1.73M2
EOSINOPHIL # BLD AUTO: 0.2 10E3/UL (ref 0–0.7)
EOSINOPHIL NFR BLD AUTO: 5 %
ERYTHROCYTE [DISTWIDTH] IN BLOOD BY AUTOMATED COUNT: 13.8 % (ref 10–15)
GLUCOSE SERPL-MCNC: 100 MG/DL (ref 70–99)
GLUCOSE UR STRIP-MCNC: NEGATIVE MG/DL
HCG SERPL QL: NEGATIVE
HCO3 SERPL-SCNC: 25 MMOL/L (ref 22–29)
HCT VFR BLD AUTO: 38.2 % (ref 35–47)
HGB BLD-MCNC: 12.8 G/DL (ref 11.7–15.7)
HGB UR QL STRIP: NEGATIVE
HOLD SPECIMEN: NORMAL
HOLD SPECIMEN: NORMAL
IMM GRANULOCYTES # BLD: 0 10E3/UL
IMM GRANULOCYTES NFR BLD: 0 %
KETONES UR STRIP-MCNC: NEGATIVE MG/DL
LEUKOCYTE ESTERASE UR QL STRIP: NEGATIVE
LIPASE SERPL-CCNC: 34 U/L (ref 13–60)
LYMPHOCYTES # BLD AUTO: 1.6 10E3/UL (ref 0.8–5.3)
LYMPHOCYTES NFR BLD AUTO: 34 %
MCH RBC QN AUTO: 27.3 PG (ref 26.5–33)
MCHC RBC AUTO-ENTMCNC: 33.5 G/DL (ref 31.5–36.5)
MCV RBC AUTO: 81 FL (ref 78–100)
MONOCYTES # BLD AUTO: 0.5 10E3/UL (ref 0–1.3)
MONOCYTES NFR BLD AUTO: 10 %
MUCOUS THREADS #/AREA URNS LPF: PRESENT /LPF
NEUTROPHILS # BLD AUTO: 2.4 10E3/UL (ref 1.6–8.3)
NEUTROPHILS NFR BLD AUTO: 51 %
NITRATE UR QL: NEGATIVE
NRBC # BLD AUTO: 0 10E3/UL
NRBC BLD AUTO-RTO: 0 /100
PH UR STRIP: 5 [PH] (ref 5–7)
PLATELET # BLD AUTO: 291 10E3/UL (ref 150–450)
POTASSIUM SERPL-SCNC: 4 MMOL/L (ref 3.4–5.3)
PROT SERPL-MCNC: 7.2 G/DL (ref 6.4–8.3)
RBC # BLD AUTO: 4.69 10E6/UL (ref 3.8–5.2)
RBC URINE: 2 /HPF
SODIUM SERPL-SCNC: 140 MMOL/L (ref 135–145)
SP GR UR STRIP: 1.02 (ref 1–1.03)
SQUAMOUS EPITHELIAL: 1 /HPF
UROBILINOGEN UR STRIP-MCNC: NORMAL MG/DL
WBC # BLD AUTO: 4.8 10E3/UL (ref 4–11)
WBC URINE: <1 /HPF

## 2025-05-19 PROCEDURE — 99204 OFFICE O/P NEW MOD 45 MIN: CPT | Performed by: SURGERY

## 2025-05-19 PROCEDURE — 96374 THER/PROPH/DIAG INJ IV PUSH: CPT

## 2025-05-19 PROCEDURE — 85025 COMPLETE CBC W/AUTO DIFF WBC: CPT | Performed by: EMERGENCY MEDICINE

## 2025-05-19 PROCEDURE — 250N000013 HC RX MED GY IP 250 OP 250 PS 637: Performed by: EMERGENCY MEDICINE

## 2025-05-19 PROCEDURE — 250N000009 HC RX 250: Performed by: EMERGENCY MEDICINE

## 2025-05-19 PROCEDURE — 3078F DIAST BP <80 MM HG: CPT | Performed by: SURGERY

## 2025-05-19 PROCEDURE — 81003 URINALYSIS AUTO W/O SCOPE: CPT | Performed by: EMERGENCY MEDICINE

## 2025-05-19 PROCEDURE — 99285 EMERGENCY DEPT VISIT HI MDM: CPT | Mod: 25

## 2025-05-19 PROCEDURE — 84703 CHORIONIC GONADOTROPIN ASSAY: CPT | Performed by: EMERGENCY MEDICINE

## 2025-05-19 PROCEDURE — 76705 ECHO EXAM OF ABDOMEN: CPT

## 2025-05-19 PROCEDURE — 3074F SYST BP LT 130 MM HG: CPT | Performed by: SURGERY

## 2025-05-19 PROCEDURE — 83690 ASSAY OF LIPASE: CPT | Performed by: EMERGENCY MEDICINE

## 2025-05-19 PROCEDURE — 82310 ASSAY OF CALCIUM: CPT | Performed by: EMERGENCY MEDICINE

## 2025-05-19 PROCEDURE — 250N000011 HC RX IP 250 OP 636: Mod: JZ | Performed by: EMERGENCY MEDICINE

## 2025-05-19 PROCEDURE — 36415 COLL VENOUS BLD VENIPUNCTURE: CPT | Performed by: EMERGENCY MEDICINE

## 2025-05-19 RX ORDER — MAGNESIUM HYDROXIDE/ALUMINUM HYDROXICE/SIMETHICONE 120; 1200; 1200 MG/30ML; MG/30ML; MG/30ML
15 SUSPENSION ORAL ONCE
Status: COMPLETED | OUTPATIENT
Start: 2025-05-19 | End: 2025-05-19

## 2025-05-19 RX ORDER — HYDROCODONE BITARTRATE AND ACETAMINOPHEN 5; 325 MG/1; MG/1
1 TABLET ORAL EVERY 6 HOURS PRN
Qty: 6 TABLET | Refills: 0 | Status: SHIPPED | OUTPATIENT
Start: 2025-05-19

## 2025-05-19 RX ORDER — LIDOCAINE HYDROCHLORIDE 20 MG/ML
15 SOLUTION OROPHARYNGEAL ONCE
Status: COMPLETED | OUTPATIENT
Start: 2025-05-19 | End: 2025-05-19

## 2025-05-19 RX ORDER — INDOCYANINE GREEN AND WATER 25 MG
2.5 KIT INJECTION ONCE
OUTPATIENT
Start: 2025-05-19 | End: 2025-05-19

## 2025-05-19 RX ORDER — IBUPROFEN 800 MG/1
800 TABLET, FILM COATED ORAL EVERY 8 HOURS PRN
Qty: 30 TABLET | Refills: 0 | Status: SHIPPED | OUTPATIENT
Start: 2025-05-19 | End: 2025-05-27

## 2025-05-19 RX ORDER — KETOROLAC TROMETHAMINE 15 MG/ML
15 INJECTION, SOLUTION INTRAMUSCULAR; INTRAVENOUS ONCE
Status: COMPLETED | OUTPATIENT
Start: 2025-05-19 | End: 2025-05-19

## 2025-05-19 RX ORDER — OXYCODONE HYDROCHLORIDE 5 MG/1
10 TABLET ORAL ONCE
Refills: 0 | Status: COMPLETED | OUTPATIENT
Start: 2025-05-19 | End: 2025-05-19

## 2025-05-19 RX ORDER — ONDANSETRON 4 MG/1
4 TABLET, ORALLY DISINTEGRATING ORAL EVERY 8 HOURS PRN
Qty: 10 TABLET | Refills: 0 | Status: SHIPPED | OUTPATIENT
Start: 2025-05-19 | End: 2025-05-22

## 2025-05-19 RX ADMIN — ALUMINUM HYDROXIDE, MAGNESIUM HYDROXIDE, AND SIMETHICONE 15 ML: 200; 200; 20 SUSPENSION ORAL at 06:29

## 2025-05-19 RX ADMIN — LIDOCAINE HYDROCHLORIDE 15 ML: 20 SOLUTION ORAL at 06:28

## 2025-05-19 RX ADMIN — KETOROLAC TROMETHAMINE 15 MG: 15 INJECTION, SOLUTION INTRAMUSCULAR; INTRAVENOUS at 06:30

## 2025-05-19 RX ADMIN — OXYCODONE HYDROCHLORIDE 10 MG: 5 TABLET ORAL at 08:03

## 2025-05-19 ASSESSMENT — ACTIVITIES OF DAILY LIVING (ADL)
ADLS_ACUITY_SCORE: 41

## 2025-05-19 ASSESSMENT — COLUMBIA-SUICIDE SEVERITY RATING SCALE - C-SSRS
2. HAVE YOU ACTUALLY HAD ANY THOUGHTS OF KILLING YOURSELF IN THE PAST MONTH?: NO
1. IN THE PAST MONTH, HAVE YOU WISHED YOU WERE DEAD OR WISHED YOU COULD GO TO SLEEP AND NOT WAKE UP?: NO
6. HAVE YOU EVER DONE ANYTHING, STARTED TO DO ANYTHING, OR PREPARED TO DO ANYTHING TO END YOUR LIFE?: NO

## 2025-05-19 NOTE — H&P (VIEW-ONLY)
"  Emergency Department Note      History of Present Illness     Chief Complaint   Abdominal Pain      HPI     Cesia Nettles is a 30 year old female with a history of depressive disorder presenting to the Emergency Department for evaluation of abdominal pain. Patient reports mid-upper abdominal pain beginning last night around 1000. She went to bed normally, then woke up with worsened pain at 0230. Since then the pain has pain constant with varying intensities. She also endorses diarrhea yesterday. No radiation of pain. She denies any fever, nausea, emesis, or blood or other changes with urination. No changes with eating, drinking, body position, etc. She has never had similar pain before. No chronic drug, alcohol, or ibuprofen use. No history of abdominal surgeries.    Independent Historian   None    Review of External Notes   None    Past Medical History     Medical History and Problem List   Absence epilepsy  Depressive disorder  Schizophrenia  Asthma  Anxiety  Epidermal inclusion cyst  ADHD      Medications   Prozac  Atarax  Lamictal  Mirena  Metformin  Invega    Surgical History   Deviated septum repair  Cyst removal, leg    Physical Exam     Patient Vitals for the past 24 hrs:   BP Temp Temp src Pulse Resp SpO2 Height Weight   05/19/25 0805 -- -- -- -- -- 96 % -- --   05/19/25 0804 119/66 -- -- 75 -- -- -- --   05/19/25 0533 112/85 -- -- 95 -- -- -- --   05/19/25 0521 120/46 97.8  F (36.6  C) Temporal 86 16 98 % 1.702 m (5' 7\") 106.7 kg (235 lb 3.7 oz)     Physical Exam      HEENT:    Oropharynx is moist  Eyes:    Conjunctiva normal  Neck:     Supple, no meningismus.     CV:     Regular rate and rhythm.      No murmurs, rubs or gallops.     No lower extremity edema.  PULM:    Clear to auscultation bilateral.       No respiratory distress.      Good air exchange.     No rales or wheezing.     No stridor.  ABD:    Soft, non-distended.       Mild-moderate focal tenderness in the RUQ and epigastric " area.      Negative Lacy's sign     Bowel sounds normal.     No pulsatile masses.       No rebound, guarding or rigidity.     No CVA tenderness.   MSK:     No gross deformity to all four extremities.   LYMPH:   No cervical lymphadenopathy.  NEURO:   Alert.  Good muscular tone, no atrophy.   Skin:    Warm, dry and intact.    Psych:    Mood is good and affect is appropriate.      Diagnostics     Lab Results   Labs Ordered and Resulted from Time of ED Arrival to Time of ED Departure   COMPREHENSIVE METABOLIC PANEL - Abnormal       Result Value    Sodium 140      Potassium 4.0      Carbon Dioxide (CO2) 25      Anion Gap 12      Urea Nitrogen 12.1      Creatinine 0.90      GFR Estimate 88      Calcium 9.8      Chloride 103      Glucose 100 (*)     Alkaline Phosphatase 122      AST 52 (*)     ALT 12      Protein Total 7.2      Albumin 4.6      Bilirubin Total 0.4     ROUTINE UA WITH MICROSCOPIC REFLEX TO CULTURE - Abnormal    Color Urine Light Yellow      Appearance Urine Clear      Glucose Urine Negative      Bilirubin Urine Negative      Ketones Urine Negative      Specific Gravity Urine 1.019      Blood Urine Negative      pH Urine 5.0      Protein Albumin Urine Negative      Urobilinogen Urine Normal      Nitrite Urine Negative      Leukocyte Esterase Urine Negative      Mucus Urine Present (*)     RBC Urine 2      WBC Urine <1      Squamous Epithelials Urine 1     LIPASE - Normal    Lipase 34     HCG QUALITATIVE PREGNANCY - Normal    hCG Serum Qualitative Negative     CBC WITH PLATELETS AND DIFFERENTIAL    WBC Count 4.8      RBC Count 4.69      Hemoglobin 12.8      Hematocrit 38.2      MCV 81      MCH 27.3      MCHC 33.5      RDW 13.8      Platelet Count 291      % Neutrophils 51      % Lymphocytes 34      % Monocytes 10      % Eosinophils 5      % Basophils 0      % Immature Granulocytes 0      NRBCs per 100 WBC 0      Absolute Neutrophils 2.4      Absolute Lymphocytes 1.6      Absolute Monocytes 0.5      Absolute  Eosinophils 0.2      Absolute Basophils 0.0      Absolute Immature Granulocytes 0.0      Absolute NRBCs 0.0         Imaging   US Abdomen Limited   Final Result   IMPRESSION:   1.  Contracted gallbladder containing gallstones. No pericholecystic fluid, biliary dilatation or sonographic Lacy sign.   2.  Diffuse increased echogenicity in the liver consistent with hepatic steatosis. No focal hepatic mass.   3.  Pancreas is partially obscured by bowel gas the visualized portion is unremarkable.   4.  No ascites.                EKG       Independent Interpretation   None    ED Course      Medications Administered   Medications   ketorolac (TORADOL) injection 15 mg (15 mg Intravenous $Given 5/19/25 0630)   alum & mag hydroxide-simethicone (MAALOX) suspension 15 mL (15 mLs Oral $Given 5/19/25 0629)   lidocaine (viscous) (XYLOCAINE) 2 % solution 15 mL (15 mLs Mouth/Throat $Given 5/19/25 0628)   oxyCODONE (ROXICODONE) tablet 10 mg (10 mg Oral $Given 5/19/25 0803)       Procedures   Procedures     Discussion of Management   None    ED Course   ED Course as of 05/19/25 0818   Mon May 19, 2025   0604 I obtained history and examined the patient as noted above.     Patient's guardian Mrs. Hastings was contacted and aware of plan.  She is comfortable with discharge home.    Additional Documentation  None    Medical Decision Making / Diagnosis     CMS Diagnoses: None    MIPS   None               [facility]     Cesia Nettles is a 30 year old female who presented with abdominal pain and symptoms consistent with biliary colic.  Ultrasound has confirmed the presence of gallstones.  There is no clinical, laboratory, or ultrasound evidence of cholecystitis, choledocholithiasis, gallstone pancreatitis, or ascending cholangitis.  There is no chest pain or ACS equivalent symptom to suggest the patient s symptoms are cardiac in nature. There is no lower abdominal tenderness to suggest appendicitis, diverticulitis, or other acute process. On  recheck, she is feeling significantly better. The re-exam is benign, pain is controlled, and she is tolerating POs. I recommended avoiding fatty foods, and to return to the ED immediately for uncontrolled pain, vomiting, fever, or any other concerning symptoms. I discussed the natural history of symptomatic cholelithiasis, and I recommended outpatient follow up with general surgery in 3-5 days for further consultation and care.  Analgesics and anti-emetics have been prescribed.      Disposition   The patient was discharged.     Diagnosis     ICD-10-CM    1. Biliary colic  K80.50            Discharge Medications   Discharge Medication List as of 5/19/2025  7:58 AM        START taking these medications    Details   HYDROcodone-acetaminophen (NORCO) 5-325 MG tablet Take 1 tablet by mouth every 6 hours as needed for pain., Disp-6 tablet, R-0, E-Prescribe      !! ibuprofen (ADVIL/MOTRIN) 800 MG tablet Take 1 tablet (800 mg) by mouth every 8 hours as needed for moderate pain., Disp-30 tablet, R-0, E-Prescribe      ondansetron (ZOFRAN ODT) 4 MG ODT tab Take 1 tablet (4 mg) by mouth every 8 hours as needed for nausea., Disp-10 tablet, R-0, E-Prescribe       !! - Potential duplicate medications found. Please discuss with provider.            Scribe Disclosure:  I, Anish Rodriguez, am serving as a scribe at 6:00 AM on 5/19/2025 to document services personally performed by Danilo Grace MD based on my observations and the provider's statements to me.        Danilo Grace MD  05/19/25 0906

## 2025-05-19 NOTE — TELEPHONE ENCOUNTER
Type of surgery: ROBOTIC ASSISTED LAPAROSCOPIC CHOLECYSTECTOMY   Location of surgery: [facility]  Date and time of surgery: 6/6/2025 @ 9:30 AM   Surgeon: IRMA CHUNG MD    Pre-Op Appt Date: PATIENT TO SCHEDULE    Post-Op Appt Date: PATIENT TO SCHEDULE     Packet sent out: Yes  Pre-cert/Authorization completed:  Not Applicable  Date: 5/19/2025         ROBOTIC ASSISTED LAPAROSCOPIC CHOLECYSTECTOMY GENERAL  H&P ER- 5/19/2025  60 MIN REQ PA ASSIST RMO [place]

## 2025-05-19 NOTE — PROGRESS NOTES
Chief complaint:  Abdominal pain, epigastric    HPI:  This patient is a 30 year old female who presents with epigastric pain after having cheese and chicken for dinner last evening. She was in the ED early this morning with an attack that seems to have resolved by the time of discharge. She still feels a little sore and did not have breakfast. She had not had similar symptoms in the past. She denies any nausea or vomiting. She has not had prior abdominal surgery.    Past Medical History:   has a past medical history of Absence epilepsy (H) (age 9-12), Depressive disorder, and Schizophrenia (H).    Past Surgical History:  Past Surgical History:   Procedure Laterality Date    CROWN, IMPLNT SUP, PORC/CERA      ENT SURGERY      SOFT TISSUE SURGERY          Social History:  Social History     Socioeconomic History    Marital status: Single     Spouse name: Not on file    Number of children: Not on file    Years of education: Not on file    Highest education level: Not on file   Occupational History    Not on file   Tobacco Use    Smoking status: Never     Passive exposure: Never    Smokeless tobacco: Never   Vaping Use    Vaping status: Never Used   Substance and Sexual Activity    Alcohol use: No    Drug use: No    Sexual activity: Yes     Birth control/protection: Implant   Other Topics Concern    Parent/sibling w/ CABG, MI or angioplasty before 65F 55M? Not Asked   Social History Narrative    Not on file     Social Drivers of Health     Financial Resource Strain: Not At Risk (5/28/2024)    Received from [facility]    Financial Resource Strain     Is it hard for you to pay for the very basics like food, housing, medical care or heating?: No   Food Insecurity: Not At Risk (5/28/2024)    Received from [facility]    Food Insecurity     Does your food run out before you have the money to buy more?: No   Transportation Needs: Not At Risk (5/28/2024)    Received from [facility]    Transportation Needs     Does a  lack of transportation keep you from your medical appointments or from getting your medications?: No   Physical Activity: Not on file   Stress: Not on file   Social Connections: Not on file   Interpersonal Safety: Not on file   Housing Stability: Not on file        Family History:  Family History   Problem Relation Age of Onset    Psychotic Disorder Mother         borderline personality     Depression Mother     Schizophrenia Maternal Grandmother     Depression Maternal Grandmother     Depression Paternal Grandmother     Schizophrenia Maternal Grandfather        Review of Systems:  The 10 point Review of Systems is negative other than noted in the HPI and above.    Physical Exam:  General - This is a well developed, well nourished female in no apparent distress.  HEENT - Normocephalic, atraumatic.  No scleral icterus, membranes moist.  Respiratory- non-labored  Gastrointestinal:   soft, non-distended with tenderness noted in the epigastric region and Lacy's sign is absent. no masses palpated. normal bowel sounds.  Extremities - warm without edema  Neurologic - non-focal  Psych-normal affect      Relevant labs:  Lipase and LFTs unremarkable, WBC 4.8K.    Imaging:  Films personally reviewed by me today.  Ultrasound performed earlier today shows a contracted gallbladder with stones, no edema.    Assessment and Plan:  I reviewed the pathophysiology of biliary tract disease, its natural history and indications for cholecystectomy.  I have recommended Laparoscopic cholecystectomy, she is interested in pursuing this.  Risks including infection, bleeding, harm to structures, open conversion, bile leak, retained stone and common duct injury were discussed.  We will work on scheduling surgery at the patient s convenience.    Bassem Bush MD  [facility]    Please route or send letter to:  Primary Care Provider (PCP)

## 2025-05-19 NOTE — LETTER
Showering Before Surgery      Your surgeon has asked you to take 2 showers before surgery.    Why is this important?  It is normal for bacteria (germs) to be on your skin. The skin protects us from these germs. When you have surgery, we cut the skin. Sometimes germs get into the cuts and cause infection (illness caused by germs). By following the instructions below and using special soap, you will lower the number of germs on your skin. This decreases your chance of infection.  Special soap  Buy or get antiseptic surgical soap called 4% CHG. Common name brands of this soap are Hibiclens and Exidine.  You can find it at your local pharmacy, clinic or retail store. If you have trouble, ask your pharmacist to help you find the right substitute.  A note about shaving:  Do not shave within 12 inches of your incision (surgical cut) area for at least 3 days before surgery. Shaving can make small cuts in the skin. This puts you at a higher risk of infection.  Items you will need for each shower:  1 newly washed towel  One of the above soaps  Clean pajamas or clothes to change into    Follow these instructions:  Follow these steps the evening before surgery and the morning of surgery.  Wash your hair and body with your regular shampoo and soap. Make sure you rinse the shampoo and soap from your hair and body.  Using clean hands, apply about 2 ounces of soap gently on your skin from your ear lobes to your toes. Use on your groin area last. Do not use this soap on your face or head. If you get any soap in your eyes, ears or mouth, rinse right away.  Repeat step 2. It is very important to let the soap stay on your skin for at least 1 minute.    Rinse well and dry off using a clean towel.    If you feel any tingling, itching or other irritation, rinse right away. It is normal to feel some coolness on the skin after using the antiseptic soap. Your skin may feel a bit dry after the shower, but do not use any lotions, creams or  moisturizers. Do not use hair spray or other products in your hair.  5.  Dress in freshly washed clothes or pajamas. Use fresh pillowcases and sheets on your bed.    Repeat these steps the morning of surgery.  If you have any questions about showering or an allergy to CHG soap, please call your surgery center.

## 2025-05-19 NOTE — ED PROVIDER NOTES
"  Emergency Department Note      History of Present Illness     Chief Complaint   Abdominal Pain      HPI     Cesia Nettles is a 30 year old female with a history of depressive disorder presenting to the Emergency Department for evaluation of abdominal pain. Patient reports mid-upper abdominal pain beginning last night around 1000. She went to bed normally, then woke up with worsened pain at 0230. Since then the pain has pain constant with varying intensities. She also endorses diarrhea yesterday. No radiation of pain. She denies any fever, nausea, emesis, or blood or other changes with urination. No changes with eating, drinking, body position, etc. She has never had similar pain before. No chronic drug, alcohol, or ibuprofen use. No history of abdominal surgeries.    Independent Historian   None    Review of External Notes   None    Past Medical History     Medical History and Problem List   Absence epilepsy  Depressive disorder  Schizophrenia  Asthma  Anxiety  Epidermal inclusion cyst  ADHD      Medications   Prozac  Atarax  Lamictal  Mirena  Metformin  Invega    Surgical History   Deviated septum repair  Cyst removal, leg    Physical Exam     Patient Vitals for the past 24 hrs:   BP Temp Temp src Pulse Resp SpO2 Height Weight   05/19/25 0805 -- -- -- -- -- 96 % -- --   05/19/25 0804 119/66 -- -- 75 -- -- -- --   05/19/25 0533 112/85 -- -- 95 -- -- -- --   05/19/25 0521 120/46 97.8  F (36.6  C) Temporal 86 16 98 % 1.702 m (5' 7\") 106.7 kg (235 lb 3.7 oz)     Physical Exam      HEENT:    Oropharynx is moist  Eyes:    Conjunctiva normal  Neck:     Supple, no meningismus.     CV:     Regular rate and rhythm.      No murmurs, rubs or gallops.     No lower extremity edema.  PULM:    Clear to auscultation bilateral.       No respiratory distress.      Good air exchange.     No rales or wheezing.     No stridor.  ABD:    Soft, non-distended.       Mild-moderate focal tenderness in the RUQ and epigastric " area.      Negative Lacy's sign     Bowel sounds normal.     No pulsatile masses.       No rebound, guarding or rigidity.     No CVA tenderness.   MSK:     No gross deformity to all four extremities.   LYMPH:   No cervical lymphadenopathy.  NEURO:   Alert.  Good muscular tone, no atrophy.   Skin:    Warm, dry and intact.    Psych:    Mood is good and affect is appropriate.      Diagnostics     Lab Results   Labs Ordered and Resulted from Time of ED Arrival to Time of ED Departure   COMPREHENSIVE METABOLIC PANEL - Abnormal       Result Value    Sodium 140      Potassium 4.0      Carbon Dioxide (CO2) 25      Anion Gap 12      Urea Nitrogen 12.1      Creatinine 0.90      GFR Estimate 88      Calcium 9.8      Chloride 103      Glucose 100 (*)     Alkaline Phosphatase 122      AST 52 (*)     ALT 12      Protein Total 7.2      Albumin 4.6      Bilirubin Total 0.4     ROUTINE UA WITH MICROSCOPIC REFLEX TO CULTURE - Abnormal    Color Urine Light Yellow      Appearance Urine Clear      Glucose Urine Negative      Bilirubin Urine Negative      Ketones Urine Negative      Specific Gravity Urine 1.019      Blood Urine Negative      pH Urine 5.0      Protein Albumin Urine Negative      Urobilinogen Urine Normal      Nitrite Urine Negative      Leukocyte Esterase Urine Negative      Mucus Urine Present (*)     RBC Urine 2      WBC Urine <1      Squamous Epithelials Urine 1     LIPASE - Normal    Lipase 34     HCG QUALITATIVE PREGNANCY - Normal    hCG Serum Qualitative Negative     CBC WITH PLATELETS AND DIFFERENTIAL    WBC Count 4.8      RBC Count 4.69      Hemoglobin 12.8      Hematocrit 38.2      MCV 81      MCH 27.3      MCHC 33.5      RDW 13.8      Platelet Count 291      % Neutrophils 51      % Lymphocytes 34      % Monocytes 10      % Eosinophils 5      % Basophils 0      % Immature Granulocytes 0      NRBCs per 100 WBC 0      Absolute Neutrophils 2.4      Absolute Lymphocytes 1.6      Absolute Monocytes 0.5      Absolute  Eosinophils 0.2      Absolute Basophils 0.0      Absolute Immature Granulocytes 0.0      Absolute NRBCs 0.0         Imaging   US Abdomen Limited   Final Result   IMPRESSION:   1.  Contracted gallbladder containing gallstones. No pericholecystic fluid, biliary dilatation or sonographic Lacy sign.   2.  Diffuse increased echogenicity in the liver consistent with hepatic steatosis. No focal hepatic mass.   3.  Pancreas is partially obscured by bowel gas the visualized portion is unremarkable.   4.  No ascites.                EKG       Independent Interpretation   None    ED Course      Medications Administered   Medications   ketorolac (TORADOL) injection 15 mg (15 mg Intravenous $Given 5/19/25 0630)   alum & mag hydroxide-simethicone (MAALOX) suspension 15 mL (15 mLs Oral $Given 5/19/25 0629)   lidocaine (viscous) (XYLOCAINE) 2 % solution 15 mL (15 mLs Mouth/Throat $Given 5/19/25 0628)   oxyCODONE (ROXICODONE) tablet 10 mg (10 mg Oral $Given 5/19/25 0803)       Procedures   Procedures     Discussion of Management   None    ED Course   ED Course as of 05/19/25 0818   Mon May 19, 2025   0604 I obtained history and examined the patient as noted above.     Patient's guardian Mrs. Hastinsg was contacted and aware of plan.  She is comfortable with discharge home.    Additional Documentation  None    Medical Decision Making / Diagnosis     CMS Diagnoses: None    MIPS   None               [facility]     Cesia Nettles is a 30 year old female who presented with abdominal pain and symptoms consistent with biliary colic.  Ultrasound has confirmed the presence of gallstones.  There is no clinical, laboratory, or ultrasound evidence of cholecystitis, choledocholithiasis, gallstone pancreatitis, or ascending cholangitis.  There is no chest pain or ACS equivalent symptom to suggest the patient s symptoms are cardiac in nature. There is no lower abdominal tenderness to suggest appendicitis, diverticulitis, or other acute process. On  recheck, she is feeling significantly better. The re-exam is benign, pain is controlled, and she is tolerating POs. I recommended avoiding fatty foods, and to return to the ED immediately for uncontrolled pain, vomiting, fever, or any other concerning symptoms. I discussed the natural history of symptomatic cholelithiasis, and I recommended outpatient follow up with general surgery in 3-5 days for further consultation and care.  Analgesics and anti-emetics have been prescribed.      Disposition   The patient was discharged.     Diagnosis     ICD-10-CM    1. Biliary colic  K80.50            Discharge Medications   Discharge Medication List as of 5/19/2025  7:58 AM        START taking these medications    Details   HYDROcodone-acetaminophen (NORCO) 5-325 MG tablet Take 1 tablet by mouth every 6 hours as needed for pain., Disp-6 tablet, R-0, E-Prescribe      !! ibuprofen (ADVIL/MOTRIN) 800 MG tablet Take 1 tablet (800 mg) by mouth every 8 hours as needed for moderate pain., Disp-30 tablet, R-0, E-Prescribe      ondansetron (ZOFRAN ODT) 4 MG ODT tab Take 1 tablet (4 mg) by mouth every 8 hours as needed for nausea., Disp-10 tablet, R-0, E-Prescribe       !! - Potential duplicate medications found. Please discuss with provider.            Scribe Disclosure:  I, Anish Rodriguez, am serving as a scribe at 6:00 AM on 5/19/2025 to document services personally performed by Danilo Grace MD based on my observations and the provider's statements to me.        Danilo Grace MD  05/19/25 0906

## 2025-05-19 NOTE — LETTER
May 19, 2025       Cesia Nettles  [address]  [facility] [address]     RE: [id number]  : 1995    Cesia Nettles has been scheduled for surgery on 2025 at 9:30 AM  at [facility] with Dr Bassem Bush.  The hospital is located at [address] in [place].    Please check in at the Surgery reception desk at 7:30 AM . This is located in the back of the hospital on the East side, just past the Emergency Room entrance.     DO NOT EAT OR DRINK ANYTHING 8 HOURS BEFORE YOUR ARRIVAL TIME.   You may have sips of clear liquids up until 2 hours before your arrival time. If you have been advised to take your medication, please do this early in the morning with just sips of clear liquid.     Hospital regulations require an updated pre-operative examination to be completed within 30 days of the procedure. This can be done by your primary care provider. Please ask them to fax documentation to [phone]. We also recommend you bring a copy with you.     You should shower before your surgery with Hibiclens or Exidine soap.  This can be found at your local pharmacy or you can pick it up from our office for free.  Please call our office if you have any questions.     You will be required to have an Adult (friend or family member) drive you home after your surgery and arrange for an adult to stay with you until the next morning.     You will receive several calls from our staff 3-7 days prior to your scheduled procedure with further details and to answer any questions you may have.    It is sometimes necessary to adjust the surgery schedule due to emergencies and additions to the schedule.  If your surgery is affected by this, we greatly appreciate your flexibility and understanding in this matter    It is best if you call regarding post-operative questions between the hours of 8:00 am & 3:00 pm Monday-Friday, so you have access to the daytime care team that know you  best.  Prescription refills are accepted during regular office hours only.    Please do not bring any Disability or FMLA papers to the hospital.  They need to be either faxed ([phone]), mailed or hand delivered to our office by you or a family member for completion.  Please allow 14 business days to complete paperwork.        If you have questions or concerns, please contact our office at [phone].

## 2025-05-19 NOTE — LETTER
May 19, 2025      To Whom It May Concern:      Cesia Nettles was seen in our Emergency Department today, 05/19/25.  I expect her condition to improve over the next 3 days.  She may return to work/school when improved.    Sincerely,        Kylie THOMAS RN  Electronically signed

## 2025-05-19 NOTE — LETTER
May 19, 2025        RE:   Cesia Nettles 1995      Dear Colleague,    Thank you for referring your patient, Cesia Nettles, to [facility]. Please see a copy of my visit note below.    Chief complaint:  Abdominal pain, epigastric    HPI:  This patient is a 30 year old female who presents with epigastric pain after having cheese and chicken for dinner last evening. She was in the ED early this morning with an attack that seems to have resolved by the time of discharge. She still feels a little sore and did not have breakfast. She had not had similar symptoms in the past. She denies any nausea or vomiting. She has not had prior abdominal surgery.    Past Medical History:  Has a past medical history of Absence epilepsy (H) (age 9-12), Depressive disorder, and Schizophrenia (H).    Review of Systems:  The 10 point Review of Systems is negative other than noted in the HPI and above.    Physical Exam:  General - This is a well developed, well nourished female in no apparent distress.  HEENT - Normocephalic, atraumatic.  No scleral icterus, membranes moist.  Respiratory- non-labored  Gastrointestinal:   soft, non-distended with tenderness noted in the epigastric region and Lacy's sign is absent. no masses palpated. normal bowel sounds.  Extremities - warm without edema  Neurologic - non-focal  Psych-normal affect    Relevant labs:  Lipase and LFTs unremarkable, WBC 4.8K.    Imaging:  Films personally reviewed by me today.  Ultrasound performed earlier today shows a contracted gallbladder with stones, no edema.    Assessment and Plan:  I reviewed the pathophysiology of biliary tract disease, its natural history and indications for cholecystectomy.  I have recommended Laparoscopic cholecystectomy, she is interested in pursuing this.  Risks including infection, bleeding, harm to structures, open conversion, bile leak, retained stone and common duct injury were  discussed.  We will work on scheduling surgery at the patient s convenience.        Again, thank you for allowing me to participate in the care of your patient.      Sincerely,      Bassem Bush MD

## 2025-06-06 ENCOUNTER — HOSPITAL ENCOUNTER (OUTPATIENT)
Facility: CLINIC | Age: 30
Discharge: HOME | End: 2025-06-06
Attending: SURGERY | Admitting: SURGERY
Payer: MEDICARE

## 2025-06-06 VITALS
HEART RATE: 72 BPM | DIASTOLIC BLOOD PRESSURE: 66 MMHG | SYSTOLIC BLOOD PRESSURE: 113 MMHG | BODY MASS INDEX: 36.29 KG/M2 | TEMPERATURE: 97.5 F | RESPIRATION RATE: 15 BRPM | WEIGHT: 231.7 LBS | OXYGEN SATURATION: 94 %

## 2025-06-06 DIAGNOSIS — K80.50 BILIARY COLIC: ICD-10-CM

## 2025-06-06 PROCEDURE — 47562 LAPAROSCOPIC CHOLECYSTECTOMY: CPT | Mod: AS | Performed by: PHYSICIAN ASSISTANT

## 2025-06-06 PROCEDURE — 250N000009 HC RX 250: Performed by: SURGERY

## 2025-06-06 PROCEDURE — 250N000013 HC RX MED GY IP 250 OP 250 PS 637: Performed by: ANESTHESIOLOGY

## 2025-06-06 PROCEDURE — 250N000025 HC SEVOFLURANE, PER MIN: Performed by: SURGERY

## 2025-06-06 PROCEDURE — 250N000011 HC RX IP 250 OP 636: Mod: JZ | Performed by: ANESTHESIOLOGY

## 2025-06-06 PROCEDURE — 272N000001 HC OR GENERAL SUPPLY STERILE: Performed by: SURGERY

## 2025-06-06 PROCEDURE — 47562 LAPAROSCOPIC CHOLECYSTECTOMY: CPT | Performed by: SURGERY

## 2025-06-06 PROCEDURE — 999N000141 HC STATISTIC PRE-PROCEDURE NURSING ASSESSMENT: Performed by: SURGERY

## 2025-06-06 PROCEDURE — 360N000080 HC SURGERY LEVEL 7, PER MIN: Performed by: SURGERY

## 2025-06-06 PROCEDURE — 88304 TISSUE EXAM BY PATHOLOGIST: CPT | Mod: TC | Performed by: SURGERY

## 2025-06-06 PROCEDURE — S2900 ROBOTIC SURGICAL SYSTEM: HCPCS | Performed by: SURGERY

## 2025-06-06 PROCEDURE — 370N000017 HC ANESTHESIA TECHNICAL FEE, PER MIN: Performed by: SURGERY

## 2025-06-06 PROCEDURE — 710N000012 HC RECOVERY PHASE 2, PER MINUTE: Performed by: SURGERY

## 2025-06-06 PROCEDURE — 710N000009 HC RECOVERY PHASE 1, LEVEL 1, PER MIN: Performed by: SURGERY

## 2025-06-06 PROCEDURE — 250N000011 HC RX IP 250 OP 636: Performed by: SURGERY

## 2025-06-06 PROCEDURE — 258N000001 HC RX 258: Performed by: SURGERY

## 2025-06-06 RX ORDER — NALOXONE HYDROCHLORIDE 0.4 MG/ML
0.1 INJECTION, SOLUTION INTRAMUSCULAR; INTRAVENOUS; SUBCUTANEOUS
Status: DISCONTINUED | OUTPATIENT
Start: 2025-06-06 | End: 2025-06-06 | Stop reason: HOSPADM

## 2025-06-06 RX ORDER — CEFAZOLIN SODIUM/WATER 2 G/20 ML
2 SYRINGE (ML) INTRAVENOUS
Status: COMPLETED | OUTPATIENT
Start: 2025-06-06 | End: 2025-06-06

## 2025-06-06 RX ORDER — ALBUTEROL SULFATE 0.83 MG/ML
2.5 SOLUTION RESPIRATORY (INHALATION) EVERY 4 HOURS PRN
Status: DISCONTINUED | OUTPATIENT
Start: 2025-06-06 | End: 2025-06-06 | Stop reason: HOSPADM

## 2025-06-06 RX ORDER — ACETAMINOPHEN 325 MG/1
975 TABLET ORAL ONCE
Status: COMPLETED | OUTPATIENT
Start: 2025-06-06 | End: 2025-06-06

## 2025-06-06 RX ORDER — ONDANSETRON 2 MG/ML
4 INJECTION INTRAMUSCULAR; INTRAVENOUS EVERY 30 MIN PRN
Status: DISCONTINUED | OUTPATIENT
Start: 2025-06-06 | End: 2025-06-06 | Stop reason: HOSPADM

## 2025-06-06 RX ORDER — SODIUM CHLORIDE, SODIUM LACTATE, POTASSIUM CHLORIDE, CALCIUM CHLORIDE 600; 310; 30; 20 MG/100ML; MG/100ML; MG/100ML; MG/100ML
INJECTION, SOLUTION INTRAVENOUS CONTINUOUS
Status: DISCONTINUED | OUTPATIENT
Start: 2025-06-06 | End: 2025-06-06 | Stop reason: HOSPADM

## 2025-06-06 RX ORDER — HYDRALAZINE HYDROCHLORIDE 20 MG/ML
2.5-5 INJECTION INTRAMUSCULAR; INTRAVENOUS EVERY 10 MIN PRN
Status: DISCONTINUED | OUTPATIENT
Start: 2025-06-06 | End: 2025-06-06 | Stop reason: HOSPADM

## 2025-06-06 RX ORDER — LIDOCAINE 40 MG/G
CREAM TOPICAL
Status: DISCONTINUED | OUTPATIENT
Start: 2025-06-06 | End: 2025-06-06 | Stop reason: HOSPADM

## 2025-06-06 RX ORDER — BUPIVACAINE HYDROCHLORIDE 5 MG/ML
INJECTION, SOLUTION PERINEURAL PRN
Status: DISCONTINUED | OUTPATIENT
Start: 2025-06-06 | End: 2025-06-06 | Stop reason: HOSPADM

## 2025-06-06 RX ORDER — HYDROCODONE BITARTRATE AND ACETAMINOPHEN 5; 325 MG/1; MG/1
1-2 TABLET ORAL EVERY 4 HOURS PRN
Qty: 10 TABLET | Refills: 0 | Status: SHIPPED | OUTPATIENT
Start: 2025-06-06

## 2025-06-06 RX ORDER — HYDROMORPHONE HCL IN WATER/PF 6 MG/30 ML
0.4 PATIENT CONTROLLED ANALGESIA SYRINGE INTRAVENOUS EVERY 5 MIN PRN
Status: DISCONTINUED | OUTPATIENT
Start: 2025-06-06 | End: 2025-06-06 | Stop reason: HOSPADM

## 2025-06-06 RX ORDER — INDOCYANINE GREEN AND WATER 25 MG
2.5 KIT INJECTION ONCE
Status: COMPLETED | OUTPATIENT
Start: 2025-06-06 | End: 2025-06-06

## 2025-06-06 RX ORDER — HYDROMORPHONE HCL IN WATER/PF 6 MG/30 ML
0.2 PATIENT CONTROLLED ANALGESIA SYRINGE INTRAVENOUS EVERY 5 MIN PRN
Status: DISCONTINUED | OUTPATIENT
Start: 2025-06-06 | End: 2025-06-06 | Stop reason: HOSPADM

## 2025-06-06 RX ORDER — ONDANSETRON 4 MG/1
4 TABLET, ORALLY DISINTEGRATING ORAL EVERY 30 MIN PRN
Status: DISCONTINUED | OUTPATIENT
Start: 2025-06-06 | End: 2025-06-06 | Stop reason: HOSPADM

## 2025-06-06 RX ORDER — DEXAMETHASONE SODIUM PHOSPHATE 4 MG/ML
4 INJECTION, SOLUTION INTRA-ARTICULAR; INTRALESIONAL; INTRAMUSCULAR; INTRAVENOUS; SOFT TISSUE
Status: DISCONTINUED | OUTPATIENT
Start: 2025-06-06 | End: 2025-06-06 | Stop reason: HOSPADM

## 2025-06-06 RX ORDER — FENTANYL CITRATE 50 UG/ML
50 INJECTION, SOLUTION INTRAMUSCULAR; INTRAVENOUS EVERY 5 MIN PRN
Status: DISCONTINUED | OUTPATIENT
Start: 2025-06-06 | End: 2025-06-06 | Stop reason: HOSPADM

## 2025-06-06 RX ORDER — FENTANYL CITRATE 50 UG/ML
25 INJECTION, SOLUTION INTRAMUSCULAR; INTRAVENOUS EVERY 5 MIN PRN
Status: DISCONTINUED | OUTPATIENT
Start: 2025-06-06 | End: 2025-06-06 | Stop reason: HOSPADM

## 2025-06-06 RX ORDER — HYDROCODONE BITARTRATE AND ACETAMINOPHEN 5; 325 MG/1; MG/1
1 TABLET ORAL
Status: DISCONTINUED | OUTPATIENT
Start: 2025-06-06 | End: 2025-06-06 | Stop reason: HOSPADM

## 2025-06-06 RX ORDER — KETOROLAC TROMETHAMINE 30 MG/ML
30 INJECTION, SOLUTION INTRAMUSCULAR; INTRAVENOUS ONCE
Status: COMPLETED | OUTPATIENT
Start: 2025-06-06 | End: 2025-06-06

## 2025-06-06 RX ORDER — CEFAZOLIN SODIUM/WATER 2 G/20 ML
2 SYRINGE (ML) INTRAVENOUS SEE ADMIN INSTRUCTIONS
Status: DISCONTINUED | OUTPATIENT
Start: 2025-06-06 | End: 2025-06-06 | Stop reason: HOSPADM

## 2025-06-06 RX ORDER — LABETALOL HYDROCHLORIDE 5 MG/ML
10 INJECTION, SOLUTION INTRAVENOUS
Status: DISCONTINUED | OUTPATIENT
Start: 2025-06-06 | End: 2025-06-06 | Stop reason: HOSPADM

## 2025-06-06 RX ORDER — OXYCODONE HYDROCHLORIDE 5 MG/1
10 TABLET ORAL
Status: DISCONTINUED | OUTPATIENT
Start: 2025-06-06 | End: 2025-06-06 | Stop reason: HOSPADM

## 2025-06-06 RX ORDER — OXYCODONE HYDROCHLORIDE 5 MG/1
5 TABLET ORAL
Status: DISCONTINUED | OUTPATIENT
Start: 2025-06-06 | End: 2025-06-06 | Stop reason: HOSPADM

## 2025-06-06 RX ADMIN — INDOCYANINE GREEN AND WATER 2.5 MG: KIT at 08:47

## 2025-06-06 RX ADMIN — FENTANYL CITRATE 25 MCG: 50 INJECTION, SOLUTION INTRAMUSCULAR; INTRAVENOUS at 11:56

## 2025-06-06 RX ADMIN — ACETAMINOPHEN 975 MG: 325 TABLET, FILM COATED ORAL at 08:46

## 2025-06-06 RX ADMIN — KETOROLAC TROMETHAMINE 30 MG: 30 INJECTION, SOLUTION INTRAMUSCULAR at 13:25

## 2025-06-06 RX ADMIN — PROCHLORPERAZINE EDISYLATE 5 MG: 5 INJECTION INTRAMUSCULAR; INTRAVENOUS at 13:04

## 2025-06-06 ASSESSMENT — ACTIVITIES OF DAILY LIVING (ADL)
ADLS_ACUITY_SCORE: 35
ADLS_ACUITY_SCORE: 36

## 2025-06-06 NOTE — OP NOTE
[facility] General Surgery Operative Note    Pre-operative diagnosis: symptomatic gallstones   Post-operative diagnosis: same   Procedure: Robotic assisted laparoscopic cholecystectomy   Surgeon: Bassem Nielsen MD   Assistant(s): Rupinder Wolfe PA-C  The Physician Assistant was medically necessary for their expertise in prepping, robotic instrument exchange, passing of material through port sites, closure of port sites, suturing and retraction.   Anesthesia: general   Estimated blood loss:  Specimen: 5 cc  gallbladder and contents               INDICATION FOR OPERATION: This is a 30 year old female who presented to my office with abdominal pain. Studies including ultrasound were consistent with biliary colic. We discussed robotic assisted laparoscopic cholecystectomy and the patient agreed to proceed after hearing the risks and benefits.    DESCRIPTION OF PROCEDURE:  The patient was taken to the operating room and placed on the table in supine position.  General endotracheal anesthesia was induced and the abdomen was prepped and draped in standard sterile fashion.     A small infra-umbilical incision was made with a skin knife. We dissected down to the fascia, the umbilical stalk was the grasped and lifted anteriorly. A 15 blade knife was used to incise the fascia and peritoneum. An 0 Vicryl figure of eight suture was placed across the fascia. An 8 mm robotic port was placed and a pneumoperitoneum was established. The abdomen was then surveyed and no injuries were seen from our entry. Three additional 8mm robotic ports were placed across the mid-abdomen. The patient was placed in reverse Trendelenburg and right side up.  The robot was then docked.     The fundus of the gallbladder was grasped and retracted cephalad with a prograsp. The gallbladder appeared grossly normal, thin walled but with stones. The infundibulum was grasped and retracted laterally.  The peritoneum over the medial and lateral  aspects of the triangle of Calot was taken down with blunt dissection and cautery.  The cystic duct and artery were freed up from surrounding tissues.  The triangle of Calot was skeletonized revealing the critical view of safety.  Intraoperative fluorescence was used to evaluate the biliary anatomy with no additional biliary structures lighting up other than the cystic duct and common bile duct.    The duct was clipped twice proximally, once distally, and the cystic artery was clipped once proximally, then both were transected, using cautery on the distal side of the cystic duct.  The gallbladder was then removed from the liver using the cautery.  Before the gallbladder was completely removed from the cystic plate fluorescence was again used to evaluate for any additional biliary structures and none seen . The gallbladder was passed into an Endocatch bag in the umbilical port site. We observed the right upper quadrant carefully for hemostasis.  Hemostasis was assured.      The endocatch bag was removed from the abdomen through the umbilical port. The umbilical port fascia was then closed with the 0-Vicryl figure-of-eight stitch.     All of the incisions were closed with interrupted 4-0 Vicryl subcuticular sutures and Dermabond.  The patient tolerated the procedure well.  Sponge and instrument counts were correct.      FINDINGS: Gallstones.    Bassem Nielsen MD

## 2025-06-06 NOTE — INTERVAL H&P NOTE
"I have reviewed the surgical (or preoperative) H&P that is linked to this encounter, and examined the patient. There are no significant changes    Clinical Conditions Present on Arrival:  Clinically Significant Risk Factors Present on Admission                       # Obesity: Estimated body mass index is 36.29 kg/m  as calculated from the following:    Height as of 5/19/25: 1.702 m (5' 7\").    Weight as of this encounter: 105.1 kg (231 lb 11.2 oz).       "

## 2025-06-06 NOTE — DISCHARGE INSTRUCTIONS
HOME CARE FOLLOWING LAPAROSCOPIC CHOLECYSTECTOMY  GEOFF Pitts, MARY Ramsey C. Pratt, J. Shaheen    INCISIONAL CARE:  Replace the bandage over your incisions DAILY until all drainage stops, or if more comfortable to have in place.  If present, leave the steri-strips (white paper tapes) in place for 14 days after surgery.  If Dermabond (a type of skin glue) is present, leave in place until it wears/flakes off (2-3 weeks).     BATHING:  OK to shower 48 hours after surgery.  Avoid baths for 1 week after surgery.  You may wash your hair at any time.  Gently pat your incision dry after bathing.  Do not apply lotions, creams, or ointments to incisions.    ACTIVITY:  Light Activity -- you may immediately be up and about as tolerated.  Walking is encouraged, increase as tolerated.  Driving/Light Work-- when comfortable and off narcotic pain medications.  Strenuous Work/Activity -- limit lifting to 20 pounds for 2 weeks.  Progressively increase with time.  Active Sports (running, biking, etc.) -- cautiously resume after 2 weeks.    DISCOMFORT:  Local anesthetic placed at surgery should provide relief for 4-8 hours.  Begin taking pain pills before discomfort is severe.  Take the pain medication with some food, when possible, to minimize side effects.  Intermittent use of ice packs may help during the first 1-3 weeks after surgery.  Expect gradual improvement.    Over-the-counter anti-inflammatory medications (i.e. Ibuprofen/Advil/Motrin or Naprosyn/Aleve) may be used per package instructions in addition to or while tapering off the narcotic pain medications to decrease swelling and sensitivity.  DO NOT TAKE these Anti-inflammatory medications if your primary physician has advised against doing so, or if you have acid reflux, ulcer, or bleeding disorder, or take blood-thinner medications.  Call your primary physician or the surgery office if you have medication questions.    After laparoscopic  cholecystectomy, you may have shoulder or upper back discomfort due to the gas used during surgery.  This is temporary and should resolve within 2-3 days.  Frequent short walks may help with this.  You may have decreased energy level for 1-2 weeks after surgery related to your recovery.    DIET:  Start with liquids and gradually increase diet as tolerated.  Drink plenty of fluids.  While taking pain medications, consider use of a stool softener, increase your fiber in your diet, or add a fiber supplement (like Metamucil, Citrucel) to help prevent constipation - a possible side effect of pain medications.  It is not uncommon to experience some bowel changes (loose stools or constipation) after surgery.  Your body has to adapt to you no longer having a gall bladder.  To help minimize this side effect, avoid fatty foods for 1-2 weeks after surgery.  You may then slowly increase the amount of fatty foods in your diet.      NAUSEA:  If nauseated from the anesthetic/pain meds; rest in bed, get up cautiously with assistance, and drink clear liquids (juice, tea, broth).    FOLLOW-UP AFTER SURGERY:  -Our office will contact you approximately 2-3 weeks after surgery to check on your progress and answer any questions you may have.  If you are doing well, you will not need to return for an office appointment.  If any concerns are identified over the phone, we will help you make an appointment to see a provider.    -If you have not received a phone call, have any questions or concerns, or would like to be seen, please call us at [phone].  We are located at: [address], [address]; [place] [address]    -CONTACT US IF THE FOLLOWING DEVELOPS:   1. A fever that is above 101     2. Increased redness, warmth, drainage, bleeding, or swelling.   3. Pain that is not relieved by rest/ice and your prescription.   4.  Increasing pain after 48 hours.   5. Drainage that is thick, cloudy, yellow, green or white.   6. Any other  questions or concerns.      FREQUENTLY ASKED QUESTIONS:    Q:  How should my incision look?    A:  Normally your incision will appear slightly swollen with light redness directly along the incision itself as it heals.  It may feel like a bump or ridge as the healing/scarring happens, and over time (3-4 months) this bump or ridge feeling should slowly go away.  In general, clear or pink watery drainage can be normal at first as your incision heals, but should decrease over time.    Q:  How do I know if my incision is infected?  A:  Look at your incision for signs of infection, like redness around the incision spreading to surrounding skin, or drainage of cloudy or foul-smelling drainage.  If you feel warm, check your temperature to see if you are running a fever.    **If any of these things occur, please notify the nurse at our office.  We may need you to come into the office for an incision check.      Q:  How do I take care of my incision?  A:  If you have a dressing in place - Starting the day after surgery, replace the dressing 1-2 times a day until there is no further drainage from the incision.  At that time, a dressing is no longer needed.  Try to minimize tape on the skin if irritation is occurring at the tape sites.  If you have significant irritation from tape on the skin, please call the office to discuss other method of dressing your incision.    Small pieces of tape called  steri-strips  may be present directly overlying your incision; these may be removed 10 days after surgery unless otherwise specified by your surgeon.  If these tapes start to loosen at the ends, you may trim them back until they fall off or are removed.    A:  If you had  Dermabond  tissue glue used as a dressing (this causes your incision to look shiny with a clear covering over it) - This type of dressing wears off with time and does not require more dressings over the top unless it is draining around the glue as it wears off.  Do  not apply ointments or lotions over the incisions until the glue has completely worn off.    Q:  There is a piece of tape or a sticky  lead  still on my skin.  Can I remove this?  A:  Sometimes the sticky  leads  used for monitoring during surgery or for evaluation in the emergency department are not all removed while you are in the hospital.  These sometimes have a tab or metal dot on them.  You can easily remove these on your own, like taking off a band-aid.  If there is a gel substance under the  lead , simply wipe/clean it off with a washcloth or paper towel.      Q:  What can I do to minimize constipation (very hard stools, or lack of stools)?  A:  Stay well hydrated.  Increase your dietary fiber intake or take a fiber supplement -with plenty of water.  Walk around frequently.  You may consider an over-the-counter stool-softener.  Your Pharmacist can assist you with choosing one that is stocked at your pharmacy.  Constipation is also one of the most common side effects of pain medication.  If you are using pain medication, be pro-active and try to PREVENT problems with constipation by taking the steps above BEFORE constipation becomes a problem.    Q:  What do I do if I need more pain medications?  A:  Call the office to receive refills.  Be aware that certain pain meds cannot be called into a pharmacy and actually require a paper prescription.  A change may be made in your pain med as you progress thru your recovery period or if you have side effects to certain meds.    --Pain meds are NOT refilled after 5pm on weekdays, and NOT AT ALL on the weekends, so please look ahead to prevent problems.      Q:  Why am I having a hard time sleeping now that I am at home?  A:  Many medications you receive while you are in the hospital can impact your sleep for a number of days after your surgery/hospitalization.  Decreased level of activity and naps during the day may also make sleeping at night difficult.  Try to  minimize day-time naps, and get up frequently during the day to walk around your home during your recovery time.  Sleep aides may be of some help, but are not recommended for long-term use.      Q:  I am having some back discomfort.  What should I do?  A:  This may be related to certain positioning that was required for your surgery, extended periods of time in bed, or other changes in your overall activity level.  You may try ice, heat, acetaminophen, or ibuprofen to treat this temporarily.  Note that many pain medications have acetaminophen in them and would state this on the prescription bottle.  Be sure not to exceed the maximum of 4000mg per day of acetaminophen.     **If the pain you are having does not resolve, is severe, or is a flare of back pain you have had on other occasions prior to surgery, please contact your primary physician for further recommendations or for an appointment to be examined at their office.    Q:  Why am I having headaches?  A:  Headaches can be caused by many things:  caffeine withdrawal, use of pain meds, dehydration, high blood pressure, lack of sleep, over-activity/exhaustion, flare-up of usual migraine headaches.  If you feel this is related to muscle tension (a band-like feeling around the head, or a pressure at the low-back of the head) you may try ice or heat to this area.  You may need to drink more fluids (try electrolyte drink like Gatorade), rest, or take your usual migraine medications.   **If your headaches do not resolve, worsen, are accompanied by other symptoms, or if your blood pressure is high, please call your primary physician for recommendation and/or examination.    Q:  I am unable to urinate.  What do I do?  A:  A small percentage of people can have difficulty urinating initially after surgery.  This includes being able to urinate only a very small amount at a time and feeling discomfort or pressure in the very low abdomen.  This is called  urinary retention ,  and is actually an urgent situation.  Proceed to your nearest Emergency department for evaluation (not an Urgent Care Center).  Sometimes the bladder does not work correctly after certain medications you receive during surgery, or related to certain procedures.  You may need to have a catheter placed until your bladder recovers.  When planning to go to an Emergency department, it may help to call the ER to let them know you are coming in for this problem after a surgery.  This may help you get in quicker to be evaluated.  **If you have symptoms of a urinary tract infection, please contact your primary physician for the proper evaluation and treatment.    If you have other questions, please call the office Monday thru Friday between 8am and 4:30pm to discuss with the nurse or physician assistant.  #[phone]    There is a surgeon ON CALL on weekday evenings and over the weekend in case of urgent need only, and may be contacted at the same number.    If you are having an emergency, call 911 or proceed to your nearest emergency department.    Maximum acetaminophen (Tylenol) dose from all sources should not exceed 4 grams (4000 mg) per day.You received 975 mg at 8:45 a.m.  Do not take any tylenol until 2:45 p.m. today.

## 2025-06-09 LAB
PATH REPORT.COMMENTS IMP SPEC: NORMAL
PATH REPORT.COMMENTS IMP SPEC: NORMAL
PATH REPORT.FINAL DX SPEC: NORMAL
PATH REPORT.GROSS SPEC: NORMAL
PATH REPORT.MICROSCOPIC SPEC OTHER STN: NORMAL
PATH REPORT.RELEVANT HX SPEC: NORMAL
PHOTO IMAGE: NORMAL

## 2025-06-09 PROCEDURE — 88304 TISSUE EXAM BY PATHOLOGIST: CPT | Mod: 26 | Performed by: PATHOLOGY

## 2025-06-19 ENCOUNTER — TELEPHONE (OUTPATIENT)
Dept: SURGERY | Facility: CLINIC | Age: 30
End: 2025-06-19
Payer: MEDICARE

## 2025-06-19 NOTE — TELEPHONE ENCOUNTER
Name of caller: Patient    Reason for Call:  PT wants to talk about going to the gym    Surgeon:  Bassem Bush MD    Recent Surgery:  Yes.    If yes, when & what type:  6/6/25 GB      Best phone number to reach pt at is: [phone]  Ok to leave a message with medical info? Yes.    Pharmacy preferred (if calling for a refill): [facility]

## 2025-06-19 NOTE — TELEPHONE ENCOUNTER
S/p robotic assisted lap sarah   Procedure date: 6/6/25  Surgeon: Dr. Bush    Patient is wondering if she is ok to go back to the gym.      Discussed that she is ok to cautiously resume exercise tomorrow as she will be two weeks post-op.    She should start with low impact exercises and gradually work her way back to her normal routine as pain/discomfort allows.      Can start weights at 20 lbs and increase by 10lbs per week as she tolerates.      No further needs or questions at this time.

## 2025-06-26 ENCOUNTER — TELEPHONE (OUTPATIENT)
Dept: SURGERY | Facility: CLINIC | Age: 30
End: 2025-06-26
Payer: MEDICARE

## 2025-06-26 NOTE — CONFIDENTIAL NOTE
[facility]  Post op call note     Cesia Nettles was called for an update regarding her recovery.  She underwent laparoscopic cholecystectomy by Dr. Bush on 6/6/2025. Today she tells me she is doing well and denies any complaints. She is eating a normal diet and her bowels are regular. She states her wounds are healing well.    The pathology revealed minimal chronic inflammation.  This was discussed with the patient.     She was instructed to slowly and gradually resume all normal activities. She states all of her questions were answered.  She understands our discussion.  She agrees to follow up as needed or to call our office with any concerns.    Ruipnder Wolfe PA-C

## 2025-07-13 ENCOUNTER — HEALTH MAINTENANCE LETTER (OUTPATIENT)
Age: 30
End: 2025-07-13

## 2025-07-30 ENCOUNTER — VIRTUAL VISIT (OUTPATIENT)
Dept: PSYCHIATRY | Facility: CLINIC | Age: 30
End: 2025-07-30
Attending: STUDENT IN AN ORGANIZED HEALTH CARE EDUCATION/TRAINING PROGRAM
Payer: MEDICARE

## 2025-07-30 DIAGNOSIS — T50.905A WEIGHT GAIN DUE TO MEDICATION: ICD-10-CM

## 2025-07-30 DIAGNOSIS — F41.9 ANXIETY DISORDER, UNSPECIFIED TYPE: ICD-10-CM

## 2025-07-30 DIAGNOSIS — F25.0 SCHIZOAFFECTIVE DISORDER, BIPOLAR TYPE (H): ICD-10-CM

## 2025-07-30 DIAGNOSIS — G47.00 INSOMNIA, UNSPECIFIED TYPE: ICD-10-CM

## 2025-07-30 DIAGNOSIS — R63.5 WEIGHT GAIN DUE TO MEDICATION: ICD-10-CM

## 2025-07-30 DIAGNOSIS — R45.89 EMOTIONAL DYSREGULATION: ICD-10-CM

## 2025-07-30 PROCEDURE — 90792 PSYCH DIAG EVAL W/MED SRVCS: CPT | Mod: 95

## 2025-07-30 PROCEDURE — 1126F AMNT PAIN NOTED NONE PRSNT: CPT | Mod: 95

## 2025-07-30 RX ORDER — LAMOTRIGINE 200 MG/1
200 TABLET ORAL DAILY
Qty: 30 TABLET | Refills: 2 | Status: SHIPPED | OUTPATIENT
Start: 2025-07-30

## 2025-07-30 RX ORDER — HYDROXYZINE HYDROCHLORIDE 25 MG/1
25 TABLET, FILM COATED ORAL 2 TIMES DAILY PRN
Qty: 60 TABLET | Refills: 3 | Status: SHIPPED | OUTPATIENT
Start: 2025-07-30

## 2025-07-30 RX ORDER — PALIPERIDONE 6 MG/1
12 TABLET, EXTENDED RELEASE ORAL AT BEDTIME
Qty: 60 TABLET | Refills: 2 | Status: SHIPPED | OUTPATIENT
Start: 2025-07-30

## 2025-07-30 ASSESSMENT — ANXIETY QUESTIONNAIRES
IF YOU CHECKED OFF ANY PROBLEMS ON THIS QUESTIONNAIRE, HOW DIFFICULT HAVE THESE PROBLEMS MADE IT FOR YOU TO DO YOUR WORK, TAKE CARE OF THINGS AT HOME, OR GET ALONG WITH OTHER PEOPLE: VERY DIFFICULT
GAD7 TOTAL SCORE: 14
GAD7 TOTAL SCORE: 14
5. BEING SO RESTLESS THAT IT IS HARD TO SIT STILL: MORE THAN HALF THE DAYS
7. FEELING AFRAID AS IF SOMETHING AWFUL MIGHT HAPPEN: MORE THAN HALF THE DAYS
8. IF YOU CHECKED OFF ANY PROBLEMS, HOW DIFFICULT HAVE THESE MADE IT FOR YOU TO DO YOUR WORK, TAKE CARE OF THINGS AT HOME, OR GET ALONG WITH OTHER PEOPLE?: VERY DIFFICULT
7. FEELING AFRAID AS IF SOMETHING AWFUL MIGHT HAPPEN: MORE THAN HALF THE DAYS
4. TROUBLE RELAXING: MORE THAN HALF THE DAYS
1. FEELING NERVOUS, ANXIOUS, OR ON EDGE: MORE THAN HALF THE DAYS
2. NOT BEING ABLE TO STOP OR CONTROL WORRYING: MORE THAN HALF THE DAYS
6. BECOMING EASILY ANNOYED OR IRRITABLE: SEVERAL DAYS
GAD7 TOTAL SCORE: 14
3. WORRYING TOO MUCH ABOUT DIFFERENT THINGS: NEARLY EVERY DAY

## 2025-07-30 ASSESSMENT — PAIN SCALES - GENERAL: PAINLEVEL_OUTOF10: NO PAIN (0)

## (undated) DEVICE — Device

## (undated) DEVICE — ESU ELEC BLADE 2.75" COATED/INSULATED E1455

## (undated) DEVICE — DAVINCI XI SUCTION IRRIGATOR ENDOWRIST 480299

## (undated) DEVICE — SOL WATER IRRIG 1000ML BOTTLE 07139-09

## (undated) DEVICE — SU VICRYL+ 4-0 UNDYED PS-2 VCP496ZH

## (undated) DEVICE — LINEN ORTHO ACL PACK 5447

## (undated) DEVICE — DAVINCI XI SEAL UNIVERSAL 5-12MM 470500

## (undated) DEVICE — LUBRICANT INST ELECTROLUBE EL101

## (undated) DEVICE — GLOVE PROTEXIS BLUE W/NEU-THERA 8.0  2D73EB80

## (undated) DEVICE — SOLUTION IV 0.9% NACL 1000ML E8000

## (undated) DEVICE — SU DERMABOND ADVANCED .7ML DNX12

## (undated) DEVICE — DAVINCI XI DRAPE ARM 470015

## (undated) DEVICE — DAVINCI XI OBTURATOR BLADELESS 8MM 470359

## (undated) DEVICE — SU VICRYL+ 0 27 UR6 VLT VCP603H

## (undated) DEVICE — CLIP ENDO HEMO-LOC GREEN MED/LG 544230

## (undated) DEVICE — SUCTION MANIFOLD NEPTUNE 2 SYS 4 PORT 0702-020-000

## (undated) DEVICE — ENDO POUCH UNIVERSAL RETRIEVAL SYSTEM INZII 5MM CD003

## (undated) DEVICE — ESU GROUND PAD ADULT W/CORD E7507

## (undated) DEVICE — ESU PENCIL W/HOLSTER E2350H

## (undated) DEVICE — BLADE KNIFE SURG 11 371111

## (undated) RX ORDER — BUPIVACAINE HYDROCHLORIDE 5 MG/ML
INJECTION, SOLUTION EPIDURAL; INTRACAUDAL; PERINEURAL
Status: DISPENSED
Start: 2025-06-06

## (undated) RX ORDER — ACETAMINOPHEN 325 MG/1
TABLET ORAL
Status: DISPENSED
Start: 2025-06-06

## (undated) RX ORDER — FENTANYL CITRATE 50 UG/ML
INJECTION, SOLUTION INTRAMUSCULAR; INTRAVENOUS
Status: DISPENSED
Start: 2025-06-06

## (undated) RX ORDER — PROPOFOL 10 MG/ML
INJECTION, EMULSION INTRAVENOUS
Status: DISPENSED
Start: 2025-06-06

## (undated) RX ORDER — INDOCYANINE GREEN AND WATER 25 MG
KIT INJECTION
Status: DISPENSED
Start: 2025-06-06

## (undated) RX ORDER — KETOROLAC TROMETHAMINE 30 MG/ML
INJECTION, SOLUTION INTRAMUSCULAR; INTRAVENOUS
Status: DISPENSED
Start: 2025-06-06

## (undated) RX ORDER — LIDOCAINE HYDROCHLORIDE 10 MG/ML
INJECTION, SOLUTION EPIDURAL; INFILTRATION; INTRACAUDAL; PERINEURAL
Status: DISPENSED
Start: 2025-06-06

## (undated) RX ORDER — ONDANSETRON 2 MG/ML
INJECTION INTRAMUSCULAR; INTRAVENOUS
Status: DISPENSED
Start: 2025-06-06

## (undated) RX ORDER — DEXAMETHASONE SODIUM PHOSPHATE 4 MG/ML
INJECTION, SOLUTION INTRA-ARTICULAR; INTRALESIONAL; INTRAMUSCULAR; INTRAVENOUS; SOFT TISSUE
Status: DISPENSED
Start: 2025-06-06

## (undated) RX ORDER — CEFAZOLIN SODIUM/WATER 2 G/20 ML
SYRINGE (ML) INTRAVENOUS
Status: DISPENSED
Start: 2025-06-06

## (undated) RX ORDER — GLYCOPYRROLATE 0.2 MG/ML
INJECTION, SOLUTION INTRAMUSCULAR; INTRAVENOUS
Status: DISPENSED
Start: 2025-06-06